# Patient Record
Sex: MALE | Race: WHITE | NOT HISPANIC OR LATINO | Employment: OTHER | URBAN - NONMETROPOLITAN AREA
[De-identification: names, ages, dates, MRNs, and addresses within clinical notes are randomized per-mention and may not be internally consistent; named-entity substitution may affect disease eponyms.]

---

## 2017-01-13 ENCOUNTER — OFFICE VISIT (OUTPATIENT)
Dept: INTERNAL MEDICINE | Facility: CLINIC | Age: 52
End: 2017-01-13

## 2017-01-13 VITALS
SYSTOLIC BLOOD PRESSURE: 134 MMHG | TEMPERATURE: 98.4 F | RESPIRATION RATE: 14 BRPM | HEART RATE: 104 BPM | HEIGHT: 69 IN | WEIGHT: 277 LBS | DIASTOLIC BLOOD PRESSURE: 86 MMHG | OXYGEN SATURATION: 98 % | BODY MASS INDEX: 41.03 KG/M2

## 2017-01-13 DIAGNOSIS — N52.9 ERECTILE DYSFUNCTION, UNSPECIFIED ERECTILE DYSFUNCTION TYPE: ICD-10-CM

## 2017-01-13 DIAGNOSIS — I10 ESSENTIAL HYPERTENSION: ICD-10-CM

## 2017-01-13 DIAGNOSIS — Z23 NEED FOR VACCINATION: ICD-10-CM

## 2017-01-13 DIAGNOSIS — E55.9 VITAMIN D DEFICIENCY DISEASE: ICD-10-CM

## 2017-01-13 DIAGNOSIS — E11.8 TYPE 2 DIABETES MELLITUS WITH COMPLICATION, WITHOUT LONG-TERM CURRENT USE OF INSULIN (HCC): ICD-10-CM

## 2017-01-13 DIAGNOSIS — R00.0 TACHYCARDIA: Primary | ICD-10-CM

## 2017-01-13 DIAGNOSIS — E78.5 HYPERLIPIDEMIA, UNSPECIFIED HYPERLIPIDEMIA TYPE: ICD-10-CM

## 2017-01-13 DIAGNOSIS — Z00.00 HEALTH CARE MAINTENANCE: ICD-10-CM

## 2017-01-13 DIAGNOSIS — E66.01 MORBID OBESITY, UNSPECIFIED OBESITY TYPE (HCC): ICD-10-CM

## 2017-01-13 PROCEDURE — 90732 PPSV23 VACC 2 YRS+ SUBQ/IM: CPT | Performed by: INTERNAL MEDICINE

## 2017-01-13 PROCEDURE — 90715 TDAP VACCINE 7 YRS/> IM: CPT | Performed by: INTERNAL MEDICINE

## 2017-01-13 PROCEDURE — 99396 PREV VISIT EST AGE 40-64: CPT | Performed by: INTERNAL MEDICINE

## 2017-01-13 PROCEDURE — 90471 IMMUNIZATION ADMIN: CPT | Performed by: INTERNAL MEDICINE

## 2017-01-13 PROCEDURE — 90472 IMMUNIZATION ADMIN EACH ADD: CPT | Performed by: INTERNAL MEDICINE

## 2017-01-13 PROCEDURE — 99213 OFFICE O/P EST LOW 20 MIN: CPT | Performed by: INTERNAL MEDICINE

## 2017-01-13 NOTE — MR AVS SNAPSHOT
Myke Marcial   1/13/2017 9:15 AM   Office Visit    Provider:  Chucky Connors MD   Department:  [facility] PRIMARY CARE   Dept Phone:  [phone]                Your Full Care Plan              Your Updated Medication List          This list is accurate as of: 1/13/17 11:38 AM.  Always use your most recent med list.                amLODIPine 5 MG tablet   Commonly known as:  NORVASC   Take 1 tablet by mouth Daily.       atorvastatin 20 MG tablet   Commonly known as:  LIPITOR   Take 1 tablet by mouth daily.       Canagliflozin 100 MG tablet   Commonly known as:  INVOKANA   1 tab daily po       glimepiride 4 MG tablet   Commonly known as:  AMARYL   1 tab bid po       insulin glargine 100 UNIT/ML injection   Commonly known as:  LANTUS   10u q am, increase 1u every 3days until am sugar below 140       Insulin Pen Needle 31G X 5 MM misc   1 each Daily.       Insulin Syringes (Disposable) U-100 1 ML misc   2 each Daily.       lisinopril 40 MG tablet   Commonly known as:  PRINIVIL,ZESTRIL   Take 1 tablet by mouth Daily.       metFORMIN 1000 MG tablet   Commonly known as:  GLUCOPHAGE   Take 1 tablet by mouth 2 (two) times a day with meals.       pramipexole 0.25 MG tablet   Commonly known as:  MIRAPEX   Take 1 tablet by mouth 3 (Three) Times a Day.       SITagliptin 100 MG tablet   Commonly known as:  JANUVIA   Take 1 tablet by mouth Daily.               We Performed the Following     Ambulatory Referral to Gastroenterology     Tdap Vaccine Greater Than or Equal To 8yo IM       You Were Diagnosed With        Codes Comments    Tachycardia    -  Primary ICD-10-CM: R00.0  ICD-9-CM: 785.0     Essential hypertension     ICD-10-CM: I10  ICD-9-CM: 401.9     Hyperlipidemia, unspecified hyperlipidemia type     ICD-10-CM: E78.5  ICD-9-CM: 272.4     Morbid obesity, unspecified obesity type     ICD-10-CM: E66.01  ICD-9-CM: 278.01     Vitamin D deficiency disease     ICD-10-CM:  "E55.9  ICD-9-CM: 268.9     Type 2 diabetes mellitus with complication, without long-term current use of insulin     ICD-10-CM: E11.8  ICD-9-CM: 250.90     Erectile dysfunction, unspecified erectile dysfunction type     ICD-10-CM: N52.9  ICD-9-CM: 607.84     Health care maintenance     ICD-10-CM: Z00.00  ICD-9-CM: V70.0       Medications to be Given to You by a Medical Professional     Due       Frequency    (none) pneumococcal polysaccharide 23-valent (PNEUMOVAX-23) vaccine 0.5 mL  During Hospitalization      Instructions     None    Patient Instructions History      MyChart Signup     Our records indicate that you have an active [employer] account.    You can view your After Visit Summary by going to [email or web address] and logging in with your [employer] username and password.  If you don't have a [employer] username and password but a parent or guardian has access to your record, the parent or guardian should login with their own [employer] username and password and access your record to view the After Visit Summary.    If you have questions, you can email [email or web address] or call [phone] to talk to our [employer] staff.  Remember, [employer] is NOT to be used for urgent needs.  For medical emergencies, dial 911.               Other Info from Your Visit           Your Appointments     Jun 21, 2017  9:00 AM EDT   Follow Up with TAE Han   [facility] SLE[facility] (--)    [address] [facility] [address] [address] [phone]           Arrive 15 minutes prior to appointment.              Allergies     Penicillins  Rash      Reason for Visit     Annual Exam Here for physical      Vital Signs     Blood Pressure Pulse Temperature Respirations Height Weight    134/86 104 98.4 °F (36.9 °C) 14 69\" (175.3 cm) 277 lb (126 kg)    Oxygen Saturation Body Mass Index Smoking Status             98% 40.91 kg/m2 Never Smoker       "   Problems and Diagnoses Noted     Erectile dysfunction    High blood pressure    High cholesterol or triglycerides    Severe obesity    Type 2 diabetes mellitus with manifestations    Vitamin D deficiency disease    Fast heart beat    -  Primary    Health maintenance examination          Treatment Goal(s) as of 1/13/2017 at 11:38 AM     1. Eat more fruits and vegetables (pt-stated)

## 2017-01-13 NOTE — PROGRESS NOTES
Subjective   Myke Marcial is a 51 y.o. male and is here for a comprehensive physical exam. Patient here for annual physical. Patient also has multiple medical problems to be followed up. Below is a level III office visit note.  Blood pressure stable on medicine. 1/11/17 BP monitor registered  while accompanying his mother in the [facility].patient was brought to ER but Telemetry showed only 90. Blood tests all normal. ?monitor error for patient did not have any sx at that time.  Diabetes is 150's based on FSBS medication, lantus 13u daily insulin  Hyperlipidemia stable medication. Patient is overweight. No chest pain no short of breath. Complaining erectile dysfunction    Do you take any herbs or supplements that were not prescribed by a doctor? no  Are you taking calcium supplements? no  Are you taking aspirin daily? no      The following portions of the patient's history were reviewed and updated as appropriate: allergies, current medications, past family history, past medical history, past social history, past surgical history and problem list.      Review of Systems   Constitutional: Negative.    HENT: Negative.    Eyes: Negative.    Respiratory: Negative.    Cardiovascular: Negative.    Gastrointestinal: Negative.    Endocrine: Negative.    Genitourinary: Negative.    Musculoskeletal: Negative.    Skin: Negative.    Allergic/Immunologic: Negative.    Neurological: Negative.    Hematological: Negative.    Psychiatric/Behavioral: Negative.    All other systems reviewed and are negative.        Physical Exam   Constitutional: He is oriented to person, place, and time. He appears well-developed and well-nourished.   HENT:   Head: Normocephalic and atraumatic.   Right Ear: External ear normal.   Left Ear: External ear normal.   Nose: Nose normal.   Mouth/Throat: Oropharynx is clear and moist.   Eyes: Conjunctivae and EOM are normal. Pupils are equal, round, and reactive to light.   Neck:  Normal range of motion. Neck supple. No thyromegaly present.   Cardiovascular: Normal rate, regular rhythm, normal heart sounds and intact distal pulses.    Pulmonary/Chest: Effort normal and breath sounds normal.   Abdominal: Soft. Bowel sounds are normal.   Genitourinary: Rectum normal, prostate normal and penis normal.   Musculoskeletal: Normal range of motion.   Neurological: He is alert and oriented to person, place, and time. He has normal reflexes.   Skin: Skin is warm and dry.   Psychiatric: He has a normal mood and affect. His behavior is normal. Judgment and thought content normal.   Nursing note and vitals reviewed.      All  tests have been reviewed.    Assessment/Plan          1. Patient Counseling:  --Nutrition: Stressed importance of moderation in sodium/caffeine intake, saturated fat and cholesterol, caloric balance, sufficient intake of fresh fruits, vegetables, fiber, calcium and iron.  --Exercise: Stressed the importance of regular exercise.   --Injury prevention: Discussed safety belts, safety helmets, smoke detector, smoking near bedding or upholstery.   --Dental health: Discussed importance of regular tooth brushing, flossing, and dental visits.  --Immunizations reviewed.  --Discussed benefits of screening colonoscopy.  --After hours service discussed with patient    2. Discussed the patient's BMI with him.             Essential hypertension continue lisinopril 40, norvasc 10  Sleep apnea continue CPAP  Type 2 diabetes mellitus, continue amaryl 4mg bid, d/c invokana due to cost, continue lantus 13u and increase by 1u q3d till am sugar below 130  Hyperlipidemia continue  Morbid obesity, diet and weight loss, counseling   Erectile dysfunction, decline med now   vit D low, do lab next  RLS, continue  mirapex  Umbilical hernia watch for now  onyco watch for now  ?tackycardia holter start, obtain rec from [facility]  tdap today and pneumovax today  3 mo after labs

## 2017-01-30 RX ORDER — LISINOPRIL 40 MG/1
TABLET ORAL
Qty: 30 TABLET | Refills: 0 | Status: SHIPPED | OUTPATIENT
Start: 2017-01-30 | End: 2017-08-21 | Stop reason: SDUPTHER

## 2017-02-06 RX ORDER — SITAGLIPTIN 100 MG/1
TABLET, FILM COATED ORAL
Qty: 30 TABLET | Refills: 0 | Status: SHIPPED | OUTPATIENT
Start: 2017-02-06 | End: 2017-07-05 | Stop reason: SDUPTHER

## 2017-02-27 RX ORDER — PRAMIPEXOLE DIHYDROCHLORIDE 0.25 MG/1
TABLET ORAL
Qty: 90 TABLET | Refills: 0 | Status: SHIPPED | OUTPATIENT
Start: 2017-02-27 | End: 2018-05-25 | Stop reason: SDUPTHER

## 2017-02-27 RX ORDER — AMLODIPINE BESYLATE 5 MG/1
TABLET ORAL
Qty: 30 TABLET | Refills: 0 | Status: SHIPPED | OUTPATIENT
Start: 2017-02-27 | End: 2017-07-05 | Stop reason: SDUPTHER

## 2017-02-27 RX ORDER — GLIMEPIRIDE 4 MG/1
TABLET ORAL
Qty: 60 TABLET | Refills: 0 | Status: SHIPPED | OUTPATIENT
Start: 2017-02-27 | End: 2017-08-21 | Stop reason: SDUPTHER

## 2017-06-09 ENCOUNTER — TELEPHONE (OUTPATIENT)
Dept: INTERNAL MEDICINE | Facility: CLINIC | Age: 52
End: 2017-06-09

## 2017-06-09 RX ORDER — INSULIN GLARGINE 100 [IU]/ML
INJECTION, SOLUTION SUBCUTANEOUS
Qty: 3 PEN | Refills: 6 | Status: SHIPPED | OUTPATIENT
Start: 2017-06-09 | End: 2018-10-31 | Stop reason: SDUPTHER

## 2017-07-05 RX ORDER — AMLODIPINE BESYLATE 5 MG/1
5 TABLET ORAL DAILY
Qty: 90 TABLET | Refills: 3 | Status: SHIPPED | OUTPATIENT
Start: 2017-07-05 | End: 2018-01-12 | Stop reason: ALTCHOICE

## 2017-07-05 RX ORDER — PRAMIPEXOLE DIHYDROCHLORIDE 0.25 MG/1
0.25 TABLET ORAL 3 TIMES DAILY
Qty: 90 TABLET | Refills: 3 | Status: SHIPPED | OUTPATIENT
Start: 2017-07-05 | End: 2017-11-15 | Stop reason: SDUPTHER

## 2017-08-21 RX ORDER — GLIMEPIRIDE 4 MG/1
TABLET ORAL
Qty: 60 TABLET | Refills: 0 | Status: SHIPPED | OUTPATIENT
Start: 2017-08-21 | End: 2017-11-29 | Stop reason: SDUPTHER

## 2017-08-21 RX ORDER — LISINOPRIL 40 MG/1
TABLET ORAL
Qty: 30 TABLET | Refills: 0 | Status: SHIPPED | OUTPATIENT
Start: 2017-08-21 | End: 2017-11-20 | Stop reason: SDUPTHER

## 2017-09-11 RX ORDER — ATORVASTATIN CALCIUM 20 MG/1
TABLET, FILM COATED ORAL
Qty: 90 TABLET | Refills: 3 | Status: SHIPPED | OUTPATIENT
Start: 2017-09-11 | End: 2019-06-17 | Stop reason: SDUPTHER

## 2017-11-15 RX ORDER — PRAMIPEXOLE DIHYDROCHLORIDE 0.25 MG/1
TABLET ORAL
Qty: 90 TABLET | Refills: 3 | Status: SHIPPED | OUTPATIENT
Start: 2017-11-15 | End: 2018-07-05 | Stop reason: SDUPTHER

## 2017-11-20 RX ORDER — LISINOPRIL 40 MG/1
40 TABLET ORAL DAILY
Qty: 30 TABLET | Refills: 11 | Status: SHIPPED | OUTPATIENT
Start: 2017-11-20 | End: 2019-02-07 | Stop reason: SINTOL

## 2017-11-29 RX ORDER — GLIMEPIRIDE 4 MG/1
4 TABLET ORAL 2 TIMES DAILY
Qty: 60 TABLET | Refills: 11 | Status: SHIPPED | OUTPATIENT
Start: 2017-11-29 | End: 2018-08-30 | Stop reason: SDUPTHER

## 2018-01-12 ENCOUNTER — OFFICE VISIT (OUTPATIENT)
Dept: INTERNAL MEDICINE | Facility: CLINIC | Age: 53
End: 2018-01-12

## 2018-01-12 VITALS
SYSTOLIC BLOOD PRESSURE: 152 MMHG | WEIGHT: 289 LBS | DIASTOLIC BLOOD PRESSURE: 102 MMHG | HEIGHT: 69 IN | BODY MASS INDEX: 42.8 KG/M2 | HEART RATE: 90 BPM | TEMPERATURE: 98.5 F | OXYGEN SATURATION: 95 %

## 2018-01-12 DIAGNOSIS — E66.01 MORBID OBESITY (HCC): ICD-10-CM

## 2018-01-12 DIAGNOSIS — E78.5 HYPERLIPIDEMIA, UNSPECIFIED HYPERLIPIDEMIA TYPE: ICD-10-CM

## 2018-01-12 DIAGNOSIS — Z79.4 TYPE 2 DIABETES MELLITUS WITH COMPLICATION, WITH LONG-TERM CURRENT USE OF INSULIN (HCC): ICD-10-CM

## 2018-01-12 DIAGNOSIS — R53.83 FATIGUE, UNSPECIFIED TYPE: ICD-10-CM

## 2018-01-12 DIAGNOSIS — Z11.59 ENCOUNTER FOR HEPATITIS C SCREENING TEST FOR LOW RISK PATIENT: ICD-10-CM

## 2018-01-12 DIAGNOSIS — E11.8 TYPE 2 DIABETES MELLITUS WITH COMPLICATION, WITH LONG-TERM CURRENT USE OF INSULIN (HCC): ICD-10-CM

## 2018-01-12 DIAGNOSIS — I10 ESSENTIAL HYPERTENSION: Primary | ICD-10-CM

## 2018-01-12 PROCEDURE — 99214 OFFICE O/P EST MOD 30 MIN: CPT | Performed by: PHYSICIAN ASSISTANT

## 2018-01-12 RX ORDER — AMLODIPINE BESYLATE 10 MG/1
10 TABLET ORAL DAILY
Qty: 30 TABLET | Refills: 5 | Status: SHIPPED | OUTPATIENT
Start: 2018-01-12 | End: 2018-06-07 | Stop reason: SDUPTHER

## 2018-01-12 NOTE — PROGRESS NOTES
"Subjective     Chief Complaint: elevated BP    History of Present Illness     Myke Marcial is a 52 y.o. male presents with elevated BP readings. Reading 150s-160s systolic when checked at work today.  Notes increased stress at work today.  States his face feels hot and appears red, but denies headache, vision changes, SOA, chest pain, leg swelling. Other that a bit of a cough and scratchy throat, he feels fine. Denies any new medications. He's been taking lisinopril 40mg and amlodipine 5mg daily, BP has been well controlled with this regimen in the past. Denies any history of MI, CVA.     He was seen by Dr. Connors in January 2017, however he did not complete his lab work at that time.  No recent labs since then.  States he is sure his A1c will be high as he has not been watching his diet or exercising.    The following portions of the patient's history were reviewed and updated as appropriate: current medications, allergies, PMH.    Review of Systems   Constitutional: Negative for chills, fatigue and fever.   HENT: Positive for congestion and sore throat. Negative for ear pain, rhinorrhea, sinus pain, sinus pressure, tinnitus, trouble swallowing and voice change.    Respiratory: Negative for cough, chest tightness, shortness of breath and wheezing.    Cardiovascular: Negative for chest pain, palpitations and leg swelling.   Gastrointestinal: Negative.    Endocrine: Negative.    Genitourinary: Negative.    Musculoskeletal: Negative.    Allergic/Immunologic: Negative.    Neurological: Negative for dizziness, weakness, light-headedness and headaches.   Psychiatric/Behavioral: Negative.        Objective     Vitals:    01/12/18 1326   BP: (!) 152/102   Pulse: 90   Temp: 98.5 °F (36.9 °C)   SpO2: 95%   Weight: 131 kg (289 lb)   Height: 175.3 cm (69.02\")       Physical Exam   Constitutional: He is oriented to person, place, and time.   Obese male.   HENT:   Head: Normocephalic and atraumatic.   Right Ear: Tympanic " membrane and external ear normal.   Left Ear: Tympanic membrane and external ear normal.   Nose: Rhinorrhea present.   Mouth/Throat: Posterior oropharyngeal erythema present.   Eyes: EOM are normal. Pupils are equal, round, and reactive to light.   Cardiovascular: Normal rate, regular rhythm and normal heart sounds.    Pulmonary/Chest: Effort normal and breath sounds normal.   Musculoskeletal: Normal range of motion. He exhibits no tenderness.   Lymphadenopathy:     He has no cervical adenopathy.   Neurological: He is alert and oriented to person, place, and time.       Assessment/Plan     Diagnoses and all orders for this visit:    Essential hypertension  -     CBC Auto Differential  -     Comprehensive Metabolic Panel  -     amLODIPine (NORVASC) 10 MG tablet; Take 1 tablet by mouth Daily.    Morbid obesity  -     Hemoglobin A1c  -     Lipid Panel    Hyperlipidemia, unspecified hyperlipidemia type  -     Hemoglobin A1c  -     Lipid Panel    Type 2 diabetes mellitus with complication, with long-term current use of insulin  -     Comprehensive Metabolic Panel  -     Hemoglobin A1c  -     Lipid Panel    Encounter for hepatitis C screening test for low risk patient  -     Hepatitis C Antibody    Fatigue, unspecified type  -     CBC Auto Differential  -     TSH      Increase amlodipine to 10 mg daily.  Patient states there is a monitor at work and his wife has a monitor at home, he will keep an eye on his blood pressure over the next few days.  We discussed the risks associated with hypertension, when and where to seek treatment if blood pressure were to increase.  We also discussed dietary changes, reducing salt, healthy options for both his hypertension and diabetes.  Patient will return in one day next week for labs as he is not fasting today.  Follow up on blood pressure and labs in 1 month.    Maribel Hall PA-C  01/12/2018         Please note that portions of this note were completed with a voice recognition  program. Efforts were made to edit dictation, but occasionally words are mistranscribed.

## 2018-02-02 ENCOUNTER — OFFICE VISIT (OUTPATIENT)
Dept: INTERNAL MEDICINE | Facility: CLINIC | Age: 53
End: 2018-02-02

## 2018-02-02 VITALS
DIASTOLIC BLOOD PRESSURE: 100 MMHG | BODY MASS INDEX: 42.36 KG/M2 | SYSTOLIC BLOOD PRESSURE: 152 MMHG | TEMPERATURE: 98.7 F | WEIGHT: 286 LBS | HEIGHT: 69 IN | OXYGEN SATURATION: 95 % | HEART RATE: 91 BPM

## 2018-02-02 DIAGNOSIS — I10 ESSENTIAL HYPERTENSION: Primary | ICD-10-CM

## 2018-02-02 PROCEDURE — 99213 OFFICE O/P EST LOW 20 MIN: CPT | Performed by: PHYSICIAN ASSISTANT

## 2018-02-02 NOTE — PROGRESS NOTES
"Subjective     Chief Complaint: follow up blood pressure    History of Present Illness     Myke Marcial is a 52 y.o. male presenting to follow up on his elevated blood pressure.  At last visit, we increased his amlodipine from 5-10 mg.  Previously his BP had been running in the 150s to 160s systolic.  Patient reports he believes it has been doing better at home with the increased dose however he forgot to take his medication last night and that is why it is elevated again today.  States he has also been trying to work on his diet at home.  Fasting today so he can get lab work completed.  He denies headache, dizziness, vision changes, chest pain, leg swelling, shortness of breath.    The following portions of the patient's history were reviewed and updated as appropriate: current medications, allergies, PMH.    Review of Systems   Respiratory: Negative for cough, chest tightness, shortness of breath and wheezing.    Cardiovascular: Negative for chest pain, palpitations and leg swelling.   Neurological: Negative for dizziness, weakness, light-headedness and headaches.     Objective     Vitals:    02/02/18 0753   BP: 152/100   Pulse: 91   Temp: 98.7 °F (37.1 °C)   SpO2: 95%   Weight: 130 kg (286 lb)   Height: 175.3 cm (69.02\")       Physical Exam   Constitutional: He is oriented to person, place, and time.   Pleasant male.  Obese.   Cardiovascular: Normal rate, regular rhythm and normal heart sounds.    Pulmonary/Chest: Effort normal and breath sounds normal.   Abdominal: Soft. Bowel sounds are normal.   Lymphadenopathy:     He has no cervical adenopathy.   Neurological: He is alert and oriented to person, place, and time.   Skin: Skin is warm and dry.   Psychiatric: He has a normal mood and affect.      Assessment/Plan     Diagnoses and all orders for this visit:    Essential hypertension    Patient will complete lab work today that was ordered at last visit.  We again had a long discussion of the importance " of blood pressure control, especially in a diabetic patient.  We discussed the impact healthy diet and adding in exercise could have on his health long-term.  He will continue to monitor his blood pressure at home and let me know what it has been running at next visit.    Maribel Hall PA-C  02/02/2018         Please note that portions of this note were completed with a voice recognition program. Efforts were made to edit dictation, but occasionally words are mistranscribed.

## 2018-02-03 LAB
ALBUMIN SERPL-MCNC: 4.1 G/DL (ref 3.5–5)
ALBUMIN/GLOB SERPL: 1.3 G/DL (ref 1–2)
ALP SERPL-CCNC: 79 U/L (ref 38–126)
ALT SERPL-CCNC: 39 U/L (ref 13–69)
AST SERPL-CCNC: 21 U/L (ref 15–46)
BASOPHILS # BLD AUTO: 0.06 10*3/MM3 (ref 0–0.2)
BASOPHILS NFR BLD AUTO: 1.1 % (ref 0–2.5)
BILIRUB SERPL-MCNC: 0.5 MG/DL (ref 0.2–1.3)
BUN SERPL-MCNC: 15 MG/DL (ref 7–20)
BUN/CREAT SERPL: 16.7 (ref 6.3–21.9)
CALCIUM SERPL-MCNC: 9.6 MG/DL (ref 8.4–10.2)
CHLORIDE SERPL-SCNC: 102 MMOL/L (ref 98–107)
CHOLEST SERPL-MCNC: 172 MG/DL (ref 0–199)
CO2 SERPL-SCNC: 28 MMOL/L (ref 26–30)
CREAT SERPL-MCNC: 0.9 MG/DL (ref 0.6–1.3)
EOSINOPHIL # BLD AUTO: 0.08 10*3/MM3 (ref 0–0.7)
EOSINOPHIL NFR BLD AUTO: 1.5 % (ref 0–7)
ERYTHROCYTE [DISTWIDTH] IN BLOOD BY AUTOMATED COUNT: 13.1 % (ref 11.5–14.5)
GFR SERPLBLD CREATININE-BSD FMLA CKD-EPI: 108 ML/MIN/1.73
GFR SERPLBLD CREATININE-BSD FMLA CKD-EPI: 89 ML/MIN/1.73
GLOBULIN SER CALC-MCNC: 3.2 GM/DL
GLUCOSE SERPL-MCNC: 230 MG/DL (ref 74–98)
HBA1C MFR BLD: 9.3 %
HCT VFR BLD AUTO: 46 % (ref 42–52)
HCV AB S/CO SERPL IA: <0.1 S/CO RATIO (ref 0–0.9)
HDLC SERPL-MCNC: 35 MG/DL (ref 40–60)
HGB BLD-MCNC: 15.2 G/DL (ref 14–18)
IMM GRANULOCYTES # BLD: 0.02 10*3/MM3 (ref 0–0.06)
IMM GRANULOCYTES NFR BLD: 0.4 % (ref 0–0.6)
LDLC SERPL CALC-MCNC: 59 MG/DL (ref 0–99)
LYMPHOCYTES # BLD AUTO: 1.39 10*3/MM3 (ref 0.6–3.4)
LYMPHOCYTES NFR BLD AUTO: 25.4 % (ref 10–50)
MCH RBC QN AUTO: 30.1 PG (ref 27–31)
MCHC RBC AUTO-ENTMCNC: 33 G/DL (ref 30–37)
MCV RBC AUTO: 91.1 FL (ref 80–94)
MONOCYTES # BLD AUTO: 0.46 10*3/MM3 (ref 0–0.9)
MONOCYTES NFR BLD AUTO: 8.4 % (ref 0–12)
NEUTROPHILS # BLD AUTO: 3.47 10*3/MM3 (ref 2–6.9)
NEUTROPHILS NFR BLD AUTO: 63.2 % (ref 37–80)
NRBC BLD AUTO-RTO: 0 /100 WBC (ref 0–0)
PLATELET # BLD AUTO: 257 10*3/MM3 (ref 130–400)
POTASSIUM SERPL-SCNC: 5.3 MMOL/L (ref 3.5–5.1)
PROT SERPL-MCNC: 7.3 G/DL (ref 6.3–8.2)
RBC # BLD AUTO: 5.05 10*6/MM3 (ref 4.7–6.1)
SODIUM SERPL-SCNC: 141 MMOL/L (ref 137–145)
TRIGL SERPL-MCNC: 388 MG/DL
TSH SERPL DL<=0.005 MIU/L-ACNC: 2.39 MIU/ML (ref 0.47–4.68)
VLDLC SERPL CALC-MCNC: 77.6 MG/DL
WBC # BLD AUTO: 5.48 10*3/MM3 (ref 4.8–10.8)

## 2018-02-23 DIAGNOSIS — I10 ESSENTIAL HYPERTENSION: Primary | ICD-10-CM

## 2018-02-23 RX ORDER — HYDROCHLOROTHIAZIDE 12.5 MG/1
12.5 CAPSULE, GELATIN COATED ORAL DAILY
Qty: 30 CAPSULE | Refills: 3 | Status: SHIPPED | OUTPATIENT
Start: 2018-02-23 | End: 2018-06-07 | Stop reason: SDUPTHER

## 2018-05-23 ENCOUNTER — OFFICE VISIT (OUTPATIENT)
Dept: FAMILY MEDICINE CLINIC | Facility: CLINIC | Age: 53
End: 2018-05-23

## 2018-05-23 ENCOUNTER — TELEPHONE (OUTPATIENT)
Dept: SURGERY | Facility: CLINIC | Age: 53
End: 2018-05-23

## 2018-05-23 DIAGNOSIS — E11.8 TYPE 2 DIABETES MELLITUS WITH COMPLICATION, WITH LONG-TERM CURRENT USE OF INSULIN (HCC): ICD-10-CM

## 2018-05-23 DIAGNOSIS — E55.9 VITAMIN D DEFICIENCY DISEASE: ICD-10-CM

## 2018-05-23 DIAGNOSIS — Z12.11 SCREENING FOR COLON CANCER: ICD-10-CM

## 2018-05-23 DIAGNOSIS — E66.01 MORBID OBESITY (HCC): ICD-10-CM

## 2018-05-23 DIAGNOSIS — G47.33 OSA ON CPAP: ICD-10-CM

## 2018-05-23 DIAGNOSIS — E78.2 MIXED HYPERLIPIDEMIA: ICD-10-CM

## 2018-05-23 DIAGNOSIS — Z79.4 TYPE 2 DIABETES MELLITUS WITH COMPLICATION, WITH LONG-TERM CURRENT USE OF INSULIN (HCC): ICD-10-CM

## 2018-05-23 DIAGNOSIS — Z99.89 OSA ON CPAP: ICD-10-CM

## 2018-05-23 DIAGNOSIS — I10 ESSENTIAL HYPERTENSION: Primary | ICD-10-CM

## 2018-05-23 LAB — GLUCOSE BLDC GLUCOMTR-MCNC: 160 MG/DL (ref 70–130)

## 2018-05-23 PROCEDURE — 82962 GLUCOSE BLOOD TEST: CPT | Performed by: FAMILY MEDICINE

## 2018-05-23 PROCEDURE — 99214 OFFICE O/P EST MOD 30 MIN: CPT | Performed by: FAMILY MEDICINE

## 2018-05-23 NOTE — PROGRESS NOTES
"Subjective   Myke Marcial is a 52 y.o. male.     History of Present Illness  Mr. Marcial presents today as a new patient with his wife to establish care.  He was previously followed at [facility] in [place].    Hypertension: Patient here for follow-up of elevated blood pressure. He is not regularly exercising and is not adherent to low salt diet.  Blood pressure is well controlled at home. Cardiac symptoms none. Patient denies chest pain, claudication, cough, dyspnea, exertional chest pressure/discomfort, irregular heart beat, orthopnea, palpitations, syncope and tachypnea.  Cardiovascular risk factors: diabetes mellitus, dyslipidemia, hypertension, male gender, obesity (BMI >= 30 kg/m2) and sedentary lifestyle. Use of agents associated with hypertension: none. History of target organ damage: none. Reports taking meds as rx'd.    Hyperlipidemia: Patient presents for f/u on hyperlipidemia.  He was tested because of comorbidities.  His last labs reviewed on chart. Cardiac symptoms as above. No focal neuro symptoms. There is a family history of hyperlipidemia. There is not a family history of early ischemia heart disease.    Diabetes Mellitus Type II, Follow-up: Patient here for follow-up of Type 2 diabetes mellitus.  Current symptoms/problems include hyperglycemia and have been unchanged. Symptoms have been present for several months.  He admits he has been having difficulty remembering to take nighttime diabetic medications.  He is currently on Januvia, Glucophage, Invokana, Amaryl as well as basal insulin.    Known diabetic complications: none  Cardiovascular risk factors: as above  Current diabetic medications include 3 oral agents as well as insulin.     Eye exam current (within one year): yes  Weight trend: stable  Prior visit with dietician: no  Current diet: in general, an \"unhealthy\" diet  Current exercise: none regularly    Current monitoring regimen: irregular BG check  Home blood sugar " records: not sure as not checking regularly  Any episodes of hypoglycemia? no    Is He on ACE inhibitor or angiotensin II receptor blocker?   Yes    lisinopril (Zestril)    Sleep Apnea: Patient presents with possible obstructive sleep apnea assoc'd with morbid obesity. Patent has a over 2 year history of symptoms of daytime fatigue and hypertension. Patient generally gets 7 or 8 hours of sleep per night, and states they generally have generally restful sleep. Snoring of moderate severity is present but better with CPAP use. Apneic episodes are not present. Nasal obstruction is not present.  He is compliant with CPAP use.     At 52 and he has not yet had screening colonoscopy.  Was previously scheduled but then he developed a complicated not reschedule.  He is willing to be referred.    Has had previous vit D def; unknown current status. Not on oral replacement.    The following portions of the patient's history were reviewed and updated as appropriate: allergies, current medications, past family history, past medical history, past social history, past surgical history and problem list.    Review of Systems   Constitutional: Negative for appetite change, fatigue, fever and unexpected weight change.   HENT: Negative for congestion, ear pain, hearing loss, nosebleeds, rhinorrhea, sneezing, sore throat, tinnitus and trouble swallowing.    Eyes: Negative for pain, discharge, redness and visual disturbance.   Respiratory: Negative for cough, chest tightness, shortness of breath and wheezing.    Cardiovascular: Negative for chest pain, palpitations and leg swelling.   Gastrointestinal: Negative for abdominal pain, blood in stool, constipation, diarrhea, nausea and vomiting.   Endocrine: Negative for cold intolerance, heat intolerance, polydipsia and polyuria.   Genitourinary: Negative for dysuria, flank pain, frequency, hematuria and urgency.        ED   Musculoskeletal: Positive for back pain and neck pain. Negative for  arthralgias, joint swelling and myalgias.   Skin: Negative for rash and wound.   Neurological: Negative for dizziness, tremors, seizures, syncope, speech difficulty, weakness, numbness and headaches.   Hematological: Negative for adenopathy. Does not bruise/bleed easily.   Psychiatric/Behavioral: Negative for confusion, dysphoric mood, sleep disturbance and suicidal ideas. The patient is not nervous/anxious.        Objective    Vitals:    05/23/18 1033   BP: 128/84   Pulse: 72   SpO2: 98%     Body mass index is 41.2 kg/m².  1    05/23/18  1033   Weight: 127 kg (279 lb)       Physical Exam   Constitutional: He is oriented to person, place, and time. He appears well-developed and well-nourished. He is cooperative. He does not appear ill. No distress.   Morbidly obese   HENT:   Head: Normocephalic and atraumatic.   Right Ear: Tympanic membrane, external ear and ear canal normal.   Left Ear: Tympanic membrane, external ear and ear canal normal.   Nose: Nose normal. No mucosal edema or rhinorrhea.   Mouth/Throat: Oropharynx is clear and moist and mucous membranes are normal. No oral lesions. No posterior oropharyngeal erythema.   Mallampati class 3   Eyes: Conjunctivae, EOM and lids are normal.   Neck: Neck supple. Normal carotid pulses present. Carotid bruit is not present. No thyromegaly present.   Hoarseness noted   Cardiovascular: Normal rate, regular rhythm and intact distal pulses.  Exam reveals distant heart sounds.    Pulmonary/Chest: Effort normal and breath sounds normal.   Abdominal: Soft. He exhibits distension (mild). He exhibits no mass (exam limited by body habitus). Bowel sounds are decreased. There is no hepatosplenomegaly (exam limited by body habitus). There is no tenderness.   Musculoskeletal: Normal range of motion. He exhibits no edema, tenderness or deformity.    Myke had a diabetic foot exam performed today.   During the foot exam he had a monofilament test performed (nromal).  Vascular Status  -  His right foot exhibits no edema. His left foot exhibits no edema.  Skin Integrity  -  His right foot skin is intact. He has callous right foot.  He has no right foot ulcer.His left foot skin is intact.He has callous left foot. He has no left foot ulcer..  Lymphadenopathy:     He has no cervical adenopathy.   Neurological: He is alert and oriented to person, place, and time. He has normal strength. He displays no tremor. Gait normal.   Skin: Skin is warm and dry. No ecchymosis and no rash noted. He is not diaphoretic. No cyanosis. No pallor. Nails show no clubbing.   Psychiatric: He has a normal mood and affect. His speech is normal and behavior is normal. Judgment and thought content normal. Cognition and memory are normal.   Vitals reviewed.    Lab Results   Component Value Date    WBC 5.48 02/02/2018    HGB 15.2 02/02/2018    HCT 46.0 02/02/2018    MCV 91.1 02/02/2018     02/02/2018     Lab Results   Component Value Date    BUN 17 05/23/2018    CREATININE 0.80 05/23/2018    EGFRIFNONA 102 05/23/2018    EGFRIFAFRI 124 05/23/2018    BCR 21.3 05/23/2018    K 4.4 05/23/2018    CO2 25.0 (L) 05/23/2018    CALCIUM 9.6 05/23/2018    PROTENTOTREF 7.6 05/23/2018    ALBUMIN 4.20 05/23/2018    LABIL2 1.2 05/23/2018    AST 30 05/23/2018    ALT 37 05/23/2018     Lab Results   Component Value Date    CHLPL 151 05/23/2018    TRIG 270 (H) 05/23/2018    HDL 37 (L) 05/23/2018    LDL 60 05/23/2018     Lab Results   Component Value Date    TSH 2.390 02/02/2018     Lab Results   Component Value Date    HGBA1C 10.00 05/23/2018     Sleep study performed 9/14/16 per Dr. Nazario confirmed severe obstructive sleep apnea and periodic limb movement disorder.    Assessment/Plan   Myke was seen today for establish care.    Diagnoses and all orders for this visit:    Essential hypertension  -     Comprehensive Metabolic Panel  -     Microalbumin / Creatinine Urine Ratio - Urine, Clean Catch    Mixed hyperlipidemia  -      Comprehensive Metabolic Panel  -     Lipid Panel    Vitamin D deficiency disease    Type 2 diabetes mellitus with complication, with long-term current use of insulin  -     Comprehensive Metabolic Panel  -     Hemoglobin A1c  -     Microalbumin / Creatinine Urine Ratio - Urine, Clean Catch  -     POC Glucose    Screening for colon cancer  -     Ambulatory Referral For Screening Colonoscopy    PRATEEK on CPAP    Morbid obesity    Hypertension generally well controlled.  He is encouraged to continue current medications, follow low-salt diet.    Insulin-dependent diabetes mellitus uncontrolled.  He is on for oral medications as well as basal insulin with fair compliance.  A1c as above with adjustment of treatment as indicated.    Hyperlipidemia with good tolerance of statin and aspirin.  Update lipid profile as above.  Adjust treatment as indicated.    Morbid obesity with associated severe sleep apnea.  He has good compliance with CPAP.  Poor compliance with dietary and exercise recommendations.  He will follow-up with Dr. Nazario as scheduled.    Assess status of vit/min deficiencies and replace as indicated.    Nutrition and activity goals reviewed including: mainly water to drink, limit white flour/processed sugar, high protein, high fiber carbs, good breakfast, working toward 150 mins cardio per week.    Routine f/u in 3 months, sooner as needed/instructed.  Pt is aware of reasons to seek emergent care.  Patient voiced understanding of all instructions and denied further questions.

## 2018-05-24 LAB
ALBUMIN SERPL-MCNC: 4.2 G/DL (ref 3.5–5)
ALBUMIN/CREAT UR: 5.7 MG/G CREAT (ref 0–30)
ALBUMIN/GLOB SERPL: 1.2 G/DL (ref 1–2)
ALP SERPL-CCNC: 80 U/L (ref 38–126)
ALT SERPL-CCNC: 37 U/L (ref 13–69)
AST SERPL-CCNC: 30 U/L (ref 15–46)
BILIRUB SERPL-MCNC: 0.5 MG/DL (ref 0.2–1.3)
BUN SERPL-MCNC: 17 MG/DL (ref 7–20)
BUN/CREAT SERPL: 21.3 (ref 6.3–21.9)
CALCIUM SERPL-MCNC: 9.6 MG/DL (ref 8.4–10.2)
CHLORIDE SERPL-SCNC: 101 MMOL/L (ref 98–107)
CHOLEST SERPL-MCNC: 151 MG/DL (ref 0–199)
CO2 SERPL-SCNC: 25 MMOL/L (ref 26–30)
CREAT SERPL-MCNC: 0.8 MG/DL (ref 0.6–1.3)
CREAT UR-MCNC: 96.7 MG/DL
GFR SERPLBLD CREATININE-BSD FMLA CKD-EPI: 102 ML/MIN/1.73
GFR SERPLBLD CREATININE-BSD FMLA CKD-EPI: 124 ML/MIN/1.73
GLOBULIN SER CALC-MCNC: 3.4 GM/DL
GLUCOSE SERPL-MCNC: 161 MG/DL (ref 74–98)
HBA1C MFR BLD: 10 %
HDLC SERPL-MCNC: 37 MG/DL (ref 40–60)
LDLC SERPL CALC-MCNC: 60 MG/DL (ref 0–99)
MICROALBUMIN UR-MCNC: 5.5 UG/ML
POTASSIUM SERPL-SCNC: 4.4 MMOL/L (ref 3.5–5.1)
PROT SERPL-MCNC: 7.6 G/DL (ref 6.3–8.2)
SODIUM SERPL-SCNC: 139 MMOL/L (ref 137–145)
TRIGL SERPL-MCNC: 270 MG/DL
VLDLC SERPL CALC-MCNC: 54 MG/DL

## 2018-05-25 VITALS
HEIGHT: 69 IN | WEIGHT: 279 LBS | OXYGEN SATURATION: 98 % | HEART RATE: 72 BPM | DIASTOLIC BLOOD PRESSURE: 84 MMHG | BODY MASS INDEX: 41.32 KG/M2 | SYSTOLIC BLOOD PRESSURE: 128 MMHG

## 2018-06-07 DIAGNOSIS — I10 ESSENTIAL HYPERTENSION: ICD-10-CM

## 2018-06-07 RX ORDER — AMLODIPINE BESYLATE 10 MG/1
10 TABLET ORAL DAILY
Qty: 30 TABLET | Refills: 5 | Status: SHIPPED | OUTPATIENT
Start: 2018-06-07 | End: 2018-10-10 | Stop reason: SDUPTHER

## 2018-06-07 RX ORDER — HYDROCHLOROTHIAZIDE 12.5 MG/1
12.5 CAPSULE, GELATIN COATED ORAL DAILY
Qty: 30 CAPSULE | Refills: 3 | Status: SHIPPED | OUTPATIENT
Start: 2018-06-07 | End: 2018-08-23

## 2018-06-14 ENCOUNTER — OFFICE VISIT (OUTPATIENT)
Dept: SURGERY | Facility: CLINIC | Age: 53
End: 2018-06-14

## 2018-06-14 VITALS
DIASTOLIC BLOOD PRESSURE: 90 MMHG | HEIGHT: 69 IN | OXYGEN SATURATION: 97 % | TEMPERATURE: 98.8 F | HEART RATE: 78 BPM | BODY MASS INDEX: 41.47 KG/M2 | WEIGHT: 280 LBS | SYSTOLIC BLOOD PRESSURE: 140 MMHG

## 2018-06-14 DIAGNOSIS — I10 HYPERTENSION, UNSPECIFIED TYPE: ICD-10-CM

## 2018-06-14 DIAGNOSIS — Z12.11 COLON CANCER SCREENING: ICD-10-CM

## 2018-06-14 DIAGNOSIS — G47.37 CENTRAL SLEEP APNEA DUE TO MEDICAL CONDITION: Primary | ICD-10-CM

## 2018-06-14 PROCEDURE — 99243 OFF/OP CNSLTJ NEW/EST LOW 30: CPT | Performed by: SURGERY

## 2018-06-14 RX ORDER — BISACODYL 5 MG/1
10 TABLET, DELAYED RELEASE ORAL 2 TIMES DAILY
Qty: 4 TABLET | Refills: 0 | Status: SHIPPED | OUTPATIENT
Start: 2018-06-14 | End: 2018-06-15

## 2018-06-14 RX ORDER — POLYETHYLENE GLYCOL 1450
1 POWDER (GRAM) MISCELLANEOUS
Qty: 238 G | Refills: 0 | Status: SHIPPED | OUTPATIENT
Start: 2018-06-14 | End: 2018-06-14

## 2018-06-14 NOTE — PROGRESS NOTES
Patient: Myke Marcial    YOB: 1965    Date: 06/14/2018    Primary Care Provider: Jaimie Nathan MD    Chief Complaint   Patient presents with   • Colon Cancer Screening       Subjective .     History of present illness:  I saw the patient in the office today as a consultation for evaluation and treatment of .  Patient has a hx of sleep apnea. He has never had a colonoscopy.  He denies a fm hx of colon cancer, changes in bowel habits or abdominal pain. Patient has never had a colonoscopy. He does have a history significant for obstructive sleep apnea, he does use a BiPAP machine at home, this seems to be fairly severe.  This is the reason for my consultation to see the patient in the office today.    The following portions of the patient's history were reviewed and updated as appropriate: allergies, current medications, past family history, past medical history, past social history, past surgical history and problem list.      Review of Systems   Constitutional: Negative for chills, diaphoresis, fatigue and fever.   HENT: Negative for dental problem, drooling, ear discharge and hearing loss.    Respiratory: Negative for cough, choking, chest tightness and shortness of breath.    Cardiovascular: Negative for chest pain, palpitations and leg swelling.   Gastrointestinal: Positive for blood in stool.   Endocrine: Negative for cold intolerance and heat intolerance.   Genitourinary: Negative for flank pain, frequency and hematuria.   Neurological: Negative for seizures, light-headedness, numbness and headaches.   Psychiatric/Behavioral: Negative for agitation, behavioral problems and confusion.       History:  Past Medical History:   Diagnosis Date   • Diabetes mellitus    • GERD (gastroesophageal reflux disease)    • Hyperlipidemia    • Hypertension    • Sleep apnea, obstructive        Past Surgical History:   Procedure Laterality Date   • MUSCLE REPAIR         Family History   Problem  Relation Age of Onset   • Hypertension Father    • Hyperlipidemia Father    • Sleep apnea Mother    • Cancer Maternal Grandmother    • Cancer Paternal Grandmother        Social History   Substance Use Topics   • Smoking status: Never Smoker   • Smokeless tobacco: Never Used   • Alcohol use No       Allergies:  Allergies   Allergen Reactions   • Penicillins Rash       Medications:    Current Outpatient Prescriptions:   •  amLODIPine (NORVASC) 10 MG tablet, Take 1 tablet by mouth Daily., Disp: 30 tablet, Rfl: 5  •  atorvastatin (LIPITOR) 20 MG tablet, TAKE ONE TABLET BY MOUTH ONCE DAILY, Disp: 90 tablet, Rfl: 3  •  Canagliflozin (INVOKANA) 100 MG tablet, 1 tab daily po, Disp: 30 tablet, Rfl: 3  •  glimepiride (AMARYL) 4 MG tablet, Take 1 tablet by mouth 2 (Two) Times a Day., Disp: 60 tablet, Rfl: 11  •  hydrochlorothiazide (MICROZIDE) 12.5 MG capsule, Take 1 capsule by mouth Daily., Disp: 30 capsule, Rfl: 3  •  Insulin Glargine (BASAGLAR KWIKPEN) 100 UNIT/ML injection pen, 25 u qhs., Disp: 3 pen, Rfl: 6  •  Insulin Pen Needle 31G X 5 MM misc, 1 each Daily., Disp: 100 each, Rfl: 3  •  Insulin Syringes, Disposable, U-100 1 ML misc, 2 each Daily., Disp: 100 each, Rfl: 3  •  lisinopril (PRINIVIL,ZESTRIL) 40 MG tablet, Take 1 tablet by mouth Daily., Disp: 30 tablet, Rfl: 11  •  metFORMIN (GLUCOPHAGE) 1000 MG tablet, TAKE ONE TABLET BY MOUTH TWICE DAILY WITH MEALS, Disp: 60 tablet, Rfl: 11  •  pramipexole (MIRAPEX) 0.25 MG tablet, TAKE ONE TABLET BY MOUTH THREE TIMES DAILY, Disp: 90 tablet, Rfl: 3  •  SITagliptin (JANUVIA) 100 MG tablet, Take 1 tablet by mouth Daily., Disp: 90 tablet, Rfl: 3  •  bisacodyl (DULCOLAX) 5 MG EC tablet, Take 2 tablets by mouth 2 (Two) Times a Day for 1 day., Disp: 4 tablet, Rfl: 0  •  Polyethylene Glycol powder, 1 bottle 1 (One) Time for 1 dose. To be used for bowel prep., Disp: 238 g, Rfl: 0    Objective     Vital Signs:   Vitals:    06/14/18 1231   BP: 140/90   Pulse: 78   Temp: 98.8 °F (37.1  "°C)   TempSrc: Temporal Artery    SpO2: 97%   Weight: 127 kg (280 lb)   Height: 175.3 cm (69\")       Physical Exam:   General Appearance:    Alert, cooperative, in no acute distress   Head:    Normocephalic, without obvious abnormality, atraumatic   Eyes:            Lids and lashes normal, conjunctivae and sclerae normal, no   icterus, no pallor, corneas clear, PERRL   Ears:    Ears appear intact with no abnormalities noted   Throat:   No oral lesions, no thrush, oral mucosa moist   Neck:   No adenopathy, supple, trachea midline, no thyromegaly,  no JVD   Lungs:     Clear to auscultation,respirations regular, even and                  unlabored    Heart:    Regular rhythm and normal rate, normal S1 and S2, no            murmur   Abdomen:     no masses, no organomegaly, soft non-tender, non-distended, no guarding, there is no evidence of tenderness   Extremities:   Moves all extremities well, no edema, no cyanosis, no             redness   Pulses:   Pulses palpable and equal bilaterally   Skin:   No bleeding, bruising or rash   Lymph nodes:   No palpable adenopathy   Neurologic:   Cranial nerves 2 - 12 grossly intact, sensation intact      Results Review:   I reviewed the patient's new clinical results.  I reviewed the patient's new imaging results and agree with the interpretation.  I reviewed the patient's other test results and agree with the interpretation    Review of Systems was reviewed and confirmed as accurate today.    Assessment/Plan :    1. Central sleep apnea due to medical condition    2. Colon cancer screening    3. Hypertension, unspecified type        I recommend a colonoscopy for further evaluation. The procedure was explained as well as the risks which include but are not limited to bleeding, infection, perforation, abdominal pain etc. The patient understands these risks and the procedure and wishes to proceed.     He needed to be seen in the office prior to this screening colonoscopy because of " his significant history of obstructive sleep apnea.    Electronically signed by Musa Berger MD  06/14/18 8:47 AM      Portoins of this note have been scribed for Musa Berger MD by Janessa Lerma. 6/14/2018  1:52 PM.

## 2018-06-18 PROBLEM — Z12.11 COLON CANCER SCREENING: Status: ACTIVE | Noted: 2018-06-18

## 2018-06-18 PROBLEM — G47.37 CENTRAL SLEEP APNEA DUE TO MEDICAL CONDITION: Status: ACTIVE | Noted: 2018-06-18

## 2018-07-05 ENCOUNTER — TELEPHONE (OUTPATIENT)
Dept: FAMILY MEDICINE CLINIC | Facility: CLINIC | Age: 53
End: 2018-07-05

## 2018-07-09 RX ORDER — PRAMIPEXOLE DIHYDROCHLORIDE 0.25 MG/1
0.25 TABLET ORAL 3 TIMES DAILY
Qty: 90 TABLET | Refills: 3 | Status: SHIPPED | OUTPATIENT
Start: 2018-07-09 | End: 2018-12-08 | Stop reason: SDUPTHER

## 2018-07-10 ENCOUNTER — ANESTHESIA (OUTPATIENT)
Dept: GASTROENTEROLOGY | Facility: HOSPITAL | Age: 53
End: 2018-07-10

## 2018-07-10 ENCOUNTER — HOSPITAL ENCOUNTER (OUTPATIENT)
Facility: HOSPITAL | Age: 53
Setting detail: HOSPITAL OUTPATIENT SURGERY
Discharge: HOME | End: 2018-07-10
Attending: SURGERY | Admitting: SURGERY

## 2018-07-10 ENCOUNTER — ANESTHESIA EVENT (OUTPATIENT)
Dept: GASTROENTEROLOGY | Facility: HOSPITAL | Age: 53
End: 2018-07-10

## 2018-07-10 VITALS
TEMPERATURE: 98.1 F | HEART RATE: 74 BPM | SYSTOLIC BLOOD PRESSURE: 145 MMHG | RESPIRATION RATE: 18 BRPM | BODY MASS INDEX: 41.47 KG/M2 | DIASTOLIC BLOOD PRESSURE: 74 MMHG | HEIGHT: 69 IN | WEIGHT: 280 LBS | OXYGEN SATURATION: 98 %

## 2018-07-10 DIAGNOSIS — Z12.11 COLON CANCER SCREENING: ICD-10-CM

## 2018-07-10 DIAGNOSIS — G47.37 CENTRAL SLEEP APNEA DUE TO MEDICAL CONDITION: ICD-10-CM

## 2018-07-10 LAB — GLUCOSE BLDC GLUCOMTR-MCNC: 159 MG/DL (ref 70–130)

## 2018-07-10 PROCEDURE — 82962 GLUCOSE BLOOD TEST: CPT

## 2018-07-10 PROCEDURE — 25010000002 PROPOFOL 200 MG/20ML EMULSION: Performed by: NURSE ANESTHETIST, CERTIFIED REGISTERED

## 2018-07-10 RX ORDER — SODIUM CHLORIDE, SODIUM LACTATE, POTASSIUM CHLORIDE, CALCIUM CHLORIDE 600; 310; 30; 20 MG/100ML; MG/100ML; MG/100ML; MG/100ML
1000 INJECTION, SOLUTION INTRAVENOUS CONTINUOUS
Status: DISCONTINUED | OUTPATIENT
Start: 2018-07-10 | End: 2018-07-10 | Stop reason: HOSPADM

## 2018-07-10 RX ORDER — PROPOFOL 10 MG/ML
INJECTION, EMULSION INTRAVENOUS AS NEEDED
Status: DISCONTINUED | OUTPATIENT
Start: 2018-07-10 | End: 2018-07-10 | Stop reason: SURG

## 2018-07-10 RX ORDER — SODIUM CHLORIDE 0.9 % (FLUSH) 0.9 %
3 SYRINGE (ML) INJECTION AS NEEDED
Status: DISCONTINUED | OUTPATIENT
Start: 2018-07-10 | End: 2018-07-10 | Stop reason: HOSPADM

## 2018-07-10 RX ADMIN — LIDOCAINE HYDROCHLORIDE 60 MG: 20 INJECTION, SOLUTION INTRAVENOUS at 07:38

## 2018-07-10 RX ADMIN — SODIUM CHLORIDE, POTASSIUM CHLORIDE, SODIUM LACTATE AND CALCIUM CHLORIDE 1000 ML: 600; 310; 30; 20 INJECTION, SOLUTION INTRAVENOUS at 06:45

## 2018-07-10 RX ADMIN — PROPOFOL 100 MG: 10 INJECTION, EMULSION INTRAVENOUS at 07:40

## 2018-07-10 RX ADMIN — PROPOFOL 100 MG: 10 INJECTION, EMULSION INTRAVENOUS at 07:43

## 2018-07-10 RX ADMIN — PROPOFOL 100 MG: 10 INJECTION, EMULSION INTRAVENOUS at 07:50

## 2018-07-10 RX ADMIN — PROPOFOL 100 MG: 10 INJECTION, EMULSION INTRAVENOUS at 07:45

## 2018-07-10 NOTE — ANESTHESIA POSTPROCEDURE EVALUATION
Patient: Myke Marcial    Procedure Summary     Date:  07/10/18 Room / Location:  [facility] ENDOSCOPY 3 / [facility] ENDOSCOPY    Anesthesia Start:  0737 Anesthesia Stop:  0758    Procedure:  COLONOSCOPY (N/A ) Diagnosis:       Colon cancer screening      Central sleep apnea due to medical condition      (Colon cancer screening [Z12.11])      (Central sleep apnea due to medical condition [G47.37])    Surgeon:  Musa Berger MD Provider:  Lobito Sal CRNA    Anesthesia Type:  MAC ASA Status:  3          Anesthesia Type: MAC  Last vitals  BP   145/74 (07/10/18 0825)   Temp   98.1 °F (36.7 °C) (07/10/18 0806)   Pulse   74 (07/10/18 0825)   Resp   18 (07/10/18 0825)     SpO2   98 % (07/10/18 0825)     Post Anesthesia Care and Evaluation    Patient location during evaluation: bedside  Patient participation: complete - patient participated  Level of consciousness: awake  Pain score: 0  Pain management: adequate  Airway patency: patent  Anesthetic complications: No anesthetic complications  PONV Status: controlled  Cardiovascular status: acceptable and stable  Respiratory status: acceptable and room air  Hydration status: acceptable

## 2018-07-10 NOTE — ANESTHESIA PREPROCEDURE EVALUATION
Anesthesia Evaluation     Patient summary reviewed and Nursing notes reviewed   no history of anesthetic complications:  NPO Solid Status: > 8 hours  NPO Liquid Status: > 8 hours           Airway   Mallampati: I  TM distance: >3 FB  Neck ROM: full  no difficulty expected  Dental - normal exam     Pulmonary - normal exam   (+) sleep apnea,   Cardiovascular - normal exam    (+) hypertension, hyperlipidemia,       Neuro/Psych- negative ROS  GI/Hepatic/Renal/Endo    (+) morbid obesity, GERD,  diabetes mellitus,     Musculoskeletal (-) negative ROS    Abdominal    Substance History - negative use     OB/GYN negative ob/gyn ROS         Other - negative ROS                       Anesthesia Plan    ASA 3     MAC     intravenous induction   Anesthetic plan and risks discussed with patient.

## 2018-07-10 NOTE — DISCHARGE INSTRUCTIONS
No pushing, pulling, tugging,  heavy lifting, or strenuous activity.  No major decision making, driving, or drinking alcoholic beverages for 24 hours. ( due to the medications you have  received)  Always use good hand hygiene/washing techniques.  .

## 2018-07-13 LAB
LAB AP CASE REPORT: NORMAL
PATH REPORT.FINAL DX SPEC: NORMAL

## 2018-07-31 RX ORDER — SITAGLIPTIN 100 MG/1
TABLET, FILM COATED ORAL
Qty: 30 TABLET | Refills: 11 | Status: SHIPPED | OUTPATIENT
Start: 2018-07-31 | End: 2018-08-01 | Stop reason: SDUPTHER

## 2018-08-23 ENCOUNTER — OFFICE VISIT (OUTPATIENT)
Dept: FAMILY MEDICINE CLINIC | Facility: CLINIC | Age: 53
End: 2018-08-23

## 2018-08-23 VITALS
DIASTOLIC BLOOD PRESSURE: 96 MMHG | OXYGEN SATURATION: 96 % | BODY MASS INDEX: 42.06 KG/M2 | HEIGHT: 69 IN | HEART RATE: 89 BPM | SYSTOLIC BLOOD PRESSURE: 142 MMHG | WEIGHT: 284 LBS

## 2018-08-23 DIAGNOSIS — Z99.89 OSA ON CPAP: ICD-10-CM

## 2018-08-23 DIAGNOSIS — G47.33 OSA ON CPAP: ICD-10-CM

## 2018-08-23 DIAGNOSIS — E78.2 MIXED HYPERLIPIDEMIA: ICD-10-CM

## 2018-08-23 DIAGNOSIS — Z23 NEED FOR VACCINATION WITH 13-POLYVALENT PNEUMOCOCCAL CONJUGATE VACCINE: ICD-10-CM

## 2018-08-23 DIAGNOSIS — E11.8 TYPE 2 DIABETES MELLITUS WITH COMPLICATION, WITH LONG-TERM CURRENT USE OF INSULIN (HCC): Primary | ICD-10-CM

## 2018-08-23 DIAGNOSIS — Z79.4 TYPE 2 DIABETES MELLITUS WITH COMPLICATION, WITH LONG-TERM CURRENT USE OF INSULIN (HCC): Primary | ICD-10-CM

## 2018-08-23 DIAGNOSIS — I10 ESSENTIAL HYPERTENSION: ICD-10-CM

## 2018-08-23 LAB
EXPIRATION DATE: ABNORMAL
GLUCOSE BLDC GLUCOMTR-MCNC: 186 MG/DL (ref 70–130)
HBA1C MFR BLD: 10.8 %
Lab: ABNORMAL

## 2018-08-23 PROCEDURE — 99214 OFFICE O/P EST MOD 30 MIN: CPT | Performed by: FAMILY MEDICINE

## 2018-08-23 PROCEDURE — 90670 PCV13 VACCINE IM: CPT | Performed by: FAMILY MEDICINE

## 2018-08-23 PROCEDURE — 90471 IMMUNIZATION ADMIN: CPT | Performed by: FAMILY MEDICINE

## 2018-08-23 PROCEDURE — 82947 ASSAY GLUCOSE BLOOD QUANT: CPT | Performed by: FAMILY MEDICINE

## 2018-08-23 PROCEDURE — 83036 HEMOGLOBIN GLYCOSYLATED A1C: CPT | Performed by: FAMILY MEDICINE

## 2018-08-23 RX ORDER — CHLORTHALIDONE 25 MG/1
25 TABLET ORAL DAILY
Qty: 30 TABLET | Refills: 5 | Status: SHIPPED | OUTPATIENT
Start: 2018-08-23 | End: 2019-04-10 | Stop reason: SDUPTHER

## 2018-08-23 NOTE — PROGRESS NOTES
"Subjective   Myke Marcial is a 52 y.o. male.     History of Present Illness  Mr. Marcial presents today for routine f/u.   Hypertension: Patient here for follow-up of elevated blood pressure. He is not regularly exercising and is not adherent to low salt diet.  Blood pressure is not checked at home. Cardiac symptoms none. Patient denies chest pain, claudication, cough, dyspnea, exertional chest pressure/discomfort, irregular heart beat, orthopnea, palpitations, syncope and tachypnea.  Cardiovascular risk factors: diabetes mellitus, dyslipidemia, hypertension, male gender, obesity (BMI >= 30 kg/m2) and sedentary lifestyle. Use of agents associated with hypertension: none. History of target organ damage: none. Reports taking meds as rx'd.  He is currently on Norvasc, HCTZ 12.5 mg, lisinopril.     Hyperlipidemia: Patient presents for f/u on hyperlipidemia.  He was tested because of comorbidities.  His last labs reviewed on chart. Cardiac symptoms as above. No focal neuro symptoms. There is a family history of hyperlipidemia. There is not a family history of early ischemia heart disease.  He is taking Lipitor and aspirin as prescribed.  Denies claudication or new focal neurological symptoms.     Diabetes Mellitus Type II, Follow-up: Patient here for follow-up of Type 2 diabetes mellitus.  Current symptoms/problems include hyperglycemia and have been unchanged. Symptoms have been present for several months.  He admits he has one dose of his twice-daily dosing Amaryl and metformin.  He does continue to take basically are as prescribed and Januvia once daily also.  He was not able to afford Invokana.       Known diabetic complications: none  Cardiovascular risk factors: as above  Current diabetic medications include 3 oral agents as well as insulin.      Eye exam current (within one year): no  Weight trend: Increased  Prior visit with dietician: no  Current diet: in general, an \"unhealthy\" diet  Current exercise: " none regularly     Current monitoring regimen: He is checking blood glucose very irregularly  Home blood sugar records: not sure as not checking regularly  Any episodes of hypoglycemia? no     Is He on ACE inhibitor or angiotensin II receptor blocker?   Yes    lisinopril (Zestril)     Sleep Apnea: Patient presents with obstructive sleep apnea assoc'd with morbid obesity. Patent has a over 2 year history of symptoms of daytime fatigue and hypertension. Patient generally gets 7 or 8 hours of sleep per night, and states they are generally restful sleep. Snoring of moderate severity is present but better with CPAP use. Apneic episodes are not present. Nasal obstruction is not present.  He is compliant with CPAP use.      Since last visit he has undergone screening colonoscopy per Dr. Berger. He had benign colon polyps removed.    The following portions of the patient's history were reviewed and updated as appropriate: allergies, current medications, past family history, past medical history, past social history, past surgical history and problem list.    Review of Systems   Constitutional: Positive for fatigue and unexpected weight change. Negative for appetite change and fever.   HENT: Negative for congestion, ear pain, hearing loss, nosebleeds, rhinorrhea, sneezing, sore throat, tinnitus and trouble swallowing.    Eyes: Negative for pain, discharge, redness and visual disturbance.   Respiratory: Negative for cough, chest tightness, shortness of breath and wheezing.    Cardiovascular: Negative for chest pain, palpitations and leg swelling.   Gastrointestinal: Negative for abdominal pain, blood in stool, constipation, diarrhea, nausea and vomiting.   Endocrine: Negative for cold intolerance, heat intolerance, polydipsia and polyuria.   Genitourinary: Negative for dysuria, flank pain, frequency, hematuria and urgency.        ED   Musculoskeletal: Positive for back pain and neck pain. Negative for arthralgias, joint  swelling and myalgias.   Skin: Negative for rash and wound.   Neurological: Negative for dizziness, tremors, seizures, syncope, speech difficulty, weakness, numbness and headaches.   Hematological: Negative for adenopathy. Does not bruise/bleed easily.   Psychiatric/Behavioral: Negative for confusion, dysphoric mood, sleep disturbance and suicidal ideas. The patient is not nervous/anxious.        Objective    Vitals:    08/23/18 1041   BP: 142/96   Pulse: 89   SpO2: 96%     Body mass index is 41.94 kg/m².  1    08/23/18  1041   Weight: 129 kg (284 lb)       Physical Exam   Constitutional: He is oriented to person, place, and time. He appears well-developed and well-nourished. He is cooperative. He does not appear ill. No distress.   Morbidly obese   HENT:   Head: Normocephalic and atraumatic.   Mouth/Throat: Mucous membranes are normal. Mucous membranes are not dry.   Mallampati class 3   Eyes: Conjunctivae and lids are normal.   Neck: Phonation normal. Neck supple. Normal carotid pulses present. Carotid bruit is not present. No thyromegaly present.   Cardiovascular: Normal rate, regular rhythm, normal heart sounds and intact distal pulses.  Exam reveals no gallop.    No murmur heard.  Pulmonary/Chest: Effort normal and breath sounds normal.   Musculoskeletal: He exhibits no edema, tenderness or deformity.     Vascular Status -  His right foot exhibits no edema. His left foot exhibits no edema.  Skin Integrity  -  His right foot skin is intact. He has callous right foot.  He has no right foot ulcer.His left foot skin is intact.He has callous left foot. He has no left foot ulcer..  Lymphadenopathy:     He has no cervical adenopathy.   Neurological: He is alert and oriented to person, place, and time. He displays no tremor. Gait normal.   Skin: Skin is warm and dry. No ecchymosis and no rash noted. No cyanosis. No pallor. Nails show no clubbing.   Psychiatric: He has a normal mood and affect. His behavior is normal.  Cognition and memory are normal.   Nursing note and vitals reviewed.    Recent Results (from the past 24 hour(s))   POC Glycated Hemoglobin, Total    Collection Time: 08/23/18 10:59 AM   Result Value Ref Range    Hemoglobin A1C 10.8 %    Lot Number [id number]     Expiration Date 5/20    POC Glucose    Collection Time: 08/23/18 10:59 AM   Result Value Ref Range    Glucose 186 (A) 70 - 130 mg/dL     Lab Results   Component Value Date    WBC 5.48 02/02/2018    HGB 15.2 02/02/2018    HCT 46.0 02/02/2018    MCV 91.1 02/02/2018     02/02/2018     Lab Results   Component Value Date    BUN 17 05/23/2018    CREATININE 0.80 05/23/2018    EGFRIFNONA 102 05/23/2018    EGFRIFAFRI 124 05/23/2018    BCR 21.3 05/23/2018    K 4.4 05/23/2018    CO2 25.0 (L) 05/23/2018    CALCIUM 9.6 05/23/2018    PROTENTOTREF 7.6 05/23/2018    ALBUMIN 4.20 05/23/2018    LABIL2 1.2 05/23/2018    AST 30 05/23/2018    ALT 37 05/23/2018     Lab Results   Component Value Date    CHLPL 151 05/23/2018    TRIG 270 (H) 05/23/2018    HDL 37 (L) 05/23/2018    LDL 60 05/23/2018     Lab Results   Component Value Date    TSH 2.390 02/02/2018     Assessment/Plan   Myke was seen today for anxiety, depression, hyperlipidemia, hypertension and diabetes.    Diagnoses and all orders for this visit:    Type 2 diabetes mellitus with complication, with long-term current use of insulin (CMS/[facility])  -     POC Glycated Hemoglobin, Total  -     POC Glucose  -     pneumococcal conj. 13-valent (PREVNAR-13) vaccine 0.5 mL; Inject 0.5 mL into the appropriate muscle as directed by prescriber 1 (One) Time.    Essential hypertension    Mixed hyperlipidemia    PRATEEK on CPAP    Need for vaccination with 13-polyvalent pneumococcal conjugate vaccine  -     pneumococcal conj. 13-valent (PREVNAR-13) vaccine 0.5 mL; Inject 0.5 mL into the appropriate muscle as directed by prescriber 1 (One) Time.    Other orders  -     chlorthalidone (HYGROTON) 25 MG tablet; Take 1 tablet by mouth  Daily.    Type 2 diabetes mellitus with basal insulin dependence, uncontrolled.  He is strongly encouraged to take his Amaryl twice daily as prescribed, metformin twice daily as prescribed, continue Januvia as well as basal insulin.  He voices understanding of the importance of medication compliance.  He is also encouraged to follow diabetic appropriate diet and exercise daily.  Is also reminded to schedule yearly diabetic eye exam.  He declines assistance with referral.    Hypertension not at goal.  Continue Norvasc 10 mg daily, lisinopril 40 mg daily.  Discontinue low-dose HCTZ and replace with chlorthalidone 25 mg daily.  Last potassium above 4.  He is encouraged to check daily record and report any blood pressure readings persistently above 140/90.    Hyperlipidemia with LDL at goal.  Continue Lipitor 20 mg daily, 1 mg aspirin.  He is encouraged to follow a heart healthy diet.    PRATEEK with good CPAP compliance.  He is encouraged to continue.    Pneumococcal vaccination updated today.    He will follow-up per routine in 3 months, sooner as needed.    Patient was encouraged to keep me informed of any acute changes, lack of improvement, or any new concerning symptoms.  Pt is aware of reasons to seek emergent care.  Patient voiced understanding of all instructions and denied further questions.        Please note that portions of this note may have been completed with a voice recognition program. Efforts were made to edit the dictations, but occasionally words are mistranscribed.

## 2018-08-24 PROBLEM — Z12.11 COLON CANCER SCREENING: Status: RESOLVED | Noted: 2018-06-18 | Resolved: 2018-08-24

## 2018-08-24 PROBLEM — Z86.010 HISTORY OF COLON POLYPS: Status: ACTIVE | Noted: 2018-08-24

## 2018-08-24 PROBLEM — Z86.0100 HISTORY OF COLON POLYPS: Status: ACTIVE | Noted: 2018-08-24

## 2018-08-30 RX ORDER — GLIMEPIRIDE 4 MG/1
4 TABLET ORAL
Qty: 60 TABLET | Refills: 5 | Status: SHIPPED | OUTPATIENT
Start: 2018-08-30 | End: 2020-06-10 | Stop reason: SDUPTHER

## 2018-09-21 ENCOUNTER — OFFICE VISIT (OUTPATIENT)
Dept: FAMILY MEDICINE CLINIC | Facility: CLINIC | Age: 53
End: 2018-09-21

## 2018-09-21 VITALS
DIASTOLIC BLOOD PRESSURE: 78 MMHG | HEART RATE: 94 BPM | WEIGHT: 279 LBS | SYSTOLIC BLOOD PRESSURE: 118 MMHG | OXYGEN SATURATION: 96 % | BODY MASS INDEX: 41.2 KG/M2

## 2018-09-21 DIAGNOSIS — Z79.4 TYPE 2 DIABETES MELLITUS WITH COMPLICATION, WITH LONG-TERM CURRENT USE OF INSULIN (HCC): ICD-10-CM

## 2018-09-21 DIAGNOSIS — L03.031 CELLULITIS OF TOE OF RIGHT FOOT: ICD-10-CM

## 2018-09-21 DIAGNOSIS — E11.621 DIABETIC ULCER OF RIGHT MIDFOOT ASSOCIATED WITH TYPE 2 DIABETES MELLITUS, LIMITED TO BREAKDOWN OF SKIN (HCC): Primary | ICD-10-CM

## 2018-09-21 DIAGNOSIS — L97.411 DIABETIC ULCER OF RIGHT MIDFOOT ASSOCIATED WITH TYPE 2 DIABETES MELLITUS, LIMITED TO BREAKDOWN OF SKIN (HCC): Primary | ICD-10-CM

## 2018-09-21 DIAGNOSIS — E11.8 TYPE 2 DIABETES MELLITUS WITH COMPLICATION, WITH LONG-TERM CURRENT USE OF INSULIN (HCC): ICD-10-CM

## 2018-09-21 LAB — GLUCOSE BLDC GLUCOMTR-MCNC: 197 MG/DL (ref 70–130)

## 2018-09-21 PROCEDURE — 99214 OFFICE O/P EST MOD 30 MIN: CPT | Performed by: FAMILY MEDICINE

## 2018-09-21 PROCEDURE — 82962 GLUCOSE BLOOD TEST: CPT | Performed by: FAMILY MEDICINE

## 2018-09-21 NOTE — PROGRESS NOTES
"Subjective   Myke Marcial is a 52 y.o. male.     History of Present Illness  Mr. Marcial presents today for ER follow-up.  He was seen at [facility] ER on 9/20/18 with complaint of a foot sore.  He is an insulin-dependent diabetic which has been recently poorly controlled.  Blood sugars generally run in the 300s.  He is noncompliant diabetic appropriate diet and routine exercise.  Reports he is taking medications as prescribed.  He denies a history of diabetic ulcer.  Lesion is on right foot just under \"joint of big toe\".  Began as a thick callus, then formed \"blood blister\".  Wife removed excess skin and performed wound care at home.  Unfortunately lesion enlarged.  He denies fever, states lesion is not acutely painful denies purulent drainage.  He has been performing wound care as instructed by the ER with Mipelex dressing since that time.  Unfortunately he has not been able to rest and elevate the extremity as he is self-employed and feels he \"cannot afford to take time off\".    The following portions of the patient's history were reviewed and updated as appropriate: allergies, current medications, past family history, past medical history, past social history, past surgical history and problem list.    Review of Systems   Constitutional: Positive for fatigue. Negative for appetite change and fever.   HENT: Negative for congestion, ear pain, hearing loss, nosebleeds, rhinorrhea, sneezing, sore throat, tinnitus and trouble swallowing.    Eyes: Negative for pain, discharge, redness and visual disturbance.   Respiratory: Negative for cough, chest tightness, shortness of breath and wheezing.    Cardiovascular: Negative for chest pain, palpitations and leg swelling.   Gastrointestinal: Negative for abdominal pain, blood in stool, constipation, diarrhea, nausea and vomiting.   Endocrine: Negative for cold intolerance, heat intolerance, polydipsia and polyuria.   Genitourinary: Negative for dysuria, flank " pain, frequency, hematuria and urgency.        ED   Musculoskeletal: Positive for back pain and neck pain. Negative for arthralgias, joint swelling and myalgias.   Skin: Positive for wound. Negative for rash.   Neurological: Negative for dizziness, tremors, seizures, syncope, speech difficulty, weakness, numbness and headaches.   Hematological: Negative for adenopathy. Does not bruise/bleed easily.   Psychiatric/Behavioral: Negative for confusion, dysphoric mood, sleep disturbance and suicidal ideas. The patient is not nervous/anxious.        Objective    Vitals:    09/21/18 1112   BP: 118/78   Pulse: 94   SpO2: 96%     Body mass index is 41.2 kg/m².  1    09/21/18  1112   Weight: 127 kg (279 lb)       Physical Exam   Constitutional: He is oriented to person, place, and time. He appears well-developed and well-nourished. He is cooperative. He does not appear ill. No distress.   morbidly obese   HENT:   Mouth/Throat: Mucous membranes are normal. Mucous membranes are not dry.   Cardiovascular: Normal rate, regular rhythm and intact distal pulses.  Exam reveals distant heart sounds.    Pulmonary/Chest: Effort normal and breath sounds normal.     Vascular Status -  His right foot exhibits no edema. His left foot exhibits no edema.  Skin Integrity  -  He has right foot ulcer (as demarcated; Grade 1 Stage A)..     Neurological: He is alert and oriented to person, place, and time. He has normal strength. A sensory deficit (soles of feet) is present. Gait (avoiding walking on ball right foot) abnormal.   Skin: Skin is warm and dry. Capillary refill takes less than 2 seconds.   See above foot exam   Psychiatric: He has a normal mood and affect. His behavior is normal. Cognition and memory are normal.   Nursing note and vitals reviewed.    Recent Results (from the past 72 hour(s))   POC Glucose    Collection Time: 09/21/18 11:46 AM   Result Value Ref Range    Glucose 197 (A) 70 - 130 mg/dL     Assessment/Plan   Myke jose daniel  seen today for foot injury.    Diagnoses and all orders for this visit:    Diabetic ulcer of right midfoot associated with type 2 diabetes mellitus, limited to breakdown of skin (CMS/[facility])    Type 2 diabetes mellitus with complication, with long-term current use of insulin (CMS/[facility])  -     POC Glucose    Cellulitis of toe of right foot  -     sulfamethoxazole-trimethoprim (BACTRIM DS) 800-160 MG per tablet; Take 1 tablet by mouth 2 (Two) Times a Day for 10 days.    Wound care reviewed including appropriate cleansing and dressing of diabetic ulcer.  He is encouraged to elevate the extremity as much as possible and avoid ill fitting shoes, prolonged walking.    He is to return Monday for recheck.  Will refer to wound care clinic if necessary.    He is encouraged to up titrate his basal insulin as necessary to keep fasting morning blood sugar less than 120.    Patient was encouraged to keep me informed of any acute changes, lack of improvement, or any new concerning symptoms.  Pt is aware of reasons to seek emergent care.  Patient voiced understanding of all instructions and denied further questions.

## 2018-09-23 RX ORDER — SULFAMETHOXAZOLE AND TRIMETHOPRIM 800; 160 MG/1; MG/1
1 TABLET ORAL 2 TIMES DAILY
Qty: 20 TABLET | Refills: 0 | Status: SHIPPED | OUTPATIENT
Start: 2018-09-23 | End: 2018-10-03

## 2018-09-24 ENCOUNTER — OFFICE VISIT (OUTPATIENT)
Dept: FAMILY MEDICINE CLINIC | Facility: CLINIC | Age: 53
End: 2018-09-24

## 2018-09-24 VITALS
DIASTOLIC BLOOD PRESSURE: 72 MMHG | WEIGHT: 286 LBS | OXYGEN SATURATION: 99 % | SYSTOLIC BLOOD PRESSURE: 112 MMHG | HEIGHT: 69 IN | HEART RATE: 100 BPM | BODY MASS INDEX: 42.36 KG/M2

## 2018-09-24 DIAGNOSIS — L97.511 DIABETIC ULCER OF OTHER PART OF RIGHT FOOT ASSOCIATED WITH TYPE 2 DIABETES MELLITUS, LIMITED TO BREAKDOWN OF SKIN (HCC): Primary | ICD-10-CM

## 2018-09-24 DIAGNOSIS — E11.621 DIABETIC ULCER OF OTHER PART OF RIGHT FOOT ASSOCIATED WITH TYPE 2 DIABETES MELLITUS, LIMITED TO BREAKDOWN OF SKIN (HCC): Primary | ICD-10-CM

## 2018-09-24 PROCEDURE — 99213 OFFICE O/P EST LOW 20 MIN: CPT | Performed by: FAMILY MEDICINE

## 2018-09-24 NOTE — PROGRESS NOTES
"Subjective   Myke Marcial is a 52 y.o. male.     History of Present Illness  Mr. Marcial presents today for f/u on diabetic foot ulcer. Was seen [facility] ER on 9/20/18 with complaint of a foot sore. Seen here in clinic 9/21/18.  He is an insulin-dependent diabetic which had been recently poorly controlled.  Blood sugars generally run in the 300s. He is noncompliant with diabetic diet and routine exercise. Reports he is taking medications as prescribed.  He denies a previous history of diabetic ulcer.  Lesion is on right foot just under \"joint of big toe\".  Began as a thick callus, then formed \"blood blister\".  Wife removed excess skin and performed wound care at home.  Unfortunately lesion enlarged.  He denies fever, states lesion is not acutely painful denies purulent drainage.  He had been performing wound care as instructed by the ER with Mipelex dressing.  Unfortunately he had not been able to rest and elevate the extremity as he is self-employed and feels he \"cannot afford to take time off\".    At time of visit of Friday, he was instructed in appropriate wound care, advised to keep foot elevated, without prolonged friction/pressure. Has done so over the weekend. Has noticed significant improvement. No further \"red, warm, swollen\". No purulent drainage.    The following portions of the patient's history were reviewed and updated as appropriate: allergies, current medications, past family history, past medical history, past social history, past surgical history and problem list.    Review of Systems   Constitutional: Positive for fatigue. Negative for appetite change and fever.   HENT: Negative for congestion, ear pain, hearing loss, nosebleeds, rhinorrhea, sneezing, sore throat, tinnitus and trouble swallowing.    Eyes: Negative for pain, discharge, redness and visual disturbance.   Respiratory: Negative for cough, chest tightness, shortness of breath and wheezing.    Cardiovascular: Negative for chest " pain, palpitations and leg swelling.   Gastrointestinal: Negative for abdominal pain, blood in stool, constipation, diarrhea, nausea and vomiting.   Endocrine: Negative for cold intolerance, heat intolerance, polydipsia and polyuria.   Genitourinary: Negative for dysuria, flank pain, frequency, hematuria and urgency.        ED   Musculoskeletal: Positive for back pain and neck pain. Negative for arthralgias, joint swelling and myalgias.   Skin: Positive for wound. Negative for rash.   Neurological: Negative for dizziness, tremors, seizures, syncope, speech difficulty, weakness, numbness and headaches.   Hematological: Negative for adenopathy. Does not bruise/bleed easily.   Psychiatric/Behavioral: Negative for confusion, dysphoric mood, sleep disturbance and suicidal ideas. The patient is not nervous/anxious.        Objective    Vitals:    09/24/18 0831   BP: 112/72   Pulse: 100   SpO2: 99%     Body mass index is 42.23 kg/m².  1    09/24/18  0831   Weight: 130 kg (286 lb)       Physical Exam   Constitutional: He is oriented to person, place, and time. He appears well-developed and well-nourished. He is cooperative. He does not appear ill. No distress.   morbidly obese   HENT:   Mouth/Throat: Mucous membranes are normal. Mucous membranes are not dry.   Cardiovascular: Normal rate, regular rhythm and intact distal pulses.  Exam reveals distant heart sounds.    Pulmonary/Chest: Effort normal and breath sounds normal.     Vascular Status -  His right foot exhibits no edema. His left foot exhibits no edema.  Skin Integrity  -  He has right foot ulcer (as demarcated; Grade 1 Stage A)..     Neurological: He is alert and oriented to person, place, and time. He has normal strength. A sensory deficit (soles of feet) is present. Gait (avoiding walking on ball right foot) abnormal.   Skin: Skin is warm and dry. Capillary refill takes less than 2 seconds.   See above foot exam   Psychiatric: He has a normal mood and affect. His  behavior is normal. Cognition and memory are normal.   Nursing note and vitals reviewed.      Assessment/Plan   Myke was seen today for wound check.    Diagnoses and all orders for this visit:    Diabetic ulcer of other part of right foot associated with type 2 diabetes mellitus, limited to breakdown of skin (CMS/HCC)    Other orders  -     Insulin Pen Needle 33G X 5 MM misc; 1 each Daily.    Healing Stage I diabetic foot ulcer. Encouraged to continue appropriate wound care, elevate when able, use appropriate foot wear, etc. Recheck in 2 weeks unless it is not continuing to improve or signs of cellulitis developed. Do not feel he needs abx at this time.  Patient was encouraged to keep me informed of any acute changes, lack of improvement, or any new concerning symptoms.  Pt is aware of reasons to seek emergent care.  Patient voiced understanding of all instructions and denied further questions.

## 2018-10-03 RX ORDER — INSULIN GLARGINE 100 [IU]/ML
INJECTION, SOLUTION SUBCUTANEOUS
Qty: 1 EACH | Refills: 12 | COMMUNITY
Start: 2018-10-03 | End: 2018-10-10

## 2018-10-10 ENCOUNTER — OFFICE VISIT (OUTPATIENT)
Dept: FAMILY MEDICINE CLINIC | Facility: CLINIC | Age: 53
End: 2018-10-10

## 2018-10-10 VITALS
SYSTOLIC BLOOD PRESSURE: 118 MMHG | DIASTOLIC BLOOD PRESSURE: 74 MMHG | HEIGHT: 69 IN | HEART RATE: 76 BPM | OXYGEN SATURATION: 98 % | BODY MASS INDEX: 42.36 KG/M2 | WEIGHT: 286 LBS

## 2018-10-10 DIAGNOSIS — E11.621 DIABETIC ULCER OF RIGHT MIDFOOT ASSOCIATED WITH TYPE 2 DIABETES MELLITUS, LIMITED TO BREAKDOWN OF SKIN (HCC): ICD-10-CM

## 2018-10-10 DIAGNOSIS — E11.8 TYPE 2 DIABETES MELLITUS WITH COMPLICATION, WITH LONG-TERM CURRENT USE OF INSULIN (HCC): Primary | ICD-10-CM

## 2018-10-10 DIAGNOSIS — Z79.4 TYPE 2 DIABETES MELLITUS WITH COMPLICATION, WITH LONG-TERM CURRENT USE OF INSULIN (HCC): Primary | ICD-10-CM

## 2018-10-10 DIAGNOSIS — I10 ESSENTIAL HYPERTENSION: ICD-10-CM

## 2018-10-10 DIAGNOSIS — L97.411 DIABETIC ULCER OF RIGHT MIDFOOT ASSOCIATED WITH TYPE 2 DIABETES MELLITUS, LIMITED TO BREAKDOWN OF SKIN (HCC): ICD-10-CM

## 2018-10-10 PROCEDURE — 99214 OFFICE O/P EST MOD 30 MIN: CPT | Performed by: FAMILY MEDICINE

## 2018-10-10 RX ORDER — AMLODIPINE BESYLATE 10 MG/1
10 TABLET ORAL DAILY
Qty: 30 TABLET | Refills: 5 | Status: SHIPPED | OUTPATIENT
Start: 2018-10-10 | End: 2019-06-17 | Stop reason: SDUPTHER

## 2018-10-10 RX ORDER — FLURBIPROFEN SODIUM 0.3 MG/ML
SOLUTION/ DROPS OPHTHALMIC
COMMUNITY
Start: 2018-09-25 | End: 2021-08-11

## 2018-10-10 RX ORDER — SYRING-NEEDL,DISP,INSUL,0.3 ML 31GX15/64"
SYRINGE, EMPTY DISPOSABLE MISCELLANEOUS
COMMUNITY
Start: 2018-10-03 | End: 2021-08-11

## 2018-10-10 NOTE — PROGRESS NOTES
"Subjective   Myke Marcial is a 52 y.o. male.     History of Present Illness  Mr. Marcial presents today for f/u on diabetic foot ulcer. Was seen [facility] ER on 9/20/18 with complaint of a foot sore. Seen here in clinic 9/21/18 and subsequently 9/24.  He is an insulin-dependent diabetic which had been poorly controlled.  Blood sugars had been running in the 300s. He is noncompliant with diabetic diet and routine exercise. At last visit was instructed to uptitrate basal insulin. He has done so and fasting blood glucose now <150. No blood sugars above 200. Reports he is taking medications as prescribed.  He denies a previous history of diabetic ulcer.  Lesion is on right foot just under \"joint of big toe\".  He denies fever, states lesion is not acutely painful and denies purulent drainage.  He had been performing wound care and using standard sterile adhesive dressing. Unfortunately he has not been able to rest and elevate the extremity as frequently as rec'd as he is self-employed and feels he \"cannot afford to take time off\".    Has HTN. BP has been well controlled. Taking meds as rx'd. Needs refill.     The following portions of the patient's history were reviewed and updated as appropriate: allergies, current medications, past family history, past medical history, past social history, past surgical history and problem list.    Review of Systems   Constitutional: Positive for fatigue. Negative for appetite change and fever.   HENT: Negative for congestion, ear pain, hearing loss, nosebleeds, rhinorrhea, sneezing, sore throat, tinnitus and trouble swallowing.    Eyes: Negative for pain, discharge, redness and visual disturbance.   Respiratory: Negative for cough, chest tightness, shortness of breath and wheezing.    Cardiovascular: Negative for chest pain, palpitations and leg swelling.   Gastrointestinal: Negative for abdominal pain, blood in stool, constipation, diarrhea, nausea and vomiting.   Endocrine: " Negative for cold intolerance, heat intolerance, polydipsia and polyuria.   Genitourinary: Negative for dysuria, flank pain, frequency, hematuria and urgency.        ED   Musculoskeletal: Positive for back pain and neck pain. Negative for arthralgias, joint swelling and myalgias.   Skin: Positive for wound. Negative for rash.   Neurological: Negative for dizziness, tremors, seizures, syncope, speech difficulty, weakness, numbness and headaches.   Hematological: Negative for adenopathy. Does not bruise/bleed easily.   Psychiatric/Behavioral: Negative for confusion, dysphoric mood, sleep disturbance and suicidal ideas. The patient is not nervous/anxious.        Objective    Vitals:    10/10/18 1354   BP: 118/74   Pulse: 76   SpO2: 98%     Body mass index is 42.23 kg/m².  1    10/10/18  1354   Weight: 130 kg (286 lb)       Physical Exam   Constitutional: He is oriented to person, place, and time. He appears well-developed and well-nourished. He is cooperative. He does not appear ill. No distress.   morbidly obese   HENT:   Mouth/Throat: Mucous membranes are normal. Mucous membranes are not dry.   Cardiovascular: Intact distal pulses.      Vascular Status -  His right foot exhibits no edema. His left foot exhibits no edema.  Skin Integrity  -  He has right foot ulcer (as demarcated; Grade 1 Stage A) and callous right foot..     Neurological: He is alert and oriented to person, place, and time. A sensory deficit (soles of feet) is present. Gait (avoiding walking on ball right foot) abnormal.   Skin: Skin is warm and dry. Capillary refill takes less than 2 seconds.   See above foot exam   Psychiatric: He has a normal mood and affect. His behavior is normal. Cognition and memory are normal.   Nursing note and vitals reviewed.    Lab Results   Component Value Date    WBC 5.48 02/02/2018    HGB 15.2 02/02/2018    HCT 46.0 02/02/2018    MCV 91.1 02/02/2018     02/02/2018     Lab Results   Component Value Date    BUN 17  05/23/2018    CREATININE 0.80 05/23/2018    EGFRIFNONA 102 05/23/2018    EGFRIFAFRI 124 05/23/2018    BCR 21.3 05/23/2018    K 4.4 05/23/2018    CO2 25.0 (L) 05/23/2018    CALCIUM 9.6 05/23/2018    PROTENTOTREF 7.6 05/23/2018    ALBUMIN 4.20 05/23/2018    LABIL2 1.2 05/23/2018    AST 30 05/23/2018    ALT 37 05/23/2018     Lab Results   Component Value Date    HGBA1C 10.8 08/23/2018     Lab Results   Component Value Date    CHLPL 151 05/23/2018    TRIG 270 (H) 05/23/2018    HDL 37 (L) 05/23/2018    LDL 60 05/23/2018     Assessment/Plan   Myke was seen today for foot ulcer.    Diagnoses and all orders for this visit:    Type 2 diabetes mellitus with complication, with long-term current use of insulin (CMS/[facility])    Essential hypertension  -     amLODIPine (NORVASC) 10 MG tablet; Take 1 tablet by mouth Daily.    Diabetic ulcer of right midfoot associated with type 2 diabetes mellitus, limited to breakdown of skin (CMS/[facility])    Other orders  -     SITagliptin (JANUVIA) 100 MG tablet; Take 1 tablet by mouth Daily.    Type 2 insulin-dependent diabetes mellitus previously uncontrolled but with significant improvement after up titration of basal insulin.  He is encouraged to continue Januvia, Glucophage, Amaryl and basal insulin.  He is encouraged to follow diabetic appropriate diet.    Hypertension controlled continue current medications.    Diabetic foot ulcer with slow improvement.  Continue appropriate wound care.  He is once again encouraged elevated extremity as much as possible.  If minimal continued improvement over the next 2 weeks, refer to podiatry for further evaluation/treatment.    Patient was encouraged to keep me informed of any acute changes, lack of improvement, or any new concerning symptoms.  Pt is aware of reasons to seek emergent care.  Patient voiced understanding of all instructions and denied further questions.

## 2018-10-31 RX ORDER — INSULIN GLARGINE 100 [IU]/ML
INJECTION, SOLUTION SUBCUTANEOUS
Qty: 3 PEN | Refills: 6 | Status: SHIPPED | OUTPATIENT
Start: 2018-10-31 | End: 2018-11-28 | Stop reason: SDUPTHER

## 2018-11-01 ENCOUNTER — TELEPHONE (OUTPATIENT)
Dept: FAMILY MEDICINE CLINIC | Facility: CLINIC | Age: 53
End: 2018-11-01

## 2018-11-01 NOTE — TELEPHONE ENCOUNTER
----- Message from Roxanne Polanco MA sent at 11/1/2018  7:43 AM EDT -----  Regarding: FW: Non-Urgent Medical Question  Contact: [phone]      ----- Message -----  From: Myke Marcial  Sent: 10/31/2018   1:41 PM  To: [facility]  Subject: Non-Urgent Medical Question                      I have had an eye exam on October 24 and everything is good. No diabatic complications. thank you

## 2018-11-21 ENCOUNTER — OFFICE VISIT (OUTPATIENT)
Dept: ORTHOPEDIC SURGERY | Facility: CLINIC | Age: 53
End: 2018-11-21

## 2018-11-21 VITALS — WEIGHT: 291 LBS | BODY MASS INDEX: 43.1 KG/M2 | HEIGHT: 69 IN | RESPIRATION RATE: 18 BRPM

## 2018-11-21 DIAGNOSIS — E11.42 DIABETIC POLYNEUROPATHY ASSOCIATED WITH TYPE 2 DIABETES MELLITUS (HCC): ICD-10-CM

## 2018-11-21 DIAGNOSIS — E11.621 TYPE 2 DIABETES MELLITUS WITH FOOT ULCER, WITH LONG-TERM CURRENT USE OF INSULIN (HCC): Primary | ICD-10-CM

## 2018-11-21 DIAGNOSIS — L97.521 ULCER OF FOOT, LEFT, LIMITED TO BREAKDOWN OF SKIN (HCC): ICD-10-CM

## 2018-11-21 DIAGNOSIS — Z79.4 TYPE 2 DIABETES MELLITUS WITH FOOT ULCER, WITH LONG-TERM CURRENT USE OF INSULIN (HCC): Primary | ICD-10-CM

## 2018-11-21 DIAGNOSIS — L97.509 TYPE 2 DIABETES MELLITUS WITH FOOT ULCER, WITH LONG-TERM CURRENT USE OF INSULIN (HCC): Primary | ICD-10-CM

## 2018-11-21 PROCEDURE — 11042 DBRDMT SUBQ TIS 1ST 20SQCM/<: CPT | Performed by: PODIATRIST

## 2018-11-21 PROCEDURE — 99203 OFFICE O/P NEW LOW 30 MIN: CPT | Performed by: PODIATRIST

## 2018-11-21 NOTE — PROGRESS NOTES
"Subjective   Patient ID: Myke Marcial is a 53 y.o. male   Foot Ulcer of the Right Foot  53-year-old diabetic male presents today with right foot wound that he thinks has been there since approximately September.  He's been applying Neosporin.  Says he was seen in the hospital and has been seen by his primary care physician for this and it has healed and improved but she recommended he see someone else.  He denies constitutional symptoms.  States his blood sugars fairly well controlled.  Complains of dry skin as well.  Complains of his toenails on both feet.    History of Present Illness                                                   Review of Systems   Constitutional: Negative.    Musculoskeletal: Positive for arthralgias.   Skin: Positive for wound (right foot).   Allergic/Immunologic: Negative.        Past Medical History:   Diagnosis Date   • Anxiety and depression    • Arthritis     LOWER BACK   • Colon cancer screening 6/18/2018    Added automatically from request for surgery [id number]   • Diabetes mellitus (CMS/[facility])    • Ear drum perforation, right    • GERD (gastroesophageal reflux disease)    • Hyperlipidemia    • Hypertension    • Sleep apnea, obstructive     CPAP   • Wears glasses         Past Surgical History:   Procedure Laterality Date   • MUSCLE REPAIR Right     biceps tendon       Allergies   Allergen Reactions   • Penicillins Swelling         Current Outpatient Medications:   •  amLODIPine (NORVASC) 10 MG tablet, Take 1 tablet by mouth Daily., Disp: 30 tablet, Rfl: 5  •  atorvastatin (LIPITOR) 20 MG tablet, TAKE ONE TABLET BY MOUTH ONCE DAILY, Disp: 90 tablet, Rfl: 3  •  B-D UF III MINI PEN NEEDLES 31G X 5 MM misc, , Disp: , Rfl:   •  BD VEO INSULIN SYR ULTRAFINE 31G X 15/64\" 1 ML misc, , Disp: , Rfl:   •  chlorthalidone (HYGROTON) 25 MG tablet, Take 1 tablet by mouth Daily., Disp: 30 tablet, Rfl: 5  •  glimepiride (AMARYL) 4 MG tablet, Take 1 tablet by mouth Every Morning Before " Breakfast., Disp: 60 tablet, Rfl: 5  •  Insulin Glargine (BASAGLAR KWIKPEN) 100 UNIT/ML injection pen, 25 u qhs., Disp: 3 pen, Rfl: 6  •  Insulin Pen Needle 33G X 5 MM misc, 1 each Daily., Disp: 100 each, Rfl: 5  •  Insulin Syringes, Disposable, U-100 1 ML misc, 1 each Daily., Disp: 100 each, Rfl: 3  •  lisinopril (PRINIVIL,ZESTRIL) 40 MG tablet, Take 1 tablet by mouth Daily., Disp: 30 tablet, Rfl: 11  •  metFORMIN (GLUCOPHAGE) 1000 MG tablet, TAKE ONE TABLET BY MOUTH TWICE DAILY WITH MEALS, Disp: 60 tablet, Rfl: 11  •  pramipexole (MIRAPEX) 0.25 MG tablet, Take 1 tablet by mouth 3 (Three) Times a Day., Disp: 90 tablet, Rfl: 3  •  SITagliptin (JANUVIA) 100 MG tablet, Take 1 tablet by mouth Daily., Disp: 30 tablet, Rfl: 3    Family History   Problem Relation Age of Onset   • Hypertension Father    • Hyperlipidemia Father    • Sleep apnea Mother    • Cancer Maternal Grandmother    • Cancer Paternal Grandmother        Social History     Socioeconomic History   • Marital status:      Spouse name: Not on file   • Number of children: Not on file   • Years of education: Not on file   • Highest education level: Not on file   Social Needs   • Financial resource strain: Not on file   • Food insecurity - worry: Not on file   • Food insecurity - inability: Not on file   • Transportation needs - medical: Not on file   • Transportation needs - non-medical: Not on file   Occupational History   • Not on file   Tobacco Use   • Smoking status: Never Smoker   • Smokeless tobacco: Never Used   Substance and Sexual Activity   • Alcohol use: Yes     Comment: OCCASIONAL   • Drug use: No   • Sexual activity: Defer   Other Topics Concern   • Not on file   Social History Narrative   • Not on file       Counseling given: No       I have reviewed all of the above social hx, family hx, surgical hx, medications, allergies & ROS and confirm that it is accurate.  Objective   Physical Exam   Constitutional: He is oriented to person, place,  and time. He appears well-developed and well-nourished.   HENT:   Head: Normocephalic and atraumatic.   Nose: Nose normal.   Eyes: Conjunctivae and EOM are normal. Pupils are equal, round, and reactive to light.   Neck: Normal range of motion.   Pulmonary/Chest: Effort normal.   Neurological: He is alert and oriented to person, place, and time.   Skin: Skin is warm.   Psychiatric: He has a normal mood and affect. His behavior is normal. Judgment and thought content normal.   Nursing note and vitals reviewed.    Ortho Exam  [unfilled]  Lower extremity exam:  Vascular: Pulses faintly palpable, minimal pedal hair noted, no edema noted  Neuro: Gross sensation intact.  Protective sensation decreased.  Derm: Normal turgor and temperature.  Skin is very dry and scaly and flaky globally about the plantar foot.  Toenails are thickened and discolored with subungual debris.  He has a full-thickness ulceration submetatarsal phalangeal joint one on the right foot.  Wound has a granular base and hyperkeratotic periwound.  Wound measures 1 x 0.6.  It is full-thickness into the subcutaneous tissue layer.  It does not probe or tunnel or track or undermine.  There is no drainage or odor.  No sign of infection.  MSK: There is flexible digital contracture digits 2 through 5 on the right foot.    Assessment/Plan diabetes, neuropathy, right plantar foot wound, onychomycosis, dry skin  Independent Review of Radiographic Studies:      Laboratory and Other Studies:     Medical Decision Making:        Procedures   Full-thickness excisional wound debridement of the right plantar foot wound was performed with curette into the subcutaneous tissue layer removing dead devitalized hyperkeratotic tissue creating a healthy granular wound bed that measures 1 x 0.6.  Collagen dressing was then applied.    Myke was seen today for foot ulcer.    Diagnoses and all orders for this visit:    Type 2 diabetes mellitus with foot ulcer, with long-term  current use of insulin (CMS/[facility])    Diabetic polyneuropathy associated with type 2 diabetes mellitus (CMS/[facility])    Ulcer of foot, left, limited to breakdown of skin (CMS/[facility])          Recommendations/Plan:  He was instructed on collagen dressing changes and this will be ordered and sent home for him.  He should apply this daily.  He was fashioned a Plastizote cut out to help offload along with his normal diabetic shoe and insert.  I explained we would try this first but if this was an adequate with offloading we may have to convert him to a shoe or boot.  I discussed his toenails with him and recommended vix vapor rub or tea tree oill.  I explained given his diabetes oral antifungals are not recommended due to the potential drug interaction with his diabetic medications.  I recommend he use Gold Bond diabetic foot lotion to his foot however keep this away from the wound.  The wound should be cleansed daily but not soaked or allowed to get wet.  Follow up 2 weeks.    Return in about 2 weeks (around 12/5/2018).  Patient agreeable to call or return sooner for any concerns.

## 2018-11-28 ENCOUNTER — OFFICE VISIT (OUTPATIENT)
Dept: FAMILY MEDICINE CLINIC | Facility: CLINIC | Age: 53
End: 2018-11-28

## 2018-11-28 VITALS
BODY MASS INDEX: 43.25 KG/M2 | OXYGEN SATURATION: 97 % | HEART RATE: 92 BPM | SYSTOLIC BLOOD PRESSURE: 118 MMHG | WEIGHT: 292 LBS | HEIGHT: 69 IN | DIASTOLIC BLOOD PRESSURE: 78 MMHG

## 2018-11-28 DIAGNOSIS — Z79.4 TYPE 2 DIABETES MELLITUS WITH COMPLICATION, WITH LONG-TERM CURRENT USE OF INSULIN (HCC): Primary | ICD-10-CM

## 2018-11-28 DIAGNOSIS — I10 ESSENTIAL HYPERTENSION: ICD-10-CM

## 2018-11-28 DIAGNOSIS — E11.621 DIABETIC ULCER OF OTHER PART OF RIGHT FOOT ASSOCIATED WITH TYPE 2 DIABETES MELLITUS, LIMITED TO BREAKDOWN OF SKIN (HCC): ICD-10-CM

## 2018-11-28 DIAGNOSIS — L97.511 DIABETIC ULCER OF OTHER PART OF RIGHT FOOT ASSOCIATED WITH TYPE 2 DIABETES MELLITUS, LIMITED TO BREAKDOWN OF SKIN (HCC): ICD-10-CM

## 2018-11-28 DIAGNOSIS — E78.2 MIXED HYPERLIPIDEMIA: ICD-10-CM

## 2018-11-28 DIAGNOSIS — E11.8 TYPE 2 DIABETES MELLITUS WITH COMPLICATION, WITH LONG-TERM CURRENT USE OF INSULIN (HCC): Primary | ICD-10-CM

## 2018-11-28 LAB
EXPIRATION DATE: ABNORMAL
HBA1C MFR BLD: 9.5 %
Lab: ABNORMAL

## 2018-11-28 PROCEDURE — 99214 OFFICE O/P EST MOD 30 MIN: CPT | Performed by: FAMILY MEDICINE

## 2018-11-28 PROCEDURE — 83036 HEMOGLOBIN GLYCOSYLATED A1C: CPT | Performed by: FAMILY MEDICINE

## 2018-11-28 RX ORDER — INSULIN GLARGINE 100 [IU]/ML
INJECTION, SOLUTION SUBCUTANEOUS
Qty: 5 PEN | Refills: 3 | Status: SHIPPED | OUTPATIENT
Start: 2018-11-28 | End: 2019-03-01 | Stop reason: SDUPTHER

## 2018-11-29 PROBLEM — E11.621 DIABETIC FOOT ULCER (HCC): Status: ACTIVE | Noted: 2018-11-29

## 2018-11-29 PROBLEM — L97.509 DIABETIC FOOT ULCER (HCC): Status: ACTIVE | Noted: 2018-11-29

## 2018-11-29 NOTE — PROGRESS NOTES
Subjective   Myke Marcial is a 53 y.o. male.     History of Present Illness  Mr. Marcial presents today for f/u on several chronic medical problems.    He has IDDM which has not been well controlled. Taking basal insulin and morning meds but skipping evening doses. Not check BG, not exercising, no following diabetic appropriate diet. Has had consulation with dr. Coronel for diabetic foot ulcer which is healing well s/p debridement.    Has HTN. Has been well controlled. Taking meds. Mild cough but he does not find it distressing, unsure if related to Ace-inh.    Has HLP for which he is taking statin. Tolerating well.    The following portions of the patient's history were reviewed and updated as appropriate: allergies, current medications, past family history, past medical history, past social history, past surgical history and problem list.    Review of Systems   Constitutional: Positive for fatigue and unexpected weight change. Negative for appetite change and fever.   HENT: Negative for congestion, ear pain, hearing loss, nosebleeds, rhinorrhea, sneezing, sore throat, tinnitus and trouble swallowing.    Eyes: Negative for pain, discharge, redness and visual disturbance.   Respiratory: Negative for cough, chest tightness, shortness of breath and wheezing.    Cardiovascular: Negative for chest pain, palpitations and leg swelling.   Gastrointestinal: Negative for abdominal pain, blood in stool, constipation, diarrhea, nausea and vomiting.   Endocrine: Negative for cold intolerance, heat intolerance, polydipsia and polyuria.   Genitourinary: Negative for dysuria, flank pain, frequency, hematuria and urgency.        ED   Musculoskeletal: Positive for back pain and neck pain. Negative for arthralgias, joint swelling and myalgias.   Skin: Positive for wound. Negative for rash.   Neurological: Negative for dizziness, tremors, seizures, syncope, speech difficulty, weakness, numbness and headaches.   Hematological:  Negative for adenopathy. Does not bruise/bleed easily.   Psychiatric/Behavioral: Negative for confusion, dysphoric mood, sleep disturbance and suicidal ideas. The patient is not nervous/anxious.        Objective    Vitals:    11/28/18 1051   BP: 118/78   Pulse: 92   SpO2: 97%     Body mass index is 43.12 kg/m².      11/28/18  1051   Weight: 132 kg (292 lb)       Physical Exam   Constitutional: He is oriented to person, place, and time. He appears well-developed and well-nourished. He is cooperative. He does not appear ill. No distress.   morbidly obese   HENT:   Head: Normocephalic and atraumatic.   Mouth/Throat: Mucous membranes are normal. Mucous membranes are not dry.   Cardiovascular: Normal rate, regular rhythm, normal heart sounds and intact distal pulses.   Pulmonary/Chest: Effort normal and breath sounds normal.     Vascular Status -  His right foot exhibits no edema. His left foot exhibits no edema.  Skin Integrity  -  He has right foot ulcer (as demarcated; Grade 1 Stage A) and callous right foot..  Neurological: He is alert and oriented to person, place, and time. A sensory deficit (soles of feet) is present. Gait (avoiding walking on ball right foot) abnormal.   Skin: Skin is warm and dry. Capillary refill takes less than 2 seconds.   Much improved diabetic foot ulcer right plantar foot, no drainage or cellulitic change   Psychiatric: He has a normal mood and affect. His behavior is normal. Cognition and memory are normal.   Nursing note and vitals reviewed.      Recent Results (from the past 4200 hour(s))   POC Glucose Once    Collection Time: 07/10/18  6:30 AM   Result Value Ref Range    Glucose 159 (H) 70 - 130 mg/dL   Tissue Pathology Exam    Collection Time: 07/10/18  7:51 AM   Result Value Ref Range    Case Report       Surgical Pathology Report                         Case: [id number]                                  Authorizing Provider:  Musa Berger MD         Collected:           07/10/2018  07:51 AM          Ordering Location:     [facility]    Received:            07/10/2018 08:44 AM                                 [facility]                                                                    Pathologist:           Jean Phoenix MD                                                      Specimens:   1) - Large Intestine, Transverse Colon, transverse colon polyp                                      2) - Large Intestine, Right / Ascending Colon, right colon polyp                           Final Diagnosis       See Scanned Report       POC Glycated Hemoglobin, Total    Collection Time: 08/23/18 10:59 AM   Result Value Ref Range    Hemoglobin A1C 10.8 %    Lot Number [id number]     Expiration Date 5/20    POC Glucose    Collection Time: 08/23/18 10:59 AM   Result Value Ref Range    Glucose 186 (A) 70 - 130 mg/dL   POC Glucose    Collection Time: 09/21/18 11:46 AM   Result Value Ref Range    Glucose 197 (A) 70 - 130 mg/dL   POC Glycated Hemoglobin, Total    Collection Time: 11/28/18 11:14 AM   Result Value Ref Range    Hemoglobin A1C 9.5 %    Lot Number [id number]     Expiration Date 7/2,020      Assessment/Plan   Myke was seen today for diabetes, hyperlipidemia, hypertension, anxiety and depression.    Diagnoses and all orders for this visit:    Type 2 diabetes mellitus with complication, with long-term current use of insulin (CMS/[facility])  -     POC Glycated Hemoglobin, Total    Essential hypertension    Mixed hyperlipidemia    Diabetic ulcer of other part of right foot associated with type 2 diabetes mellitus, limited to breakdown of skin (CMS/[facility])    Other orders  -     Insulin Glargine (BASAGLAR KWIKPEN) 100 UNIT/ML injection pen; 25 u qhs.       Uncontrolled IDDM with some improvement in A1c. Encouraged to take evening meds as rx'd as well. Encouraged to follow diabetic appropriate diet, exercise daily and have yearly eye exam.    HTN controlled. HLP with good tolerance of statin.  Continue current meds. Pt advised to eat a heart healthy diet and get regular aerobic exercise.    Good improvement in diabetic foot ulcer. F/u with Dr. Coronel as scheduled and continue wound care as directed.    Routine f/u in 3 months, sooner as needed.  Patient was encouraged to keep me informed of any acute changes, lack of improvement, or any new concerning symptoms.  Pt is aware of reasons to seek emergent care.  Patient voiced understanding of all instructions and denied further questions.

## 2018-12-05 ENCOUNTER — OFFICE VISIT (OUTPATIENT)
Dept: ORTHOPEDIC SURGERY | Facility: CLINIC | Age: 53
End: 2018-12-05

## 2018-12-05 VITALS — WEIGHT: 292 LBS | BODY MASS INDEX: 43.25 KG/M2 | RESPIRATION RATE: 18 BRPM | HEIGHT: 69 IN

## 2018-12-05 DIAGNOSIS — L97.511 ULCERATED, FOOT, RIGHT, LIMITED TO BREAKDOWN OF SKIN (HCC): ICD-10-CM

## 2018-12-05 DIAGNOSIS — Z79.4 TYPE 2 DIABETES MELLITUS WITH FOOT ULCER, WITH LONG-TERM CURRENT USE OF INSULIN (HCC): Primary | ICD-10-CM

## 2018-12-05 DIAGNOSIS — E11.621 TYPE 2 DIABETES MELLITUS WITH FOOT ULCER, WITH LONG-TERM CURRENT USE OF INSULIN (HCC): Primary | ICD-10-CM

## 2018-12-05 DIAGNOSIS — E11.42 DIABETIC POLYNEUROPATHY ASSOCIATED WITH TYPE 2 DIABETES MELLITUS (HCC): ICD-10-CM

## 2018-12-05 DIAGNOSIS — L97.509 TYPE 2 DIABETES MELLITUS WITH FOOT ULCER, WITH LONG-TERM CURRENT USE OF INSULIN (HCC): Primary | ICD-10-CM

## 2018-12-05 PROCEDURE — 11042 DBRDMT SUBQ TIS 1ST 20SQCM/<: CPT | Performed by: PODIATRIST

## 2018-12-05 NOTE — PROGRESS NOTES
Subjective   Patient ID: Myke Marcial is a 53 y.o. male patient returns today for her plantar right foot wound.  He's been applying his dressing and is wearing regular shoes but states he is trying to stay off of it as best he can.  He states his work load has decreased some so he's been doing better with standing off of it.  Denies complaints or constitutional symptoms.      History of Present Illness                                                   Review of Systems   Constitutional: Negative.    Cardiovascular: Negative.    Musculoskeletal: Positive for arthralgias.   Skin: Positive for wound (right foot).       Past Medical History:   Diagnosis Date   • Anxiety and depression    • Arthritis     LOWER BACK   • Colon cancer screening 6/18/2018    Added automatically from request for surgery [id number]   • Diabetes mellitus (CMS/[facility])    • Ear drum perforation, right    • GERD (gastroesophageal reflux disease)    • Hyperlipidemia    • Hypertension    • Sleep apnea, obstructive     CPAP   • Wears glasses         Past Surgical History:   Procedure Laterality Date   • MUSCLE REPAIR Right     biceps tendon       Social History     Socioeconomic History   • Marital status:      Spouse name: Not on file   • Number of children: Not on file   • Years of education: Not on file   • Highest education level: Not on file   Social Needs   • Financial resource strain: Not on file   • Food insecurity - worry: Not on file   • Food insecurity - inability: Not on file   • Transportation needs - medical: Not on file   • Transportation needs - non-medical: Not on file   Occupational History   • Not on file   Tobacco Use   • Smoking status: Never Smoker   • Smokeless tobacco: Never Used   Substance and Sexual Activity   • Alcohol use: Yes     Comment: OCCASIONAL   • Drug use: No   • Sexual activity: Defer   Other Topics Concern   • Not on file   Social History Narrative   • Not on file       Counseling given: No      I  have reviewed all of the above social hx, family hx, surgical hx, medications, allergies & ROS and confirm that it is accurate.    Allergies   Allergen Reactions   • Penicillins Swelling       Objective   Physical Exam   Constitutional: He is oriented to person, place, and time. He appears well-developed and well-nourished.   HENT:   Head: Normocephalic and atraumatic.   Nose: Nose normal.   Eyes: Conjunctivae and EOM are normal. Pupils are equal, round, and reactive to light.   Neck: Normal range of motion.   Pulmonary/Chest: Effort normal.   Neurological: He is alert and oriented to person, place, and time.   Skin: Skin is warm.   Psychiatric: He has a normal mood and affect. His behavior is normal. Judgment and thought content normal.   Nursing note and vitals reviewed.    Ortho Exam  [unfilled]    His right foot wound under the first metatarsal phalangeal joint seems to be decreased in size.  Also seems to be decreased in depth.  Has a hyperkeratotic periwound.  Base is granular.  It does not probe or tunnel or track or undermine.  No drainage or odor.  Measures 0.7 x 0.5.  No changed neurovascular exam.    Assessment/Plan  diabetes, neuropathy, plantar right foot wound  Independent Review of Radiographic Studies:      Laboratory and Other Studies:     Medical Decision Making:        Procedures  Debridement Note    Location of debridement: Plantar right foot  Description of procedure: excisional  Type of instrument used: lianne  Type of tissue removed: devitalized and non-viable  Appearance and size of the wound: down to fresh bleeding tissue  Depth of debridement: subcutaneous tissue    0.7 x 0.5  I sharply and excisionally debrided the wounds down to healthy bleeding tissue with a good healthy granular base we will continue with the local wound care.    There are no diagnoses linked to this encounter.      Recommendations/Plan:  Continue collagen.  As his wound continues to heal I will allow him to continue  the offloading I have made him for regular shoe year.  If it deteriorates we may have to consider boot.  May also still consider grafting if needed.  Follow-up 2 weeks.    No Follow-up on file.  Patient agreeable to call or return sooner for any concerns.

## 2018-12-10 RX ORDER — PRAMIPEXOLE DIHYDROCHLORIDE 0.25 MG/1
TABLET ORAL
Qty: 90 TABLET | Refills: 3 | Status: SHIPPED | OUTPATIENT
Start: 2018-12-10 | End: 2019-06-03 | Stop reason: SDUPTHER

## 2018-12-19 ENCOUNTER — OFFICE VISIT (OUTPATIENT)
Dept: ORTHOPEDIC SURGERY | Facility: CLINIC | Age: 53
End: 2018-12-19

## 2018-12-19 VITALS — BODY MASS INDEX: 43.25 KG/M2 | WEIGHT: 292 LBS | HEIGHT: 69 IN | RESPIRATION RATE: 18 BRPM

## 2018-12-19 DIAGNOSIS — Z79.4 TYPE 2 DIABETES MELLITUS WITH FOOT ULCER, WITH LONG-TERM CURRENT USE OF INSULIN (HCC): Primary | ICD-10-CM

## 2018-12-19 DIAGNOSIS — E11.42 DIABETIC POLYNEUROPATHY ASSOCIATED WITH TYPE 2 DIABETES MELLITUS (HCC): ICD-10-CM

## 2018-12-19 DIAGNOSIS — L97.511 ULCERATED, FOOT, RIGHT, LIMITED TO BREAKDOWN OF SKIN (HCC): ICD-10-CM

## 2018-12-19 DIAGNOSIS — E11.621 TYPE 2 DIABETES MELLITUS WITH FOOT ULCER, WITH LONG-TERM CURRENT USE OF INSULIN (HCC): Primary | ICD-10-CM

## 2018-12-19 DIAGNOSIS — L97.509 TYPE 2 DIABETES MELLITUS WITH FOOT ULCER, WITH LONG-TERM CURRENT USE OF INSULIN (HCC): Primary | ICD-10-CM

## 2018-12-19 PROCEDURE — 11042 DBRDMT SUBQ TIS 1ST 20SQCM/<: CPT | Performed by: PODIATRIST

## 2018-12-19 NOTE — PROGRESS NOTES
Subjective   Patient ID: Myke Marcial is a 53 y.o. male patient returns today for his right foot wound.  Denies complaints.  He is wearing normal shoes.  States he's been applying his collagen.      History of Present Illness                                                   Review of Systems   Constitutional: Negative.    Respiratory: Negative.    Musculoskeletal: Negative.    Skin: Positive for wound (right fool).       Past Medical History:   Diagnosis Date   • Anxiety and depression    • Arthritis     LOWER BACK   • Colon cancer screening 6/18/2018    Added automatically from request for surgery [id number]   • Diabetes mellitus (CMS/[facility])    • Ear drum perforation, right    • GERD (gastroesophageal reflux disease)    • Hyperlipidemia    • Hypertension    • Sleep apnea, obstructive     CPAP   • Wears glasses         Past Surgical History:   Procedure Laterality Date   • COLONOSCOPY N/A 7/10/2018    Procedure: COLONOSCOPY WITH POLYPECTOMIES;  Surgeon: Musa Berger MD;  Location: [facility] ENDOSCOPY;  Service: Gastroenterology   • MUSCLE REPAIR Right     biceps tendon       Social History     Socioeconomic History   • Marital status:      Spouse name: Not on file   • Number of children: Not on file   • Years of education: Not on file   • Highest education level: Not on file   Social Needs   • Financial resource strain: Not on file   • Food insecurity - worry: Not on file   • Food insecurity - inability: Not on file   • Transportation needs - medical: Not on file   • Transportation needs - non-medical: Not on file   Occupational History   • Not on file   Tobacco Use   • Smoking status: Never Smoker   • Smokeless tobacco: Never Used   Substance and Sexual Activity   • Alcohol use: Yes     Comment: OCCASIONAL   • Drug use: No   • Sexual activity: Defer   Other Topics Concern   • Not on file   Social History Narrative   • Not on file       Counseling given: No      I have reviewed all of the above social  hx, family hx, surgical hx, medications, allergies & ROS and confirm that it is accurate.    Allergies   Allergen Reactions   • Penicillins Swelling       Objective   Physical Exam   Constitutional: He is oriented to person, place, and time. He appears well-developed and well-nourished.   HENT:   Head: Normocephalic and atraumatic.   Nose: Nose normal.   Eyes: Conjunctivae and EOM are normal. Pupils are equal, round, and reactive to light.   Neck: Normal range of motion.   Cardiovascular: Normal rate.   Pulmonary/Chest: Effort normal.   Neurological: He is alert and oriented to person, place, and time.   Skin: Skin is warm.   Psychiatric: He has a normal mood and affect. His behavior is normal. Judgment and thought content normal.   Nursing note and vitals reviewed.    Ortho Exam  [unfilled]  His wound continues to slowly improve.  It's developed callus and hyperkeratotic tissue around the perimeter of the wound.  The base is granular.  It seems to be healthy.  No change to neurovascular exam or musculoskeletal exam.  Wound measures 1 x 1 cm and is full-thickness into the subcutaneous tissue layer.  It does not probe or tunnel or track or undermine.      Assessment/Plan  diabetes, neuropathy, plantar right foot wound  Independent Review of Radiographic Studies:      Laboratory and Other Studies:     Medical Decision Making:        Procedures  Debridement Note    Location of debridement: Right foot sub-met one  Description of procedure: excisional  Type of instrument used: lianne  Type of tissue removed: devitalized and non-viable  Appearance and size of the wound: down to fresh bleeding tissue  Depth of debridement: subcutaneous tissue    Wound measures 1 x 1 cm  I sharply and excisionally debrided the wounds down to healthy bleeding tissue with a good healthy granular base we will continue with the local wound care.    There are no diagnoses linked to this encounter.      Recommendations/Plan:  Collagen was  applied.  Continue collagen daily.  I also recommended he purchase a dancer's pad and place in his shoe to help further offload the area.  We will submit for wound graft.  Follow-up in 2 weeks.    No Follow-up on file.  Patient agreeable to call or return sooner for any concerns.

## 2019-01-09 ENCOUNTER — OFFICE VISIT (OUTPATIENT)
Dept: ORTHOPEDIC SURGERY | Facility: CLINIC | Age: 54
End: 2019-01-09

## 2019-01-09 VITALS — HEIGHT: 69 IN | BODY MASS INDEX: 43.25 KG/M2 | WEIGHT: 292 LBS | RESPIRATION RATE: 18 BRPM

## 2019-01-09 DIAGNOSIS — Z79.4 TYPE 2 DIABETES MELLITUS WITH FOOT ULCER, WITH LONG-TERM CURRENT USE OF INSULIN (HCC): Primary | ICD-10-CM

## 2019-01-09 DIAGNOSIS — M20.11 VALGUS DEFORMITY OF BOTH GREAT TOES: ICD-10-CM

## 2019-01-09 DIAGNOSIS — L97.509 TYPE 2 DIABETES MELLITUS WITH FOOT ULCER, WITH LONG-TERM CURRENT USE OF INSULIN (HCC): Primary | ICD-10-CM

## 2019-01-09 DIAGNOSIS — L97.511 ULCERATED, FOOT, RIGHT, LIMITED TO BREAKDOWN OF SKIN (HCC): ICD-10-CM

## 2019-01-09 DIAGNOSIS — L97.521 ULCER OF FOOT, LEFT, LIMITED TO BREAKDOWN OF SKIN (HCC): ICD-10-CM

## 2019-01-09 DIAGNOSIS — M20.12 VALGUS DEFORMITY OF BOTH GREAT TOES: ICD-10-CM

## 2019-01-09 DIAGNOSIS — E11.621 TYPE 2 DIABETES MELLITUS WITH FOOT ULCER, WITH LONG-TERM CURRENT USE OF INSULIN (HCC): Primary | ICD-10-CM

## 2019-01-09 DIAGNOSIS — E11.42 DIABETIC POLYNEUROPATHY ASSOCIATED WITH TYPE 2 DIABETES MELLITUS (HCC): ICD-10-CM

## 2019-01-09 PROCEDURE — 99213 OFFICE O/P EST LOW 20 MIN: CPT | Performed by: PODIATRIST

## 2019-01-09 NOTE — PROGRESS NOTES
Subjective   Patient ID: Myke Marcial is a 53 y.o. male returns today for his right foot wound.  He is wearing normal shoes.  States he did get the dancer's pad that I recommended and he's been using that.  States he thinks his wound is doing pretty good.  States there is no hole there anymore.    History of Present Illness                                                   Review of Systems   Constitutional: Negative.    Respiratory: Negative.    Musculoskeletal: Negative.    Skin: Positive for wound (right foot).       Past Medical History:   Diagnosis Date   • Anxiety and depression    • Arthritis     LOWER BACK   • Colon cancer screening 6/18/2018    Added automatically from request for surgery [id number]   • Diabetes mellitus (CMS/[facility])    • Ear drum perforation, right    • GERD (gastroesophageal reflux disease)    • Hyperlipidemia    • Hypertension    • Sleep apnea, obstructive     CPAP   • Wears glasses         Past Surgical History:   Procedure Laterality Date   • COLONOSCOPY N/A 7/10/2018    Procedure: COLONOSCOPY WITH POLYPECTOMIES;  Surgeon: Musa Berger MD;  Location: [facility] ENDOSCOPY;  Service: Gastroenterology   • MUSCLE REPAIR Right     biceps tendon       Social History     Socioeconomic History   • Marital status:      Spouse name: Not on file   • Number of children: Not on file   • Years of education: Not on file   • Highest education level: Not on file   Social Needs   • Financial resource strain: Not on file   • Food insecurity - worry: Not on file   • Food insecurity - inability: Not on file   • Transportation needs - medical: Not on file   • Transportation needs - non-medical: Not on file   Occupational History   • Not on file   Tobacco Use   • Smoking status: Never Smoker   • Smokeless tobacco: Never Used   Substance and Sexual Activity   • Alcohol use: Yes     Comment: OCCASIONAL   • Drug use: No   • Sexual activity: Defer   Other Topics Concern   • Not on file   Social History  Narrative   • Not on file       Counseling given: No      I have reviewed all of the above social hx, family hx, surgical hx, medications, allergies & ROS and confirm that it is accurate.    Allergies   Allergen Reactions   • Penicillins Swelling       Objective   Physical Exam  Ortho Exam  [unfilled]  Exam of the right foot shows no change in neurovascular exam her musculoskeletal exam.  The wound under the plantar aspect of his first MTPJ is callused and when I removed a small amount of callus with a 15 blade there is a very small crack in the skin that only may be 2-3 mm in length and this is not full thickness.  Seems to be responding well and healing quite well.  No sign of infection.      Assessment/Plan  diabetes, neuropathy, hallux valgus, cavus foot, hammertoes 2 through 5  Independent Review of Radiographic Studies:      Laboratory and Other Studies:     Medical Decision Making:        Procedures  Debridement Note        There are no diagnoses linked to this encounter.      Recommendations/Plan:  I recommend he continue to soften his skin and use lotions and moisturizers.  I recommend he may be use a thick Band-Aid over the area to add additional padding and also continue with his dancer's pad.  I was able to stand down the portion of his diabetic insert to help further offload this area to help with the more definitive treatment.  I also recommend he probably needs to bring the remainder of his diabetic inserts and so I can stand them down as well.  When it comes time for new diabetic shoes and inserts he should let me know so we can ensure that he gets a custom molded insert rather than just something off the shelf.  Follow-up 1 month.    No Follow-up on file.  Patient agreeable to call or return sooner for any concerns.

## 2019-02-06 ENCOUNTER — OFFICE VISIT (OUTPATIENT)
Dept: ORTHOPEDIC SURGERY | Facility: CLINIC | Age: 54
End: 2019-02-06

## 2019-02-06 VITALS — RESPIRATION RATE: 18 BRPM | HEIGHT: 69 IN | BODY MASS INDEX: 43.25 KG/M2 | WEIGHT: 292 LBS

## 2019-02-06 DIAGNOSIS — L97.509 TYPE 2 DIABETES MELLITUS WITH FOOT ULCER, WITH LONG-TERM CURRENT USE OF INSULIN (HCC): ICD-10-CM

## 2019-02-06 DIAGNOSIS — L85.3 DRY SKIN: ICD-10-CM

## 2019-02-06 DIAGNOSIS — E11.42 DIABETIC POLYNEUROPATHY ASSOCIATED WITH TYPE 2 DIABETES MELLITUS (HCC): ICD-10-CM

## 2019-02-06 DIAGNOSIS — L97.511 ULCERATED, FOOT, RIGHT, LIMITED TO BREAKDOWN OF SKIN (HCC): Primary | ICD-10-CM

## 2019-02-06 DIAGNOSIS — E11.621 TYPE 2 DIABETES MELLITUS WITH FOOT ULCER, WITH LONG-TERM CURRENT USE OF INSULIN (HCC): ICD-10-CM

## 2019-02-06 DIAGNOSIS — Z79.4 TYPE 2 DIABETES MELLITUS WITH FOOT ULCER, WITH LONG-TERM CURRENT USE OF INSULIN (HCC): ICD-10-CM

## 2019-02-06 PROCEDURE — 99213 OFFICE O/P EST LOW 20 MIN: CPT | Performed by: PODIATRIST

## 2019-02-06 NOTE — PROGRESS NOTES
Subjective   Patient ID: Myke Marcial is a 53 y.o. male turns today for his plantar right foot.  Says he's been under the weather and sick.  Says he's using cream on his feet but still having issues with dry skin.      History of Present Illness                                                   Review of Systems   Constitutional: Negative.    Respiratory: Negative.    Gastrointestinal: Negative.    Musculoskeletal: Negative.    Skin: Negative.        Past Medical History:   Diagnosis Date   • Anxiety and depression    • Arthritis     LOWER BACK   • Colon cancer screening 6/18/2018    Added automatically from request for surgery [id number]   • Diabetes mellitus (CMS/[facility])    • Ear drum perforation, right    • GERD (gastroesophageal reflux disease)    • Hyperlipidemia    • Hypertension    • Sleep apnea, obstructive     CPAP   • Wears glasses         Past Surgical History:   Procedure Laterality Date   • COLONOSCOPY N/A 7/10/2018    Procedure: COLONOSCOPY WITH POLYPECTOMIES;  Surgeon: Musa Berger MD;  Location: [facility] ENDOSCOPY;  Service: Gastroenterology   • MUSCLE REPAIR Right     biceps tendon       Social History     Socioeconomic History   • Marital status:      Spouse name: Not on file   • Number of children: Not on file   • Years of education: Not on file   • Highest education level: Not on file   Social Needs   • Financial resource strain: Not on file   • Food insecurity - worry: Not on file   • Food insecurity - inability: Not on file   • Transportation needs - medical: Not on file   • Transportation needs - non-medical: Not on file   Occupational History   • Not on file   Tobacco Use   • Smoking status: Never Smoker   • Smokeless tobacco: Never Used   Substance and Sexual Activity   • Alcohol use: Yes     Comment: OCCASIONAL   • Drug use: No   • Sexual activity: Defer   Other Topics Concern   • Not on file   Social History Narrative   • Not on file       Counseling given: No      I have  reviewed all of the above social hx, family hx, surgical hx, medications, allergies & ROS and confirm that it is accurate.    Allergies   Allergen Reactions   • Penicillins Swelling       Objective   Physical Exam  Ortho Exam  [unfilled]right  Lower extremity exam:  Vascular: Pulses palpable, pedal hair noted, CFT brisk, no edema noted  Neuro: Gross sensation intact  Derm: Normal turgor and temperature, no wounds.  The wound area sub-met one on the right foot has a thick layer of dry skin once this is removed shows it is healed.  His skin is still diffusely dry and cracked about the entire plantar foot.  MSK: Joint range of motion normal, manual muscle testing normal, no pain to palpation, no pain with range of motion          Assessment/Plan  diabetes, neuropathy, resolved right foot wound, dry skin  Independent Review of Radiographic Studies:      Laboratory and Other Studies:     Medical Decision Making:        Procedures  Debridement Note        There are no diagnoses linked to this encounter.      Recommendations/Plan:  I stressed the importance of moisturizing his feet daily and applying lotion to prevent further issues.  Continue to use his diabetic shoes and inserts.  I can check him ever so often for routine evaluation.  Notify me any problems or concerns.  When he is ready for diabetic shoes he can let me know.    No Follow-up on file.  Patient agreeable to call or return sooner for any concerns.

## 2019-02-07 RX ORDER — LOSARTAN POTASSIUM 100 MG/1
100 TABLET ORAL DAILY
Qty: 90 TABLET | Refills: 3 | Status: SHIPPED | OUTPATIENT
Start: 2019-02-07 | End: 2020-02-11

## 2019-03-01 ENCOUNTER — OFFICE VISIT (OUTPATIENT)
Dept: FAMILY MEDICINE CLINIC | Facility: CLINIC | Age: 54
End: 2019-03-01

## 2019-03-01 VITALS
HEIGHT: 69 IN | DIASTOLIC BLOOD PRESSURE: 72 MMHG | SYSTOLIC BLOOD PRESSURE: 112 MMHG | HEART RATE: 90 BPM | BODY MASS INDEX: 41.92 KG/M2 | WEIGHT: 283 LBS | OXYGEN SATURATION: 98 %

## 2019-03-01 DIAGNOSIS — Z79.4 TYPE 2 DIABETES MELLITUS WITH COMPLICATION, WITH LONG-TERM CURRENT USE OF INSULIN (HCC): Primary | ICD-10-CM

## 2019-03-01 DIAGNOSIS — E78.2 MIXED HYPERLIPIDEMIA: ICD-10-CM

## 2019-03-01 DIAGNOSIS — E11.8 TYPE 2 DIABETES MELLITUS WITH COMPLICATION, WITH LONG-TERM CURRENT USE OF INSULIN (HCC): Primary | ICD-10-CM

## 2019-03-01 DIAGNOSIS — Z99.89 OSA ON CPAP: ICD-10-CM

## 2019-03-01 DIAGNOSIS — I10 ESSENTIAL HYPERTENSION: ICD-10-CM

## 2019-03-01 DIAGNOSIS — G47.33 OSA ON CPAP: ICD-10-CM

## 2019-03-01 PROBLEM — L97.509 DIABETIC FOOT ULCER: Status: RESOLVED | Noted: 2018-11-29 | Resolved: 2019-03-01

## 2019-03-01 PROBLEM — E11.621 DIABETIC FOOT ULCER: Status: RESOLVED | Noted: 2018-11-29 | Resolved: 2019-03-01

## 2019-03-01 LAB
EXPIRATION DATE: ABNORMAL
GLUCOSE BLDC GLUCOMTR-MCNC: 277 MG/DL (ref 70–130)
HBA1C MFR BLD: 11 %
Lab: ABNORMAL

## 2019-03-01 PROCEDURE — 82962 GLUCOSE BLOOD TEST: CPT | Performed by: FAMILY MEDICINE

## 2019-03-01 PROCEDURE — 99214 OFFICE O/P EST MOD 30 MIN: CPT | Performed by: FAMILY MEDICINE

## 2019-03-01 PROCEDURE — 83036 HEMOGLOBIN GLYCOSYLATED A1C: CPT | Performed by: FAMILY MEDICINE

## 2019-03-01 RX ORDER — INSULIN GLARGINE 100 [IU]/ML
80 INJECTION, SOLUTION SUBCUTANEOUS NIGHTLY
Qty: 8 PEN | Refills: 5 | Status: SHIPPED | OUTPATIENT
Start: 2019-03-01 | End: 2020-09-10

## 2019-03-01 NOTE — PATIENT INSTRUCTIONS
IDDM uncontrolled. A1c = 11. You should increase basal insulin by 5 units every 5 days for fasting morning blood glucose persitently over 120.

## 2019-03-04 LAB
ALBUMIN SERPL-MCNC: 4.4 G/DL (ref 3.5–5)
ALBUMIN/GLOB SERPL: 1.3 G/DL (ref 1–2)
ALP SERPL-CCNC: 90 U/L (ref 38–126)
ALT SERPL-CCNC: 43 U/L (ref 13–69)
AST SERPL-CCNC: 36 U/L (ref 15–46)
BILIRUB SERPL-MCNC: 0.5 MG/DL (ref 0.2–1.3)
BUN SERPL-MCNC: 18 MG/DL (ref 7–20)
BUN/CREAT SERPL: 16.4 (ref 6.3–21.9)
CALCIUM SERPL-MCNC: 10.1 MG/DL (ref 8.4–10.2)
CHLORIDE SERPL-SCNC: 96 MMOL/L (ref 98–107)
CHOLEST SERPL-MCNC: 184 MG/DL (ref 0–199)
CO2 SERPL-SCNC: 27 MMOL/L (ref 26–30)
CREAT SERPL-MCNC: 1.1 MG/DL (ref 0.6–1.3)
ERYTHROCYTE [DISTWIDTH] IN BLOOD BY AUTOMATED COUNT: 13.8 % (ref 11.5–14.5)
GLOBULIN SER CALC-MCNC: 3.4 GM/DL
GLUCOSE SERPL-MCNC: 283 MG/DL (ref 74–98)
HCT VFR BLD AUTO: 45.6 % (ref 42–52)
HDLC SERPL-MCNC: 33 MG/DL (ref 40–60)
HGB BLD-MCNC: 14.9 G/DL (ref 14–18)
LDLC SERPL CALC-MCNC: ABNORMAL MG/DL
MCH RBC QN AUTO: 29.6 PG (ref 27–31)
MCHC RBC AUTO-ENTMCNC: 32.7 G/DL (ref 30–37)
MCV RBC AUTO: 90.5 FL (ref 80–94)
PLATELET # BLD AUTO: 270 10*3/MM3 (ref 130–400)
POTASSIUM SERPL-SCNC: 4.1 MMOL/L (ref 3.5–5.1)
PROT SERPL-MCNC: 7.8 G/DL (ref 6.3–8.2)
RBC # BLD AUTO: 5.04 10*6/MM3 (ref 4.7–6.1)
SODIUM SERPL-SCNC: 137 MMOL/L (ref 137–145)
TRIGL SERPL-MCNC: 524 MG/DL
TSH SERPL DL<=0.005 MIU/L-ACNC: 2.52 MIU/ML (ref 0.47–4.68)
VLDLC SERPL CALC-MCNC: ABNORMAL MG/DL
WBC # BLD AUTO: 6.01 10*3/MM3 (ref 4.8–10.8)

## 2019-04-11 RX ORDER — CHLORTHALIDONE 25 MG/1
TABLET ORAL
Qty: 30 TABLET | Refills: 5 | Status: SHIPPED | OUTPATIENT
Start: 2019-04-11 | End: 2020-06-10

## 2019-06-03 ENCOUNTER — OFFICE VISIT (OUTPATIENT)
Dept: FAMILY MEDICINE CLINIC | Facility: CLINIC | Age: 54
End: 2019-06-03

## 2019-06-03 VITALS
OXYGEN SATURATION: 94 % | SYSTOLIC BLOOD PRESSURE: 118 MMHG | HEART RATE: 110 BPM | WEIGHT: 279 LBS | HEIGHT: 69 IN | BODY MASS INDEX: 41.32 KG/M2 | DIASTOLIC BLOOD PRESSURE: 78 MMHG

## 2019-06-03 DIAGNOSIS — E78.2 MIXED HYPERLIPIDEMIA: ICD-10-CM

## 2019-06-03 DIAGNOSIS — E11.8 TYPE 2 DIABETES MELLITUS WITH COMPLICATION, WITH LONG-TERM CURRENT USE OF INSULIN (HCC): Primary | ICD-10-CM

## 2019-06-03 DIAGNOSIS — I10 ESSENTIAL HYPERTENSION: ICD-10-CM

## 2019-06-03 DIAGNOSIS — L02.415 ABSCESS OF RIGHT THIGH: ICD-10-CM

## 2019-06-03 DIAGNOSIS — Z79.4 TYPE 2 DIABETES MELLITUS WITH COMPLICATION, WITH LONG-TERM CURRENT USE OF INSULIN (HCC): Primary | ICD-10-CM

## 2019-06-03 LAB
EXPIRATION DATE: ABNORMAL
GLUCOSE BLDC GLUCOMTR-MCNC: 400 MG/DL (ref 70–130)
HBA1C MFR BLD: 13 %
Lab: ABNORMAL

## 2019-06-03 PROCEDURE — 82962 GLUCOSE BLOOD TEST: CPT | Performed by: FAMILY MEDICINE

## 2019-06-03 PROCEDURE — 99214 OFFICE O/P EST MOD 30 MIN: CPT | Performed by: FAMILY MEDICINE

## 2019-06-03 PROCEDURE — 10061 I&D ABSCESS COMP/MULTIPLE: CPT | Performed by: FAMILY MEDICINE

## 2019-06-03 PROCEDURE — 83036 HEMOGLOBIN GLYCOSYLATED A1C: CPT | Performed by: FAMILY MEDICINE

## 2019-06-03 RX ORDER — SULFAMETHOXAZOLE AND TRIMETHOPRIM 800; 160 MG/1; MG/1
1 TABLET ORAL 2 TIMES DAILY
Qty: 20 TABLET | Refills: 0 | Status: SHIPPED | OUTPATIENT
Start: 2019-06-03 | End: 2019-06-13

## 2019-06-03 RX ORDER — PRAMIPEXOLE DIHYDROCHLORIDE 0.25 MG/1
TABLET ORAL
Qty: 90 TABLET | Refills: 3 | Status: SHIPPED | OUTPATIENT
Start: 2019-06-03 | End: 2019-06-03 | Stop reason: SDUPTHER

## 2019-06-03 RX ORDER — PRAMIPEXOLE DIHYDROCHLORIDE 0.25 MG/1
0.25 TABLET ORAL 3 TIMES DAILY
Qty: 90 TABLET | Refills: 3 | Status: SHIPPED | OUTPATIENT
Start: 2019-06-03 | End: 2020-02-11

## 2019-06-03 NOTE — PROGRESS NOTES
"Subjective   Myke Marcial is a 53 y.o. male.     Presents for routine f/u and c/o boil right leg.    Friday, now 3 days ago, noted painful right \"knot\" upper inner right thigh. Enlarging since that time. Some purulent drainage. Very painful. No injury to area. No treatments. Denies fever. Becoming \"Too painful to walk\".     Also with uncontrolled IDDM. Has not been taking his basaglar x 1 week at last . Not checking blood glucose regularly. Not following diabetic appropriate diet. Minimal exercise.    Comorbid HTN, HLP. Taking norvasc, lipitor, ARB as rx'd. BP has been well controlled. Does not follow heart healthy diet.     The following portions of the patient's history were reviewed and updated as appropriate: allergies, current medications, past family history, past medical history, past social history, past surgical history and problem list.    Review of Systems   Constitutional: Positive for unexpected weight change. Negative for appetite change.   HENT: Negative for ear pain, nosebleeds, rhinorrhea, tinnitus and trouble swallowing.    Eyes: Negative for visual disturbance.   Respiratory: Negative for wheezing.    Cardiovascular: Positive for leg swelling.   Gastrointestinal: Negative for blood in stool, constipation and diarrhea.   Genitourinary: Negative for dysuria, frequency and hematuria.        ED   Musculoskeletal: Positive for back pain (chronic, stable).   Skin: Positive for color change and wound.   Neurological: Negative for syncope.   Hematological: Negative for adenopathy. Does not bruise/bleed easily.   Psychiatric/Behavioral: Positive for sleep disturbance (due to pain). Negative for dysphoric mood.       Objective    Vitals:    06/03/19 0811   BP: 118/78   Pulse: 110   SpO2: 94%     Body mass index is 41.2 kg/m².      06/03/19  0811   Weight: 127 kg (279 lb)       Physical Exam   Constitutional: He is oriented to person, place, and time. He appears well-developed and well-nourished. " He is cooperative. He appears ill (mildly). No distress.   morbidly obese   HENT:   Head: Normocephalic and atraumatic.   Mouth/Throat: Mucous membranes are normal. Mucous membranes are not dry.   Cardiovascular: Normal rate, regular rhythm, normal heart sounds and intact distal pulses.   Pulmonary/Chest: Effort normal and breath sounds normal.   Abdominal: Soft. There is no tenderness.     Vascular Status -  His right foot exhibits no edema. His left foot exhibits no edema.  Neurological: He is alert and oriented to person, place, and time. He displays no tremor. Gait (antalgic on right) abnormal.   Skin: Skin is warm and dry. Lesion (large abscess inner right thigh, markedlyl TTP, underlying fluctuance, no active drainage at this time, moderate surrounding induratin/cellulitis) noted.        Much improved diabetic foot ulcer right plantar foot, no drainage or cellulitic change   Psychiatric: He has a normal mood and affect. His behavior is normal. Cognition and memory are normal.   Good eye contact. Answers questions appropriately. Good personal hygiene and grooming.   Nursing note and vitals reviewed.      Assessment/Plan   Myke was seen today for leg injury, diabetes, hyperlipidemia and hypertension.    Diagnoses and all orders for this visit:    Type 2 diabetes mellitus with complication, with long-term current use of insulin (CMS/[facility])  -     insulin aspart protamine & aspart (NOVOLOG) 70/30 100 unit/mL; 25 units SQ bid, increase as directed to max dose of 50 units bid  -     POC Glucose  -     POC Glycated Hemoglobin, Total    Essential hypertension    Mixed hyperlipidemia    Abscess of right thigh  -     Incision & Drainage  -     sulfamethoxazole-trimethoprim (BACTRIM DS) 800-160 MG per tablet; Take 1 tablet by mouth 2 (Two) Times a Day for 10 days.  -     Culture, Routine - Swab, Leg, Right  -     Anaerobic & Aerobic Culture ([facility] Only) - Swab, Thigh, Right    Other orders  -     pramipexole (MIRAPEX)  0.25 MG tablet; Take 1 tablet by mouth 3 (Three) Times a Day.       IDDM uncontrolled with jump in A1c from 11 to 13. I have reviewed risks/benefits and potential side effects of various treatment options. Pt voices understanding and wishes to proceed with 70/30 insulin bid. Hold basaglar for now. Continue oral meds. Patient encouraged to take meds as rx'd, follow diabetic appropriate diet, exercise daily, perform feet check daily, and have yearly diabetic eye exams.    HTN at goal. Continue norvasc, ARB.  HLP with good tolerance of statin.    Abscess right thigh in need of I & D. See procedure note below. abx as above. Recheck 2 days.     Patient was encouraged to keep me informed of any acute changes, lack of improvement, or any new concerning symptoms.  Pt is aware of reasons to seek emergent care.  Patient voiced understanding of all instructions and denied further questions.      PROCEDURE NOTE  Procedure: Complex I & D.  Informed consent obtained.  Time out protocol performed prior to procedure.    Incision & Drainage  Date/Time: 6/3/2019 8:50 AM  Performed by: Jaimie Nathan MD  Authorized by: Jaimie Nathan MD   Type: abscess  Body area: lower extremity  Location details: right leg  Anesthesia: local infiltration    Anesthesia:  Local Anesthetic: lidocaine 2% with epinephrine  Anesthetic total: 4 mL    Sedation:  Patient sedated: no    Risk factors: none.  Scalpel size: 11  Incision type: single straight (2 intersecting perpendicular straight incisions)  Complexity: complex (loculations disrupted with hemostat)  Drainage: purulent and  serosanguinous  Drainage amount: moderate  Wound treatment: wound left open  Packing material: 1/4 in iodoform gauze  Patient tolerance: Patient tolerated the procedure well with no immediate complications  Comments: Sterile compression dressing applied. Wound care instructions reviewed. Pt/spouse denied further questions. Recheck 2 days. Patient was encouraged to keep me  informed of any acute changes, lack of improvement, or any new concerning symptoms.  Pt is aware of reasons to seek emergent care.  Patient voiced understanding of all instructions and denied further questions.

## 2019-06-03 NOTE — PATIENT INSTRUCTIONS
Incision and Drainage  Incision and drainage is a surgical procedure to open and drain a fluid-filled sac. The sac may be filled with pus, mucus, or blood. Examples of fluid-filled sacs that may need surgical drainage include cysts, skin infections (abscesses), and red lumps that develop from a ruptured cyst or a small abscess (boils).  You may need this procedure if the affected area is large, painful, infected, or not healing well.  Tell a health care provider about:  · Any allergies you have.  · All medicines you are taking, including vitamins, herbs, eye drops, creams, and over-the-counter medicines.  · Any problems you or family members have had with anesthetic medicines.  · Any blood disorders you have.  · Any surgeries you have had.  · Any medical conditions you have.  · Whether you are pregnant or may be pregnant.  What are the risks?  Generally, this is a safe procedure. However, problems may occur, including:  · Infection.  · Bleeding.  · Allergic reactions to medicines.  · Scarring.    What happens before the procedure?  · You may need an ultrasound or other imaging tests to see how large or deep the fluid-filled sac is.  · You may have blood tests to check for infection.  · You may get a tetanus shot.  · You may be given antibiotic medicine to help prevent infection.  · Follow instructions from your health care provider about eating or drinking restrictions.  · Ask your health care provider about:  ? Changing or stopping your regular medicines. This is especially important if you are taking diabetes medicines or blood thinners.  ? Taking medicines such as aspirin and ibuprofen. These medicines can thin your blood. Do not take these medicines before your procedure if your health care provider instructs you not to.  · Plan to have someone take you home after the procedure.  · If you will be going home right after the procedure, plan to have someone stay with you for 24 hours.  What happens during the  procedure?  · To reduce your risk of infection:  ? Your health care team will wash or sanitize their hands.  ? Your skin will be washed with soap.  · You will be given one or more of the following:  ? A medicine to help you relax (sedative).  ? A medicine to numb the area (local anesthetic).  ? A medicine to make you fall asleep (general anesthetic).  · An incision will be made in the top of the fluid-filled sac.  · The contents of the sac may be squeezed out, or a syringe or tube (catheter) may be used to empty the sac.  · The catheter may be left in place for several weeks to drain any fluid. Or, your health care provider may stitch open the edges of the incision to make a long-term opening for drainage (marsupialization).  · The inside of the sac may be washed out (irrigated) with a sterile solution and packed with gauze before it is covered with a bandage (dressing).  The procedure may vary among health care providers and hospitals.  What happens after the procedure?  · Your blood pressure, heart rate, breathing rate, and blood oxygen level will be monitored often until the medicines you were given have worn off.  · Do not drive for 24 hours if you received a sedative.  This information is not intended to replace advice given to you by your health care provider. Make sure you discuss any questions you have with your health care provider.  Document Released: 06/13/2002 Document Revised: 05/25/2017 Document Reviewed: 10/07/2016  [employer] Interactive Patient Education © 2019 [employer]

## 2019-06-05 ENCOUNTER — OFFICE VISIT (OUTPATIENT)
Dept: FAMILY MEDICINE CLINIC | Facility: CLINIC | Age: 54
End: 2019-06-05

## 2019-06-05 VITALS
HEIGHT: 69 IN | SYSTOLIC BLOOD PRESSURE: 112 MMHG | DIASTOLIC BLOOD PRESSURE: 78 MMHG | OXYGEN SATURATION: 97 % | BODY MASS INDEX: 41.32 KG/M2 | WEIGHT: 279 LBS | HEART RATE: 96 BPM

## 2019-06-05 DIAGNOSIS — E11.8 TYPE 2 DIABETES MELLITUS WITH COMPLICATION, WITH LONG-TERM CURRENT USE OF INSULIN (HCC): ICD-10-CM

## 2019-06-05 DIAGNOSIS — L02.415 ABSCESS OF RIGHT THIGH: Primary | ICD-10-CM

## 2019-06-05 DIAGNOSIS — Z79.4 TYPE 2 DIABETES MELLITUS WITH COMPLICATION, WITH LONG-TERM CURRENT USE OF INSULIN (HCC): ICD-10-CM

## 2019-06-05 PROCEDURE — 99213 OFFICE O/P EST LOW 20 MIN: CPT | Performed by: FAMILY MEDICINE

## 2019-06-05 NOTE — PROGRESS NOTES
Subjective   Myke Marcial is a 53 y.o. male.     History of Present Illness  Mr. Marical is a 52 yo male with uncontrolled diabetes mellitus who was seen 2 days ago for his diabetes as well as abscess right upper thigh. He required complex I and D with packing. Here for wound recheck and f/u on DM. He has obtained the mixed insulin as rx'd at last visit. Began tx yesterday. No hypoglycemia. Has just started checking BG at home.    He reports improved in pain, swelling location of abscess. No fever. Minimal serosanguinous drainage.    The following portions of the patient's history were reviewed and updated as appropriate: allergies, current medications, past family history, past medical history, past social history, past surgical history and problem list.    Review of Systems   Constitutional: Positive for fatigue. Negative for fever.   HENT: Negative for trouble swallowing.    Eyes: Negative for visual disturbance.   Respiratory: Negative for cough, shortness of breath and wheezing.    Cardiovascular: Positive for leg swelling. Negative for chest pain and palpitations.   Gastrointestinal: Negative for abdominal pain, blood in stool, diarrhea, nausea and vomiting.   Genitourinary: Negative for decreased urine volume, difficulty urinating and hematuria.   Musculoskeletal: Positive for arthralgias, back pain (chronic, stable) and myalgias.   Skin: Positive for wound.   Neurological: Negative for dizziness, syncope, weakness and headaches.   Hematological: Negative for adenopathy. Does not bruise/bleed easily.   Psychiatric/Behavioral: Positive for sleep disturbance (due to pain). Negative for confusion and dysphoric mood.       Objective    Vitals:    06/05/19 1345   BP: 112/78   Pulse: 96   SpO2: 97%     Body mass index is 41.2 kg/m².      06/05/19  1345   Weight: 127 kg (279 lb)       Physical Exam   Constitutional: He is oriented to person, place, and time. He appears well-developed and well-nourished. He  is cooperative. He does not appear ill. No distress.   morbidly obese   HENT:   Head: Normocephalic and atraumatic.   Mouth/Throat: Mucous membranes are normal. Mucous membranes are not dry.     Vascular Status -  His right foot exhibits no edema. His left foot exhibits no edema.  Neurological: He is alert and oriented to person, place, and time. He displays no tremor. Gait (antalgic on right) abnormal.   Skin: Skin is warm and dry. Lesion (resolving abscess upper inner right thigh, moderately TTP, no underlying fluctuance, no active drainage at this time, packing in place, decreased surrounding induration/decreased cellulitis) noted.        Much improved diabetic foot ulcer right plantar foot, no drainage or cellulitic change   Psychiatric: He has a normal mood and affect. His behavior is normal. Cognition and memory are normal.   Good eye contact. Answers questions appropriately. Good personal hygiene and grooming.   Nursing note and vitals reviewed.    Lab Results   Component Value Date    HGBA1C 13.0 06/03/2019    HGBA1C 11.0 03/01/2019    HGBA1C 9.5 11/28/2018     Lab Results   Component Value Date    MICROALBUR 5.5 05/23/2018    CREATININE 1.10 03/01/2019     Assessment/Plan   Myke was seen today for wound check and diabetes.    Diagnoses and all orders for this visit:    Abscess of right thigh    Type 2 diabetes mellitus with complication, with long-term current use of insulin (CMS/[facility])      Wound culture pending. Continue bactrim for now. Wound packing removed without incident. Using sterile technique wound packed once again with 1/4 in iodoform gauze and sterile pressure dressing with Tegaderm placed. Wound care once again reviewed. Recheck wound 2 days.    Uncontrolled DM now insulin dependent; have reviewed mixed insulin regimen/instructions. He voiced understanding and will uptitrate dosing as instructed.    I will contact patient regarding test results and provide instructions regarding any necessary  changes in plan of care.  Patient was encouraged to keep me informed of any acute changes, lack of improvement, or any new concerning symptoms.  Pt is aware of reasons to seek emergent care.  Patient voiced understanding of all instructions and denied further questions.

## 2019-06-06 LAB
BACTERIA SPEC AEROBE CULT: ABNORMAL
BACTERIA SPEC CULT: ABNORMAL

## 2019-06-07 ENCOUNTER — OFFICE VISIT (OUTPATIENT)
Dept: FAMILY MEDICINE CLINIC | Facility: CLINIC | Age: 54
End: 2019-06-07

## 2019-06-07 VITALS
OXYGEN SATURATION: 96 % | WEIGHT: 279 LBS | BODY MASS INDEX: 41.32 KG/M2 | HEART RATE: 98 BPM | SYSTOLIC BLOOD PRESSURE: 122 MMHG | HEIGHT: 69 IN | DIASTOLIC BLOOD PRESSURE: 76 MMHG

## 2019-06-07 DIAGNOSIS — L02.415 ABSCESS OF RIGHT THIGH: Primary | ICD-10-CM

## 2019-06-07 DIAGNOSIS — E11.8 TYPE 2 DIABETES MELLITUS WITH COMPLICATION, WITH LONG-TERM CURRENT USE OF INSULIN (HCC): ICD-10-CM

## 2019-06-07 DIAGNOSIS — Z79.4 TYPE 2 DIABETES MELLITUS WITH COMPLICATION, WITH LONG-TERM CURRENT USE OF INSULIN (HCC): ICD-10-CM

## 2019-06-07 PROCEDURE — 99213 OFFICE O/P EST LOW 20 MIN: CPT | Performed by: FAMILY MEDICINE

## 2019-06-07 RX ORDER — CEPHALEXIN 500 MG/1
500 CAPSULE ORAL 3 TIMES DAILY
Qty: 30 CAPSULE | Refills: 0 | Status: SHIPPED | OUTPATIENT
Start: 2019-06-07 | End: 2019-06-17

## 2019-06-07 NOTE — PROGRESS NOTES
"Subjective   Myke Marcial is a 53 y.o. male.     History of Present Illness  Mr. Marcial is a 52 yo male with uncontrolled diabetes mellitus who was seen 4 days ago for his diabetes as well as abscess right upper thigh. He required complex I and D with packing. Had wound check and repacking 2 days ago. Here for wound recheck as directed. Having trouble getting dressing to \"stay stuck\" due to location. He has obtained the mixed insulin as rx'd earlier this week. No hypoglycemia. He reports improvement in pain, decreased swelling at location of abscess. No fever. Moderate serosanguinous drainage.    Culture report showed group B beta-hemolytic strep. He is currently only on Bactrim. Lists PCN as allergy. States he gets rash/hives. No h/o anaphylaxis.     The following portions of the patient's history were reviewed and updated as appropriate: allergies, current medications, past family history, past medical history, past social history, past surgical history and problem list.    Review of Systems   Constitutional: Positive for fatigue. Negative for fever.   HENT: Negative for trouble swallowing.    Eyes: Negative for visual disturbance.   Respiratory: Negative for cough, shortness of breath and wheezing.    Cardiovascular: Positive for leg swelling. Negative for chest pain and palpitations.   Gastrointestinal: Positive for nausea. Negative for abdominal pain, blood in stool, diarrhea and vomiting.   Genitourinary: Negative for decreased urine volume, difficulty urinating and hematuria.   Musculoskeletal: Positive for arthralgias, back pain (chronic, stable) and myalgias.   Skin: Positive for wound.        As per HPI   Neurological: Negative for dizziness, weakness and headaches.   Hematological: Negative for adenopathy. Does not bruise/bleed easily.   Psychiatric/Behavioral: Positive for sleep disturbance (due to pain). Negative for confusion and dysphoric mood.       Objective    Vitals:    06/07/19 1121 "   BP: 122/76   Pulse: 98   SpO2: 96%     Body mass index is 41.2 kg/m².      06/07/19  1121   Weight: 127 kg (279 lb)       Physical Exam   Constitutional: He is oriented to person, place, and time. He appears well-developed and well-nourished. He is cooperative. He does not appear ill. No distress.   morbidly obese   HENT:   Head: Normocephalic and atraumatic.   Mouth/Throat: Mucous membranes are normal. Mucous membranes are not dry.   Cardiovascular: Intact distal pulses.     Vascular Status -  His right foot exhibits no edema. His left foot exhibits no edema.  Neurological: He is alert and oriented to person, place, and time. Gait (antalgic on right) abnormal.   Skin: Skin is warm and dry. Lesion (resolving abscess upper inner right thigh, moderately TTP, no underlying fluctuance, no active drainage at this time, packing in place, stable surrounding induration/stable cellulitis from 2 days ago) noted.        Psychiatric: He has a normal mood and affect. His behavior is normal. Cognition and memory are normal.   Nursing note and vitals reviewed.    Recent Results (from the past 168 hour(s))   POC Glycated Hemoglobin, Total    Collection Time: 06/03/19  2:05 PM   Result Value Ref Range    Hemoglobin A1C 13.0 %    Lot Number [id number]     Expiration Date 1/27/21    POC Glucose    Collection Time: 06/03/19  2:06 PM   Result Value Ref Range    Glucose 400 (A) 70 - 130 mg/dL   Culture, Routine - ,    Collection Time: 06/03/19  4:50 PM   Result Value Ref Range    Culture Final report (A)     Result 1 Comment (A)             Culture Final report Abnormal     Result 1 Comment Abnormal      Comment: Beta hemolytic Streptococcus, group B   Heavy growth   Penicillin and ampicillin are drugs of choice for treatment of   beta-hemolytic streptococcal infections. Susceptibility testing of   penicillins and other beta-lactam agents approved by the FDA for   treatment of beta-hemolytic streptococcal infections need not be    performed routinely because nonsusceptible isolates are extremely   rare in any beta-hemolytic streptococcus and have not been reported   for Streptococcus pyogenes (group A). (CLSI)            Assessment/Plan   Myke was seen today for wound check.    Diagnoses and all orders for this visit:    Abscess of right thigh    Type 2 diabetes mellitus with complication, with long-term current use of insulin (CMS/[facility])    Other orders  -     cephalexin (KEFLEX) 500 MG capsule; Take 1 capsule by mouth 3 (Three) Times a Day for 10 days.       Finish Bactrim, add keflex as above with allergy precaution reviewed.  Packing removed. No active drainage. No re-packing necessary sterile dressing applied, with use of wide ACE-bandage for securing. Pt and spouse instructed in appropriate wound care.  Uncontrolled now IDDM. Patient encouraged to take meds as rx'd, follow diabetic appropriate diet, exercise daily, perform feet check daily, and have yearly diabetic eye exams.  Keep routine f/u, f/u sooner as needed.  Patient was encouraged to keep me informed of any acute changes, lack of improvement, or any new concerning symptoms.  Pt is aware of reasons to seek emergent care.  Patient voiced understanding of all instructions and denied further questions.

## 2019-06-17 DIAGNOSIS — I10 ESSENTIAL HYPERTENSION: ICD-10-CM

## 2019-06-17 RX ORDER — AMLODIPINE BESYLATE 10 MG/1
10 TABLET ORAL DAILY
Qty: 30 TABLET | Refills: 5 | Status: SHIPPED | OUTPATIENT
Start: 2019-06-17 | End: 2019-06-17 | Stop reason: SDUPTHER

## 2019-06-17 RX ORDER — ATORVASTATIN CALCIUM 20 MG/1
20 TABLET, FILM COATED ORAL DAILY
Qty: 90 TABLET | Refills: 3 | Status: SHIPPED | OUTPATIENT
Start: 2019-06-17 | End: 2020-09-21

## 2019-06-17 RX ORDER — AMLODIPINE BESYLATE 10 MG/1
10 TABLET ORAL DAILY
Qty: 90 TABLET | Refills: 1 | Status: SHIPPED | OUTPATIENT
Start: 2019-06-17 | End: 2020-09-10

## 2019-09-09 ENCOUNTER — OFFICE VISIT (OUTPATIENT)
Dept: FAMILY MEDICINE CLINIC | Facility: CLINIC | Age: 54
End: 2019-09-09

## 2019-09-09 VITALS
BODY MASS INDEX: 41.79 KG/M2 | DIASTOLIC BLOOD PRESSURE: 76 MMHG | HEART RATE: 94 BPM | WEIGHT: 283 LBS | SYSTOLIC BLOOD PRESSURE: 138 MMHG | OXYGEN SATURATION: 97 %

## 2019-09-09 DIAGNOSIS — E66.01 MORBID OBESITY (HCC): ICD-10-CM

## 2019-09-09 DIAGNOSIS — I10 ESSENTIAL HYPERTENSION: ICD-10-CM

## 2019-09-09 DIAGNOSIS — Z79.4 TYPE 2 DIABETES MELLITUS WITH COMPLICATION, WITH LONG-TERM CURRENT USE OF INSULIN (HCC): Primary | ICD-10-CM

## 2019-09-09 DIAGNOSIS — E78.2 MIXED HYPERLIPIDEMIA: ICD-10-CM

## 2019-09-09 DIAGNOSIS — G47.33 OSA ON CPAP: ICD-10-CM

## 2019-09-09 DIAGNOSIS — Z28.21 INFLUENZA VACCINATION DECLINED BY PATIENT: ICD-10-CM

## 2019-09-09 DIAGNOSIS — E11.8 TYPE 2 DIABETES MELLITUS WITH COMPLICATION, WITH LONG-TERM CURRENT USE OF INSULIN (HCC): Primary | ICD-10-CM

## 2019-09-09 DIAGNOSIS — K21.9 GASTROESOPHAGEAL REFLUX DISEASE, ESOPHAGITIS PRESENCE NOT SPECIFIED: ICD-10-CM

## 2019-09-09 DIAGNOSIS — Z99.89 OSA ON CPAP: ICD-10-CM

## 2019-09-09 DIAGNOSIS — E11.65 UNCONTROLLED TYPE 2 DIABETES MELLITUS WITH HYPERGLYCEMIA (HCC): ICD-10-CM

## 2019-09-09 LAB — HBA1C MFR BLD: 12 %

## 2019-09-09 PROCEDURE — 83036 HEMOGLOBIN GLYCOSYLATED A1C: CPT | Performed by: FAMILY MEDICINE

## 2019-09-09 PROCEDURE — 99214 OFFICE O/P EST MOD 30 MIN: CPT | Performed by: FAMILY MEDICINE

## 2019-09-09 RX ORDER — LANSOPRAZOLE 30 MG/1
30 CAPSULE, DELAYED RELEASE ORAL DAILY
Qty: 30 CAPSULE | Refills: 1 | Status: SHIPPED | OUTPATIENT
Start: 2019-09-09 | End: 2019-10-01

## 2019-09-09 NOTE — PROGRESS NOTES
Subjective   Myke Marcial is a 53 y.o. male.     History of Present Illness  Mr. Marcial presents today for f/u on several chronic medical problems.     He has IDDM which has not been well controlled. Taking mixed insulin as rx'd. Now up to 60 units bid. Reports taking his oral meds as well including amaryl and metformin. Not checking BG on regular basis. When he does check it, still running over 200 in AM. He is not exercising regularly, not following diabetic appropriate diet. He has h/o diabetic foot ulcer now healed.      Has HTN. Has been farily well controlled on norvasc, HCTZ and ARB. Taking meds as rx'd. Denies side effects. Denies CP, palpitations, orthopnea. Stable mild swelling in feet.     Has HLP for which he is taking statin. Tolerating well. Denies symptoms of claudication.     Has PRATEEK and his using his CPAP regularly. Denies apneic spells. Daytime sleepiness has improved. recently having increased insomnia which he relates to stress.    He complains of worsening heartburn, worst after eating, lying down etc.  No dysphagia, no blood in stool, no unintentional weight loss.  He has not used any over-the-counter medications to treat symptoms.  Worsening over the last few weeks.  Denies history of peptic ulcer disease.  Denies use of chronic NSAIDs.  Denies alcohol use.    Pt's previous HPI reviewed and updated as indicated.      The following portions of the patient's history were reviewed and updated as appropriate: allergies, current medications, past family history, past medical history, past social history, past surgical history and problem list.    Review of Systems   Constitutional: Positive for fatigue and unexpected weight change. Negative for appetite change.   HENT: Positive for congestion and postnasal drip. Negative for nosebleeds, rhinorrhea, sore throat, tinnitus and trouble swallowing.    Eyes: Negative for visual disturbance.   Respiratory: Positive for shortness of breath (with  exertion). Negative for cough and wheezing.    Cardiovascular: Positive for leg swelling. Negative for chest pain and palpitations.   Gastrointestinal: Positive for nausea and vomiting (occasional with severe reflux). Negative for abdominal pain, blood in stool, constipation and diarrhea.        Heartburn, bilious reflux   Endocrine: Positive for heat intolerance.   Genitourinary: Negative for decreased urine volume, difficulty urinating, dysuria and hematuria.        ED   Musculoskeletal: Positive for arthralgias and back pain (chronic, stable).   Skin: Negative for rash and wound.   Neurological: Negative for dizziness, syncope, weakness and headaches.   Hematological: Negative for adenopathy. Does not bruise/bleed easily.   Psychiatric/Behavioral: Positive for dysphoric mood (mildly) and sleep disturbance. Negative for confusion. The patient is not nervous/anxious.    Pt's previous ROS reviewed and updated as indicated.     Objective    Vitals:    09/09/19 0954   BP: 138/76   Pulse: 94   SpO2: 97%     Body mass index is 41.79 kg/m².      09/09/19  0954   Weight: 128 kg (283 lb)         Physical Exam   Constitutional: He is oriented to person, place, and time. He appears well-developed and well-nourished. He is cooperative. He does not appear ill. No distress.   morbidly obese   HENT:   Head: Normocephalic and atraumatic.   Mouth/Throat: Mucous membranes are normal. Mucous membranes are not dry.   Neck: Phonation normal. Neck supple. Normal carotid pulses present. Carotid bruit is not present. No thyroid mass present.   Thyroid exam limited by body habitus   Cardiovascular: Normal rate, regular rhythm and intact distal pulses. Exam reveals distant heart sounds.   Pulmonary/Chest: Effort normal and breath sounds normal.   Abdominal: Soft. He exhibits no distension and no mass (exam limited by body habitus). Bowel sounds are decreased. There is no hepatosplenomegaly (exam limited by body habitus). There is no  tenderness.     Vascular Status -  His right foot exhibits no edema. His left foot exhibits no edema.  Neurological: He is alert and oriented to person, place, and time. He displays no tremor. Gait normal.   Skin: Skin is warm and dry. No bruising and no rash noted.   Much improved diabetic foot ulcer right plantar foot, no drainage or cellulitic change   Psychiatric: He has a normal mood and affect. His behavior is normal. Cognition and memory are normal.   Good eye contact. Answers questions appropriately. Good personal hygiene and grooming.   Nursing note and vitals reviewed.  Pt's previous physical exam reviewed and updated as indicated.    Lab Results   Component Value Date    HGBA1C 12 09/09/2019    HGBA1C 13.0 06/03/2019    HGBA1C 11.0 03/01/2019     Lab Results   Component Value Date    MICROALBUR 4.2 09/09/2019    CREATININE 1.20 09/09/2019       Assessment/Plan   Myke was seen today for diabetes, heartburn, nausea and hypertension.    Diagnoses and all orders for this visit:    Type 2 diabetes mellitus with complication, with long-term current use of insulin (CMS/[facility])  -     POC Glycosylated Hemoglobin (Hb A1C)  -     Comprehensive Metabolic Panel  -     Microalbumin / Creatinine Urine Ratio - Urine, Clean Catch  -     Ambulatory Referral to Endocrinology    PRATEEK on CPAP    Mixed hyperlipidemia  -     Comprehensive Metabolic Panel  -     Lipid Panel    Essential hypertension  -     CBC & Differential  -     Comprehensive Metabolic Panel  -     Microalbumin / Creatinine Urine Ratio - Urine, Clean Catch    Gastroesophageal reflux disease, esophagitis presence not specified  -     CBC & Differential  -     Comprehensive Metabolic Panel  -     lansoprazole (PREVACID) 30 MG capsule; Take 1 capsule by mouth Daily.    Uncontrolled type 2 diabetes mellitus with hyperglycemia (CMS/[facility])  -     Ambulatory Referral to Endocrinology    Morbid obesity (CMS/[facility])    Influenza vaccination declined by patient        Insulin-dependent diabetes mellitus poorly controlled.  A1c down to 12.0 from a previous of 13.  On high dose of mixed insulin as well as to oral medications.  Poor compliance with dietary and exercise recommendations.  I recommended he have consultation with endocrinologist which she is amenable. Patient encouraged to take meds as rx'd, follow diabetic appropriate diet, exercise daily, perform feet check daily, and have yearly diabetic eye exams.    PRATEEK on CPAP with fair compliance.  Encouraged follow-up with sleep medicine as scheduled.    Mixed hyperlipidemia good tolerance of statin.    Essential hypertension fairly well controlled.  Continue amlodipine, losartan.  He is reminded of importance of heart healthy diet and regular exercise.    Recent worsening of heartburn/reflux.  Reviewed with/benefits and potential side effects of various treatment options.  He is amenable to a trial of PPI.  If minimal response consider referral for EGD, H. pylori testing etc.  Lifestyle modifications for management of heartburn/reflux again reviewed.    Chronic morbid obesity with poor lifestyle management. Nutrition and activity goals reviewed including: mainly water to drink, limit white flour/processed sugar, higher lean protein, high fiber carbs, working toward 150 mins cardio per week, resistance training 2x/week.    He declined influenza vaccination.    Routine follow-up in 3 months, sooner as needed/instructed.  I will contact patient regarding test results and provide instructions regarding any necessary changes in plan of care.  Patient was encouraged to keep me informed of any acute changes, lack of improvement, or any new concerning symptoms.  Pt is aware of reasons to seek emergent care.  Patient voiced understanding of all instructions and denied further questions.            Please note that portions of this note may have been completed with a voice recognition program. Efforts were made to edit the dictations,  but occasionally words are mistranscribed.

## 2019-09-10 LAB
ALBUMIN SERPL-MCNC: 4.3 G/DL (ref 3.5–5.2)
ALBUMIN/CREAT UR: 3.9 MG/G CREAT (ref 0–30)
ALBUMIN/GLOB SERPL: 1.3 G/DL
ALP SERPL-CCNC: 77 U/L (ref 39–117)
ALT SERPL-CCNC: 31 U/L (ref 1–41)
AST SERPL-CCNC: 34 U/L (ref 1–40)
BASOPHILS # BLD AUTO: 0.04 10*3/MM3 (ref 0–0.2)
BASOPHILS NFR BLD AUTO: 0.6 % (ref 0–1.5)
BILIRUB SERPL-MCNC: 0.5 MG/DL (ref 0.2–1.2)
BUN SERPL-MCNC: 23 MG/DL (ref 6–20)
BUN/CREAT SERPL: 19.2 (ref 7–25)
CALCIUM SERPL-MCNC: 9.4 MG/DL (ref 8.6–10.5)
CHLORIDE SERPL-SCNC: 93 MMOL/L (ref 98–107)
CHOLEST SERPL-MCNC: 186 MG/DL (ref 0–200)
CO2 SERPL-SCNC: 26.1 MMOL/L (ref 22–29)
CREAT SERPL-MCNC: 1.2 MG/DL (ref 0.76–1.27)
CREAT UR-MCNC: 107.4 MG/DL
EOSINOPHIL # BLD AUTO: 0.04 10*3/MM3 (ref 0–0.4)
EOSINOPHIL NFR BLD AUTO: 0.6 % (ref 0.3–6.2)
ERYTHROCYTE [DISTWIDTH] IN BLOOD BY AUTOMATED COUNT: 14.6 % (ref 12.3–15.4)
GLOBULIN SER CALC-MCNC: 3.4 GM/DL
GLUCOSE SERPL-MCNC: 311 MG/DL (ref 65–99)
HCT VFR BLD AUTO: 52.4 % (ref 37.5–51)
HDLC SERPL-MCNC: 28 MG/DL (ref 40–60)
HGB BLD-MCNC: 16.1 G/DL (ref 13–17.7)
IMM GRANULOCYTES # BLD AUTO: 0.03 10*3/MM3 (ref 0–0.05)
IMM GRANULOCYTES NFR BLD AUTO: 0.4 % (ref 0–0.5)
LDLC SERPL CALC-MCNC: ABNORMAL MG/DL
LYMPHOCYTES # BLD AUTO: 1.43 10*3/MM3 (ref 0.7–3.1)
LYMPHOCYTES NFR BLD AUTO: 20.1 % (ref 19.6–45.3)
MCH RBC QN AUTO: 29.4 PG (ref 26.6–33)
MCHC RBC AUTO-ENTMCNC: 30.7 G/DL (ref 31.5–35.7)
MCV RBC AUTO: 95.8 FL (ref 79–97)
MICROALBUMIN UR-MCNC: 4.2 UG/ML
MONOCYTES # BLD AUTO: 0.44 10*3/MM3 (ref 0.1–0.9)
MONOCYTES NFR BLD AUTO: 6.2 % (ref 5–12)
NEUTROPHILS # BLD AUTO: 5.13 10*3/MM3 (ref 1.7–7)
NEUTROPHILS NFR BLD AUTO: 72.1 % (ref 42.7–76)
NRBC BLD AUTO-RTO: 0 /100 WBC (ref 0–0.2)
PLATELET # BLD AUTO: 305 10*3/MM3 (ref 140–450)
POTASSIUM SERPL-SCNC: 4.9 MMOL/L (ref 3.5–5.2)
PROT SERPL-MCNC: 7.7 G/DL (ref 6–8.5)
RBC # BLD AUTO: 5.47 10*6/MM3 (ref 4.14–5.8)
SODIUM SERPL-SCNC: 134 MMOL/L (ref 136–145)
TRIGL SERPL-MCNC: 679 MG/DL (ref 0–150)
VLDLC SERPL CALC-MCNC: ABNORMAL MG/DL
WBC # BLD AUTO: 7.11 10*3/MM3 (ref 3.4–10.8)

## 2019-09-16 ENCOUNTER — PRIOR AUTHORIZATION (OUTPATIENT)
Dept: FAMILY MEDICINE CLINIC | Facility: CLINIC | Age: 54
End: 2019-09-16

## 2019-09-16 NOTE — TELEPHONE ENCOUNTER
A prior auth has been started through cover my meds for lansoprazole.  Currently waiting on a response from the insurance.

## 2019-10-01 ENCOUNTER — TELEPHONE (OUTPATIENT)
Dept: FAMILY MEDICINE CLINIC | Facility: CLINIC | Age: 54
End: 2019-10-01

## 2019-10-01 RX ORDER — OMEPRAZOLE 20 MG/1
20 TABLET, DELAYED RELEASE ORAL 2 TIMES DAILY
Qty: 60 TABLET | Refills: 2 | Status: SHIPPED | OUTPATIENT
Start: 2019-10-01 | End: 2020-06-10 | Stop reason: SDUPTHER

## 2019-10-01 NOTE — TELEPHONE ENCOUNTER
Please inform pt, insurance would not cover prevacid for reflux until you have failed other tx options. New rx for prilosec OTC sent in.

## 2019-10-16 ENCOUNTER — OFFICE VISIT (OUTPATIENT)
Dept: FAMILY MEDICINE CLINIC | Facility: CLINIC | Age: 54
End: 2019-10-16

## 2019-10-16 VITALS
DIASTOLIC BLOOD PRESSURE: 80 MMHG | HEIGHT: 69 IN | WEIGHT: 273 LBS | TEMPERATURE: 97.9 F | OXYGEN SATURATION: 96 % | SYSTOLIC BLOOD PRESSURE: 134 MMHG | RESPIRATION RATE: 14 BRPM | HEART RATE: 102 BPM | BODY MASS INDEX: 40.43 KG/M2

## 2019-10-16 DIAGNOSIS — L02.416 ABSCESS OF LEFT THIGH: Primary | ICD-10-CM

## 2019-10-16 PROCEDURE — 99213 OFFICE O/P EST LOW 20 MIN: CPT | Performed by: FAMILY MEDICINE

## 2019-10-16 PROCEDURE — 10061 I&D ABSCESS COMP/MULTIPLE: CPT | Performed by: FAMILY MEDICINE

## 2019-10-16 RX ORDER — CEPHALEXIN 500 MG/1
500 CAPSULE ORAL 3 TIMES DAILY
Qty: 30 CAPSULE | Refills: 0 | Status: SHIPPED | OUTPATIENT
Start: 2019-10-16 | End: 2019-10-26

## 2019-10-16 NOTE — PROGRESS NOTES
Subjective   Myke Marcial is a 53 y.o. male.     History of Present Illness  Mr. Marcial presents today with c/o painful sore upper inner left thigh beginning several days ago. assoc'd with swelling, redness, burning severe pain. Worse with walking, friction. Used topical tx without improvement. No fever, no drainage from site. He has comorbid uncontrolled DM. He is UTD on tetanus. He has had these type of lesions in the past.    The following portions of the patient's history were reviewed and updated as appropriate: allergies, current medications, past family history, past medical history, past social history, past surgical history and problem list.    Review of Systems   Constitutional: Positive for fatigue and unexpected weight change. Negative for appetite change.   HENT: Positive for congestion and postnasal drip. Negative for nosebleeds, rhinorrhea, sore throat, tinnitus and trouble swallowing.    Eyes: Negative for visual disturbance.   Respiratory: Positive for shortness of breath (with exertion). Negative for cough and wheezing.    Cardiovascular: Positive for leg swelling. Negative for chest pain and palpitations.   Gastrointestinal: Positive for nausea and vomiting (occasional with severe reflux). Negative for abdominal pain, blood in stool, constipation and diarrhea.        Heartburn, bilious reflux   Endocrine: Positive for heat intolerance.   Genitourinary: Negative for decreased urine volume, difficulty urinating, dysuria and hematuria.        ED   Musculoskeletal: Positive for arthralgias and back pain (chronic, stable).   Skin: Negative for rash and wound.        As per HPI   Neurological: Negative for dizziness, syncope, weakness and headaches.   Hematological: Negative for adenopathy. Does not bruise/bleed easily.   Psychiatric/Behavioral: Positive for dysphoric mood (mildly) and sleep disturbance. Negative for confusion. The patient is not nervous/anxious.    Pt's previous ROS reviewed  and updated as indicated.       Objective    Vitals:    10/16/19 0928   BP: 134/80   Pulse: 102   Resp: 14   Temp: 97.9 °F (36.6 °C)   SpO2: 96%     Body mass index is 40.32 kg/m².      10/16/19  0928   Weight: 124 kg (273 lb)       Physical Exam   Constitutional: He appears well-developed and well-nourished. He is cooperative. He does not appear ill. He appears distressed (due to pain).   Genitourinary:         Neurological: He is alert.   Skin: Lesion (abscess upper left inner thigh/groin as detailed above) noted.   Psychiatric: He has a normal mood and affect. His behavior is normal.   Nursing note and vitals reviewed.    Immunization History   Administered Date(s) Administered   • Pneumococcal Conjugate 13-Valent (PCV13) 08/23/2018   • Pneumococcal Polysaccharide (PPSV23) 01/13/2017   • Tdap 01/13/2017     Assessment/Plan   Myke was seen today for cyst on leg.    Diagnoses and all orders for this visit:    Abscess of left thigh  -     Incision & Drainage  -     Anaerobic & Aerobic Culture ([facility] Only) - Swab, Groin, left  -     cephalexin (KEFLEX) 500 MG capsule; Take 1 capsule by mouth 3 (Three) Times a Day for 10 days.       I have reviewed risks/benefits and potential side effects of various treatment options. Pt voices understanding and wishes to proceed with I and D. See procedure note below.    Keep routine apt as scheduled next week. F/u sooner as needed.  I will contact patient regarding test results and provide instructions regarding any necessary changes in plan of care.  Patient was encouraged to keep me informed of any acute changes, lack of improvement, or any new concerning symptoms.  Pt is aware of reasons to seek emergent care.  Patient voiced understanding of all instructions and denied further questions.            PROCEDURE NOTE  Procedure: complex I and D of abscess   Informed consent obtained.  Time out protocol performed prior to procedure.    Incision & Drainage  Date/Time: 10/16/2019  10:40 AM  Performed by: Jaimie Nathan MD  Authorized by: Jaimie Nathan MD   Type: abscess  Body area: lower extremity (upper inner left thigh/groin)  Anesthesia: local infiltration    Anesthesia:  Local Anesthetic: lidocaine 1% without epinephrine  Anesthetic total: 3 mL    Sedation:  Patient sedated: no    Risk factor: underlying major vessel  Scalpel size: 10  Incision type: single straight (x 2 in cross anderson pattern)  Drainage: purulent and  serosanguinous  Drainage amount: copious  Wound treatment: wound left open  Packing material: 1/4 in iodoform gauze  Patient tolerance: Patient tolerated the procedure well with no immediate complications  Comments: Sterile dressing applied. postprocedure wound care reviewed. Pt voiced understanding and denied further questions.

## 2019-10-21 ENCOUNTER — OFFICE VISIT (OUTPATIENT)
Dept: FAMILY MEDICINE CLINIC | Facility: CLINIC | Age: 54
End: 2019-10-21

## 2019-10-21 VITALS
HEART RATE: 94 BPM | DIASTOLIC BLOOD PRESSURE: 74 MMHG | WEIGHT: 273 LBS | TEMPERATURE: 97.7 F | BODY MASS INDEX: 40.43 KG/M2 | HEIGHT: 69 IN | SYSTOLIC BLOOD PRESSURE: 112 MMHG | OXYGEN SATURATION: 98 %

## 2019-10-21 DIAGNOSIS — Z51.89 WOUND CHECK, ABSCESS: Primary | ICD-10-CM

## 2019-10-21 DIAGNOSIS — L02.214 ABSCESS OF LEFT GROIN: ICD-10-CM

## 2019-10-21 PROCEDURE — 99024 POSTOP FOLLOW-UP VISIT: CPT | Performed by: FAMILY MEDICINE

## 2019-10-21 NOTE — PROGRESS NOTES
Subjective   Myke Marcial is a 53 y.o. male.     History of Present Illness  Mr. Marcial presents today for wound recheck. Seen 3 days ago for abscess left groin, upper inner left thigh. Had I and D last Thursday with packing. Subsequent packing removed. He has been continuing wound care as instructed. Marked improvement in pain, swelling. Minimal purulent drainage.    The following portions of the patient's history were reviewed and updated as appropriate: allergies, current medications, past family history, past medical history, past social history, past surgical history and problem list.    Review of Systems  No fever. No new lesions.     Objective    Vitals:    10/21/19 0822   BP: 112/74   Pulse: 94   Temp: 97.7 °F (36.5 °C)   SpO2: 98%     Body mass index is 40.32 kg/m².      10/21/19  0822   Weight: 124 kg (273 lb)       Physical Exam  Left upper inner thigh, left groin with markedly decreased swelling. No underlying fluctuance. Center of abscess sight with very minimal purulent drainage. Minimally TTP.    Assessment/Plan   Myke was seen today for abscess.    Diagnoses and all orders for this visit:    Wound check, abscess    Abscess of left groin       Resolving abscess. Continue warm soaks. Complete abx. Culture pending.  Keep routine f/u as scheduled, f/u sooner as needed.  Patient was encouraged to keep me informed of any acute changes, lack of improvement, or any new concerning symptoms.  Pt is aware of reasons to seek emergent care.  Patient voiced understanding of all instructions and denied further questions.

## 2019-10-22 LAB
BACTERIA SPEC AEROBE CULT: ABNORMAL
BACTERIA SPEC ANAEROBE CULT: ABNORMAL
BACTERIA SPEC CULT: ABNORMAL
BACTERIA SPEC CULT: ABNORMAL

## 2019-11-18 RX ORDER — FLUCONAZOLE 150 MG/1
TABLET ORAL
Qty: 2 TABLET | Refills: 0 | Status: SHIPPED | OUTPATIENT
Start: 2019-11-18 | End: 2019-12-09

## 2019-12-03 ENCOUNTER — OFFICE VISIT (OUTPATIENT)
Dept: PULMONOLOGY | Facility: CLINIC | Age: 54
End: 2019-12-03

## 2019-12-03 VITALS
HEIGHT: 69 IN | BODY MASS INDEX: 39.55 KG/M2 | HEART RATE: 110 BPM | RESPIRATION RATE: 18 BRPM | OXYGEN SATURATION: 97 % | DIASTOLIC BLOOD PRESSURE: 70 MMHG | WEIGHT: 267 LBS | SYSTOLIC BLOOD PRESSURE: 110 MMHG

## 2019-12-03 DIAGNOSIS — R06.83 SNORING: ICD-10-CM

## 2019-12-03 DIAGNOSIS — E66.9 OBESITY (BMI 30-39.9): ICD-10-CM

## 2019-12-03 DIAGNOSIS — G47.19 EXCESSIVE DAYTIME SLEEPINESS: ICD-10-CM

## 2019-12-03 DIAGNOSIS — G47.33 OBSTRUCTIVE SLEEP APNEA: Primary | ICD-10-CM

## 2019-12-03 PROCEDURE — 99214 OFFICE O/P EST MOD 30 MIN: CPT | Performed by: INTERNAL MEDICINE

## 2019-12-03 RX ORDER — HYDROXYZINE HYDROCHLORIDE 25 MG/1
TABLET, FILM COATED ORAL SEE ADMIN INSTRUCTIONS
Refills: 0 | COMMUNITY
Start: 2019-11-09 | End: 2020-09-10

## 2019-12-03 NOTE — PROGRESS NOTES
"Chief Complaint   Patient presents with   • Follow-up   • Sleeping Problem       Subjective   Myke Marcial is a 54 y.o. male.     History of Present Illness   Patient comes back today for follow up of Sleep apnea. Patient does report some initial improvement but now feels that the PAP device, at the current pressure of 12, is not relieving very few symptoms    Patient reports minimal worsening in daytime sleepiness when compared to before. Patient is not aware of snoring through the device.     Patient is also having occasional leaks with the mask which makes it hard for him to use the mask on a consistent basis, especially since his mask is 2 years old.     Patient says that the compliance with the use of the equipment is good.     He still drinks 1-2 cups of coffee per day.     The following portions of the patient's history were reviewed and updated as appropriate: allergies, current medications, past family history, past medical history, past social history and past surgical history.    Review of Systems   Constitutional: Negative for chills and fever.   HENT: Negative for rhinorrhea, sinus pressure and sore throat.    Respiratory: Negative for cough, chest tightness, shortness of breath and wheezing.    Psychiatric/Behavioral: Positive for sleep disturbance.       Objective   Visit Vitals  /70   Pulse 110   Resp 18   Ht 175.3 cm (69\")   Wt 121 kg (267 lb)   SpO2 97%   BMI 39.43 kg/m²       Physical Exam   Constitutional: He is oriented to person, place, and time. He appears well-developed and well-nourished.   HENT:   Head: Atraumatic.   Crowded Oropharynx.    Eyes: EOM are normal. Pupils are equal, round, and reactive to light.   Neck: No JVD present. No tracheal deviation present. No thyromegaly present.   Increased adipose tissue.    Cardiovascular: Normal rate and regular rhythm.   Pulmonary/Chest: Effort normal and breath sounds normal. No respiratory distress. He has no wheezes. "   Musculoskeletal: Normal range of motion. He exhibits no edema.   Gait was normal.   Neurological: He is alert and oriented to person, place, and time.   Skin: Skin is warm and dry.   Psychiatric: He has a normal mood and affect. His behavior is normal.   Vitals reviewed.      Assessment/Plan   Myke was seen today for follow-up and sleeping problem.    Diagnoses and all orders for this visit:    Obstructive sleep apnea  -     BIPAP / CPAP Adjustment    Snoring  -     BIPAP / CPAP Adjustment    Excessive daytime sleepiness    Obesity (BMI 30-39.9)           Return in about 3 months (around 3/3/2020) for Yas/Nataliia, ....Also 10 mths w/ Dr. Nazario.    DISCUSSION (if any):  Laboratory workup was also reviewed which showed   Lab Results   Component Value Date    CO2 26.1 09/09/2019     ===========================  ===========================    I was able to review his last sleep study, from Sep 2016.  It showed that the patient has significant sleep apnea and his AHI was 93/hour.     I told the patient that his symptoms are consistnt with fairly well controlled sleep apnea.    It appears that some of his issues are secondary to the mask.  The patient was provided with a prescription for new mask and supplies.    If the symptoms do not improve, even after above-mentioned measures, then he may have to undergo a full night titration study.    Patient was advised to continue using PAP for at least 4 hours every night    Patient was advised to call this office with any issues    Weight loss was also discussed and advised.      Dictated utilizing Dragon dictation.    This document was electronically signed by Anai Nazario MD on 12/03/19 at 11:56 AM

## 2019-12-09 ENCOUNTER — OFFICE VISIT (OUTPATIENT)
Dept: FAMILY MEDICINE CLINIC | Facility: CLINIC | Age: 54
End: 2019-12-09

## 2019-12-09 VITALS
BODY MASS INDEX: 39.55 KG/M2 | SYSTOLIC BLOOD PRESSURE: 112 MMHG | OXYGEN SATURATION: 98 % | HEART RATE: 91 BPM | DIASTOLIC BLOOD PRESSURE: 78 MMHG | TEMPERATURE: 98.6 F | HEIGHT: 69 IN | WEIGHT: 267 LBS

## 2019-12-09 DIAGNOSIS — E78.2 MIXED HYPERLIPIDEMIA: ICD-10-CM

## 2019-12-09 DIAGNOSIS — Z99.89 OSA ON CPAP: ICD-10-CM

## 2019-12-09 DIAGNOSIS — Z79.4 TYPE 2 DIABETES MELLITUS WITH COMPLICATION, WITH LONG-TERM CURRENT USE OF INSULIN (HCC): Primary | ICD-10-CM

## 2019-12-09 DIAGNOSIS — E11.8 TYPE 2 DIABETES MELLITUS WITH COMPLICATION, WITH LONG-TERM CURRENT USE OF INSULIN (HCC): Primary | ICD-10-CM

## 2019-12-09 DIAGNOSIS — G47.33 OSA ON CPAP: ICD-10-CM

## 2019-12-09 DIAGNOSIS — I10 ESSENTIAL HYPERTENSION: ICD-10-CM

## 2019-12-09 LAB — HBA1C MFR BLD: 12.9 %

## 2019-12-09 PROCEDURE — 99214 OFFICE O/P EST MOD 30 MIN: CPT | Performed by: FAMILY MEDICINE

## 2019-12-09 PROCEDURE — 83036 HEMOGLOBIN GLYCOSYLATED A1C: CPT | Performed by: FAMILY MEDICINE

## 2020-02-11 RX ORDER — PRAMIPEXOLE DIHYDROCHLORIDE 0.25 MG/1
TABLET ORAL
Qty: 90 TABLET | Refills: 0 | Status: SHIPPED | OUTPATIENT
Start: 2020-02-11 | End: 2020-04-06

## 2020-02-11 RX ORDER — LOSARTAN POTASSIUM 100 MG/1
TABLET ORAL
Qty: 30 TABLET | Refills: 0 | Status: SHIPPED | OUTPATIENT
Start: 2020-02-11 | End: 2020-04-06

## 2020-03-09 ENCOUNTER — OFFICE VISIT (OUTPATIENT)
Dept: FAMILY MEDICINE CLINIC | Facility: CLINIC | Age: 55
End: 2020-03-09

## 2020-03-09 VITALS
SYSTOLIC BLOOD PRESSURE: 138 MMHG | BODY MASS INDEX: 41.03 KG/M2 | HEIGHT: 69 IN | DIASTOLIC BLOOD PRESSURE: 88 MMHG | TEMPERATURE: 98.2 F | OXYGEN SATURATION: 98 % | HEART RATE: 99 BPM | WEIGHT: 277 LBS

## 2020-03-09 DIAGNOSIS — G47.33 OSA ON CPAP: ICD-10-CM

## 2020-03-09 DIAGNOSIS — E11.8 TYPE 2 DIABETES MELLITUS WITH COMPLICATION (HCC): Primary | ICD-10-CM

## 2020-03-09 DIAGNOSIS — I10 ESSENTIAL HYPERTENSION: ICD-10-CM

## 2020-03-09 DIAGNOSIS — Z99.89 OSA ON CPAP: ICD-10-CM

## 2020-03-09 DIAGNOSIS — E78.2 MIXED HYPERLIPIDEMIA: ICD-10-CM

## 2020-03-09 LAB — HBA1C MFR BLD: 11.2 %

## 2020-03-09 PROCEDURE — 83036 HEMOGLOBIN GLYCOSYLATED A1C: CPT | Performed by: FAMILY MEDICINE

## 2020-03-09 PROCEDURE — 99214 OFFICE O/P EST MOD 30 MIN: CPT | Performed by: FAMILY MEDICINE

## 2020-03-09 RX ORDER — CYCLOPENTOLATE HYDROCHLORIDE 10 MG/ML
SOLUTION/ DROPS OPHTHALMIC
COMMUNITY
Start: 2020-02-13 | End: 2020-06-10

## 2020-03-09 NOTE — PROGRESS NOTES
"Subjective   Myke Marcial is a 54 y.o. male.     History of Present Illness   Mr. Marcial presents today for f/u on several chronic medical problems.     He has IDDM which has not been well controlled. He is on 3 oral meds. Not taking mixed insulin as rx'd. Often only getting one shot per day but he reports he \"takes the other if he feels he needs it\". Reports taking his oral meds as well including amaryl, januvia, and metformin. Not checking BG on regular basis. When he does check it, still running over 160-180 in AM. He is not exercising regularly, not following diabetic appropriate diet. He has h/o diabetic foot ulcer now healed.      Has HTN. Has been farily well controlled on norvasc, HCTZ and ARB. Taking meds as rx'd. Denies side effects. Denies CP, palpitations, orthopnea. Stable mild swelling in feet.     Has HLP for which he is taking statin. Tolerating well. Denies symptoms of claudication.     Has PRATEEK and his using his CPAP irregularly since having cataract surgery. Denies apneic spells. Daytime sleepiness improves with use.    Now UTD on diabetic eye exam with Dr. Perry and subsequently Dr. Aguilera who performed cataract surgery each eye over past month.    No recent ilness. He is up approx 10 lbs from last visit.    Pt's previous HPI reviewed and updated as indicated.     The following portions of the patient's history were reviewed and updated as appropriate: allergies, current medications, past family history, past medical history, past social history, past surgical history and problem list.    Review of Systems   Constitutional: Positive for fatigue. Negative for appetite change and unexpected weight change.   HENT: Positive for congestion and postnasal drip. Negative for nosebleeds, rhinorrhea, sore throat, tinnitus and trouble swallowing.    Eyes: Negative for visual disturbance.   Respiratory: Positive for shortness of breath (with exertion). Negative for cough and wheezing.  "   Cardiovascular: Positive for leg swelling (mild, stable). Negative for chest pain and palpitations.   Gastrointestinal: Positive for nausea and vomiting (occasional with severe reflux). Negative for abdominal pain, blood in stool, constipation and diarrhea.        Heartburn   Endocrine: Positive for heat intolerance.   Genitourinary: Negative for decreased urine volume, difficulty urinating, dysuria and hematuria.        ED   Musculoskeletal: Positive for arthralgias and back pain (chronic, stable).   Skin: Negative for rash and wound.   Neurological: Negative for dizziness, syncope, weakness and headaches.   Hematological: Negative for adenopathy. Does not bruise/bleed easily.   Psychiatric/Behavioral: Positive for dysphoric mood (mildly) and sleep disturbance. Negative for confusion and suicidal ideas. The patient is not nervous/anxious.    Pt's previous ROS reviewed and updated as indicated.       Objective    Vitals:    03/09/20 0800   BP: 138/88   Pulse: 99   Temp: 98.2 °F (36.8 °C)   SpO2: 98%     Body mass index is 40.91 kg/m².      03/09/20  0800   Weight: 126 kg (277 lb)       Physical Exam   Constitutional: He is oriented to person, place, and time. He appears well-developed and well-nourished. He is cooperative. He does not appear ill. No distress.   obese   HENT:   Head: Normocephalic and atraumatic.   Mouth/Throat: Mucous membranes are normal. Mucous membranes are not dry.   Eyes: Conjunctivae and lids are normal. No scleral icterus.   Neck: Neck supple. Normal carotid pulses present. Carotid bruit is not present. No thyromegaly present.   Cardiovascular: Normal rate, regular rhythm, normal heart sounds and intact distal pulses. Exam reveals no gallop.   No murmur heard.  Pulmonary/Chest: Effort normal and breath sounds normal.     Vascular Status -  His right foot exhibits no edema. His left foot exhibits no edema.  Lymphadenopathy:     He has no cervical adenopathy.   Neurological: He is alert and  oriented to person, place, and time. He displays no tremor. Gait normal.   Skin: Skin is warm and dry. No bruising and no rash noted.   Psychiatric: He has a normal mood and affect. His behavior is normal. Cognition and memory are normal.   Good eye contact. Answers questions appropriately. Good personal hygiene and grooming.   Nursing note and vitals reviewed.  Pt's previous physical exam reviewed and updated as indicated.    Lab Results   Component Value Date    HGBA1C 11.2 03/09/2020    HGBA1C 12.9 12/09/2019    HGBA1C 12 09/09/2019     Lab Results   Component Value Date    MICROALBUR 4.2 09/09/2019    CREATININE 1.20 09/09/2019         Assessment/Plan   Myke was seen today for diabetes, hyperlipidemia and hypertension.    Diagnoses and all orders for this visit:    Type 2 diabetes mellitus with complication (CMS/[facility])  -     POC Glycosylated Hemoglobin (Hb A1C)  -     Comprehensive Metabolic Panel    Essential hypertension  -     Comprehensive Metabolic Panel    Mixed hyperlipidemia  -     Comprehensive Metabolic Panel  -     Lipid Panel    PRATEEK on CPAP       Type 2 insulin-dependent diabetes mellitus uncontrolled but with slowly improving A1c.  He is once again reminded importance of taking his mixed insulin twice daily for adequate blood glucose control.  Continue glimepiride, Januvia and metformin. Patient encouraged to take meds as rx'd, follow diabetic appropriate diet, exercise daily, perform feet check daily, and have yearly diabetic eye exams. He declines endocrinology referral at this time.    Hypertension fairly well controlled, continue losartan, chlorthalidone, and amlodipine.  Hyperlipidemia with good tolerance of statin.  Continue Lipitor. Pt advised to eat a heart healthy diet and get regular aerobic exercise.    Fluctuating CPAP compliance for management of PRATEEK.  More consistent compliance encouraged.  He admits he does have improvement in chronic fatigue, swelling, cognitive impairment with use  of CPAP.    Routine follow-up in 3 months, sooner as needed/instructed.  I will contact patient regarding test results and provide instructions regarding any necessary changes in plan of care.  Patient was encouraged to keep me informed of any acute changes, lack of improvement, or any new concerning symptoms.  Pt is aware of reasons to seek emergent care.  Patient voiced understanding of all instructions and denied further questions.              Please note that portions of this note may have been completed with a voice recognition program. Efforts were made to edit the dictations, but occasionally words are mistranscribed.

## 2020-03-10 LAB
ALBUMIN SERPL-MCNC: 4 G/DL (ref 3.5–5.2)
ALBUMIN/GLOB SERPL: 1.2 G/DL
ALP SERPL-CCNC: 85 U/L (ref 39–117)
ALT SERPL-CCNC: 21 U/L (ref 1–41)
AST SERPL-CCNC: 20 U/L (ref 1–40)
BILIRUB SERPL-MCNC: 0.3 MG/DL (ref 0.2–1.2)
BUN SERPL-MCNC: 19 MG/DL (ref 6–20)
BUN/CREAT SERPL: 17.9 (ref 7–25)
CALCIUM SERPL-MCNC: 9.5 MG/DL (ref 8.6–10.5)
CHLORIDE SERPL-SCNC: 94 MMOL/L (ref 98–107)
CHOLEST SERPL-MCNC: 187 MG/DL (ref 0–200)
CO2 SERPL-SCNC: 27.8 MMOL/L (ref 22–29)
CREAT SERPL-MCNC: 1.06 MG/DL (ref 0.76–1.27)
GLOBULIN SER CALC-MCNC: 3.3 GM/DL
GLUCOSE SERPL-MCNC: 199 MG/DL (ref 65–99)
HDLC SERPL-MCNC: 32 MG/DL (ref 40–60)
LDLC SERPL CALC-MCNC: ABNORMAL MG/DL
POTASSIUM SERPL-SCNC: 4.3 MMOL/L (ref 3.5–5.2)
PROT SERPL-MCNC: 7.3 G/DL (ref 6–8.5)
SODIUM SERPL-SCNC: 137 MMOL/L (ref 136–145)
TRIGL SERPL-MCNC: 468 MG/DL (ref 0–150)
VLDLC SERPL CALC-MCNC: ABNORMAL MG/DL

## 2020-04-06 RX ORDER — PRAMIPEXOLE DIHYDROCHLORIDE 0.25 MG/1
TABLET ORAL
Qty: 90 TABLET | Refills: 0 | Status: SHIPPED | OUTPATIENT
Start: 2020-04-06 | End: 2020-06-30

## 2020-04-06 RX ORDER — LOSARTAN POTASSIUM 100 MG/1
TABLET ORAL
Qty: 30 TABLET | Refills: 0 | Status: SHIPPED | OUTPATIENT
Start: 2020-04-06 | End: 2020-06-24

## 2020-05-05 ENCOUNTER — TELEMEDICINE (OUTPATIENT)
Dept: PULMONOLOGY | Facility: CLINIC | Age: 55
End: 2020-05-05

## 2020-05-05 DIAGNOSIS — G47.19 EXCESSIVE DAYTIME SLEEPINESS: ICD-10-CM

## 2020-05-05 DIAGNOSIS — G47.9 SLEEP DISTURBANCE: ICD-10-CM

## 2020-05-05 DIAGNOSIS — Z72.821 POOR SLEEP HYGIENE: ICD-10-CM

## 2020-05-05 DIAGNOSIS — G47.33 OBSTRUCTIVE SLEEP APNEA: Primary | ICD-10-CM

## 2020-05-05 PROCEDURE — 99214 OFFICE O/P EST MOD 30 MIN: CPT | Performed by: INTERNAL MEDICINE

## 2020-05-05 NOTE — PROGRESS NOTES
You have chosen to receive care through a telehealth visit.  Do you consent to use a video/audio connection for your medical care today? Yes       Subjective   Myke Marcial is a 54 y.o. male.     History of Present Illness   Patient was evaluated today for follow up of Sleep apnea.     Patient doesn't report any issues with the device. The patient describes no significant issues with his mask either.  He is having trouble falling asleep and staying asleep because of stress and actually says that he ends up staying up till 4 AM in the morning and then sleeps for a little while without the CPAP and wakes up.  He actually feels that his ability to rest has been significantly diminished.    Patient says that the compliance with the use of the equipment is good, only limited by poor sleeping habits over the past 1 month or more..     He is having issues with daytime sleepiness and tiredness, on the nights where he does not sleep well and does not end up using the CPAP.    He does mention however, that even on the nights where he is Yupelri to use the CPAP, he does not feel that the pressure is resolving all his symptoms.  Although he denies any knowledge of snoring with a full facemask, he is having occasional dryness again.    Review of Systems -positive for sleep disturbance, anxiety and stress.  Negative for shortness of breath, cough or chest pain.    Objective     Physical Exam    This was a remote visit. Physical exam not performed.     Assessment/Plan   Diagnoses and all orders for this visit:    Obstructive sleep apnea  -     BIPAP / CPAP Adjustment    Excessive daytime sleepiness  -     BIPAP / CPAP Adjustment    Sleep disturbance    Poor sleep hygiene           Return in about 8 months (around 1/5/2021) for Yas/Nataliia.    DISCUSSION (if any):  I reviewed the results of last sleep study in detail. I informed him that the apnea hypopnea index was 93 / hr. This was an in lab study. REM AHI, on the  sleep study, was 120/hr. This was done in 2016.    I told the patient that his symptoms are consistent with partially controlled sleep apnea.    I had a long discussion with the patient with regards to sleep hygiene and the need to sleep.  I told the patient that I completely understand the stress given the current CoVid 19 situation.      The patient brought up melatonin and I suggested that he can use 3-5 mg every night around 7:30 or 8:00 on a regular basis.    I explained to him that melatonin works better if it is taken with DLMO, as it does potentiate the effect of endogenous melatonin.     Sleep hygiene measures were discussed in great detail.    I have decided to empirically increase the pressure by 2 cm, to a total of 14.    Patient was advised to continue using PAP for at least 4 hours every night.    Patient was advised to call this office with any issues.      Dictated utilizing Dragon dictation.    This document was electronically signed by Anai Nazario MD on 05/05/20 at 13:30

## 2020-06-10 ENCOUNTER — OFFICE VISIT (OUTPATIENT)
Dept: FAMILY MEDICINE CLINIC | Facility: CLINIC | Age: 55
End: 2020-06-10

## 2020-06-10 VITALS
HEIGHT: 69 IN | WEIGHT: 269 LBS | TEMPERATURE: 98 F | OXYGEN SATURATION: 98 % | BODY MASS INDEX: 39.84 KG/M2 | DIASTOLIC BLOOD PRESSURE: 80 MMHG | SYSTOLIC BLOOD PRESSURE: 110 MMHG | HEART RATE: 104 BPM

## 2020-06-10 DIAGNOSIS — Z99.89 OSA ON CPAP: ICD-10-CM

## 2020-06-10 DIAGNOSIS — E66.01 MORBID OBESITY (HCC): ICD-10-CM

## 2020-06-10 DIAGNOSIS — E78.2 MIXED HYPERLIPIDEMIA: ICD-10-CM

## 2020-06-10 DIAGNOSIS — E11.8 TYPE 2 DIABETES MELLITUS WITH COMPLICATION (HCC): Primary | ICD-10-CM

## 2020-06-10 DIAGNOSIS — I10 ESSENTIAL HYPERTENSION: ICD-10-CM

## 2020-06-10 DIAGNOSIS — G47.33 OSA ON CPAP: ICD-10-CM

## 2020-06-10 DIAGNOSIS — K21.9 GASTROESOPHAGEAL REFLUX DISEASE, ESOPHAGITIS PRESENCE NOT SPECIFIED: ICD-10-CM

## 2020-06-10 LAB
GLUCOSE BLDC GLUCOMTR-MCNC: 263 MG/DL (ref 70–130)
HBA1C MFR BLD: 14.9 %

## 2020-06-10 PROCEDURE — 99214 OFFICE O/P EST MOD 30 MIN: CPT | Performed by: FAMILY MEDICINE

## 2020-06-10 PROCEDURE — 83036 HEMOGLOBIN GLYCOSYLATED A1C: CPT | Performed by: FAMILY MEDICINE

## 2020-06-10 PROCEDURE — 82962 GLUCOSE BLOOD TEST: CPT | Performed by: FAMILY MEDICINE

## 2020-06-10 RX ORDER — OMEPRAZOLE 20 MG/1
20 TABLET, DELAYED RELEASE ORAL 2 TIMES DAILY
Qty: 60 TABLET | Refills: 2 | Status: SHIPPED | OUTPATIENT
Start: 2020-06-10 | End: 2022-05-02

## 2020-06-10 RX ORDER — CHLORTHALIDONE 25 MG/1
TABLET ORAL
Qty: 30 TABLET | Refills: 0 | Status: SHIPPED | OUTPATIENT
Start: 2020-06-10 | End: 2020-08-14

## 2020-06-10 RX ORDER — GLIMEPIRIDE 4 MG/1
4 TABLET ORAL
Qty: 60 TABLET | Refills: 5 | Status: SHIPPED | OUTPATIENT
Start: 2020-06-10 | End: 2021-01-07 | Stop reason: SDUPTHER

## 2020-06-10 NOTE — PROGRESS NOTES
"Subjective   Myke Marcial is a 54 y.o. male.     History of Present Illness   Mr. Marcial presents today for f/u on several chronic medical problems.     He has IDDM which has not been well controlled. He is on 3 oral meds and mixed insulin. He admits that he has not been taking either as rx'd over the past 3 months. Not checking BG on regular basis. He is not exercising regularly, not following diabetic appropriate diet over past 3 months, less active as he has had less work. He has h/o diabetic foot ulcer now healed. he has noticed he feels much more fatigued than usual.     Has HTN. Has been farily well controlled on norvasc, HCTZ and ARB. Taking meds as rx'd. Denies side effects. Denies CP, palpitations, orthopnea. Stable mild swelling in feet.     Has HLP for which he is taking statin. Tolerating well. Denies symptoms of claudication.     Has PRATEEK and his using his CPAP when he does sleep but admits his \"sleep schedule is all off\". Denies apneic spells. Daytime sleepiness improves with use.     No recent ilness. Unintentional weight loss of 11 lbs since last visit.    He has also noticed Increased heartburn. No abd pain, dysphagia or blood in stool. He has not been using his prilosec regularly.    Pt's previous HPI reviewed and updated as indicated.     The following portions of the patient's history were reviewed and updated as appropriate: allergies, current medications, past family history, past medical history, past social history, past surgical history and problem list.    Review of Systems   Constitutional: Positive for fatigue. Negative for appetite change and unexpected weight change.   HENT: Positive for congestion and postnasal drip. Negative for nosebleeds, rhinorrhea, sore throat, tinnitus and trouble swallowing.    Eyes: Negative for visual disturbance.   Respiratory: Positive for shortness of breath (with exertion). Negative for cough and wheezing.    Cardiovascular: Positive for leg " swelling (mild, stable). Negative for chest pain and palpitations.   Gastrointestinal: Positive for nausea. Negative for abdominal pain, blood in stool, constipation, diarrhea and vomiting.        Heartburn   Endocrine: Positive for heat intolerance.   Genitourinary: Negative for decreased urine volume, difficulty urinating, dysuria and hematuria.        ED   Musculoskeletal: Positive for arthralgias and back pain (chronic, stable).   Skin: Negative for rash and wound.   Neurological: Negative for dizziness, syncope, weakness and headaches.   Hematological: Negative for adenopathy. Does not bruise/bleed easily.   Psychiatric/Behavioral: Positive for dysphoric mood (mildly) and sleep disturbance. Negative for confusion and suicidal ideas. The patient is not nervous/anxious.    Pt's previous ROS reviewed and updated as indicated.       Objective    Vitals:    06/10/20 0901   BP: 110/80   Pulse: 104   Temp: 98 °F (36.7 °C)   SpO2: 98%     Body mass index is 39.72 kg/m².      06/10/20  0901   Weight: 122 kg (269 lb)       Physical Exam   Constitutional: He is oriented to person, place, and time. He appears well-developed and well-nourished. He is cooperative. He does not appear ill. No distress.   obese   HENT:   Head: Normocephalic and atraumatic.   Mouth/Throat: Mucous membranes are normal. Mucous membranes are not dry.   Eyes: Conjunctivae and lids are normal. No scleral icterus.   Neck: Neck supple. Normal carotid pulses present. Carotid bruit is not present.   Cardiovascular: Normal rate, regular rhythm, normal heart sounds and intact distal pulses. Exam reveals no gallop.   No murmur heard.  Pulmonary/Chest: Effort normal and breath sounds normal.     Vascular Status -  His right foot exhibits abnormal foot edema (mild). His left foot exhibits abnormal foot edema (mild).  Neurological: He is alert and oriented to person, place, and time. He has normal strength. He displays no tremor. Gait normal.   Skin: Skin is  warm and dry. No bruising and no rash noted.   Psychiatric: He has a normal mood and affect. His behavior is normal. Cognition and memory are normal.   Nursing note and vitals reviewed.  Pt's previous physical exam reviewed and updated as indicated.    Recent Results (from the past 168 hour(s))   POC Glycosylated Hemoglobin (Hb A1C)    Collection Time: 06/10/20  9:25 AM   Result Value Ref Range    Hemoglobin A1C 14.9 %   POC Glucose Fingerstick    Collection Time: 06/10/20  9:25 AM   Result Value Ref Range    Glucose 263 (A) 70 - 130 mg/dL     Lab Results   Component Value Date    HGBA1C 14.9 06/10/2020    HGBA1C 11.2 03/09/2020    HGBA1C 12.9 12/09/2019     Lab Results   Component Value Date    MICROALBUR 4.2 09/09/2019    CREATININE 1.06 03/09/2020     Lab Results   Component Value Date    CHLPL 187 03/09/2020    TRIG 468 (H) 03/09/2020    HDL 32 (L) 03/09/2020    LDL CANCELED 03/09/2020     Lab Results   Component Value Date    WBC 7.11 09/09/2019    HGB 16.1 09/09/2019    HCT 52.4 (H) 09/09/2019    MCV 95.8 09/09/2019     09/09/2019     Lab Results   Component Value Date    BUN 19 03/09/2020    CREATININE 1.06 03/09/2020    EGFRIFNONA 73 03/09/2020    EGFRIFAFRI 89 03/09/2020    BCR 17.9 03/09/2020    K 4.3 03/09/2020    CO2 27.8 03/09/2020    CALCIUM 9.5 03/09/2020    PROTENTOTREF 7.3 03/09/2020    ALBUMIN 4.00 03/09/2020    LABIL2 1.2 03/09/2020    AST 20 03/09/2020    ALT 21 03/09/2020         Assessment/Plan   Myke was seen today for follow-up and diabetes.    Diagnoses and all orders for this visit:    Type 2 diabetes mellitus with complication (CMS/[facility])  -     POC Glycosylated Hemoglobin (Hb A1C)  -     POC Glucose Fingerstick    Essential hypertension    Mixed hyperlipidemia    PRATEEK on CPAP    Morbid obesity (CMS/[facility])    Gastroesophageal reflux disease, esophagitis presence not specified    Other orders  -     glimepiride (AMARYL) 4 MG tablet; Take 1 tablet by mouth Every Morning Before  Breakfast.  -     metFORMIN (GLUCOPHAGE) 1000 MG tablet; Take 1 tablet by mouth 2 (Two) Times a Day With Meals.  -     omeprazole OTC (PrilOSEC OTC) 20 MG EC tablet; Take 1 tablet by mouth 2 (Two) Times a Day.       Markedly uncontrolled IDDM despite being rx'd 3 oral meds as well as mixed insulin- this is due to noncompliance with meds, diet, and exercise recommendations. We have had kayleen discussion today regarding absolute need for him to take meds as rx'd, follow dietary rec's and get daily exercise in order to get his blood sugar under control and prevent serious complications. He voiced understanding. Continue novolog 70/30, basaglar, amaryl, metformin and januvia.    HTN controlled, continue losartan and norvasc.    HLP continue lipitor.    Encouraged regular sleep schedule and use of CPAP.    Nutrition and activity goals reviewed including: mainly water to drink, limit white flour/processed sugar, higher lean protein, high fiber carbs, regular meals, working toward 150 mins cardio per week, limit daily kcal to 1800.    Restart prilosec. GERD prevention methods again reviewed.    Routine f/u in 3 months, sooner as needed.  Check BG at least twice daily, record for my review. Report BG persistently over 300.  Patient was encouraged to keep me informed of any acute changes, lack of improvement, or any new concerning symptoms.  Pt is aware of reasons to seek emergent care.  Patient voiced understanding of all instructions and denied further questions.

## 2020-06-12 PROBLEM — K21.9 GASTROESOPHAGEAL REFLUX DISEASE: Status: ACTIVE | Noted: 2020-06-12

## 2020-06-24 RX ORDER — LOSARTAN POTASSIUM 100 MG/1
TABLET ORAL
Qty: 30 TABLET | Refills: 2 | Status: SHIPPED | OUTPATIENT
Start: 2020-06-24 | End: 2020-11-23 | Stop reason: SDUPTHER

## 2020-06-30 RX ORDER — PRAMIPEXOLE DIHYDROCHLORIDE 0.25 MG/1
TABLET ORAL
Qty: 90 TABLET | Refills: 0 | Status: SHIPPED | OUTPATIENT
Start: 2020-06-30 | End: 2020-08-21

## 2020-08-14 DIAGNOSIS — E11.8 TYPE 2 DIABETES MELLITUS WITH COMPLICATION, WITH LONG-TERM CURRENT USE OF INSULIN (HCC): ICD-10-CM

## 2020-08-14 DIAGNOSIS — Z79.4 TYPE 2 DIABETES MELLITUS WITH COMPLICATION, WITH LONG-TERM CURRENT USE OF INSULIN (HCC): ICD-10-CM

## 2020-08-14 RX ORDER — CHLORTHALIDONE 25 MG/1
TABLET ORAL
Qty: 30 TABLET | Refills: 0 | Status: SHIPPED | OUTPATIENT
Start: 2020-08-14 | End: 2020-09-21

## 2020-08-21 RX ORDER — PRAMIPEXOLE DIHYDROCHLORIDE 0.25 MG/1
TABLET ORAL
Qty: 90 TABLET | Refills: 0 | Status: SHIPPED | OUTPATIENT
Start: 2020-08-21 | End: 2020-09-29

## 2020-09-10 ENCOUNTER — OFFICE VISIT (OUTPATIENT)
Dept: FAMILY MEDICINE CLINIC | Facility: CLINIC | Age: 55
End: 2020-09-10

## 2020-09-10 VITALS
SYSTOLIC BLOOD PRESSURE: 122 MMHG | TEMPERATURE: 98.4 F | HEIGHT: 69 IN | DIASTOLIC BLOOD PRESSURE: 80 MMHG | HEART RATE: 97 BPM | WEIGHT: 279 LBS | OXYGEN SATURATION: 98 % | BODY MASS INDEX: 41.32 KG/M2 | RESPIRATION RATE: 14 BRPM

## 2020-09-10 DIAGNOSIS — Z79.4 TYPE 2 DIABETES MELLITUS WITH COMPLICATION, WITH LONG-TERM CURRENT USE OF INSULIN (HCC): ICD-10-CM

## 2020-09-10 DIAGNOSIS — I10 ESSENTIAL HYPERTENSION: ICD-10-CM

## 2020-09-10 DIAGNOSIS — E11.8 TYPE 2 DIABETES MELLITUS WITH COMPLICATION, WITH LONG-TERM CURRENT USE OF INSULIN (HCC): ICD-10-CM

## 2020-09-10 DIAGNOSIS — E78.2 MIXED HYPERLIPIDEMIA: ICD-10-CM

## 2020-09-10 DIAGNOSIS — F32.A DEPRESSIVE DISORDER: ICD-10-CM

## 2020-09-10 DIAGNOSIS — E11.8 TYPE 2 DIABETES MELLITUS WITH COMPLICATION (HCC): Primary | ICD-10-CM

## 2020-09-10 DIAGNOSIS — Z28.21 INFLUENZA VACCINATION DECLINED BY PATIENT: ICD-10-CM

## 2020-09-10 PROCEDURE — 99214 OFFICE O/P EST MOD 30 MIN: CPT | Performed by: FAMILY MEDICINE

## 2020-09-10 RX ORDER — BUPROPION HYDROCHLORIDE 150 MG/1
150 TABLET ORAL DAILY
Qty: 30 TABLET | Refills: 2 | Status: SHIPPED | OUTPATIENT
Start: 2020-09-10 | End: 2020-12-28

## 2020-09-10 NOTE — PROGRESS NOTES
"Subjective   Myke Marcial is a 54 y.o. male.     History of Present Illness   Mr. Marcial presents today for f/u on several chronic medical problems.     He has IDDM which has not been well controlled. He is on 3 oral meds and mixed insulin. over past few weeks he feels he has been taking meds more regularly. BG this morning 103. Prior to that BG in 300s, 400s. He admits that when he is \"working on big job\" he does not take his medicaiton as regularly. He is not exercising regularly, not following diabetic appropriate. He has h/o diabetic foot ulcer now healed.      Has HTN. Has been farily well controlled on norvasc, HCTZ and ARB. Taking meds as rx'd. Denies side effects. Denies CP, palpitations, orthopnea. Stable mild swelling in feet.     Has HLP for which he is taking statin. Tolerating well. Denies symptoms of claudication.     Has PRATEEK and his using his CPAP when he does sleep but admits his \"sleep schedule is all off\". Denies apneic spells. Daytime sleepiness improves with use.     No recent ilness. weight gain of 10 lbs over past month.     He reports his wife wants him to be evaluated for possible depression.  He admits he has had longstanding dysphoric mood, irritability, sleep impairment, fluctuating appetite, anhedonia, poor motivation.  Is never been on anti-depressants previously.  Denies suicidal ideation.  No manic or psychotic features.  He is quite willing to try medication if it helps\".      The following portions of the patient's history were reviewed and updated as appropriate: allergies, current medications, past family history, past medical history, past social history, past surgical history and problem list.    Review of Systems   Constitutional: Positive for fatigue. Negative for appetite change and unexpected weight change.   HENT: Positive for congestion and postnasal drip. Negative for nosebleeds, rhinorrhea, sore throat, tinnitus and trouble swallowing.    Eyes: Negative for " visual disturbance.   Respiratory: Positive for shortness of breath (with exertion). Negative for cough and wheezing.    Cardiovascular: Positive for leg swelling (mild, stable). Negative for chest pain and palpitations.   Gastrointestinal: Positive for nausea. Negative for abdominal pain, blood in stool, constipation, diarrhea and vomiting.        Heartburn   Endocrine: Positive for heat intolerance.   Genitourinary: Negative for decreased urine volume, difficulty urinating, dysuria and hematuria.        ED   Musculoskeletal: Positive for arthralgias and back pain (chronic, stable).   Skin: Negative for rash and wound.   Neurological: Negative for dizziness, syncope, weakness and headaches.   Hematological: Negative for adenopathy. Does not bruise/bleed easily.   Psychiatric/Behavioral: Positive for dysphoric mood and sleep disturbance. Negative for confusion and suicidal ideas. The patient is not nervous/anxious.    Pt's previous ROS reviewed and updated as indicated.       Objective    Vitals:    09/10/20 0826   BP: 122/80   Pulse: 97   Resp: 14   Temp: 98.4 °F (36.9 °C)   SpO2: 98%     Body mass index is 41.2 kg/m².      09/10/20  0826   Weight: 127 kg (279 lb)       Physical Exam   Constitutional: He is oriented to person, place, and time. He appears well-developed and well-nourished. He is cooperative. He does not appear ill. No distress.   Morbidly obese   HENT:   Head: Normocephalic and atraumatic.   Eyes: Conjunctivae and EOM are normal. No scleral icterus.   Neck: Neck supple. Normal carotid pulses present. Carotid bruit is not present. No thyroid mass present.   Cardiovascular: Normal rate, regular rhythm, normal heart sounds and intact distal pulses. Exam reveals no gallop.   No murmur heard.  Pulmonary/Chest: Effort normal and breath sounds normal.     Vascular Status -  His right foot exhibits abnormal foot edema (mild). His left foot exhibits abnormal foot edema (mild).  Lymphadenopathy:     He has no  cervical adenopathy.   Neurological: He is alert and oriented to person, place, and time. He has normal strength. He displays no tremor. No cranial nerve deficit or sensory deficit. Gait normal.   Skin: Skin is warm and dry. No bruising and no rash noted.   Psychiatric: He has a normal mood and affect. His speech is normal and behavior is normal. Judgment and thought content normal. Cognition and memory are normal.   Good eye contact. Answers questions appropriately. Good personal hygiene and grooming.   Nursing note and vitals reviewed.  Pt's previous physical exam reviewed and updated as indicated.        Assessment/Plan   Myke was seen today for diabetes, hyperlipidemia and hypertension.    Diagnoses and all orders for this visit:    Type 2 diabetes mellitus with complication (CMS/[facility])  -     CBC (No Diff)  -     Comprehensive Metabolic Panel  -     Hemoglobin A1c  -     Microalbumin / Creatinine Urine Ratio - Urine, Clean Catch    Type 2 diabetes mellitus with complication, with long-term current use of insulin (CMS/[facility])  -     insulin aspart protamine & aspart (NOVOLOG) 70/30 100 unit/mL; INJECT 25 UNITS SUBCUTANEOUSLY TWICE DAILY. INCREASE AS DIRECTED TO MAX DOSE OF 50 UNITS TWICE DAILY  -     Cancel: CBC (No Diff)  -     Cancel: Comprehensive Metabolic Panel  -     Cancel: Lipid Panel  -     Cancel: Hemoglobin A1c  -     Cancel: Microalbumin / Creatinine Urine Ratio - Urine, Clean Catch  -     Cancel: TSH Rfx On Abnormal To Free T4    Mixed hyperlipidemia  -     Comprehensive Metabolic Panel  -     Lipid Panel    Essential hypertension  -     CBC (No Diff)  -     Comprehensive Metabolic Panel  -     Microalbumin / Creatinine Urine Ratio - Urine, Clean Catch  -     TSH Rfx On Abnormal To Free T4    Depressive disorder    Influenza vaccination declined by patient    Other orders  -     buPROPion XL (Wellbutrin XL) 150 MG 24 hr tablet; Take 1 tablet by mouth Daily.       Type 2 insulin-dependent diabetes  mellitus poorly controlled but with recently improving blood glucose per his report.  A1c as above.  Adjust treatment as indicated.  In the interim continue mixed insulin twice daily, metformin, Januvia, Amaryl. Patient encouraged to take meds as rx'd, follow diabetic appropriate diet, exercise daily, perform feet check daily, and have yearly diabetic eye exams.    Mixed hyperlipidemia with good tolerance of statin.  Lipid profile as above.    Hypertension fairly well controlled on losartan, chlorthalidone. Pt advised to eat a heart healthy diet and get regular aerobic exercise.    Risk/benefits and potential side effects various treatment options for depression reviewed.  He was understanding is amenable to a trial of Wellbutrin  mg once daily.  Lifestyle modification efforts for management of depression reviewed as well including regular exercise, appropriate nutrition, regulating sleep schedule etc.    Routine follow-up in 3 months, sooner as needed/instructed.  I will contact patient regarding test results and provide instructions regarding any necessary changes in plan of care.  Patient was encouraged to keep me informed of any acute changes, lack of improvement, or any new concerning symptoms.  Pt is aware of reasons to seek emergent care.  Patient voiced understanding of all instructions and denied further questions.              Please note that portions of this note may have been completed with a voice recognition program. Efforts were made to edit the dictations, but occasionally words are mistranscribed.

## 2020-09-11 LAB
ALBUMIN SERPL-MCNC: 4.6 G/DL (ref 3.5–5.2)
ALBUMIN/CREAT UR: 9 MG/G CREAT (ref 0–29)
ALBUMIN/GLOB SERPL: 1.6 G/DL
ALP SERPL-CCNC: 77 U/L (ref 39–117)
ALT SERPL-CCNC: 30 U/L (ref 1–41)
AST SERPL-CCNC: 28 U/L (ref 1–40)
BILIRUB SERPL-MCNC: 0.4 MG/DL (ref 0–1.2)
BUN SERPL-MCNC: 18 MG/DL (ref 6–20)
BUN/CREAT SERPL: 14.5 (ref 7–25)
CALCIUM SERPL-MCNC: 10 MG/DL (ref 8.6–10.5)
CHLORIDE SERPL-SCNC: 99 MMOL/L (ref 98–107)
CHOLEST SERPL-MCNC: 176 MG/DL (ref 0–200)
CO2 SERPL-SCNC: 28.6 MMOL/L (ref 22–29)
CREAT SERPL-MCNC: 1.24 MG/DL (ref 0.76–1.27)
CREAT UR-MCNC: 191.3 MG/DL
ERYTHROCYTE [DISTWIDTH] IN BLOOD BY AUTOMATED COUNT: 13.6 % (ref 12.3–15.4)
GLOBULIN SER CALC-MCNC: 2.8 GM/DL
GLUCOSE SERPL-MCNC: 108 MG/DL (ref 65–99)
HBA1C MFR BLD: 12.6 % (ref 4.8–5.6)
HCT VFR BLD AUTO: 47.4 % (ref 37.5–51)
HDLC SERPL-MCNC: 38 MG/DL (ref 40–60)
HGB BLD-MCNC: 16 G/DL (ref 13–17.7)
LDLC SERPL CALC-MCNC: ABNORMAL MG/DL
MCH RBC QN AUTO: 30 PG (ref 26.6–33)
MCHC RBC AUTO-ENTMCNC: 33.8 G/DL (ref 31.5–35.7)
MCV RBC AUTO: 88.8 FL (ref 79–97)
MICROALBUMIN UR-MCNC: 16.4 UG/ML
PLATELET # BLD AUTO: 309 10*3/MM3 (ref 140–450)
POTASSIUM SERPL-SCNC: 4.5 MMOL/L (ref 3.5–5.2)
PROT SERPL-MCNC: 7.4 G/DL (ref 6–8.5)
RBC # BLD AUTO: 5.34 10*6/MM3 (ref 4.14–5.8)
SODIUM SERPL-SCNC: 138 MMOL/L (ref 136–145)
TRIGL SERPL-MCNC: 419 MG/DL (ref 0–150)
TSH SERPL DL<=0.005 MIU/L-ACNC: 2.57 UIU/ML (ref 0.27–4.2)
VLDLC SERPL CALC-MCNC: ABNORMAL MG/DL
WBC # BLD AUTO: 7.74 10*3/MM3 (ref 3.4–10.8)

## 2020-09-21 RX ORDER — CHLORTHALIDONE 25 MG/1
TABLET ORAL
Qty: 90 TABLET | Refills: 3 | Status: SHIPPED | OUTPATIENT
Start: 2020-09-21 | End: 2020-09-29

## 2020-09-21 RX ORDER — ATORVASTATIN CALCIUM 20 MG/1
TABLET, FILM COATED ORAL
Qty: 90 TABLET | Refills: 3 | Status: SHIPPED | OUTPATIENT
Start: 2020-09-21 | End: 2021-01-07 | Stop reason: SDUPTHER

## 2020-09-29 RX ORDER — PRAMIPEXOLE DIHYDROCHLORIDE 0.25 MG/1
TABLET ORAL
Qty: 90 TABLET | Refills: 0 | Status: SHIPPED | OUTPATIENT
Start: 2020-09-29 | End: 2020-12-22

## 2020-09-29 RX ORDER — CHLORTHALIDONE 25 MG/1
TABLET ORAL
Qty: 30 TABLET | Refills: 0 | Status: SHIPPED | OUTPATIENT
Start: 2020-09-29 | End: 2020-12-22

## 2020-10-27 ENCOUNTER — TELEPHONE (OUTPATIENT)
Dept: FAMILY MEDICINE CLINIC | Facility: CLINIC | Age: 55
End: 2020-10-27

## 2020-10-27 NOTE — TELEPHONE ENCOUNTER
PT IS CALLING CHECKING ON THE STATUS OF HIS FORM FOR DIABETIC SHOES.    PT IS REQUESTING A CALL BACK TO DISCUSS.    PLEASE ADVISE  [phone]

## 2020-11-08 DIAGNOSIS — E11.8 TYPE 2 DIABETES MELLITUS WITH COMPLICATION, WITH LONG-TERM CURRENT USE OF INSULIN (HCC): ICD-10-CM

## 2020-11-08 DIAGNOSIS — Z79.4 TYPE 2 DIABETES MELLITUS WITH COMPLICATION, WITH LONG-TERM CURRENT USE OF INSULIN (HCC): ICD-10-CM

## 2020-11-09 ENCOUNTER — PRIOR AUTHORIZATION (OUTPATIENT)
Dept: FAMILY MEDICINE CLINIC | Facility: CLINIC | Age: 55
End: 2020-11-09

## 2020-11-09 RX ORDER — INSULIN ASPART 100 [IU]/ML
INJECTION, SUSPENSION SUBCUTANEOUS
Qty: 15 ML | Refills: 5 | Status: SHIPPED | OUTPATIENT
Start: 2020-11-09 | End: 2021-05-01 | Stop reason: SDUPTHER

## 2020-11-23 RX ORDER — LOSARTAN POTASSIUM 100 MG/1
100 TABLET ORAL DAILY
Qty: 30 TABLET | Refills: 5 | Status: SHIPPED | OUTPATIENT
Start: 2020-11-23 | End: 2021-01-07 | Stop reason: SDUPTHER

## 2020-12-22 RX ORDER — CHLORTHALIDONE 25 MG/1
TABLET ORAL
Qty: 30 TABLET | Refills: 0 | Status: SHIPPED | OUTPATIENT
Start: 2020-12-22 | End: 2020-12-28

## 2020-12-22 RX ORDER — PRAMIPEXOLE DIHYDROCHLORIDE 0.25 MG/1
TABLET ORAL
Qty: 90 TABLET | Refills: 0 | Status: SHIPPED | OUTPATIENT
Start: 2020-12-22 | End: 2020-12-28

## 2020-12-28 RX ORDER — PRAMIPEXOLE DIHYDROCHLORIDE 0.25 MG/1
TABLET ORAL
Qty: 90 TABLET | Refills: 0 | Status: SHIPPED | OUTPATIENT
Start: 2020-12-28 | End: 2021-01-07 | Stop reason: SDUPTHER

## 2020-12-28 RX ORDER — BUPROPION HYDROCHLORIDE 150 MG/1
TABLET ORAL
Qty: 90 TABLET | Refills: 0 | Status: SHIPPED | OUTPATIENT
Start: 2020-12-28 | End: 2021-01-07 | Stop reason: SDUPTHER

## 2020-12-28 RX ORDER — CHLORTHALIDONE 25 MG/1
TABLET ORAL
Qty: 30 TABLET | Refills: 0 | Status: SHIPPED | OUTPATIENT
Start: 2020-12-28 | End: 2021-01-07 | Stop reason: SDUPTHER

## 2021-01-06 ENCOUNTER — OFFICE VISIT (OUTPATIENT)
Dept: PULMONOLOGY | Facility: CLINIC | Age: 56
End: 2021-01-06

## 2021-01-06 VITALS
WEIGHT: 270 LBS | DIASTOLIC BLOOD PRESSURE: 86 MMHG | HEIGHT: 69 IN | OXYGEN SATURATION: 97 % | SYSTOLIC BLOOD PRESSURE: 130 MMHG | BODY MASS INDEX: 39.99 KG/M2 | HEART RATE: 96 BPM | TEMPERATURE: 97.3 F | RESPIRATION RATE: 16 BRPM

## 2021-01-06 DIAGNOSIS — Z72.821 POOR SLEEP HYGIENE: ICD-10-CM

## 2021-01-06 DIAGNOSIS — G47.33 OBSTRUCTIVE SLEEP APNEA: Primary | ICD-10-CM

## 2021-01-06 DIAGNOSIS — E66.9 OBESITY (BMI 30-39.9): ICD-10-CM

## 2021-01-06 DIAGNOSIS — G47.19 EXCESSIVE DAYTIME SLEEPINESS: ICD-10-CM

## 2021-01-06 PROCEDURE — 99213 OFFICE O/P EST LOW 20 MIN: CPT | Performed by: NURSE PRACTITIONER

## 2021-01-07 ENCOUNTER — OFFICE VISIT (OUTPATIENT)
Dept: FAMILY MEDICINE CLINIC | Facility: CLINIC | Age: 56
End: 2021-01-07

## 2021-01-07 VITALS
BODY MASS INDEX: 39.84 KG/M2 | OXYGEN SATURATION: 96 % | HEART RATE: 90 BPM | RESPIRATION RATE: 14 BRPM | WEIGHT: 269 LBS | HEIGHT: 69 IN | TEMPERATURE: 96.2 F | DIASTOLIC BLOOD PRESSURE: 80 MMHG | SYSTOLIC BLOOD PRESSURE: 124 MMHG

## 2021-01-07 DIAGNOSIS — Z28.21 INFLUENZA VACCINATION DECLINED BY PATIENT: ICD-10-CM

## 2021-01-07 DIAGNOSIS — G47.33 OSA ON CPAP: ICD-10-CM

## 2021-01-07 DIAGNOSIS — Z99.89 OSA ON CPAP: ICD-10-CM

## 2021-01-07 DIAGNOSIS — F32.A DEPRESSIVE DISORDER: ICD-10-CM

## 2021-01-07 DIAGNOSIS — I10 ESSENTIAL HYPERTENSION: ICD-10-CM

## 2021-01-07 DIAGNOSIS — E78.2 MIXED HYPERLIPIDEMIA: ICD-10-CM

## 2021-01-07 DIAGNOSIS — E11.8 TYPE 2 DIABETES MELLITUS WITH COMPLICATION (HCC): Primary | ICD-10-CM

## 2021-01-07 LAB — HBA1C MFR BLD: NORMAL %

## 2021-01-07 PROCEDURE — 99214 OFFICE O/P EST MOD 30 MIN: CPT | Performed by: FAMILY MEDICINE

## 2021-01-07 PROCEDURE — 83036 HEMOGLOBIN GLYCOSYLATED A1C: CPT | Performed by: FAMILY MEDICINE

## 2021-01-07 RX ORDER — LOSARTAN POTASSIUM 100 MG/1
100 TABLET ORAL DAILY
Qty: 90 TABLET | Refills: 3 | Status: SHIPPED | OUTPATIENT
Start: 2021-01-07 | End: 2022-01-06

## 2021-01-07 RX ORDER — CHLORTHALIDONE 25 MG/1
25 TABLET ORAL DAILY
Qty: 90 TABLET | Refills: 3 | Status: SHIPPED | OUTPATIENT
Start: 2021-01-07 | End: 2022-01-24

## 2021-01-07 RX ORDER — BUPROPION HYDROCHLORIDE 150 MG/1
150 TABLET ORAL DAILY
Qty: 90 TABLET | Refills: 3 | Status: SHIPPED | OUTPATIENT
Start: 2021-01-07 | End: 2021-04-28

## 2021-01-07 RX ORDER — ATORVASTATIN CALCIUM 20 MG/1
20 TABLET, FILM COATED ORAL DAILY
Qty: 90 TABLET | Refills: 3 | Status: SHIPPED | OUTPATIENT
Start: 2021-01-07 | End: 2021-11-15 | Stop reason: SDUPTHER

## 2021-01-07 RX ORDER — PRAMIPEXOLE DIHYDROCHLORIDE 0.25 MG/1
0.25 TABLET ORAL 3 TIMES DAILY
Qty: 90 TABLET | Refills: 3 | Status: SHIPPED | OUTPATIENT
Start: 2021-01-07 | End: 2021-04-28 | Stop reason: SDUPTHER

## 2021-01-07 RX ORDER — GLIMEPIRIDE 4 MG/1
4 TABLET ORAL
Qty: 90 TABLET | Refills: 3 | Status: SHIPPED | OUTPATIENT
Start: 2021-01-07 | End: 2021-08-11

## 2021-01-07 NOTE — PROGRESS NOTES
"Subjective   Myke Marcial is a 55 y.o. male.     History of Present Illness   Mr. Marcial presents today for f/u on several chronic medical problems.     He has IDDM which has not been poorly controlled. He is on 3 oral meds and mixed insulin. he admits that since his last visit he has not been taking his meds regularly, taking insulin \"only if he needs it\" although he admits he does not check his BG regularly. He is not exercising much, not followign appropriate diet.    Has HTN. Has been farily well controlled on norvasc, HCTZ and ARB. Taking meds as rx'd. Denies side effects. Denies CP, palpitations, orthopnea. Stable mild swelling in feet.     Has HLP for which he is taking statin. Tolerating well. Denies symptoms of claudication.     Has PRATEEK and his using his CPAP when he does sleep but admits his \"sleep schedule is all off\". often going to bed at 3 AM in morning, sleeping late. Makes for irregular meal times. Denies apneic spells. Daytime sleepiness improves with use.     No recent ilness. weight stable since last visit, but BMI 39+.     He continues to struggle with depressed mood, anhedonia, poor motivation. Taking wellbutrin, helps some. Denies SI. Does not wish to increase dose or take additional meds. Similar to last visit he endorses dysphoric mood, irritability, sleep impairment, fluctuating appetite, anhedonia, poor motivation.  No manic or psychotic features.      Pt's previous HPI reviewed and updated as indicated.     The following portions of the patient's history were reviewed and updated as appropriate: allergies, current medications, past family history, past medical history, past social history, past surgical history and problem list.    Review of Systems   Constitutional: Positive for fatigue. Negative for appetite change and unexpected weight change.   HENT: Positive for congestion and postnasal drip. Negative for nosebleeds, rhinorrhea, sore throat, tinnitus and trouble swallowing. "    Eyes: Negative for visual disturbance.   Respiratory: Positive for shortness of breath (with exertion). Negative for cough and wheezing.    Cardiovascular: Positive for leg swelling (mild, stable). Negative for chest pain and palpitations.   Gastrointestinal: Positive for nausea. Negative for abdominal pain, blood in stool, constipation, diarrhea and vomiting.        Heartburn   Endocrine: Positive for heat intolerance.   Genitourinary: Negative for decreased urine volume, difficulty urinating, dysuria and hematuria.        ED   Musculoskeletal: Positive for arthralgias and back pain (chronic, stable).   Skin: Negative for rash and wound.   Neurological: Negative for dizziness, syncope, weakness and headaches.   Hematological: Negative for adenopathy. Does not bruise/bleed easily.   Psychiatric/Behavioral: Positive for dysphoric mood and sleep disturbance. Negative for confusion and suicidal ideas. The patient is not nervous/anxious.    Pt's previous ROS reviewed and updated as indicated.       Objective    Vitals:    01/07/21 0811   BP: 124/80   Pulse: 90   Resp: 14   Temp: 96.2 °F (35.7 °C)   SpO2: 96%     Body mass index is 39.72 kg/m².      01/07/21 0811   Weight: 122 kg (269 lb)       Physical Exam  Vitals signs and nursing note reviewed.   Constitutional:       General: He is not in acute distress.     Appearance: He is well-developed and well-groomed. He is obese. He is not ill-appearing.   HENT:      Head: Normocephalic and atraumatic.   Eyes:      General: No scleral icterus.     Extraocular Movements: Extraocular movements intact.      Conjunctiva/sclera: Conjunctivae normal.   Neck:      Thyroid: No thyroid mass.      Vascular: Normal carotid pulses. No carotid bruit.   Cardiovascular:      Rate and Rhythm: Normal rate and regular rhythm.      Pulses: Normal pulses.      Heart sounds: Heart sounds are distant.   Pulmonary:      Effort: Pulmonary effort is normal.      Breath sounds: Normal breath  sounds.   Musculoskeletal:      Right lower leg: Edema (mild) present.      Left lower leg: Edema (mild) present.   Lymphadenopathy:      Cervical: No cervical adenopathy.   Skin:     General: Skin is warm and dry.      Coloration: Skin is not jaundiced or pale.      Findings: No bruising.   Neurological:      Mental Status: He is alert and oriented to person, place, and time.      Motor: No tremor.      Gait: Gait is intact.   Psychiatric:         Attention and Perception: Attention normal.         Mood and Affect: Affect is flat.         Behavior: Behavior normal. Behavior is cooperative.         Thought Content: Thought content is not paranoid or delusional. Thought content does not include suicidal ideation.         Cognition and Memory: Cognition normal.         Judgment: Judgment normal.       Lab Results   Component Value Date    HGBA1C  01/07/2021      Comment:      Too high to read     Lab Results   Component Value Date    HGBA1C  01/07/2021      Comment:      Too high to read    HGBA1C 12.60 (H) 09/10/2020    HGBA1C 14.9 06/10/2020     Lab Results   Component Value Date    MICROALBUR 16.4 09/10/2020    CREATININE 1.24 09/10/2020     Immunization History   Administered Date(s) Administered   • Pneumococcal Conjugate 13-Valent (PCV13) 08/23/2018   • Pneumococcal Polysaccharide (PPSV23) 01/13/2017   • Tdap 01/13/2017       Assessment/Plan   Diagnoses and all orders for this visit:    1. Type 2 diabetes mellitus with complication (CMS/HCC) (Primary)  -     POC Glycosylated Hemoglobin (Hb A1C)  -     Hemoglobin A1c  -     Comprehensive Metabolic Panel    2. Essential hypertension  -     Comprehensive Metabolic Panel    3. Mixed hyperlipidemia  -     Lipid Panel  -     Comprehensive Metabolic Panel    4. PRATEEK on CPAP    5. Depressive disorder    6. Influenza vaccination declined by patient    Other orders  -     glimepiride (AMARYL) 4 MG tablet; Take 1 tablet by mouth Every Morning Before Breakfast.  Dispense: 90  tablet; Refill: 3  -     SITagliptin (Januvia) 100 MG tablet; Take 1 tablet by mouth Daily.  Dispense: 90 tablet; Refill: 3  -     losartan (COZAAR) 100 MG tablet; Take 1 tablet by mouth Daily.  Dispense: 90 tablet; Refill: 3  -     pramipexole (MIRAPEX) 0.25 MG tablet; Take 1 tablet by mouth 3 (Three) Times a Day.  Dispense: 90 tablet; Refill: 3  -     metFORMIN (GLUCOPHAGE) 1000 MG tablet; Take 1 tablet by mouth 2 (Two) Times a Day With Meals.  Dispense: 180 tablet; Refill: 3  -     chlorthalidone (HYGROTON) 25 MG tablet; Take 1 tablet by mouth Daily.  Dispense: 90 tablet; Refill: 3  -     buPROPion XL (WELLBUTRIN XL) 150 MG 24 hr tablet; Take 1 tablet by mouth Daily.  Dispense: 90 tablet; Refill: 3  -     atorvastatin (LIPITOR) 20 MG tablet; Take 1 tablet by mouth Daily.  Dispense: 90 tablet; Refill: 3       IDDM very poorly controlled. venupuncture A1c as above. He is once again encouraged to take oral meds and insulin as rx'd and that his average BG is likely running over 400 placing him at risk for severe illness requiring hospitalization as well as increasing his risk of death. Clinically stable at this time. He is also advised to limit sugar intake and follow more diabetic appropriate eating plan as well as at least walk daily.    Depression affecting medical complaince. Continue wellbutrin. He declines change in tx at this time. Denies SI.    Encouraged CPAP compliance, regular sleep schedule, sleep hygiene, etc.    HLP, continue statin.    HTN controlled, continue losartan, hctz. Pt advised to eat a heart healthy diet and get regular aerobic exercise.    He once again declines influenza vaccination.    Routine f/u in 3 months, sooner as needed/instructed.  I will contact patient regarding test results and provide instructions regarding any necessary changes in plan of care.  Patient was encouraged to keep me informed of any acute changes, lack of improvement, or any new concerning symptoms.  Pt is aware of  reasons to seek emergent care.  Patient voiced understanding of all instructions and denied further questions.

## 2021-01-08 ENCOUNTER — DOCUMENTATION (OUTPATIENT)
Dept: INTERNAL MEDICINE | Facility: CLINIC | Age: 56
End: 2021-01-08

## 2021-01-08 LAB
ALBUMIN SERPL-MCNC: 4.2 G/DL (ref 3.5–5.2)
ALBUMIN/GLOB SERPL: 1.2 G/DL
ALP SERPL-CCNC: 91 U/L (ref 39–117)
ALT SERPL-CCNC: 34 U/L (ref 1–41)
AST SERPL-CCNC: 26 U/L (ref 1–40)
BILIRUB SERPL-MCNC: 0.5 MG/DL (ref 0–1.2)
BUN SERPL-MCNC: 16 MG/DL (ref 6–20)
BUN/CREAT SERPL: 14.2 (ref 7–25)
CALCIUM SERPL-MCNC: 9.9 MG/DL (ref 8.6–10.5)
CHLORIDE SERPL-SCNC: 93 MMOL/L (ref 98–107)
CHOLEST SERPL-MCNC: 293 MG/DL (ref 0–200)
CO2 SERPL-SCNC: 27.9 MMOL/L (ref 22–29)
CREAT SERPL-MCNC: 1.13 MG/DL (ref 0.76–1.27)
GLOBULIN SER CALC-MCNC: 3.5 GM/DL
GLUCOSE SERPL-MCNC: 447 MG/DL (ref 65–99)
HBA1C MFR BLD: 15.2 % (ref 4.8–5.6)
HDLC SERPL-MCNC: 27 MG/DL (ref 40–60)
LDLC SERPL CALC-MCNC: ABNORMAL MG/DL
POTASSIUM SERPL-SCNC: 4.7 MMOL/L (ref 3.5–5.2)
PROT SERPL-MCNC: 7.7 G/DL (ref 6–8.5)
SODIUM SERPL-SCNC: 131 MMOL/L (ref 136–145)
TRIGL SERPL-MCNC: 1202 MG/DL (ref 0–150)
VLDLC SERPL CALC-MCNC: ABNORMAL MG/DL

## 2021-01-08 NOTE — PROGRESS NOTES
Was notified by lab regarding critical glucose of 457 and patient’s hemoglobin A-1 C was greater than 15.2.  Patient has history of uncontrolled diabetes as last hemoglobin A1c was 12.6.

## 2021-01-11 ENCOUNTER — OFFICE VISIT (OUTPATIENT)
Dept: FAMILY MEDICINE CLINIC | Facility: CLINIC | Age: 56
End: 2021-01-11

## 2021-01-11 VITALS
RESPIRATION RATE: 14 BRPM | SYSTOLIC BLOOD PRESSURE: 122 MMHG | HEART RATE: 99 BPM | WEIGHT: 273 LBS | BODY MASS INDEX: 40.43 KG/M2 | TEMPERATURE: 98.6 F | OXYGEN SATURATION: 97 % | HEIGHT: 69 IN | DIASTOLIC BLOOD PRESSURE: 78 MMHG

## 2021-01-11 DIAGNOSIS — R59.0 LYMPHADENOPATHY, POSTERIOR CERVICAL: ICD-10-CM

## 2021-01-11 DIAGNOSIS — E11.65 UNCONTROLLED TYPE 2 DIABETES MELLITUS WITH HYPERGLYCEMIA (HCC): ICD-10-CM

## 2021-01-11 DIAGNOSIS — L03.221 CELLULITIS AND ABSCESS OF NECK: Primary | ICD-10-CM

## 2021-01-11 DIAGNOSIS — L02.11 CELLULITIS AND ABSCESS OF NECK: Primary | ICD-10-CM

## 2021-01-11 PROCEDURE — 96372 THER/PROPH/DIAG INJ SC/IM: CPT | Performed by: FAMILY MEDICINE

## 2021-01-11 PROCEDURE — 99213 OFFICE O/P EST LOW 20 MIN: CPT | Performed by: FAMILY MEDICINE

## 2021-01-11 RX ORDER — CLINDAMYCIN HYDROCHLORIDE 300 MG/1
300 CAPSULE ORAL 3 TIMES DAILY
Qty: 21 CAPSULE | Refills: 0 | Status: SHIPPED | OUTPATIENT
Start: 2021-01-11 | End: 2021-01-18

## 2021-01-11 RX ORDER — CEFTRIAXONE 1 G/1
1 INJECTION, POWDER, FOR SOLUTION INTRAMUSCULAR; INTRAVENOUS ONCE
Status: COMPLETED | OUTPATIENT
Start: 2021-01-11 | End: 2021-01-11

## 2021-01-11 RX ORDER — CIPROFLOXACIN 500 MG/1
500 TABLET, FILM COATED ORAL 2 TIMES DAILY
Qty: 14 TABLET | Refills: 0 | Status: SHIPPED | OUTPATIENT
Start: 2021-01-11 | End: 2021-01-18

## 2021-01-11 RX ADMIN — CEFTRIAXONE 1 G: 1 INJECTION, POWDER, FOR SOLUTION INTRAMUSCULAR; INTRAVENOUS at 11:04

## 2021-01-11 NOTE — PROGRESS NOTES
Subjective   Myke Marcial is a 55 y.o. male.     History of Present Illness  Mr. Morales is a 55-year  male with poorly controlled insulin-dependent diabetes who presents today with complaint of painful knot on the back of his head/neck.  Noted within the last couple of days.  Tender to palpation.  No drainage.  Feels warm to touch.  No fever.  He is up-to-date on his tetanus vaccination. Worsening from time of onset. Pain making it difficult to wear his CPAP properly due to strap location.    The following portions of the patient's history were reviewed and updated as appropriate: allergies, current medications, past family history, past medical history, past social history, past surgical history and problem list.    Review of Systems   Constitutional: Positive for fatigue. Negative for appetite change and unexpected weight change.   HENT: Positive for congestion and postnasal drip. Negative for nosebleeds, rhinorrhea, sore throat, tinnitus and trouble swallowing.    Eyes: Negative for visual disturbance.   Respiratory: Positive for shortness of breath (with exertion). Negative for cough and wheezing.    Cardiovascular: Positive for leg swelling (mild, stable). Negative for chest pain and palpitations.   Gastrointestinal: Positive for nausea. Negative for abdominal pain, blood in stool, constipation, diarrhea and vomiting.        Heartburn   Endocrine: Positive for heat intolerance.   Genitourinary: Negative for decreased urine volume, difficulty urinating, dysuria and hematuria.        ED   Musculoskeletal: Positive for arthralgias and back pain (chronic, stable).   Skin: Positive for color change and rash. Negative for wound.   Neurological: Negative for dizziness, syncope, weakness and headaches.   Hematological: Negative for adenopathy. Does not bruise/bleed easily.   Psychiatric/Behavioral: Positive for dysphoric mood and sleep disturbance. Negative for confusion and suicidal ideas. The  patient is not nervous/anxious.    Pt's previous ROS reviewed and updated as indicated.       Objective    Vitals:    01/11/21 1038   BP: 122/78   Pulse: 99   Resp: 14   Temp: 98.6 °F (37 °C)   SpO2: 97%     Body mass index is 40.32 kg/m².      01/11/21  1038   Weight: 124 kg (273 lb)       Physical Exam  Vitals signs and nursing note reviewed.   Constitutional:       General: He is not in acute distress.     Appearance: He is well-developed and well-groomed. He is morbidly obese. He is not ill-appearing.   HENT:      Head: Normocephalic and atraumatic.     Eyes:      General: No scleral icterus.     Conjunctiva/sclera:      Right eye: Right conjunctiva is injected (mildly).      Left eye: Left conjunctiva is injected (mildly).   Neck:      Musculoskeletal: Neck supple. Erythema (as above, right posterior) present.   Lymphadenopathy:      Cervical: Cervical adenopathy present.      Right cervical: Posterior cervical adenopathy present.   Skin:     General: Skin is warm and dry.      Findings: Erythema (as above) present.   Neurological:      Mental Status: He is alert and oriented to person, place, and time.   Psychiatric:         Mood and Affect: Mood and affect normal.         Behavior: Behavior normal. Behavior is cooperative.         Cognition and Memory: Cognition normal.       Lab Results   Component Value Date    HGBA1C 15.20 (H) 01/07/2021    HGBA1C  01/07/2021      Comment:      Too high to read    HGBA1C 12.60 (H) 09/10/2020     Lab Results   Component Value Date    MICROALBUR 16.4 09/10/2020    CREATININE 1.13 01/07/2021     Immunization History   Administered Date(s) Administered   • Pneumococcal Conjugate 13-Valent (PCV13) 08/23/2018   • Pneumococcal Polysaccharide (PPSV23) 01/13/2017   • Tdap 01/13/2017     Assessment/Plan   Diagnoses and all orders for this visit:    1. Cellulitis and abscess of neck (Primary)  -     cefTRIAXone (ROCEPHIN) injection 1 g    2. Uncontrolled type 2 diabetes mellitus with  hyperglycemia (CMS/HCC)    3. Lymphadenopathy, posterior cervical    Other orders  -     clindamycin (Cleocin) 300 MG capsule; Take 1 capsule by mouth 3 (Three) Times a Day for 7 days.  Dispense: 21 capsule; Refill: 0  -     ciprofloxacin (Cipro) 500 MG tablet; Take 1 tablet by mouth 2 (Two) Times a Day for 7 days.  Dispense: 14 tablet; Refill: 0       Somewhat rapid onset suspected infectious process right posterior neck consistent with cellulitis, possible abscess formation.  Do not feel incision and drainage necessary at this time.  POC IM Rocephin in office today.  Cipro and clindamycin to cover broad-spectrum.  He is encouraged to use warm compress and monitor closely, avoid attempting to drain lesion on his own.  Tetanus up-to-date.  He is to follow-up for recheck if minimal improvement in the next 24 to 48 hours or any acute worsening.    He reports he did receive and review result note instructions regarding recent lab results, marked hyperglycemia and A1c over 15.  As instructed he has restarted his insulin and oral medications.    He has been made aware that use of Cipro may increase his risk of hypoglycemia as he is currently glipizide as well.  Not likely a risk considering his recent marked hyperglycemia.  He is encouraged to check his glucose often and report concerns.    Routine follow-up as scheduled.  Follow-up sooner as needed.  Patient was encouraged to keep me informed of any acute changes, lack of improvement, or any new concerning symptoms.  Pt is aware of reasons to seek emergent care.  Patient voiced understanding of all instructions and denied further questions.            Please note that portions of this note may have been completed with a voice recognition program. Efforts were made to edit the dictations, but occasionally words are mistranscribed.

## 2021-02-01 PROBLEM — E11.319 DIABETIC RETINOPATHY ASSOCIATED WITH TYPE 2 DIABETES MELLITUS (HCC): Status: ACTIVE | Noted: 2021-02-01

## 2021-03-23 ENCOUNTER — PRIOR AUTHORIZATION (OUTPATIENT)
Dept: FAMILY MEDICINE CLINIC | Facility: CLINIC | Age: 56
End: 2021-03-23

## 2021-03-23 NOTE — TELEPHONE ENCOUNTER
Patients medication has been approved.      Start date: 03/23/2021  End date: 03/23/2022      Patients pharmacy has been notified.    Patient notified via [employer]

## 2021-03-23 NOTE — TELEPHONE ENCOUNTER
A prior auth has been started through cover my meds for januvia.    Currently waiting on a response from the insurance.

## 2021-04-28 ENCOUNTER — OFFICE VISIT (OUTPATIENT)
Dept: FAMILY MEDICINE CLINIC | Facility: CLINIC | Age: 56
End: 2021-04-28

## 2021-04-28 VITALS
HEIGHT: 69 IN | BODY MASS INDEX: 38.51 KG/M2 | SYSTOLIC BLOOD PRESSURE: 130 MMHG | RESPIRATION RATE: 20 BRPM | WEIGHT: 260 LBS | DIASTOLIC BLOOD PRESSURE: 70 MMHG | HEART RATE: 91 BPM | TEMPERATURE: 97.3 F | OXYGEN SATURATION: 97 %

## 2021-04-28 DIAGNOSIS — E78.2 MIXED HYPERLIPIDEMIA: ICD-10-CM

## 2021-04-28 DIAGNOSIS — E11.65 UNCONTROLLED TYPE 2 DIABETES MELLITUS WITH HYPERGLYCEMIA (HCC): ICD-10-CM

## 2021-04-28 DIAGNOSIS — Z99.89 OSA ON CPAP: ICD-10-CM

## 2021-04-28 DIAGNOSIS — G47.33 OSA ON CPAP: ICD-10-CM

## 2021-04-28 DIAGNOSIS — E11.8 TYPE 2 DIABETES MELLITUS WITH COMPLICATION, WITH LONG-TERM CURRENT USE OF INSULIN (HCC): ICD-10-CM

## 2021-04-28 DIAGNOSIS — Z79.4 TYPE 2 DIABETES MELLITUS WITH COMPLICATION, WITH LONG-TERM CURRENT USE OF INSULIN (HCC): ICD-10-CM

## 2021-04-28 DIAGNOSIS — I10 ESSENTIAL HYPERTENSION: Primary | ICD-10-CM

## 2021-04-28 PROCEDURE — 99214 OFFICE O/P EST MOD 30 MIN: CPT | Performed by: FAMILY MEDICINE

## 2021-04-28 RX ORDER — PRAMIPEXOLE DIHYDROCHLORIDE 0.25 MG/1
0.25 TABLET ORAL 3 TIMES DAILY
Qty: 90 TABLET | Refills: 5 | Status: SHIPPED | OUTPATIENT
Start: 2021-04-28 | End: 2022-07-01

## 2021-04-29 LAB
ALBUMIN SERPL-MCNC: 4.5 G/DL (ref 3.5–5.2)
ALBUMIN/GLOB SERPL: 1.5 G/DL
ALP SERPL-CCNC: 79 U/L (ref 39–117)
ALT SERPL-CCNC: 26 U/L (ref 1–41)
AST SERPL-CCNC: 23 U/L (ref 1–40)
BASOPHILS # BLD AUTO: 0.04 10*3/MM3 (ref 0–0.2)
BASOPHILS NFR BLD AUTO: 0.7 % (ref 0–1.5)
BILIRUB SERPL-MCNC: 0.7 MG/DL (ref 0–1.2)
BUN SERPL-MCNC: 18 MG/DL (ref 6–20)
BUN/CREAT SERPL: 13.7 (ref 7–25)
CALCIUM SERPL-MCNC: 10.1 MG/DL (ref 8.6–10.5)
CHLORIDE SERPL-SCNC: 97 MMOL/L (ref 98–107)
CHOLEST SERPL-MCNC: 196 MG/DL (ref 0–200)
CO2 SERPL-SCNC: 27.4 MMOL/L (ref 22–29)
CREAT SERPL-MCNC: 1.31 MG/DL (ref 0.76–1.27)
EOSINOPHIL # BLD AUTO: 0.04 10*3/MM3 (ref 0–0.4)
EOSINOPHIL NFR BLD AUTO: 0.7 % (ref 0.3–6.2)
ERYTHROCYTE [DISTWIDTH] IN BLOOD BY AUTOMATED COUNT: 13.3 % (ref 12.3–15.4)
GLOBULIN SER CALC-MCNC: 3.1 GM/DL
GLUCOSE SERPL-MCNC: 282 MG/DL (ref 65–99)
HBA1C MFR BLD: 13.8 % (ref 4.8–5.6)
HCT VFR BLD AUTO: 49.9 % (ref 37.5–51)
HDLC SERPL-MCNC: 40 MG/DL (ref 40–60)
HGB BLD-MCNC: 16.7 G/DL (ref 13–17.7)
IMM GRANULOCYTES # BLD AUTO: 0.02 10*3/MM3 (ref 0–0.05)
IMM GRANULOCYTES NFR BLD AUTO: 0.3 % (ref 0–0.5)
LDLC SERPL CALC-MCNC: 74 MG/DL (ref 0–100)
LYMPHOCYTES # BLD AUTO: 1.46 10*3/MM3 (ref 0.7–3.1)
LYMPHOCYTES NFR BLD AUTO: 24 % (ref 19.6–45.3)
MCH RBC QN AUTO: 29.2 PG (ref 26.6–33)
MCHC RBC AUTO-ENTMCNC: 33.5 G/DL (ref 31.5–35.7)
MCV RBC AUTO: 87.2 FL (ref 79–97)
MONOCYTES # BLD AUTO: 0.38 10*3/MM3 (ref 0.1–0.9)
MONOCYTES NFR BLD AUTO: 6.3 % (ref 5–12)
NEUTROPHILS # BLD AUTO: 4.14 10*3/MM3 (ref 1.7–7)
NEUTROPHILS NFR BLD AUTO: 68 % (ref 42.7–76)
NRBC BLD AUTO-RTO: 0 /100 WBC (ref 0–0.2)
PLATELET # BLD AUTO: 303 10*3/MM3 (ref 140–450)
POTASSIUM SERPL-SCNC: 4.1 MMOL/L (ref 3.5–5.2)
PROT SERPL-MCNC: 7.6 G/DL (ref 6–8.5)
RBC # BLD AUTO: 5.72 10*6/MM3 (ref 4.14–5.8)
SODIUM SERPL-SCNC: 136 MMOL/L (ref 136–145)
TRIGL SERPL-MCNC: 525 MG/DL (ref 0–150)
VLDLC SERPL CALC-MCNC: 82 MG/DL (ref 5–40)
WBC # BLD AUTO: 6.08 10*3/MM3 (ref 3.4–10.8)

## 2021-05-01 RX ORDER — INSULIN ASPART 100 [IU]/ML
INJECTION, SUSPENSION SUBCUTANEOUS
Qty: 15 ML | Refills: 5 | Status: SHIPPED | OUTPATIENT
Start: 2021-05-01 | End: 2021-08-11 | Stop reason: SDUPTHER

## 2021-07-28 ENCOUNTER — OFFICE VISIT (OUTPATIENT)
Dept: FAMILY MEDICINE CLINIC | Facility: CLINIC | Age: 56
End: 2021-07-28

## 2021-07-28 VITALS
SYSTOLIC BLOOD PRESSURE: 120 MMHG | WEIGHT: 257.6 LBS | OXYGEN SATURATION: 95 % | DIASTOLIC BLOOD PRESSURE: 84 MMHG | HEART RATE: 105 BPM | HEIGHT: 69 IN | BODY MASS INDEX: 38.16 KG/M2

## 2021-07-28 DIAGNOSIS — E66.01 MORBID OBESITY (HCC): ICD-10-CM

## 2021-07-28 DIAGNOSIS — E11.65 UNCONTROLLED TYPE 2 DIABETES MELLITUS WITH HYPERGLYCEMIA (HCC): ICD-10-CM

## 2021-07-28 DIAGNOSIS — Z99.89 OSA ON CPAP: ICD-10-CM

## 2021-07-28 DIAGNOSIS — Z91.199 MEDICALLY NONCOMPLIANT: ICD-10-CM

## 2021-07-28 DIAGNOSIS — I10 ESSENTIAL HYPERTENSION: Primary | ICD-10-CM

## 2021-07-28 DIAGNOSIS — E78.2 MIXED HYPERLIPIDEMIA: ICD-10-CM

## 2021-07-28 DIAGNOSIS — G47.33 OSA ON CPAP: ICD-10-CM

## 2021-07-28 PROCEDURE — 99214 OFFICE O/P EST MOD 30 MIN: CPT | Performed by: FAMILY MEDICINE

## 2021-07-28 RX ORDER — BUPROPION HYDROCHLORIDE 150 MG/1
150 TABLET ORAL DAILY
COMMUNITY
Start: 2021-06-24 | End: 2022-03-08

## 2021-07-29 LAB
ALBUMIN SERPL-MCNC: 4.3 G/DL (ref 3.5–5.2)
ALBUMIN/GLOB SERPL: 1.3 G/DL
ALP SERPL-CCNC: 84 U/L (ref 39–117)
ALT SERPL-CCNC: 23 U/L (ref 1–41)
AST SERPL-CCNC: 22 U/L (ref 1–40)
BILIRUB SERPL-MCNC: 0.4 MG/DL (ref 0–1.2)
BUN SERPL-MCNC: 15 MG/DL (ref 6–20)
BUN/CREAT SERPL: 14.7 (ref 7–25)
CALCIUM SERPL-MCNC: 9.8 MG/DL (ref 8.6–10.5)
CHLORIDE SERPL-SCNC: 99 MMOL/L (ref 98–107)
CHOLEST SERPL-MCNC: 244 MG/DL (ref 0–200)
CO2 SERPL-SCNC: 23.5 MMOL/L (ref 22–29)
CREAT SERPL-MCNC: 1.02 MG/DL (ref 0.76–1.27)
GLOBULIN SER CALC-MCNC: 3.4 GM/DL
GLUCOSE SERPL-MCNC: 228 MG/DL (ref 65–99)
HBA1C MFR BLD: 13.6 % (ref 4.8–5.6)
HDLC SERPL-MCNC: 37 MG/DL (ref 40–60)
LDLC SERPL CALC-MCNC: 105 MG/DL (ref 0–100)
POTASSIUM SERPL-SCNC: 4.1 MMOL/L (ref 3.5–5.2)
PROT SERPL-MCNC: 7.7 G/DL (ref 6–8.5)
SODIUM SERPL-SCNC: 137 MMOL/L (ref 136–145)
TRIGL SERPL-MCNC: 591 MG/DL (ref 0–150)
TSH SERPL DL<=0.005 MIU/L-ACNC: 2.32 UIU/ML (ref 0.27–4.2)
VLDLC SERPL CALC-MCNC: 102 MG/DL (ref 5–40)

## 2021-08-01 PROBLEM — Z91.199 MEDICALLY NONCOMPLIANT: Status: ACTIVE | Noted: 2021-08-01

## 2021-08-11 ENCOUNTER — OFFICE VISIT (OUTPATIENT)
Dept: ENDOCRINOLOGY | Facility: CLINIC | Age: 56
End: 2021-08-11

## 2021-08-11 VITALS
OXYGEN SATURATION: 99 % | DIASTOLIC BLOOD PRESSURE: 78 MMHG | HEART RATE: 103 BPM | BODY MASS INDEX: 37.77 KG/M2 | HEIGHT: 69 IN | SYSTOLIC BLOOD PRESSURE: 142 MMHG | WEIGHT: 255 LBS

## 2021-08-11 DIAGNOSIS — E66.01 CLASS 2 SEVERE OBESITY DUE TO EXCESS CALORIES WITH SERIOUS COMORBIDITY AND BODY MASS INDEX (BMI) OF 37.0 TO 37.9 IN ADULT (HCC): ICD-10-CM

## 2021-08-11 DIAGNOSIS — E11.65 UNCONTROLLED TYPE 2 DIABETES MELLITUS WITH HYPERGLYCEMIA (HCC): Primary | ICD-10-CM

## 2021-08-11 DIAGNOSIS — E78.2 MIXED HYPERLIPIDEMIA: ICD-10-CM

## 2021-08-11 DIAGNOSIS — E11.319 DIABETIC RETINOPATHY ASSOCIATED WITH TYPE 2 DIABETES MELLITUS, MACULAR EDEMA PRESENCE UNSPECIFIED, UNSPECIFIED LATERALITY, UNSPECIFIED RETINOPATHY SEVERITY (HCC): ICD-10-CM

## 2021-08-11 PROCEDURE — 99204 OFFICE O/P NEW MOD 45 MIN: CPT | Performed by: INTERNAL MEDICINE

## 2021-08-11 RX ORDER — INSULIN ASPART 100 [IU]/ML
INJECTION, SUSPENSION SUBCUTANEOUS
Qty: 75 ML | Refills: 1 | Status: SHIPPED | OUTPATIENT
Start: 2021-08-11 | End: 2022-03-08 | Stop reason: SDUPTHER

## 2021-08-11 RX ORDER — DULAGLUTIDE 0.75 MG/.5ML
0.75 INJECTION, SOLUTION SUBCUTANEOUS
Qty: 2 ML | Refills: 5 | Status: SHIPPED | OUTPATIENT
Start: 2021-08-11 | End: 2021-11-15

## 2021-08-11 NOTE — PROGRESS NOTES
Chief Complaint   Patient presents with   • Diabetes        Referring Provider  Jaimie Nathan MD     HPI   Myke Marcial is a 55 y.o. male had concerns including Diabetes.    Diabetes was diagnosed 6-7 years ago. On insulin for about 5 years.   Complications include neuropathy and retinopathy.  Last ophtho exam was within the last month - getting treatment for retinopathy.  Current prescribed medications for diabetes include metformin 1000 mg BID, glimepiride 4 mg AM, januvia 100 mg, mixed insulin 25 units twice daily. He misses doses of all of his medications frequently. If he takes any of these, will take only at night.   Has taken up to 100 units of insulin daily on rare occasion with no hypoglycemia.   Doesn't take his insulin even once daily recently.   He checks his blood sugar 0 times per day.     Has unintentional weight loss. About 20 lbs since January.     No personal history of pancreatitis and no family history of MEN or MTC.     Diet: meat and potatoes mostly, doesn't eat a lot of fruits and vegetables. Some apples, bananas, green beans, corn. Wheat bread.   Drinks: rare alcohol, regular soda - 2 per day, water   Snacks: chips, candy, chocolate     Is also missing doses of atorvastatin.    Past Medical History:   Diagnosis Date   • Anxiety and depression    • Arthritis     LOWER BACK   • Colon cancer screening 6/18/2018    Added automatically from request for surgery [id number]   • Diabetes mellitus (CMS/HCC)    • Diabetic foot ulcer (CMS/HCC) 11/29/2018   • Ear drum perforation, right    • GERD (gastroesophageal reflux disease)    • Hyperlipidemia    • Hypertension    • Sleep apnea, obstructive     CPAP   • Type 2 diabetes mellitus (CMS/HCC)    • Wears glasses      Past Surgical History:   Procedure Laterality Date   • COLONOSCOPY N/A 7/10/2018    Procedure: COLONOSCOPY WITH POLYPECTOMIES;  Surgeon: Musa Berger MD;  Location: [facility] ENDOSCOPY;  Service: Gastroenterology   • MUSCLE REPAIR  Right     biceps tendon      Family History   Problem Relation Age of Onset   • Hypertension Father    • Hyperlipidemia Father    • Sleep apnea Mother    • Cancer Maternal Grandmother    • Cancer Paternal Grandmother       Social History     Socioeconomic History   • Marital status:      Spouse name: Not on file   • Number of children: Not on file   • Years of education: Not on file   • Highest education level: Not on file   Tobacco Use   • Smoking status: Never Smoker   • Smokeless tobacco: Never Used   Vaping Use   • Vaping Use: Never used   Substance and Sexual Activity   • Alcohol use: Yes     Comment: OCCASIONAL   • Drug use: No   • Sexual activity: Defer      Allergies   Allergen Reactions   • Ace Inhibitors Cough   • Penicillins Swelling      Current Outpatient Medications on File Prior to Visit   Medication Sig Dispense Refill   • atorvastatin (LIPITOR) 20 MG tablet Take 1 tablet by mouth Daily. 90 tablet 3   • buPROPion XL (WELLBUTRIN XL) 150 MG 24 hr tablet Take 150 mg by mouth Daily.     • chlorthalidone (HYGROTON) 25 MG tablet Take 1 tablet by mouth Daily. 90 tablet 3   • losartan (COZAAR) 100 MG tablet Take 1 tablet by mouth Daily. 90 tablet 3   • metFORMIN (GLUCOPHAGE) 1000 MG tablet Take 1 tablet by mouth 2 (Two) Times a Day With Meals. 180 tablet 3   • omeprazole OTC (PrilOSEC OTC) 20 MG EC tablet Take 1 tablet by mouth 2 (Two) Times a Day. 60 tablet 2   • pramipexole (MIRAPEX) 0.25 MG tablet Take 1 tablet by mouth 3 (Three) Times a Day. 90 tablet 5   • [DISCONTINUED] glimepiride (AMARYL) 4 MG tablet Take 1 tablet by mouth Every Morning Before Breakfast. 90 tablet 3   • [DISCONTINUED] insulin aspart prot & aspart (NovoLOG Mix 70/30 FlexPen) (70-30) 100 UNIT/ML suspension pen-injector injection INJECT 25 UNITS SUBCUTANEOUSLY TWICE DAILY. INCREASE AS DIRECTED TO MAX DOSE OF 50 UNITS TWICE DAILY 15 mL 5   • [DISCONTINUED] Insulin Syringes, Disposable, U-100 1 ML misc 1 each Daily. 100 each 3   •  "[DISCONTINUED] SITagliptin (Januvia) 100 MG tablet Take 1 tablet by mouth Daily. 90 tablet 3   • Insulin Pen Needle 33G X 5 MM misc 1 each Daily. 100 each 5   • [DISCONTINUED] B-D UF III MINI PEN NEEDLES 31G X 5 MM misc      • [DISCONTINUED] BD VEO INSULIN SYR ULTRAFINE 31G X 15/64\" 1 ML misc        No current facility-administered medications on file prior to visit.        Review of Systems   Constitutional: Negative.    HENT: Negative.    Eyes: Negative.    Respiratory: Negative.    Cardiovascular: Negative.    Gastrointestinal: Negative.    Endocrine: Negative.    Genitourinary: Negative.    Musculoskeletal: Negative.    Skin: Negative.    Allergic/Immunologic: Negative.    Neurological: Negative.    Hematological: Negative.    Psychiatric/Behavioral: Negative.         /78 (BP Location: Left arm, Patient Position: Sitting, Cuff Size: Adult)   Pulse 103   Ht 175.3 cm (69\")   Wt 116 kg (255 lb)   SpO2 99%   BMI 37.66 kg/m²      Physical Exam      Constitutional:  well developed; well nourished  no acute distress  obese - Body mass index is 37.66 kg/m².   ENT/Thyroid: no thyromegaly  no palpable nodules   Eyes: EOM intact  Conjunctiva: clear   Respiratory:  breathing is unlabored  clear to auscultation bilaterally   Cardiovascular:  regular rate and rhythm, S1, S2 normal, no murmur, click, rub or gallop   Chest:  Not performed.   Abdomen: Not performed.   : Not performed.   Musculoskeletal: negative findings:  ROM of all joints is normal, no deformities present   Skin: dry and warm   Neuro: normal without focal findings and mental status, speech normal, alert and oriented x3   Psych: oriented to time, place and person, mood and affect are within normal limits     CMP:  Lab Results   Component Value Date     (H) 07/28/2021    BUN 15 07/28/2021    CREATININE 1.02 07/28/2021    EGFRIFNONA 76 07/28/2021    EGFRIFAFRI 92 07/28/2021    BCR 14.7 07/28/2021     07/28/2021    K 4.1 07/28/2021    CO2 " 23.5 07/28/2021    CALCIUM 9.8 07/28/2021    PROTENTOTREF 7.7 07/28/2021    ALBUMIN 4.30 07/28/2021    LABGLOBREF 3.4 07/28/2021    LABIL2 1.3 07/28/2021    BILITOT 0.4 07/28/2021    ALKPHOS 84 07/28/2021    AST 22 07/28/2021    ALT 23 07/28/2021     Lipid Panel:  Lab Results   Component Value Date    TRIG 591 (H) 07/28/2021    HDL 37 (L) 07/28/2021    VLDL 102 (H) 07/28/2021     (H) 07/28/2021     HbA1c:  Lab Results   Component Value Date    HGBA1C 13.60 (H) 07/28/2021    HGBA1C 13.80 (H) 04/28/2021     Glucose:  Lab Results   Component Value Date    POCGLU 263 (A) 06/10/2020     Microalbumin:  Lab Results   Component Value Date    MALBCRERATIO 9 09/10/2020     TSH:  Lab Results   Component Value Date    TSH 2.320 07/28/2021       Assessment and Plan    Diagnoses and all orders for this visit:    1. Uncontrolled type 2 diabetes mellitus with hyperglycemia (CMS/[facility]) (Primary)  Uncontrolled with A1c 13. Complicated by neuropathy and retinopathy. Hyperglycemia due to noncompliance.  Resume Metformin 1000 mg twice daily.  Discontinue glimepiride and Januvia. Glimepiride does not add much glucose control when prescribed insulin. Will transition to GLP-1 agonist, therefore DC Januvia.  Add Trulicity 0.75 mg weekly. Cautioned regarding possible GI side effects. No personal history of pancreatitis, no family history of MEN or MTC. Call the office monthly for dose increases.  Labs up-to-date with urine microalbumin from September 2020, lipid and CMP from July. Ophtho exam up-to-date monthly. Check monofilament at follow-up visit.  -     Dulaglutide (Trulicity) 0.75 MG/0.5ML solution pen-injector; Inject 0.75 mg under the skin into the appropriate area as directed Every 7 (Seven) Days.  Dispense: 2 mL; Refill: 5  -     insulin aspart prot & aspart (NovoLOG Mix 70/30 FlexPen) (70-30) 100 UNIT/ML suspension pen-injector injection; INJECT 20 UNITS SUBCUTANEOUSLY TWICE DAILY. INCREASE AS DIRECTED TO MAX DOSE OF 70 UNITS  TWICE DAILY  Dispense: 75 mL; Refill: 1    2. Diabetic retinopathy associated with type 2 diabetes mellitus, macular edema presence unspecified, unspecified laterality, unspecified retinopathy severity (CMS/[facility])  Due to uncontrolled diabetes. Cautioned regarding possible risk for progression of complications with persistent significant hyperglycemia which could even result in vision loss. Continue routine follow-up and treatments through ophthalmology.    3. Mixed hyperlipidemia  Lipids elevated from labs in July. Noncompliant with atorvastatin. Resume atorvastatin daily and recheck CMP and lipid after 3 months.    4. Class 2 severe obesity due to excess calories with serious comorbidity and body mass index (BMI) of 37.0 to 37.9 in adult (CMS/[facility])  BMI elevated at 37.6. Weight loss necessary for overall health and diabetes management. Patient has had unintentional weight loss over the last several months, possibly due to uncontrolled diabetes. Cautioned regarding the risk for weight gain if he does not make adjustments in his diet as his diabetes control improves. Discontinue regular soda. Decrease carb intake. Caution on high calorie foods.       Return in about 3 months (around 11/11/2021) for next scheduled follow up. The patient was instructed to contact the clinic with any interval questions or concerns.    Ondina Garcia, DO   Endocrinologist    Please note that portions of this document were completed with a voice recognition program. Efforts were made to edit the dictations, but occasionally words are mis-transcribed.

## 2021-08-11 NOTE — PATIENT INSTRUCTIONS
Check glucose levels twice daily. Discontinue Januvia and glimepiride. Start Trulicity 0.75 mg weekly. Call the office monthly for dose increases if tolerated.  Resume it mixed insulin 20 units twice daily before meals. Call the office if BG's are persistently greater than 200.

## 2021-08-26 ENCOUNTER — OFFICE VISIT (OUTPATIENT)
Dept: FAMILY MEDICINE CLINIC | Facility: CLINIC | Age: 56
End: 2021-08-26

## 2021-08-26 VITALS
HEIGHT: 69 IN | SYSTOLIC BLOOD PRESSURE: 130 MMHG | WEIGHT: 251 LBS | BODY MASS INDEX: 37.18 KG/M2 | DIASTOLIC BLOOD PRESSURE: 90 MMHG | HEART RATE: 106 BPM | OXYGEN SATURATION: 97 %

## 2021-08-26 DIAGNOSIS — R11.2 INTRACTABLE VOMITING WITH NAUSEA, UNSPECIFIED VOMITING TYPE: Primary | ICD-10-CM

## 2021-08-26 PROCEDURE — 99213 OFFICE O/P EST LOW 20 MIN: CPT | Performed by: FAMILY MEDICINE

## 2021-08-26 RX ORDER — ONDANSETRON 4 MG/1
TABLET, ORALLY DISINTEGRATING ORAL
COMMUNITY
Start: 2021-08-24 | End: 2022-05-12

## 2021-08-26 NOTE — PROGRESS NOTES
Subjective   Myke Marcial is a 55 y.o. male.     History of Present Illness  Mr. Morales is a 55-year-old  male with uncontrolled insulin-dependent diabetes who presents today with concern regarding nausea and vomiting.  Began after initiation of Trulicity.  He has had 2 injections thus far.  Symptoms actually improved over the last few days.  No hematemesis, no melena hematochezia, no abdominal pain.  He continues to have some nausea.  Has had follow-up with endocrinology earlier this month.  Has not been checking his blood glucose regularly.  Denies fever or malaise.    Apparently he was seen at [facility] ER earlier this week.  Labs reportedly normal.  Given IV fluids.  Concerned about his use of Trulicity.  ER records not available at this time.    The following portions of the patient's history were reviewed and updated as appropriate: allergies, current medications, past family history, past medical history, past social history, past surgical history and problem list.    Review of Systems   Constitutional: Positive for fatigue. Negative for appetite change and unexpected weight change.   HENT: Positive for congestion and postnasal drip. Negative for nosebleeds, rhinorrhea, sore throat, tinnitus and trouble swallowing.    Eyes: Negative for visual disturbance.   Respiratory: Positive for shortness of breath (with exertion). Negative for cough and wheezing.    Cardiovascular: Positive for leg swelling (mild, stable). Negative for chest pain and palpitations.   Gastrointestinal: Positive for diarrhea, nausea and vomiting. Negative for abdominal pain, blood in stool and constipation.        Heartburn   Endocrine: Positive for heat intolerance.   Genitourinary: Negative for decreased urine volume, difficulty urinating, dysuria and hematuria.        ED   Musculoskeletal: Positive for arthralgias and back pain (chronic, stable).   Skin: Negative for rash and wound.   Neurological: Negative  for dizziness, syncope, weakness and headaches.   Hematological: Negative for adenopathy. Does not bruise/bleed easily.   Psychiatric/Behavioral: Positive for dysphoric mood and sleep disturbance. Negative for confusion and suicidal ideas. The patient is not nervous/anxious.    Pt's previous ROS reviewed and updated as indicated.       Objective    Vitals:    08/26/21 1058   BP: 130/90   Pulse: 106   SpO2: 97%     Body mass index is 37.05 kg/m².      08/26/21  1058   Weight: 114 kg (251 lb)       Physical Exam  Vitals and nursing note reviewed.   Constitutional:       General: He is not in acute distress.     Appearance: He is well-developed and well-groomed. He is not ill-appearing.   HENT:      Head: Normocephalic and atraumatic.   Eyes:      General: No scleral icterus.  Cardiovascular:      Rate and Rhythm: Normal rate and regular rhythm.      Heart sounds: Normal heart sounds.   Pulmonary:      Effort: Pulmonary effort is normal.      Breath sounds: Normal breath sounds.   Abdominal:      General: Abdomen is protuberant. Bowel sounds are normal.      Palpations: Abdomen is soft.      Tenderness: There is no abdominal tenderness.      Comments: Exam limited by body habitus   Musculoskeletal:      Right lower leg: No edema.      Left lower leg: No edema.   Skin:     General: Skin is warm and dry.      Coloration: Skin is not jaundiced or pale.   Neurological:      Mental Status: He is alert and oriented to person, place, and time.      Gait: Gait is intact.   Psychiatric:         Mood and Affect: Mood and affect normal.         Behavior: Behavior normal. Behavior is cooperative.         Cognition and Memory: Cognition normal.         Assessment/Plan   Diagnoses and all orders for this visit:    1. Intractable vomiting with nausea, unspecified vomiting type (Primary)       Nausea and vomiting with benign abdominal exam today and generally slowly improving course.  Suspect this is most likely due to his Trulicity  in commendation with possible diabetic associated gastroparesis.  I recommended he eat bland diet, stay well-hydrated, and contact his endocrinologist regarding possible replacements for Trulicity.  He is amenable to this plan.    ER records requested.    Keep routine follow-up in October, sooner as needed.  I will contact patient regarding test results and provide instructions regarding any necessary changes in plan of care.  Patient was encouraged to keep me informed of any acute changes, lack of improvement, or any new concerning symptoms.  Pt is aware of reasons to seek emergent care.  Patient voiced understanding of all instructions and denied further questions.    Please note that portions of this note may have been completed with a voice recognition program. Efforts were made to edit the dictations, but occasionally words are mistranscribed.

## 2021-10-28 ENCOUNTER — OFFICE VISIT (OUTPATIENT)
Dept: FAMILY MEDICINE CLINIC | Facility: CLINIC | Age: 56
End: 2021-10-28

## 2021-10-28 VITALS
OXYGEN SATURATION: 100 % | WEIGHT: 251 LBS | HEART RATE: 89 BPM | SYSTOLIC BLOOD PRESSURE: 130 MMHG | TEMPERATURE: 97 F | DIASTOLIC BLOOD PRESSURE: 80 MMHG | BODY MASS INDEX: 37.18 KG/M2 | HEIGHT: 69 IN

## 2021-10-28 DIAGNOSIS — I10 ESSENTIAL HYPERTENSION: Primary | ICD-10-CM

## 2021-10-28 DIAGNOSIS — Z28.21 INFLUENZA VACCINATION DECLINED: ICD-10-CM

## 2021-10-28 DIAGNOSIS — E78.2 MIXED HYPERLIPIDEMIA: ICD-10-CM

## 2021-10-28 DIAGNOSIS — E11.65 UNCONTROLLED TYPE 2 DIABETES MELLITUS WITH HYPERGLYCEMIA (HCC): ICD-10-CM

## 2021-10-28 PROBLEM — Z91.199 MEDICALLY NONCOMPLIANT: Status: RESOLVED | Noted: 2021-08-01 | Resolved: 2021-10-28

## 2021-10-28 PROCEDURE — 99214 OFFICE O/P EST MOD 30 MIN: CPT | Performed by: FAMILY MEDICINE

## 2021-10-29 LAB
ALBUMIN/CREAT UR: 16 MG/G CREAT (ref 0–29)
CHOLEST SERPL-MCNC: 201 MG/DL (ref 0–200)
CREAT UR-MCNC: 140.4 MG/DL
HBA1C MFR BLD: 11.3 % (ref 4.8–5.6)
HDLC SERPL-MCNC: 38 MG/DL (ref 40–60)
LDLC SERPL CALC-MCNC: 104 MG/DL (ref 0–100)
MICROALBUMIN UR-MCNC: 21.9 UG/ML
TRIGL SERPL-MCNC: 350 MG/DL (ref 0–150)
VLDLC SERPL CALC-MCNC: 59 MG/DL (ref 5–40)

## 2021-11-09 ENCOUNTER — OFFICE VISIT (OUTPATIENT)
Dept: PULMONOLOGY | Facility: CLINIC | Age: 56
End: 2021-11-09

## 2021-11-09 VITALS
RESPIRATION RATE: 16 BRPM | SYSTOLIC BLOOD PRESSURE: 122 MMHG | WEIGHT: 254 LBS | BODY MASS INDEX: 37.62 KG/M2 | HEART RATE: 92 BPM | HEIGHT: 69 IN | DIASTOLIC BLOOD PRESSURE: 84 MMHG | OXYGEN SATURATION: 98 %

## 2021-11-09 DIAGNOSIS — G47.33 OBSTRUCTIVE SLEEP APNEA: Primary | ICD-10-CM

## 2021-11-09 PROCEDURE — 99213 OFFICE O/P EST LOW 20 MIN: CPT | Performed by: INTERNAL MEDICINE

## 2021-11-09 NOTE — PROGRESS NOTES
"  Chief Complaint   Patient presents with   • Follow-up   • Sleeping Problem         Subjective   Myke Marcial is a 56 y.o. male.     History of Present Illness   Patient comes in today to follow-up on obstructive sleep apnea.    The patient says that he tries to use his CPAP on a nightly basis but sometimes he doesn't sleep in the room and doesn't use CPAP.       The following portions of the patient's history were reviewed and updated as appropriate: allergies, current medications, past family history, past medical history, past social history and past surgical history.    Review of Systems   Constitutional: Negative for chills and fever.   HENT: Negative for rhinorrhea, sinus pressure, sneezing and sore throat.    Respiratory: Negative for cough, shortness of breath and wheezing.    Psychiatric/Behavioral: Positive for sleep disturbance.       Objective   Visit Vitals  /84   Pulse 92   Resp 16   Ht 175.3 cm (69\")   Wt 115 kg (254 lb)   SpO2 98%   BMI 37.51 kg/m²       Physical Exam  Vitals reviewed.   Constitutional:       Appearance: He is well-developed.   HENT:      Head: Atraumatic.      Mouth/Throat:      Comments: Oropharynx was crowded.  Musculoskeletal:      Comments: Gait was normal.   Neurological:      Mental Status: He is alert and oriented to person, place, and time.         Assessment/Plan   Diagnoses and all orders for this visit:    1. Obstructive sleep apnea (Primary)           Return in about 4 months (around 3/9/2022) for Yas/Nataliia.    DISCUSSION (if any):  I reviewed the results of last sleep study in detail. I informed him that the apnea hypopnea index was 93 / hr. This was in-lab study, performed in Sept 2016.    Needs to be more compliant with sleeping in his bed and thereby using CPAP at a pressure of 14.    Patient seems to be compliant with PAP device, based on the available data and his account of improved symptoms.     Compliance data was obtained from the his " "device/DME company.    Sleep hygiene measures were discussed in great detail including need to follow a strict bedtime and to avoid electronic devices in bed and close to bedtime.    he was also asked to avoid caffeinated or alcoholic beverages within 4 to 6 hours of bedtime.    Weight loss advised.    The patient was once again reminded to continue using the PAP device regularly, every night for atleast 4 hours.    Patient is aware of the recall and he has registered the device with [employer].    Patient can look into adding inline filter and to consider changing it on a regular basis.      It is not possible to make any predictions with regards to increase in the possibility of health related complications, in the event obstructive sleep apnea is not treated even for short time.    Patients were urged to contact their DME company, regarding their concern for their current PAP devices. We informed them, that we can only order \"PAP devices\" but have no control over which brand of device they receive/received from their DME company. A change in the device will have to be determined by their DME company and insurance company.     We will keep following American Academy of sleep medicine and American thoracic Society guidelines and update recommendations as they are released.    I spent a total of 22 minutes face to face with this patient. Part of this time was spent in counseling patient about the pathophysiology of underlying disease process, reviewing any available sleep studies, need to use devices (as applicable) on a regular basis and lifestyle modifications that may positively impact patient's health.  Time also includes documentation in electronic health records.        Dictated utilizing Dragon dictation.    This document was electronically signed by Anai Nazario MD on 11/09/21 at 13:49 EST  "

## 2021-11-15 ENCOUNTER — OFFICE VISIT (OUTPATIENT)
Dept: ENDOCRINOLOGY | Facility: CLINIC | Age: 56
End: 2021-11-15

## 2021-11-15 VITALS
WEIGHT: 249.2 LBS | HEIGHT: 69 IN | HEART RATE: 71 BPM | BODY MASS INDEX: 36.91 KG/M2 | OXYGEN SATURATION: 98 % | DIASTOLIC BLOOD PRESSURE: 78 MMHG | SYSTOLIC BLOOD PRESSURE: 118 MMHG

## 2021-11-15 DIAGNOSIS — E78.2 MIXED HYPERLIPIDEMIA: ICD-10-CM

## 2021-11-15 DIAGNOSIS — E66.01 CLASS 2 SEVERE OBESITY DUE TO EXCESS CALORIES WITH SERIOUS COMORBIDITY AND BODY MASS INDEX (BMI) OF 36.0 TO 36.9 IN ADULT (HCC): ICD-10-CM

## 2021-11-15 DIAGNOSIS — E11.65 UNCONTROLLED TYPE 2 DIABETES MELLITUS WITH HYPERGLYCEMIA (HCC): Primary | ICD-10-CM

## 2021-11-15 PROCEDURE — 99214 OFFICE O/P EST MOD 30 MIN: CPT | Performed by: INTERNAL MEDICINE

## 2021-11-15 RX ORDER — ATORVASTATIN CALCIUM 40 MG/1
40 TABLET, FILM COATED ORAL DAILY
Qty: 90 TABLET | Refills: 3 | Status: SHIPPED | OUTPATIENT
Start: 2021-11-15 | End: 2022-10-26 | Stop reason: SDUPTHER

## 2021-11-15 RX ORDER — DULAGLUTIDE 1.5 MG/.5ML
1.5 INJECTION, SOLUTION SUBCUTANEOUS
Qty: 2 PEN | Refills: 0
Start: 2021-11-15 | End: 2021-12-07

## 2021-11-15 NOTE — PATIENT INSTRUCTIONS
Change the insulin to 25 units twice daily before breakfast and supper. Increase by 5 units once a week until most glucose levels are < 200.

## 2021-11-15 NOTE — PROGRESS NOTES
Chief Complaint   Patient presents with   • Diabetes     A!C 10/28/21          HPI   Myke Marcial is a 56 y.o. male had concerns including Diabetes (A!C 10/28/21).    He is checking blood sugars 0-2 times per day. BG this morning is 105. Yesterday afternoon/evening was 300s.   Last night took 100 units of insulin.   Current medications for diabetes include metformin 1000 mg BID, trulicity 0.75 mg weekly, and mixed insulin  units taking only once a day before supper. Only takes it if his glucose is high.    Had some issues with tolerating the trulicity initially. Has a few episodes of vomiting but this seems to be better when he takes separate from his other meds. Is willing to try a dose increase.     Is taking the atorvastatin consistently now. Had labs updated recently with persistently elevated lipids.    The following portions of the patient's history were reviewed and updated as appropriate: allergies, current medications, past family history, past medical history, past social history, past surgical history and problem list.      Review of Systems   Constitutional: Negative.    Endocrine:        See HPI        Physical Exam  Vitals reviewed.   Constitutional:       Appearance: Normal appearance. He is obese.      Comments: Body mass index is 36.8 kg/m².   Cardiovascular:      Rate and Rhythm: Normal rate.      Pulses:           Dorsalis pedis pulses are 1+ on the right side and 1+ on the left side.   Pulmonary:      Effort: Pulmonary effort is normal.   Feet:      Right foot:      Skin integrity: Callus and dry skin present.      Toenail Condition: Right toenails are abnormally thick. Fungal disease present.     Left foot:      Skin integrity: Callus and dry skin present.      Toenail Condition: Left toenails are abnormally thick. Fungal disease present.     Comments: Monofilament 1/5 bilaterally, great toe only    Diabetic Foot Exam Performed and Monofilament Test Performed      Neurological:  "     General: No focal deficit present.      Mental Status: He is alert. Mental status is at baseline.   Psychiatric:         Mood and Affect: Mood normal.         Behavior: Behavior normal.        /78 (BP Location: Right arm, Patient Position: Sitting, Cuff Size: Adult)   Pulse 71   Ht 175.3 cm (69\")   Wt 113 kg (249 lb 3.2 oz)   SpO2 98%   BMI 36.80 kg/m²      Labs and imaging    CMP:  Lab Results   Component Value Date    BUN 15 07/28/2021    CREATININE 1.02 07/28/2021    EGFRIFNONA 76 07/28/2021    EGFRIFAFRI 92 07/28/2021    BCR 14.7 07/28/2021     07/28/2021    K 4.1 07/28/2021    CO2 23.5 07/28/2021    CALCIUM 9.8 07/28/2021    PROTENTOTREF 7.7 07/28/2021    ALBUMIN 4.30 07/28/2021    LABGLOBREF 3.4 07/28/2021    LABIL2 1.3 07/28/2021    BILITOT 0.4 07/28/2021    ALKPHOS 84 07/28/2021    AST 22 07/28/2021    ALT 23 07/28/2021     Lipid Panel:  Lab Results   Component Value Date    TRIG 350 (H) 10/28/2021    TRIG 591 (H) 07/28/2021    HDL 38 (L) 10/28/2021    HDL 37 (L) 07/28/2021    VLDL 59 (H) 10/28/2021    VLDL 102 (H) 07/28/2021     (H) 10/28/2021     (H) 07/28/2021     HbA1c:  Lab Results   Component Value Date    HGBA1C 11.30 (H) 10/28/2021    HGBA1C 13.60 (H) 07/28/2021     Glucose:    Lab Results   Component Value Date    POCGLU 263 (A) 06/10/2020     Microalbumin:  Lab Results   Component Value Date    MALBCRERATIO 16 10/28/2021     TSH:  Lab Results   Component Value Date    TSH 2.320 07/28/2021       Assessment and plan  Diagnoses and all orders for this visit:    1. Uncontrolled type 2 diabetes mellitus with hyperglycemia (HCC) (Primary)  Uncontrolled with significant hyperglycemia and an A1c of 11.3, though this is improved from his last visit. Complicated by neuropathy and retinopathy.  Continue metformin 1000 mg twice daily.  Increase Trulicity to 1.5 mg weekly. Titrate up monthly if tolerated. Sample was given today.  Reminded the patient that mixed insulin is a " twice daily insulin. Taking only once daily will result in uncontrolled glucose levels.  Recommended a dose of 25 units twice daily before meals. Titrate up weekly as needed.  Labs are up-to-date from 10/28/2021. Ophtho exam is up-to-date monthly. Monofilament updated in the office today.  He needs to check his glucose levels twice daily. Call the office if BG's are persistently less than 100 or greater than 200.  -     Dulaglutide (Trulicity) 1.5 MG/0.5ML solution pen-injector; Inject 1.5 mg under the skin into the appropriate area as directed Every 7 (Seven) Days. SAMPLE  Dispense: 2 pen; Refill: 0    2. Mixed hyperlipidemia  Lipids remain above goal despite atorvastatin 20 mg daily. Increase dose to 40 mg daily. Monitor CMP and lipids yearly.  -     atorvastatin (LIPITOR) 40 MG tablet; Take 1 tablet by mouth Daily.  Dispense: 90 tablet; Refill: 3    3. Class 2 severe obesity due to excess calories with serious comorbidity and body mass index (BMI) of 36.0 to 36.9 in adult (HCC)  Weight is down 5 pounds with a BMI now at 36.8. Congratulated on weight loss and encouraged to continue efforts.       Return in about 3 months (around 2/15/2022) for next scheduled follow up. The patient was instructed to contact the clinic with any interval questions or concerns.    Ondina Garcia, DO   Endocrinologist    Please note that portions of this note were completed with a voice recognition program.

## 2021-12-07 DIAGNOSIS — E11.65 UNCONTROLLED TYPE 2 DIABETES MELLITUS WITH HYPERGLYCEMIA (HCC): ICD-10-CM

## 2021-12-07 RX ORDER — DULAGLUTIDE 3 MG/.5ML
3 INJECTION, SOLUTION SUBCUTANEOUS
Qty: 2 ML | Refills: 5 | Status: SHIPPED | OUTPATIENT
Start: 2021-12-07 | End: 2022-02-21 | Stop reason: SDUPTHER

## 2022-01-06 RX ORDER — LOSARTAN POTASSIUM 100 MG/1
TABLET ORAL
Qty: 90 TABLET | Refills: 0 | Status: SHIPPED | OUTPATIENT
Start: 2022-01-06 | End: 2022-10-14

## 2022-01-13 ENCOUNTER — CLINICAL SUPPORT (OUTPATIENT)
Dept: FAMILY MEDICINE CLINIC | Facility: CLINIC | Age: 57
End: 2022-01-13

## 2022-01-13 VITALS
DIASTOLIC BLOOD PRESSURE: 68 MMHG | HEIGHT: 69 IN | WEIGHT: 249 LBS | HEART RATE: 113 BPM | OXYGEN SATURATION: 100 % | SYSTOLIC BLOOD PRESSURE: 116 MMHG | BODY MASS INDEX: 36.88 KG/M2

## 2022-01-13 DIAGNOSIS — Z99.89 OSA ON CPAP: ICD-10-CM

## 2022-01-13 DIAGNOSIS — E66.01 CLASS 2 SEVERE OBESITY DUE TO EXCESS CALORIES WITH SERIOUS COMORBIDITY AND BODY MASS INDEX (BMI) OF 36.0 TO 36.9 IN ADULT: ICD-10-CM

## 2022-01-13 DIAGNOSIS — E78.2 MIXED HYPERLIPIDEMIA: ICD-10-CM

## 2022-01-13 DIAGNOSIS — G47.33 OSA ON CPAP: ICD-10-CM

## 2022-01-13 DIAGNOSIS — E11.65 UNCONTROLLED TYPE 2 DIABETES MELLITUS WITH HYPERGLYCEMIA: ICD-10-CM

## 2022-01-13 DIAGNOSIS — Z02.89 ENCOUNTER FOR EXAMINATION REQUIRED BY DEPARTMENT OF TRANSPORTATION (DOT): Primary | ICD-10-CM

## 2022-01-13 DIAGNOSIS — I10 ESSENTIAL HYPERTENSION: ICD-10-CM

## 2022-01-13 LAB
EXPIRATION DATE: NORMAL
HBA1C MFR BLD: 8.6 %
Lab: NORMAL

## 2022-01-13 PROCEDURE — 83036 HEMOGLOBIN GLYCOSYLATED A1C: CPT | Performed by: NURSE PRACTITIONER

## 2022-01-13 PROCEDURE — 99214 OFFICE O/P EST MOD 30 MIN: CPT | Performed by: NURSE PRACTITIONER

## 2022-01-13 PROCEDURE — 3051F HG A1C>EQUAL 7.0%<8.0%: CPT | Performed by: NURSE PRACTITIONER

## 2022-01-13 NOTE — PROGRESS NOTES
Subjective   Myke Marcial is a 56 y.o. male.     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  He does have past medical history of hypertension, diabetes, hyperlipidemia, obesity and PRATEEK.  Blood pressure is well controlled on current medication.  He is following endocrinology for his diabetes and at his previous visit was started on Trulicity.  He is also on metformin and NovoLog.  His A1c is 8.6 today which is down from 11.1, 2 months ago.  He has a history of retinopathy but has been receiving MATTY injections from his ophthalmologist and condition is much improved.  He just had a visit earlier this week with more injections and condition much improved.  He has 20/20 vision.  He did in clinic today as well.  He is doing much better with his diet and exercise and has lost 24 pounds in the past 12 months, 5 pounds since previous visit.  His mixed hyperlipidemia was much improved with labs in October and he takes his Lipitor 40 mg every night.  He has PRATEEK and uses a CPAP.  He just had a visit with pulmonology in October and was noted to have compliance with CPAP machine.  He denies any daytime sleepiness.       The following portions of the patient's history were reviewed and updated as appropriate: allergies, current medications, past family history, past medical history, past social history, past surgical history and problem list.    Review of Systems   Constitutional: Negative.    HENT: Negative.    Eyes: Negative.    Respiratory: Negative.    Cardiovascular: Negative.    Gastrointestinal: Negative.    Genitourinary: Negative.    Musculoskeletal: Negative.    Skin: Negative.    Neurological: Negative for dizziness, syncope, weakness and numbness.   Hematological: Negative for adenopathy.   Psychiatric/Behavioral: Negative for confusion and  "suicidal ideas. The patient is not nervous/anxious.      Vitals:    01/13/22 1538   BP: 116/68   Pulse: 113   SpO2: 100%   Weight: 113 kg (249 lb)   Height: 175.3 cm (69\")         01/13/22  1538   Weight: 113 kg (249 lb)     Objective   Physical Exam  Vitals and nursing note reviewed.   Constitutional:       General: He is not in acute distress.     Appearance: He is well-developed.   HENT:      Head: Normocephalic.      Right Ear: External ear normal.      Left Ear: External ear normal.      Nose: Nose normal.   Eyes:      Conjunctiva/sclera: Conjunctivae normal.      Pupils: Pupils are equal, round, and reactive to light.   Cardiovascular:      Rate and Rhythm: Normal rate and regular rhythm.      Heart sounds: Normal heart sounds. No murmur heard.      Pulmonary:      Effort: Pulmonary effort is normal. No respiratory distress.      Breath sounds: Normal breath sounds. No wheezing or rales.   Abdominal:      General: Bowel sounds are normal. There is no distension.      Palpations: Abdomen is soft. There is no splenomegaly or mass.      Tenderness: There is no abdominal tenderness. There is no guarding or rebound.      Hernia: No hernia is present.   Musculoskeletal:         General: No tenderness. Normal range of motion.      Cervical back: Normal range of motion and neck supple.   Feet:      Right foot:      Protective Sensation: 5 sites tested. 5 sites sensed.      Skin integrity: Callus present.      Toenail Condition: Right toenails are abnormally thick.      Left foot:      Protective Sensation: 5 sites tested. 5 sites sensed.      Skin integrity: Callus present.      Toenail Condition: Left toenails are abnormally thick.      Comments: Reports feeling has improved and feet since better control of diabetes  Skin:     General: Skin is warm and dry.      Findings: No rash.   Neurological:      Mental Status: He is alert and oriented to person, place, and time.      Cranial Nerves: Cranial nerves are intact. "      Sensory: Sensation is intact.      Motor: Motor function is intact.      Coordination: Romberg sign negative. Coordination normal. Finger-Nose-Finger Test and Heel to Shin Test normal. Rapid alternating movements normal.      Gait: Gait normal.   Psychiatric:         Behavior: Behavior normal.         Thought Content: Thought content normal.         Judgment: Judgment normal.         Assessment/Plan   Diagnoses and all orders for this visit:    1. Encounter for examination required by Department of Transportation (DOT) (Primary)    2. Uncontrolled type 2 diabetes mellitus with hyperglycemia (HCC)  -     POC Glycosylated Hemoglobin (Hb A1C)    3. Class 2 severe obesity due to excess calories with serious comorbidity and body mass index (BMI) of 36.0 to 36.9 in adult ([facility])    4. PRATEEK on CPAP    5. Essential hypertension    6. Mixed hyperlipidemia      DOT exam performed today and license renewed for 1 year.  Did not renew for 2 years due to chronic conditions.  Specific form for diabetics filled out as well.    Uncontrolled DM  -A1c 8.6 today which is definitely acceptable range per requirements for DOT licensure renewal.  He is on insulin but monitors blood sugars 2-3 times daily and follows endocrinology closely.  He has no history of hypoglycemic episodes.  No neuropathy.  He has history of retinopathy but much improved after injections and with better control of diabetes.  He follows ophthalmology closely and will continue injections.  His vision is 20/20 today without corrective lenses.     Obesity  -He has had a 24 pound weight loss in the past 12 months with 5 of those pounds being in the last 2 months since starting Trulicity.  His BMI is down to 36.7 from greater than 40.Nutrition and activity goals reviewed including: mainly water to drink, limit white flour/processed sugar, higher lean protein, high fiber carbs, regular meals, working toward 150 mins cardio per week, resistance training  2x/week.    PRATEEK  -He follows with pulmonology closely and compliance with CPAP is documented in visit from November 2021.  He reports compliance with CPAP every night and denies any daytime sleepiness.    Hypertension  -Controlled with current medication.  He is on an ARB which is renal protectant related to his diabetes.  He is also on chlorthalidone.  He reports that he does monitor his sodium intake and does not add salt.    HLD  -Dyslipidemia was not controlled with labs in October but much improved.  He is taking Lipitor nightly as directed.    Patient is treated for chronic conditions here at this practice.  We will continue to follow-up on his chronic conditions and notify Department of Transportation with any concerns.    Patient to return to clinic for regular follow-up with PCP and as needed.

## 2022-01-24 RX ORDER — CHLORTHALIDONE 25 MG/1
TABLET ORAL
Qty: 90 TABLET | Refills: 0 | Status: SHIPPED | OUTPATIENT
Start: 2022-01-24 | End: 2022-11-28

## 2022-01-28 DIAGNOSIS — G47.33 OSA (OBSTRUCTIVE SLEEP APNEA): Primary | ICD-10-CM

## 2022-01-31 ENCOUNTER — OFFICE VISIT (OUTPATIENT)
Dept: FAMILY MEDICINE CLINIC | Facility: CLINIC | Age: 57
End: 2022-01-31

## 2022-01-31 VITALS
HEART RATE: 105 BPM | DIASTOLIC BLOOD PRESSURE: 80 MMHG | OXYGEN SATURATION: 97 % | BODY MASS INDEX: 37.47 KG/M2 | WEIGHT: 253 LBS | TEMPERATURE: 98.8 F | SYSTOLIC BLOOD PRESSURE: 130 MMHG | HEIGHT: 69 IN

## 2022-01-31 DIAGNOSIS — G47.33 OSA ON CPAP: ICD-10-CM

## 2022-01-31 DIAGNOSIS — E11.319 DIABETIC RETINOPATHY ASSOCIATED WITH TYPE 2 DIABETES MELLITUS, MACULAR EDEMA PRESENCE UNSPECIFIED, UNSPECIFIED LATERALITY, UNSPECIFIED RETINOPATHY SEVERITY: ICD-10-CM

## 2022-01-31 DIAGNOSIS — R11.0 POSTPRANDIAL NAUSEA: ICD-10-CM

## 2022-01-31 DIAGNOSIS — E11.65 UNCONTROLLED TYPE 2 DIABETES MELLITUS WITH HYPERGLYCEMIA: Primary | ICD-10-CM

## 2022-01-31 DIAGNOSIS — Z99.89 OSA ON CPAP: ICD-10-CM

## 2022-01-31 DIAGNOSIS — I10 ESSENTIAL HYPERTENSION: ICD-10-CM

## 2022-01-31 DIAGNOSIS — E78.2 MIXED HYPERLIPIDEMIA: ICD-10-CM

## 2022-01-31 DIAGNOSIS — K21.9 GASTROESOPHAGEAL REFLUX DISEASE, UNSPECIFIED WHETHER ESOPHAGITIS PRESENT: ICD-10-CM

## 2022-01-31 DIAGNOSIS — R11.2 NON-INTRACTABLE VOMITING WITH NAUSEA, UNSPECIFIED VOMITING TYPE: ICD-10-CM

## 2022-01-31 PROCEDURE — 99214 OFFICE O/P EST MOD 30 MIN: CPT | Performed by: FAMILY MEDICINE

## 2022-01-31 RX ORDER — DULAGLUTIDE 3 MG/.5ML
3 INJECTION, SOLUTION SUBCUTANEOUS
Qty: 2 ML | Refills: 5 | Status: CANCELLED | OUTPATIENT
Start: 2022-01-31

## 2022-02-01 LAB
ALBUMIN SERPL-MCNC: 4 G/DL (ref 3.5–5.2)
ALBUMIN/GLOB SERPL: 1.3 G/DL
ALP SERPL-CCNC: 100 U/L (ref 39–117)
ALT SERPL-CCNC: 13 U/L (ref 1–41)
AST SERPL-CCNC: 10 U/L (ref 1–40)
BILIRUB SERPL-MCNC: 0.4 MG/DL (ref 0–1.2)
BUN SERPL-MCNC: 17 MG/DL (ref 6–20)
BUN/CREAT SERPL: 11.7 (ref 7–25)
CALCIUM SERPL-MCNC: 9.3 MG/DL (ref 8.6–10.5)
CHLORIDE SERPL-SCNC: 98 MMOL/L (ref 98–107)
CO2 SERPL-SCNC: 28.3 MMOL/L (ref 22–29)
CREAT SERPL-MCNC: 1.45 MG/DL (ref 0.76–1.27)
GLOBULIN SER CALC-MCNC: 3 GM/DL
GLUCOSE SERPL-MCNC: 249 MG/DL (ref 65–99)
MAGNESIUM SERPL-MCNC: 1.6 MG/DL (ref 1.6–2.6)
POTASSIUM SERPL-SCNC: 4.3 MMOL/L (ref 3.5–5.2)
PROT SERPL-MCNC: 7 G/DL (ref 6–8.5)
SODIUM SERPL-SCNC: 140 MMOL/L (ref 136–145)

## 2022-02-04 DIAGNOSIS — N28.9 ACUTE RENAL INSUFFICIENCY: ICD-10-CM

## 2022-02-04 DIAGNOSIS — I10 ESSENTIAL HYPERTENSION: ICD-10-CM

## 2022-02-04 DIAGNOSIS — E11.65 UNCONTROLLED TYPE 2 DIABETES MELLITUS WITH HYPERGLYCEMIA: Primary | ICD-10-CM

## 2022-02-07 DIAGNOSIS — Z11.52 ENCOUNTER FOR SCREENING FOR COVID-19: Primary | ICD-10-CM

## 2022-02-11 ENCOUNTER — HOSPITAL ENCOUNTER (OUTPATIENT)
Dept: ULTRASOUND IMAGING | Facility: HOSPITAL | Age: 57
Discharge: HOME | End: 2022-02-11
Admitting: FAMILY MEDICINE

## 2022-02-11 DIAGNOSIS — E11.65 UNCONTROLLED TYPE 2 DIABETES MELLITUS WITH HYPERGLYCEMIA: ICD-10-CM

## 2022-02-11 DIAGNOSIS — N28.9 ACUTE RENAL INSUFFICIENCY: ICD-10-CM

## 2022-02-11 DIAGNOSIS — I10 ESSENTIAL HYPERTENSION: ICD-10-CM

## 2022-02-11 PROCEDURE — 76775 US EXAM ABDO BACK WALL LIM: CPT

## 2022-02-14 ENCOUNTER — LAB (OUTPATIENT)
Dept: LAB | Facility: HOSPITAL | Age: 57
End: 2022-02-14

## 2022-02-14 PROCEDURE — U0004 COV-19 TEST NON-CDC HGH THRU: HCPCS | Performed by: FAMILY MEDICINE

## 2022-02-14 PROCEDURE — C9803 HOPD COVID-19 SPEC COLLECT: HCPCS | Performed by: FAMILY MEDICINE

## 2022-02-16 ENCOUNTER — HOSPITAL ENCOUNTER (OUTPATIENT)
Dept: GENERAL RADIOLOGY | Facility: HOSPITAL | Age: 57
Discharge: HOME | End: 2022-02-16
Admitting: FAMILY MEDICINE

## 2022-02-16 DIAGNOSIS — R11.2 NON-INTRACTABLE VOMITING WITH NAUSEA, UNSPECIFIED VOMITING TYPE: ICD-10-CM

## 2022-02-16 DIAGNOSIS — R11.0 POSTPRANDIAL NAUSEA: ICD-10-CM

## 2022-02-16 DIAGNOSIS — K21.9 GASTROESOPHAGEAL REFLUX DISEASE, UNSPECIFIED WHETHER ESOPHAGITIS PRESENT: ICD-10-CM

## 2022-02-16 DIAGNOSIS — E11.65 UNCONTROLLED TYPE 2 DIABETES MELLITUS WITH HYPERGLYCEMIA: ICD-10-CM

## 2022-02-16 PROCEDURE — 74220 X-RAY XM ESOPHAGUS 1CNTRST: CPT

## 2022-02-17 DIAGNOSIS — N28.9 RENAL INSUFFICIENCY: Primary | ICD-10-CM

## 2022-02-17 PROBLEM — K76.0 FATTY LIVER: Status: ACTIVE | Noted: 2022-02-17

## 2022-02-21 DIAGNOSIS — E11.65 UNCONTROLLED TYPE 2 DIABETES MELLITUS WITH HYPERGLYCEMIA: ICD-10-CM

## 2022-02-22 DIAGNOSIS — K22.4 ESOPHAGEAL DYSMOTILITY: Primary | ICD-10-CM

## 2022-02-22 DIAGNOSIS — R11.2 NON-INTRACTABLE VOMITING WITH NAUSEA, UNSPECIFIED VOMITING TYPE: ICD-10-CM

## 2022-02-22 RX ORDER — DULAGLUTIDE 3 MG/.5ML
3 INJECTION, SOLUTION SUBCUTANEOUS
Qty: 2 ML | Refills: 5 | Status: SHIPPED | OUTPATIENT
Start: 2022-02-22 | End: 2022-03-01 | Stop reason: ALTCHOICE

## 2022-02-28 ENCOUNTER — OFFICE VISIT (OUTPATIENT)
Dept: GASTROENTEROLOGY | Facility: CLINIC | Age: 57
End: 2022-02-28

## 2022-02-28 VITALS
BODY MASS INDEX: 37.33 KG/M2 | DIASTOLIC BLOOD PRESSURE: 84 MMHG | SYSTOLIC BLOOD PRESSURE: 132 MMHG | WEIGHT: 252 LBS | TEMPERATURE: 98.6 F | HEIGHT: 69 IN

## 2022-02-28 DIAGNOSIS — Z86.010 PERSONAL HISTORY OF COLONIC POLYPS: ICD-10-CM

## 2022-02-28 DIAGNOSIS — R19.4 CHANGE IN BOWEL HABITS: ICD-10-CM

## 2022-02-28 DIAGNOSIS — R11.2 NAUSEA AND VOMITING, INTRACTABILITY OF VOMITING NOT SPECIFIED, UNSPECIFIED VOMITING TYPE: Primary | ICD-10-CM

## 2022-02-28 DIAGNOSIS — Z01.818 PREOP TESTING: Primary | ICD-10-CM

## 2022-02-28 DIAGNOSIS — K76.0 NONALCOHOLIC FATTY LIVER DISEASE: ICD-10-CM

## 2022-02-28 DIAGNOSIS — E66.09 CLASS 2 OBESITY DUE TO EXCESS CALORIES WITHOUT SERIOUS COMORBIDITY WITH BODY MASS INDEX (BMI) OF 37.0 TO 37.9 IN ADULT: ICD-10-CM

## 2022-02-28 DIAGNOSIS — K21.9 GASTROESOPHAGEAL REFLUX DISEASE WITHOUT ESOPHAGITIS: ICD-10-CM

## 2022-02-28 PROCEDURE — 99204 OFFICE O/P NEW MOD 45 MIN: CPT | Performed by: INTERNAL MEDICINE

## 2022-02-28 RX ORDER — PROMETHAZINE HYDROCHLORIDE 12.5 MG/1
12.5 TABLET ORAL EVERY 12 HOURS PRN
Qty: 30 TABLET | Refills: 1 | Status: SHIPPED | OUTPATIENT
Start: 2022-02-28 | End: 2022-05-12 | Stop reason: SDUPTHER

## 2022-02-28 RX ORDER — SODIUM CHLORIDE 9 MG/ML
70 INJECTION, SOLUTION INTRAVENOUS CONTINUOUS PRN
Status: CANCELLED | OUTPATIENT
Start: 2022-02-28

## 2022-02-28 NOTE — PROGRESS NOTES
New Patient Consult      Date: 2022   Patient Name: Myke Marcial  MRN: [id number]  : 1965     Referring Physician: Jaimie Nathan MD    Chief Complaint   Patient presents with   • Esophageal dysmotility       History of Present Illness: Myke Marcial is a 56 y.o. male who is here today to establish care with Gastroenterology for evaluation of persistent nausea vomiting early satiety.    Patient has been having issues with nausea and intermittent vomiting for the past over several months. He feels that food is sitting in the stomach prolonged period of time and has to through up with relief intermittently. No abdominal pain as such but feels burning sensation in the epigastrium. He burps excessively as well. He will have occasional heartburn depend on the diet. On prilosec as needed now.  Pt denies odynophagia or dysphagia.  He had a esophagram done on 2022 which revealed small hiatal hernia and esophageal dysmotility.  He has a poorly controlled diabetes mellitus on insulin.  His A1c improved from 11-8 recently. GB still there.     This patient deny any hematochezia or melena. He will have occasional lose stool and constipation. Normally will have 1-2 soft BM daily. Weight is stable. There is no history of anemia. No prior history of EGD.  He had a colonoscopy done in 2018 by Dr. Ahuja and was reported as having a 7 mm colon polyp in the transverse colon which was removed with hot biopsy forceps.  Biopsy revealed only benign colonic mucosa.  Reported as having a minimal mid ascending colon mucosal erythema and biopsies showed only lymphoid aggregate. No family history of colon cancer. Grandmother had Breast cancer. No history of any abdominal surgery. Denies alcohol abuse or cigarette smoking.         Subjective      Past Medical History:   Past Medical History:   Diagnosis Date   • Anxiety and depression    • Arthritis     LOWER BACK   • Colon cancer screening  6/18/2018    Added automatically from request for surgery [id number]   • Diabetes mellitus (HCC)    • Diabetic foot ulcer (HCC) 11/29/2018   • Ear drum perforation, right    • GERD (gastroesophageal reflux disease)    • Hyperlipidemia    • Hypertension    • Sleep apnea, obstructive     CPAP   • Type 2 diabetes mellitus (HCC)    • Wears glasses        Past Surgical History:   Past Surgical History:   Procedure Laterality Date   • COLONOSCOPY N/A 7/10/2018    Procedure: COLONOSCOPY WITH POLYPECTOMIES;  Surgeon: Musa Berger MD;  Location: [facility] ENDOSCOPY;  Service: Gastroenterology   • MUSCLE REPAIR Right     biceps tendon       Family History:   Family History   Problem Relation Age of Onset   • Hypertension Father    • Hyperlipidemia Father    • Sleep apnea Mother    • Cancer Maternal Grandmother    • Cancer Paternal Grandmother        Social History:   Social History     Socioeconomic History   • Marital status:    Tobacco Use   • Smoking status: Never Smoker   • Smokeless tobacco: Never Used   Vaping Use   • Vaping Use: Never used   Substance and Sexual Activity   • Alcohol use: Yes     Comment: OCCASIONAL   • Drug use: No   • Sexual activity: Defer         Current Outpatient Medications:   •  atorvastatin (LIPITOR) 40 MG tablet, Take 1 tablet by mouth Daily., Disp: 90 tablet, Rfl: 3  •  buPROPion XL (WELLBUTRIN XL) 150 MG 24 hr tablet, Take 150 mg by mouth Daily., Disp: , Rfl:   •  chlorthalidone (HYGROTON) 25 MG tablet, Take 1 tablet by mouth once daily, Disp: 90 tablet, Rfl: 0  •  Dulaglutide (Trulicity) 3 MG/0.5ML solution pen-injector, Inject 0.5 mL under the skin into the appropriate area as directed Every 7 (Seven) Days., Disp: 2 mL, Rfl: 5  •  insulin aspart prot & aspart (NovoLOG Mix 70/30 FlexPen) (70-30) 100 UNIT/ML suspension pen-injector injection, INJECT 20 UNITS SUBCUTANEOUSLY TWICE DAILY. INCREASE AS DIRECTED TO MAX DOSE OF 70 UNITS TWICE DAILY, Disp: 75 mL, Rfl: 1  •  Insulin Pen Needle  "33G X 5 MM misc, 1 each Daily., Disp: 100 each, Rfl: 5  •  losartan (COZAAR) 100 MG tablet, Take 1 tablet by mouth once daily, Disp: 90 tablet, Rfl: 0  •  metFORMIN (GLUCOPHAGE) 1000 MG tablet, Take 1 tablet by mouth 2 (Two) Times a Day With Meals., Disp: 180 tablet, Rfl: 3  •  omeprazole OTC (PrilOSEC OTC) 20 MG EC tablet, Take 1 tablet by mouth 2 (Two) Times a Day., Disp: 60 tablet, Rfl: 2  •  ondansetron ODT (ZOFRAN-ODT) 4 MG disintegrating tablet, DISSOLVE 1 TABLET IN MOUTH EVERY 6 HOURS AS NEEDED FOR NAUSEA, Disp: , Rfl:   •  pramipexole (MIRAPEX) 0.25 MG tablet, Take 1 tablet by mouth 3 (Three) Times a Day., Disp: 90 tablet, Rfl: 5    Allergies   Allergen Reactions   • Ace Inhibitors Cough   • Penicillins Swelling       Review of Systems:   Review of Systems   Constitutional: Negative for appetite change, fatigue, fever and unexpected weight loss.   HENT: Negative for trouble swallowing.    Gastrointestinal: Positive for nausea and vomiting. Negative for abdominal distention, abdominal pain, anal bleeding, blood in stool, constipation, diarrhea, rectal pain, GERD and indigestion.       The following portions of the patient's history were reviewed and updated as appropriate: allergies, current medications, past family history, past medical history, past social history, past surgical history and problem list.    Objective     Physical Exam:  Vital Signs:   Vitals:    02/28/22 1614   BP: 132/84   Temp: 98.6 °F (37 °C)   TempSrc: Infrared   Weight: 114 kg (252 lb)   Height: 175.3 cm (69\")       Physical Exam  Constitutional:       Appearance: He is obese.   HENT:      Head: Normocephalic and atraumatic.   Eyes:      Conjunctiva/sclera: Conjunctivae normal.   Abdominal:      General: Abdomen is flat. There is no distension.      Palpations: There is no mass.      Tenderness: There is no abdominal tenderness. There is no guarding or rebound.      Hernia: No hernia is present.   Musculoskeletal:      Cervical back: " Normal range of motion and neck supple.   Neurological:      Mental Status: He is alert.           Results Review:   I have reviewed the patient's new clinical and imaging results and agree with the interpretation.     Results Encounter on 02/07/2022   Component Date Value Ref Range Status   • WBC 02/16/2022 6.95  3.40 - 10.80 10*3/mm3 Final   • RBC 02/16/2022 5.40  4.14 - 5.80 10*6/mm3 Final   • Hemoglobin 02/16/2022 16.1  13.0 - 17.7 g/dL Final   • Hematocrit 02/16/2022 48.2  37.5 - 51.0 % Final   • MCV 02/16/2022 89.3  79.0 - 97.0 fL Final   • MCH 02/16/2022 29.8  26.6 - 33.0 pg Final   • MCHC 02/16/2022 33.4  31.5 - 35.7 g/dL Final   • RDW 02/16/2022 12.5  12.3 - 15.4 % Final   • Platelets 02/16/2022 337  140 - 450 10*3/mm3 Final   • Neutrophil Rel % 02/16/2022 72.6  42.7 - 76.0 % Final   • Lymphocyte Rel % 02/16/2022 19.4* 19.6 - 45.3 % Final   • Monocyte Rel % 02/16/2022 6.0  5.0 - 12.0 % Final   • Eosinophil Rel % 02/16/2022 1.0  0.3 - 6.2 % Final   • Basophil Rel % 02/16/2022 0.6  0.0 - 1.5 % Final   • Neutrophils Absolute 02/16/2022 5.04  1.70 - 7.00 10*3/mm3 Final   • Lymphocytes Absolute 02/16/2022 1.35  0.70 - 3.10 10*3/mm3 Final   • Monocytes Absolute 02/16/2022 0.42  0.10 - 0.90 10*3/mm3 Final   • Eosinophils Absolute 02/16/2022 0.07  0.00 - 0.40 10*3/mm3 Final   • Basophils Absolute 02/16/2022 0.04  0.00 - 0.20 10*3/mm3 Final   • Immature Granulocyte Rel % 02/16/2022 0.4  0.0 - 0.5 % Final   • Immature Grans Absolute 02/16/2022 0.03  0.00 - 0.05 10*3/mm3 Final   • nRBC 02/16/2022 0.0  0.0 - 0.2 /100 WBC Final   • Specific Gravity, UA 02/16/2022 1.029  1.005 - 1.030 Final   • pH, UA 02/16/2022 5.5  5.0 - 8.0 Final   • Color, UA 02/16/2022 Yellow   Final    REFERENCE RANGE: Yellow, Straw   • Appearance, UA 02/16/2022 Clear  Clear Final   • Leukocytes, UA 02/16/2022 Negative  Negative Final   • Protein 02/16/2022 Negative  Negative Final   • Glucose, UA 02/16/2022 See below:* Negative Final    >=1000  mg/dL (3+)   • Ketones 02/16/2022 Negative  Negative Final   • Blood, UA 02/16/2022 Negative  Negative Final   • Bilirubin, UA 02/16/2022 Negative  Negative Final   • Urobilinogen, UA 02/16/2022 Comment   Final    Comment: 0.2 E.U./dL  REFERENCE RANGE: 0.2 - 1.0 E.U./dL     • Nitrite, UA 02/16/2022 Negative  Negative Final   • Creatinine, Urine 02/16/2022 135.3  Not Estab. mg/dL Final   • Microalbumin, Urine 02/16/2022 28.1  Not Estab. ug/mL Final   • Microalbumin/Creatinine Ratio 02/16/2022 21  0 - 29 mg/g creat Final    Comment:                        Normal:                0 -  29                         Moderately increased: 30 - 300                         Severely increased:       >300     • SARS-CoV-2 ANAM 02/14/2022 Not Detected  Not Detected Final   • WBC, UA 02/16/2022 0-2  /HPF Final    REFERENCE RANGE: None Seen, 0-2   • RBC, UA 02/16/2022 Comment  /HPF Final    Comment: None Seen  REFERENCE RANGE: None Seen, 0-2     • Epithelial Cells (non renal) 02/16/2022 0-2  /HPF Final    REFERENCE RANGE: None Seen, 0-2   • Bacteria, UA 02/16/2022 Trace* None Seen /HPF Final   Office Visit on 01/31/2022   Component Date Value Ref Range Status   • Glucose 01/31/2022 249* 65 - 99 mg/dL Final   • BUN 01/31/2022 17  6 - 20 mg/dL Final   • Creatinine 01/31/2022 1.45* 0.76 - 1.27 mg/dL Final   • eGFR Non  Am 01/31/2022 51* >60 mL/min/1.73 Final    Comment: GFR Normal >60  Chronic Kidney Disease <60  Kidney Failure <15     • eGFR  Am 01/31/2022 61  >60 mL/min/1.73 Final   • BUN/Creatinine Ratio 01/31/2022 11.7  7.0 - 25.0 Final   • Sodium 01/31/2022 140  136 - 145 mmol/L Final   • Potassium 01/31/2022 4.3  3.5 - 5.2 mmol/L Final   • Chloride 01/31/2022 98  98 - 107 mmol/L Final   • Total CO2 01/31/2022 28.3  22.0 - 29.0 mmol/L Final   • Calcium 01/31/2022 9.3  8.6 - 10.5 mg/dL Final   • Total Protein 01/31/2022 7.0  6.0 - 8.5 g/dL Final   • Albumin 01/31/2022 4.00  3.50 - 5.20 g/dL Final   • Globulin  01/31/2022 3.0  gm/dL Final   • A/G Ratio 01/31/2022 1.3  g/dL Final   • Total Bilirubin 01/31/2022 0.4  0.0 - 1.2 mg/dL Final   • Alkaline Phosphatase 01/31/2022 100  39 - 117 U/L Final   • AST (SGOT) 01/31/2022 10  1 - 40 U/L Final   • ALT (SGPT) 01/31/2022 13  1 - 41 U/L Final   • Magnesium 01/31/2022 1.6  1.6 - 2.6 mg/dL Final   Clinical Support on 01/13/2022   Component Date Value Ref Range Status   • Hemoglobin A1C 01/13/2022 8.6  % Final   • Lot Number 01/13/2022 [id number]   Final   • Expiration Date 01/13/2022 08/30/2023   Final      FL Esophagram Complete Single Contrast    Result Date: 2/16/2022  Small type I hiatal hernia and esophageal dysmotility.    This report was signed and finalized on 2/16/2022 11:17 AM by Juan Jade M.D..    US Renal Bilateral    Result Date: 2/11/2022  1. Unremarkable appearance of the liver. 2. Fatty infiltration of the liver.  This report was signed and finalized on 2/11/2022 3:02 PM by Kyler Morris MD.      Assessment / Plan      Assessment & Plan:  1. Nausea and vomiting, intractability of vomiting not specified, unspecified vomiting type  2. Gastroesophageal reflux disease without esophagitis  Suspected gastroparesis versus diabetes associated nausea    Patient's history is more suggestive of diabetes associated nausea and possible diabetic gastroparesis.  His Wellbutrin use possibly contributing to some extent for drug-induced gastroparesis.  Gallbladder pathology need to be ruled out however history is not very convincing of gallbladder pathology.  Recent esophagogram done on 2/16/2021 revealed only a small hiatal hernia and esophageal dysmotility.  He also had renal ultrasound done which revealed a fatty liver disease.    We had a discussion regarding dietary changes including low fiber low-fat small meals  We will start him on 12.5 mg p.o. 3 times daily as needed  We will get ultrasound gallbladder  EGD for further evaluation  Consider reducing the dose of  Wellbutrin or discontinuing the Wellbutrin if that is possible    The indications, technique, alternatives and potential risk and complications were discussed with the patient including but not limited to bleeding, bowel perforations, missing lesions and anesthetic complications. The patient understands and wishes to proceed with the procedure and has given their verbal consent. Written patient education information was given to the patient.   The patient will call if they have further questions before procedure.     3. Nonalcoholic fatty liver disease  4. Class 2 obesity due to excess calories without serious comorbidity with body mass index (BMI) of 37.0 to 37.9 in adult  Recent ultrasound abdomen done revealed fatty liver disease without any liver lesions.  Recent CMP reviewed which revealed a normal liver enzymes.  Patient is high risk for progression of the liver disease.  As per patient he used to weigh over 300 pounds and has come down to 250 pounds now.  Advised the lifestyle changes, dietary changes and losing weight down to less than 200 pounds.    5. Personal history of colonic polyps  6. Change in bowel habits  Patient does have a occasional loose stools, this is mostly associated with her diabetes, possible Metformin related.  Patient does have a CKD and may not be a candidate for Metformin.  He is going to see the endocrinologist soon and his Metformin may be discontinued that may change his bowel pattern.  Advised to avoid excessive cheese carbonated beverages and daily foods.  As per record he had a colonoscopy done in 2018 by Dr. Musa Berger and had a 1 polyp removed and biopsy was unremarkable.    May need colonoscopy if symptom doesnt improve        Follow Up:   No follow-ups on file.    Brittany Goins MD  [facility] [place]  2/28/2022  16:28 EST    Please note that portions of this note may have been completed with a voice recognition program.

## 2022-02-28 NOTE — PATIENT INSTRUCTIONS
Low-Fiber Eating Plan  Fiber is found in fruits, vegetables, whole grains, and beans. Eating a diet low in fiber helps to reduce how often you have bowel movements and the amount of stool you produce. A low-fiber eating plan may help your digestive system heal if you:  · Have certain conditions, such as Crohn's disease, diverticulitis, or irritable bowel syndrome (IBS), and are having a flare-up.  · Recently had radiation therapy on your pelvis or bowel.  · Recently had intestinal surgery.  · Have a new surgical opening in your abdomen (colostomy or ileostomy).  · Have an intestine that has narrowed (stricture).  Your health care provider will tell you how long to stay on this diet and may recommend that you work with a dietitian.  What are tips for following this plan?  Reading food labels    · Check the nutrition facts label on food products for the amount of dietary fiber.  · Choose foods that have less than 2 grams (g) of fiber per serving.    Cooking  · Use white flour for baking and cooking.  · Cook meat using methods that keep it tender, such as braising or poaching.  · Cook eggs until the yolk is completely solid.  · Cook with healthy oils, such as olive oil or canola oil.  Meal planning  · Eat 5-6 small meals throughout the day instead of 3 large meals.  · If you are lactose intolerant:  ? Choose low-lactose dairy foods.  ? Do not eat dairy foods if told by your health care provider or dietitian.  · Limit fats and oils to less than 8 teaspoons (39 mL) a day.  · Eat small portions of desserts.  · Limit acidic, spicy, or fried foods to reduce gas, bloating, and discomfort.  General information  · Follow instructions from your health care provider or dietitian about how much fiber you should have each day.  · Most people on a low-fiber eating plan should eat less than 10 g of fiber a day. Your daily fiber goal is _________________ g.  · Take vitamin and mineral supplements as told by your health care provider  or dietitian. Chewable or liquid forms are best when on this eating plan. A gummy vitamin is not recommended.  What foods should I eat?  Fruits  Soft-cooked or canned fruits without skin and seeds. Ripe banana. Applesauce. Fruit juice without pulp.  Vegetables  Well-cooked or canned vegetables without skin, seeds, or stems. Cooked potatoes without skins. Vegetable juice.  Grains  All bread and crackers made with white flour. Waffles, pancakes, and [place] toast. Bagels. Pretzels. [place]isela toast, zwieback, and matzoh. Cooked and dried cereals that do not have whole grains, added fiber, seeds, or dried fruit. [place]. Hot and cold cereals made with refined corn, rice, or oats. Plain pasta and noodles. White rice.  Meats and other proteins  Ground meat. Tender cuts of meat or poultry. Eggs. Fish, seafood, and shellfish. Smooth nut butters. Tofu.  Dairy  All milk products and drinks. Lactose-free milk, including rice, soy, and almond milk. Yogurt without fruit, nuts, chocolate, or granola mixed in. Sour cream. Cottage cheese. Cheese.  Fats and oils  Olive oil, canola oil, sunflower oil, flaxseed oil, avocado oil, and grapeseed oil. Mayonnaise. Cream cheese. Margarine. Butter.  Beverages  Decaf coffee. Fruit and vegetable juices. Smoothies (in small amounts, with no pulp or skins, and with fruits from the recommended list). Sports drinks. Herbal tea. Water.  Sweets and desserts  Plain cakes. Cookies. Cream pies and pies made with recommended fruits. Pudding. Custard. Fruit gelatin. Sherbet. Ice pops. Ice cream without nuts. Hard candy. Honey. Jelly. Molasses. Syrups. Chocolate. Marshmallows. Gumdrops.  Seasonings and condiments  Ketchup. Mild mustard. Mild salad dressings. Plain gravies. Vinegar. Spices in moderation. Salt. Sugar.  Other foods  Bouillon. Broth. Cream and strained soups made from recommended foods. Casseroles made with recommended foods.  The items listed above may not be a complete list of foods and beverages  you can eat. Contact a dietitian for more information.  What foods should I avoid?  Fruits  Raw or dried fruit. Berries. Fruit juice with pulp. Prune juice.  Vegetables  Potato skins. Raw or undercooked vegetables. All beans and bean sprouts. Cooked greens. Corn. Peas. Cabbage. Beets. Broccoli. [place] sprouts. Cauliflower. Mushrooms. Onions. Peppers. Parsnips. Okra. Sauerkraut.  Grains  Whole-wheat, whole-grain, or multigrain breads, cereals, or crackers. Rye bread. Cereals with nuts, raisins, or coconut. Bran. Granola. High-fiber cereals. Cornmeal or corn bread. Whole-grain pasta. Wild or brown rice. Quinoa. Popcorn. Buckwheat. Wheat germ.  Meats and other proteins  Tough, fibrous meats with gristle. Fatty meat. Poultry with skin. Fried meat, poultry, or fish. Precooked or cured meat, such as sausages or meat loaves. [place]. Hot dogs. Nuts and chunky nut butter. Dried peas, beans, and lentils. Hummus.  Dairy  Yogurt with fruit, nuts, chocolate, or granola mixed in. Full-fat dairy such as whole milk, ice cream, or sour cream.  Beverages  Caffeinated coffee and teas.  Fats and oils  Avocado. Coconut. Butter.  Sweets and desserts  Desserts, cookies, or candies that contain nuts or coconut. Dried fruit. Jams and preserves with seeds. Marmalade. Any dessert made with fruits or grains that are not recommended.  Seasonings and condiments  Relish. Horseradish. Pickles. Olives.  Other foods  Corn tortilla chips. Soups made with vegetables or grains that are not recommended.  The items listed above may not be a complete list of foods and beverages you should avoid. Contact a dietitian for more information.  Summary  · Most people on a low-fiber eating plan should eat less than 10 grams of fiber a day. Follow recommendations from your health care provider or dietitian about how much fiber you should have each day.  · Always check nutrition facts labels to see the dietary fiber amount in packaged foods. A low-fiber food will  have less than 2 grams of fiber per serving.  · Try to avoid whole grains, raw fruits and vegetables, dried fruit, tough cuts of meat, nuts, and seeds.  · Take a vitamin and mineral supplement as told by your health care provider or dietitian.  This information is not intended to replace advice given to you by your health care provider. Make sure you discuss any questions you have with your health care provider.  Document Revised: 04/22/2021 Document Reviewed: 04/22/2021  Elsevier Patient Education © 2021 [employer]vier Inc[employer]

## 2022-03-01 ENCOUNTER — TELEPHONE (OUTPATIENT)
Dept: ENDOCRINOLOGY | Facility: CLINIC | Age: 57
End: 2022-03-01

## 2022-03-01 RX ORDER — SEMAGLUTIDE 1.34 MG/ML
1 INJECTION, SOLUTION SUBCUTANEOUS
Qty: 9 ML | Refills: 1 | Status: SHIPPED | OUTPATIENT
Start: 2022-03-01 | End: 2022-07-12

## 2022-03-04 ENCOUNTER — PATIENT ROUNDING (BHMG ONLY) (OUTPATIENT)
Dept: GASTROENTEROLOGY | Facility: CLINIC | Age: 57
End: 2022-03-04

## 2022-03-04 NOTE — PROGRESS NOTES
March 4, 2022    Hello, may I speak with Myke Marcial?    My name is Melisa    I am  with [facility]  [address]  [place] [id number] [phone].    Before we get started may I verify your date of birth? 1965    I am calling to officially welcome you to our practice and ask about your recent visit. Is this a good time to talk? yes    Tell me about your visit with us. What things went well?  everything went fine       We're always looking for ways to make our patients' experiences even better. Do you have recommendations on ways we may improve?  no    Overall were you satisfied with your first visit to our practice? yes       I appreciate you taking the time to speak with me today. Is there anything else I can do for you? no      Thank you, and have a great day.

## 2022-03-08 ENCOUNTER — OFFICE VISIT (OUTPATIENT)
Dept: ENDOCRINOLOGY | Facility: CLINIC | Age: 57
End: 2022-03-08

## 2022-03-08 VITALS
BODY MASS INDEX: 37.33 KG/M2 | HEIGHT: 69 IN | HEART RATE: 94 BPM | DIASTOLIC BLOOD PRESSURE: 78 MMHG | OXYGEN SATURATION: 99 % | WEIGHT: 252 LBS | SYSTOLIC BLOOD PRESSURE: 124 MMHG

## 2022-03-08 DIAGNOSIS — E11.65 UNCONTROLLED TYPE 2 DIABETES MELLITUS WITH HYPERGLYCEMIA: Primary | ICD-10-CM

## 2022-03-08 DIAGNOSIS — R11.2 NON-INTRACTABLE VOMITING WITH NAUSEA, UNSPECIFIED VOMITING TYPE: ICD-10-CM

## 2022-03-08 DIAGNOSIS — E78.2 MIXED HYPERLIPIDEMIA: ICD-10-CM

## 2022-03-08 LAB
EXPIRATION DATE: ABNORMAL
GLUCOSE BLDC GLUCOMTR-MCNC: 262 MG/DL (ref 70–130)
Lab: ABNORMAL

## 2022-03-08 PROCEDURE — 99214 OFFICE O/P EST MOD 30 MIN: CPT | Performed by: INTERNAL MEDICINE

## 2022-03-08 PROCEDURE — 82947 ASSAY GLUCOSE BLOOD QUANT: CPT | Performed by: INTERNAL MEDICINE

## 2022-03-08 RX ORDER — METFORMIN HYDROCHLORIDE 500 MG/1
500 TABLET, EXTENDED RELEASE ORAL 2 TIMES DAILY
Qty: 180 TABLET | Refills: 1 | Status: SHIPPED | OUTPATIENT
Start: 2022-03-08 | End: 2022-10-18 | Stop reason: SDUPTHER

## 2022-03-08 RX ORDER — INSULIN ASPART 100 [IU]/ML
INJECTION, SUSPENSION SUBCUTANEOUS
Qty: 75 ML | Refills: 1 | Status: SHIPPED | OUTPATIENT
Start: 2022-03-08 | End: 2022-10-18 | Stop reason: SDUPTHER

## 2022-03-08 NOTE — PROGRESS NOTES
"Chief Complaint   Patient presents with   • Diabetes     Type II          HPI   Myke Marcial is a 56 y.o. male had concerns including Diabetes (Type II).    He is checking blood sugars rarely. A1c is improved to 8.6 from January.  Glucose elevated at 262 today.  He ate out last night, fried chicken, biscuit, high in carbs.  Current medications for diabetes include ozempic 1 mg weekly and mixed insulin 25 units twice daily.     He stopped metformin after labs from January as his kidney function was declined.     Is working on getting approval to become certified .     Has had some GI issues. Had an US and MBS and will have additional testing next week - EGD.  There is concern for gastroparesis med and/or DM related.    Had an issue in the past with vomiting after meals. Happens only on occasion.Symptoms have improved recently.    The following portions of the patient's history were reviewed and updated as appropriate: allergies, current medications, past family history, past medical history, past social history, past surgical history and problem list.      Review of Systems   Constitutional: Negative.    Endocrine:        See HPI        Physical Exam  Vitals reviewed.   Constitutional:       Appearance: Normal appearance. He is obese.      Comments: Body mass index is 37.21 kg/m².   Cardiovascular:      Rate and Rhythm: Normal rate.   Pulmonary:      Effort: Pulmonary effort is normal.   Neurological:      General: No focal deficit present.      Mental Status: He is alert. Mental status is at baseline.   Psychiatric:         Mood and Affect: Mood normal.         Behavior: Behavior normal.        /78 (BP Location: Left arm, Patient Position: Sitting, Cuff Size: Adult)   Pulse 94   Ht 175.3 cm (69\")   Wt 114 kg (252 lb)   SpO2 99%   BMI 37.21 kg/m²      Labs and imaging    CMP:  Lab Results   Component Value Date    BUN 17 01/31/2022    CREATININE 1.45 (H) 01/31/2022    EGFRIFNONA 51 (L) " 01/31/2022    EGFRIFAFRI 61 01/31/2022    BCR 11.7 01/31/2022     01/31/2022    K 4.3 01/31/2022    CO2 28.3 01/31/2022    CALCIUM 9.3 01/31/2022    PROTENTOTREF 7.0 01/31/2022    ALBUMIN 4.00 01/31/2022    LABGLOBREF 3.0 01/31/2022    LABIL2 1.3 01/31/2022    BILITOT 0.4 01/31/2022    ALKPHOS 100 01/31/2022    AST 10 01/31/2022    ALT 13 01/31/2022     Lipid Panel:  Lab Results   Component Value Date    TRIG 350 (H) 10/28/2021    HDL 38 (L) 10/28/2021    VLDL 59 (H) 10/28/2021     (H) 10/28/2021     HbA1c:  Lab Results   Component Value Date    HGBA1C 8.6 01/13/2022    HGBA1C 11.30 (H) 10/28/2021     Glucose:    Lab Results   Component Value Date    POCGLU 262 (A) 03/08/2022     Microalbumin:  Lab Results   Component Value Date    MALBCRERATIO 21 02/16/2022     TSH:  Lab Results   Component Value Date    TSH 2.320 07/28/2021       Assessment and plan  Diagnoses and all orders for this visit:    1. Uncontrolled type 2 diabetes mellitus with hyperglycemia (HCC) (Primary)  Uncontrolled with persistent hyperglycemia.  A1c has improved to 8.6 from January from prior of 11.3.  Complicated by neuropathy and retinopathy.  Patient is not monitoring his blood sugars as directed.  He must monitor glucose levels at least twice daily.  He declined CGM.  Continue Ozempic 1 mg weekly.  IF he has gastric emptying study he needs to discontinue the Ozempic for several weeks prior to the study.  GI symptoms have recently improved therefore continue Ozempic.    Resume Metformin 500 mg twice daily.  Dose reduction due to declined GFR.  Continue mixed insulin 25 units twice daily.  Titrate as needed once he start checking glucose levels.  If BG is persistently greater than 200, increase dose of insulin.  Labs are up-to-date.  Lipid from October, CMP from January.  Urine microalbumin from February.  Ophtho exam is up-to-date.  Monofilament up-to-date from November.  -     POC Glucose, Blood  -     metFORMIN ER  (GLUCOPHAGE-XR) 500 MG 24 hr tablet; Take 1 tablet by mouth 2 (Two) Times a Day.  Dispense: 180 tablet; Refill: 1  -     insulin aspart prot & aspart (NovoLOG Mix 70/30 FlexPen) (70-30) 100 UNIT/ML suspension pen-injector injection; INJECT 25 UNITS SUBCUTANEOUSLY TWICE DAILY. INCREASE AS DIRECTED TO MAX DOSE OF 70 UNITS TWICE DAILY  Dispense: 75 mL; Refill: 1    2. Mixed hyperlipidemia  Dose of atorvastatin was increased after his last lipid panel October.  Fasting lab orders have been entered.  Continue atorvastatin 40 mg daily.  -     Comprehensive Metabolic Panel; Future  -     Lipid Panel; Future    3. Non-intractable vomiting with nausea, unspecified vomiting type  Possibly due to gastroparesis.  Symptoms have improved with improved glucose levels.  Discussed the need to come off of Ozempic if he undergoes a gastric emptying study.  Continue for now.  Follow-up with GI.       Return in about 4 months (around 7/8/2022) for next scheduled follow up. The patient was instructed to contact the clinic with any interval questions or concerns.    Ondina Garcia, DO   Endocrinologist    Please note that portions of this note were completed with a voice recognition program.

## 2022-03-09 PROBLEM — R11.2 NAUSEA AND VOMITING: Status: ACTIVE | Noted: 2022-03-09

## 2022-03-09 PROBLEM — R19.4 CHANGE IN BOWEL HABITS: Status: ACTIVE | Noted: 2022-03-09

## 2022-03-10 ENCOUNTER — OFFICE VISIT (OUTPATIENT)
Dept: PULMONOLOGY | Facility: CLINIC | Age: 57
End: 2022-03-10

## 2022-03-10 VITALS
OXYGEN SATURATION: 100 % | HEIGHT: 69 IN | DIASTOLIC BLOOD PRESSURE: 78 MMHG | HEART RATE: 106 BPM | BODY MASS INDEX: 38.26 KG/M2 | SYSTOLIC BLOOD PRESSURE: 130 MMHG | WEIGHT: 258.3 LBS

## 2022-03-10 DIAGNOSIS — E66.9 OBESITY (BMI 30-39.9): ICD-10-CM

## 2022-03-10 DIAGNOSIS — G47.33 OSA (OBSTRUCTIVE SLEEP APNEA): Primary | ICD-10-CM

## 2022-03-10 DIAGNOSIS — G47.19 EXCESSIVE DAYTIME SLEEPINESS: ICD-10-CM

## 2022-03-10 PROCEDURE — 99212 OFFICE O/P EST SF 10 MIN: CPT | Performed by: NURSE PRACTITIONER

## 2022-03-10 NOTE — PROGRESS NOTES
"Chief Complaint   Patient presents with   • Follow-up   • Sleep Apnea         Subjective   Myke Marcial is a 56 y.o. male.     History of Present Illness   Patient comes back today for follow up of Obstructive Sleep apnea.     Patient says that he is compliant with his device and using it regularly.    Patient's symptoms of sleep disturbance and daytime sleepiness have been helped greatly with the use of PAP device, as prescribed. He feels rested most days upon awakening.    He states his machine has been recalled and he has registered it and is waiting on another machine.       The following portions of the patient's history were reviewed and updated as appropriate: allergies, current medications, past family history, past medical history, past social history and past surgical history.    Review of Systems   HENT: Negative for congestion, sinus pressure, sinus pain and sore throat.    Respiratory: Positive for apnea. Negative for cough, chest tightness, shortness of breath and wheezing.        Objective   Visit Vitals  /78 (BP Location: Left arm, Patient Position: Sitting, Cuff Size: Adult)   Pulse 106   Ht 175.3 cm (69\")   Wt 117 kg (258 lb 4.8 oz)   SpO2 100%   BMI 38.14 kg/m²         Physical Exam  Vitals reviewed.   Constitutional:       Appearance: He is well-developed.   HENT:      Head: Atraumatic.      Mouth/Throat:      Mouth: Mucous membranes are moist.      Comments: Crowded oropharynx.   Eyes:      Extraocular Movements: Extraocular movements intact.   Musculoskeletal:      Comments: Gait normal.   Skin:     General: Skin is warm.   Neurological:      Mental Status: He is alert and oriented to person, place, and time.           Assessment/Plan   Diagnoses and all orders for this visit:    1. PRATEEK (obstructive sleep apnea) (Primary)  -     BIPAP / CPAP Adjustment    2. Obesity (BMI 30-39.9)    3. Excessive daytime sleepiness           Return in about 1 year (around 3/10/2023) for Recheck, " For Dr. Nazario, Sleep ONLY.    DISCUSSION (if any):  I reviewed the results of last sleep study in detail. I informed him that the apnea hypopnea index was 93 / hr. This was in-lab study, performed in Sept 2016.    I have ordered an inline filter since his cpap machine has been recalled.     Continue treatment with CPAP at a pressure of 14, with a full-face mask.    Patient's compliance data was reviewed and the compliance is greater than 70%.    Humidification setup, hose and mask care discussed.    Weight loss advised.    Use every night for at least 4 hours stressed.      Dictated utilizing Dragon dictation.    This document was electronically signed by TAE Roger March 10, 2022  14:18 EST

## 2022-03-16 ENCOUNTER — LAB (OUTPATIENT)
Dept: LAB | Facility: HOSPITAL | Age: 57
End: 2022-03-16

## 2022-03-16 LAB — SARS-COV-2 RNA PNL SPEC NAA+PROBE: NOT DETECTED

## 2022-03-16 PROCEDURE — C9803 HOPD COVID-19 SPEC COLLECT: HCPCS

## 2022-03-16 PROCEDURE — 87635 SARS-COV-2 COVID-19 AMP PRB: CPT

## 2022-03-16 RX ORDER — MELATONIN
1000 DAILY
COMMUNITY
End: 2022-08-16 | Stop reason: HOSPADM

## 2022-03-16 NOTE — PRE-PROCEDURE INSTRUCTIONS
PAT phone history completed with pt for upcoming procedure on  3/17/22 with Dr. Goins.    PAT PASS GIVEN/REVIEWED WITH PT.  VERBALIZED UNDERSTANDING OF THE FOLLOWING:  DO NOT EAT, DRINK, SMOKE, USE SMOKELESS TOBACCO OR CHEW GUM AFTER MIDNIGHT THE NIGHT BEFORE SURGERY.  THIS ALSO INCLUDES HARD CANDIES AND MINTS.    DO NOT SHAVE THE AREA TO BE OPERATED ON AT LEAST 48 HOURS PRIOR TO THE PROCEDURE.  DO NOT WEAR MAKE UP OR NAIL POLISH.  DO NOT LEAVE IN ANY PIERCING OR WEAR JEWELRY THE DAY OF SURGERY.      DO NOT USE ADHESIVES IF YOU WEAR DENTURES.    DO NOT WEAR EYE CONTACTS; BRING IN YOUR GLASSES.    ONLY TAKE MEDICATION THE MORNING OF YOUR PROCEDURE IF INSTRUCTED BY YOUR SURGEON WITH ENOUGH WATER TO SWALLOW THE MEDICATION.  IF YOUR SURGEON DID NOT SPECIFY WHICH MEDICATIONS TO TAKE, YOU WILL NEED TO CALL THEIR OFFICE FOR FURTHER INSTRUCTIONS AND DO AS THEY INSTRUCT.    LEAVE ANYTHING YOU CONSIDER VALUABLE AT HOME.    YOU WILL NEED TO ARRANGE FOR SOMEONE TO DRIVE YOU HOME AFTER SURGERY.  IT IS RECOMMENDED THAT YOU DO NOT DRIVE, WORK, DRINK ALCOHOL OR MAKE MAJOR DECISIONS FOR AT LEAST 24 HOURS AFTER YOUR PROCEDURE IS COMPLETE.      THE DAY OF YOUR PROCEDURE, BRING IN THE FOLLOWING IF APPLICABLE:   PICTURE ID AND INSURANCE/MEDICARE OR MEDICAID CARDS/ANY CO-PAY THAT MAY BE DUE   COPY OF ADVANCED DIRECTIVE/LIVING WILL/POWER OR    CPAP/BIPAP/INHALERS   SKIN PREP SHEET   YOUR PREADMISSION TESTING PASS (IF NOT A PHONE HISTORY)           COVID self-quarantine instructions reviewed with the pt.  Verbalized understanding.

## 2022-03-17 ENCOUNTER — ANESTHESIA (OUTPATIENT)
Dept: GASTROENTEROLOGY | Facility: HOSPITAL | Age: 57
End: 2022-03-17

## 2022-03-17 ENCOUNTER — ANESTHESIA EVENT (OUTPATIENT)
Dept: GASTROENTEROLOGY | Facility: HOSPITAL | Age: 57
End: 2022-03-17

## 2022-03-17 ENCOUNTER — HOSPITAL ENCOUNTER (OUTPATIENT)
Facility: HOSPITAL | Age: 57
Setting detail: HOSPITAL OUTPATIENT SURGERY
Discharge: HOME | End: 2022-03-17
Attending: INTERNAL MEDICINE | Admitting: INTERNAL MEDICINE

## 2022-03-17 VITALS
SYSTOLIC BLOOD PRESSURE: 127 MMHG | OXYGEN SATURATION: 96 % | HEART RATE: 85 BPM | RESPIRATION RATE: 20 BRPM | WEIGHT: 243 LBS | BODY MASS INDEX: 35.99 KG/M2 | DIASTOLIC BLOOD PRESSURE: 82 MMHG | TEMPERATURE: 97.9 F | HEIGHT: 69 IN

## 2022-03-17 DIAGNOSIS — R19.4 CHANGE IN BOWEL HABITS: ICD-10-CM

## 2022-03-17 DIAGNOSIS — R11.2 NAUSEA AND VOMITING, INTRACTABILITY OF VOMITING NOT SPECIFIED, UNSPECIFIED VOMITING TYPE: ICD-10-CM

## 2022-03-17 DIAGNOSIS — K21.9 GASTROESOPHAGEAL REFLUX DISEASE WITHOUT ESOPHAGITIS: ICD-10-CM

## 2022-03-17 LAB — GLUCOSE BLDC GLUCOMTR-MCNC: 204 MG/DL (ref 70–130)

## 2022-03-17 PROCEDURE — 25010000002 PROPOFOL 10 MG/ML EMULSION: Performed by: NURSE ANESTHETIST, CERTIFIED REGISTERED

## 2022-03-17 PROCEDURE — 43239 EGD BIOPSY SINGLE/MULTIPLE: CPT | Performed by: INTERNAL MEDICINE

## 2022-03-17 PROCEDURE — 25010000002 ONDANSETRON PER 1 MG: Performed by: NURSE ANESTHETIST, CERTIFIED REGISTERED

## 2022-03-17 PROCEDURE — 82962 GLUCOSE BLOOD TEST: CPT

## 2022-03-17 PROCEDURE — 25010000002 METOCLOPRAMIDE PER 10 MG: Performed by: NURSE ANESTHETIST, CERTIFIED REGISTERED

## 2022-03-17 RX ORDER — ONDANSETRON 2 MG/ML
INJECTION INTRAMUSCULAR; INTRAVENOUS AS NEEDED
Status: DISCONTINUED | OUTPATIENT
Start: 2022-03-17 | End: 2022-03-17 | Stop reason: SURG

## 2022-03-17 RX ORDER — PROPOFOL 10 MG/ML
VIAL (ML) INTRAVENOUS AS NEEDED
Status: DISCONTINUED | OUTPATIENT
Start: 2022-03-17 | End: 2022-03-17 | Stop reason: SURG

## 2022-03-17 RX ORDER — KETAMINE HYDROCHLORIDE 50 MG/ML
INJECTION, SOLUTION, CONCENTRATE INTRAMUSCULAR; INTRAVENOUS AS NEEDED
Status: DISCONTINUED | OUTPATIENT
Start: 2022-03-17 | End: 2022-03-17 | Stop reason: SURG

## 2022-03-17 RX ORDER — LIDOCAINE HYDROCHLORIDE 20 MG/ML
INJECTION, SOLUTION INTRAVENOUS AS NEEDED
Status: DISCONTINUED | OUTPATIENT
Start: 2022-03-17 | End: 2022-03-17 | Stop reason: SURG

## 2022-03-17 RX ORDER — METOCLOPRAMIDE HYDROCHLORIDE 5 MG/ML
INJECTION INTRAMUSCULAR; INTRAVENOUS AS NEEDED
Status: DISCONTINUED | OUTPATIENT
Start: 2022-03-17 | End: 2022-03-17 | Stop reason: SURG

## 2022-03-17 RX ORDER — SODIUM CHLORIDE 9 MG/ML
70 INJECTION, SOLUTION INTRAVENOUS CONTINUOUS PRN
Status: DISCONTINUED | OUTPATIENT
Start: 2022-03-17 | End: 2022-03-17 | Stop reason: HOSPADM

## 2022-03-17 RX ADMIN — METOCLOPRAMIDE 5 MG: 5 INJECTION, SOLUTION INTRAMUSCULAR; INTRAVENOUS at 08:02

## 2022-03-17 RX ADMIN — LIDOCAINE HYDROCHLORIDE 40 MG: 20 INJECTION, SOLUTION INTRAVENOUS at 08:02

## 2022-03-17 RX ADMIN — KETAMINE HYDROCHLORIDE 25 MG: 50 INJECTION, SOLUTION INTRAMUSCULAR; INTRAVENOUS at 08:02

## 2022-03-17 RX ADMIN — SODIUM CHLORIDE 70 ML/HR: 9 INJECTION, SOLUTION INTRAVENOUS at 06:37

## 2022-03-17 RX ADMIN — PROPOFOL 100 MG: 10 INJECTION, EMULSION INTRAVENOUS at 08:02

## 2022-03-17 RX ADMIN — ONDANSETRON 4 MG: 2 INJECTION INTRAMUSCULAR; INTRAVENOUS at 08:02

## 2022-03-17 NOTE — ANESTHESIA POSTPROCEDURE EVALUATION
Patient: Myke Marcial    Procedure Summary     Date: 03/17/22 Room / Location: [facility] END[facility] 2 / [facility]    Anesthesia Start: 0756 Anesthesia Stop:     Procedure: ESOPHAGOGASTRODUODENOSCOPY with biopsies (N/A Esophagus) Diagnosis:       Nausea and vomiting, intractability of vomiting not specified, unspecified vomiting type      Gastroesophageal reflux disease without esophagitis      Change in bowel habits      (Nausea and vomiting, intractability of vomiting not specified, unspecified vomiting type [R11.2])      (Gastroesophageal reflux disease without esophagitis [K21.9])      (Change in bowel habits [R19.4])    Surgeons: Brittany Goins MD Provider: Bashir Suggs CRNA    Anesthesia Type: MAC ASA Status: 3          Anesthesia Type: MAC    Vitals  HR 84  Sat 95  Resp 12  /77  Temp 97.6        Post Anesthesia Care and Evaluation    Patient location during evaluation: bedside  Patient participation: complete - patient participated  Level of consciousness: awake and alert and sleepy but conscious  Pain score: 0  Pain management: adequate  Airway patency: patent  Anesthetic complications: No anesthetic complications  PONV Status: none  Cardiovascular status: acceptable  Respiratory status: acceptable and nasal cannula  Hydration status: acceptable

## 2022-03-17 NOTE — DISCHARGE INSTRUCTIONS
- Discharge patient to home (ambulatory).   - Low fiber small meals  - PPI daily  - Phenergon BID prn   - Avoid NSAIDS  - Will consider Reglan in 3-6 months if above measures doese not improve his symptoms   - Continue present medications.   - Await pathology results.   - Return to my office in 8 weeks.     To assist you in voiding:  Drink plenty of fluids  Listen to running water while attempting to void.    If you are unable to urinate and you have an uncomfortable urge to void or it has been   6 hours since you were discharged, return to the Emergency Room Please follow all post op instructions and follow up appointment time from your physician's office included in your discharge packet.    Rest today  No pushing,pulling,tugging,heavy lifting, or strenuous activity   No major decision making,driving,or drinking alcoholic beverages for 24 hours due to the sedation you received  Always use good hand hygiene/washing technique  No driving on pain medication.

## 2022-03-17 NOTE — ANESTHESIA PREPROCEDURE EVALUATION
Anesthesia Evaluation     Patient summary reviewed and Nursing notes reviewed   no history of anesthetic complications:  NPO Solid Status: > 8 hours  NPO Liquid Status: > 8 hours           Airway   Mallampati: II  TM distance: >3 FB  Neck ROM: full  Possible difficult intubation and Large neck circumference  Dental - normal exam     Pulmonary    (+) sleep apnea on CPAP, decreased breath sounds,   Cardiovascular - normal exam    Rhythm: regular  Rate: normal    (+) hypertension, hyperlipidemia,       Neuro/Psych  (+) psychiatric history Anxiety and Depression,    GI/Hepatic/Renal/Endo    (+) obesity,  GERD,  liver disease fatty liver disease, diabetes mellitus type 2 poorly controlled using insulin,     Musculoskeletal     Abdominal   (+) obese,     Abdomen: soft.  Bowel sounds: normal.   Substance History      OB/GYN          Other   arthritis,                      Anesthesia Plan    ASA 3     MAC   (Risks and benefits discussed including risk of aspiration, recall and dental damage. All patient questions answered. Will continue with POC.)  intravenous induction     Anesthetic plan, all risks, benefits, and alternatives have been provided, discussed and informed consent has been obtained with: patient.        CODE STATUS:

## 2022-03-22 LAB
LAB AP CASE REPORT: NORMAL
PATH REPORT.FINAL DX SPEC: NORMAL

## 2022-04-14 RX ORDER — FLURBIPROFEN SODIUM 0.3 MG/ML
SOLUTION/ DROPS OPHTHALMIC
Qty: 100 EACH | Refills: 0 | Status: SHIPPED | OUTPATIENT
Start: 2022-04-14

## 2022-05-02 ENCOUNTER — OFFICE VISIT (OUTPATIENT)
Dept: FAMILY MEDICINE CLINIC | Facility: CLINIC | Age: 57
End: 2022-05-02

## 2022-05-02 VITALS
TEMPERATURE: 97.2 F | WEIGHT: 253.8 LBS | SYSTOLIC BLOOD PRESSURE: 128 MMHG | HEART RATE: 97 BPM | RESPIRATION RATE: 16 BRPM | DIASTOLIC BLOOD PRESSURE: 72 MMHG | OXYGEN SATURATION: 100 % | HEIGHT: 69 IN | BODY MASS INDEX: 37.59 KG/M2

## 2022-05-02 DIAGNOSIS — E66.01 MORBID OBESITY: ICD-10-CM

## 2022-05-02 DIAGNOSIS — E11.65 UNCONTROLLED TYPE 2 DIABETES MELLITUS WITH HYPERGLYCEMIA: ICD-10-CM

## 2022-05-02 DIAGNOSIS — K21.00 GASTROESOPHAGEAL REFLUX DISEASE WITH ESOPHAGITIS WITHOUT HEMORRHAGE: ICD-10-CM

## 2022-05-02 DIAGNOSIS — I10 ESSENTIAL HYPERTENSION: Primary | ICD-10-CM

## 2022-05-02 DIAGNOSIS — K22.10 ESOPHAGEAL ULCER WITHOUT BLEEDING: ICD-10-CM

## 2022-05-02 DIAGNOSIS — Z99.89 OSA ON CPAP: ICD-10-CM

## 2022-05-02 DIAGNOSIS — E11.43 DIABETIC GASTROPARESIS ASSOCIATED WITH TYPE 2 DIABETES MELLITUS: ICD-10-CM

## 2022-05-02 DIAGNOSIS — E66.01 CLASS 2 SEVERE OBESITY DUE TO EXCESS CALORIES WITH SERIOUS COMORBIDITY AND BODY MASS INDEX (BMI) OF 36.0 TO 36.9 IN ADULT: ICD-10-CM

## 2022-05-02 DIAGNOSIS — K76.0 FATTY LIVER: ICD-10-CM

## 2022-05-02 DIAGNOSIS — G47.33 OSA ON CPAP: ICD-10-CM

## 2022-05-02 DIAGNOSIS — E78.2 MIXED HYPERLIPIDEMIA: ICD-10-CM

## 2022-05-02 DIAGNOSIS — K31.84 DIABETIC GASTROPARESIS ASSOCIATED WITH TYPE 2 DIABETES MELLITUS: ICD-10-CM

## 2022-05-02 PROCEDURE — 99214 OFFICE O/P EST MOD 30 MIN: CPT | Performed by: FAMILY MEDICINE

## 2022-05-02 NOTE — PROGRESS NOTES
Subjective   Myke Marcial is a 56 y.o. male.     History of Present Illness   MR. Marcial presents today for routine f/u on several chronic med problems.      Currently followed by gisele for uncont'd IDDM. A1c improved with trulicity, improved med compliance, struggling with dietary compliance.  Also on mixed insulin, metformin, statin, ARB.  Eye exam UTD. Also seeing retinoapthy specialist for injections as well.     Taking losartan, HCTZ,, statin as rx'd for HTN, HLP.      Using CPAP generally as directed.     Since last visit has had consultation with Dr. Goins for persistent nausea, vomiting, early satiety.  Told he had esophagitis, ulcer, gastroparesis.  He is DC'd SSRI as this was felt to be contributory.  Of course, recommend weight loss.  Does not appear to currently be on PPI.    Has chronic obesity, now morbid with associated PRATEEK, fatty liver, uncontrolled diabetes, dyslipidemia, hypertension.  No regular exercise, eating high-calorie/high salt diet    Pt's previous HPI reviewed and updated as indicated.       The following portions of the patient's history were reviewed and updated as appropriate: allergies, current medications, past family history, past medical history, past social history, past surgical history and problem list.    Review of Systems   Constitutional: Positive for fatigue. Negative for appetite change and unexpected weight change.   HENT: Positive for congestion and postnasal drip. Negative for nosebleeds, rhinorrhea, sore throat, tinnitus and trouble swallowing.    Eyes: Negative for visual disturbance.   Respiratory: Positive for shortness of breath (with exertion). Negative for cough and wheezing.    Cardiovascular: Positive for leg swelling (mild, stable). Negative for chest pain and palpitations.   Gastrointestinal: Positive for abdominal distention (mild), diarrhea, nausea and vomiting. Negative for abdominal pain, blood in stool and constipation.        Heartburn    Endocrine: Positive for heat intolerance.   Genitourinary: Negative for decreased urine volume, difficulty urinating, dysuria and hematuria.        ED   Musculoskeletal: Positive for arthralgias and back pain (chronic, stable).   Skin: Negative for rash and wound.   Neurological: Negative for dizziness, syncope, weakness and headaches.   Hematological: Negative for adenopathy. Does not bruise/bleed easily.   Psychiatric/Behavioral: Positive for dysphoric mood and sleep disturbance. Negative for confusion and suicidal ideas. The patient is not nervous/anxious.    Pt's previous ROS reviewed and updated as indicated.       Objective    Vitals:    05/02/22 0855   BP: 128/72   Pulse: 97   Resp: 16   Temp: 97.2 °F (36.2 °C)   SpO2: 100%     Body mass index is 37.48 kg/m².      05/02/22 0855   Weight: 115 kg (253 lb 12.8 oz)       Physical Exam  Vitals and nursing note reviewed.   Constitutional:       General: He is not in acute distress.     Appearance: He is well-developed and well-groomed. He is obese. He is not ill-appearing.   HENT:      Head: Normocephalic and atraumatic.   Eyes:      General: No scleral icterus.     Conjunctiva/sclera: Conjunctivae normal.   Neck:      Thyroid: No thyroid mass.   Cardiovascular:      Rate and Rhythm: Normal rate and regular rhythm.      Pulses: Normal pulses.      Heart sounds: Normal heart sounds.   Pulmonary:      Effort: Pulmonary effort is normal.      Breath sounds: Normal breath sounds.   Musculoskeletal:      Right lower leg: No edema.      Left lower leg: No edema.   Lymphadenopathy:      Cervical: No cervical adenopathy.   Skin:     General: Skin is warm and dry.      Coloration: Skin is not jaundiced or pale.   Neurological:      Mental Status: He is alert and oriented to person, place, and time.      Gait: Gait is intact.   Psychiatric:         Mood and Affect: Mood and affect normal.         Behavior: Behavior normal. Behavior is cooperative.         Cognition and  Memory: Cognition normal.     Pt's previous physical exam reviewed and updated as indicated.    Lab Results   Component Value Date    TSH 2.320 07/28/2021     Consultation notes from gastroenterology reviewed.  EGD report reviewed.    Assessment/Plan   Diagnoses and all orders for this visit:    1. Essential hypertension (Primary)  -     Comprehensive Metabolic Panel    2. Mixed hyperlipidemia  -     Lipid Panel  -     Comprehensive Metabolic Panel    3. Uncontrolled type 2 diabetes mellitus with hyperglycemia (HCC)  -     Hemoglobin A1c  -     Comprehensive Metabolic Panel    4. Class 2 severe obesity due to excess calories with serious comorbidity and body mass index (BMI) of 36.0 to 36.9 in adult (HCC)    5. Morbid obesity (HCC)    6. Fatty liver    7. PRATEEK on CPAP    8. Gastroesophageal reflux disease with esophagitis without hemorrhage    9. Diabetic gastroparesis associated with type 2 diabetes mellitus (HCC)    10. Esophageal ulcer without bleeding       Hypertension controlled, continue losartan, HCTZ.  Dyslipidemia unknown status.  Lipid panel as above.  Continue atorvastatin.  Encouraged more heart healthy eating and increased physical activity.    Follow-up with endocrinology for management of uncontrolled diabetes. Patient encouraged to take meds as rx'd, follow diabetic appropriate diet, exercise daily, perform feet check daily, and have yearly diabetic eye exams.    Class 2 Severe Obesity (BMI >=35 and <=39.9). Obesity-related health conditions include the following: obstructive sleep apnea, hypertension, diabetes mellitus, dyslipidemias and GERD. Obesity is unchanged. BMI is is above average; BMI management plan is completed. We discussed portion control and increasing exercise. Continue Trulicity. Nutrition and activity goals reviewed including: mainly water to drink, limit white flour/processed sugar, higher lean protein, high fiber carbs, regular meals, working toward 150 mins cardio per  week.    Follow-up with GI for management of esophagitis, subcu ulcer, gastroparesis etc.    Encourage CPAP compliance.  Routine follow-up in 6 months, sooner as needed/instructed.    I will contact patient regarding test results and provide instructions regarding any necessary changes in plan of care.  Patient was encouraged to keep me informed of any acute changes, lack of improvement, or any new concerning symptoms.  Pt is aware of reasons to seek emergent care.  Patient voiced understanding of all instructions and denied further questions.    Please note that portions of this note may have been completed with a voice recognition program. Efforts were made to edit the dictations, but occasionally words are mistranscribed.

## 2022-05-03 LAB
ALBUMIN SERPL-MCNC: 4 G/DL (ref 3.5–5.2)
ALBUMIN/GLOB SERPL: 1.3 G/DL
ALP SERPL-CCNC: 86 U/L (ref 39–117)
ALT SERPL-CCNC: 15 U/L (ref 1–41)
AST SERPL-CCNC: 13 U/L (ref 1–40)
BILIRUB SERPL-MCNC: 0.4 MG/DL (ref 0–1.2)
BUN SERPL-MCNC: 23 MG/DL (ref 6–20)
BUN/CREAT SERPL: 17.6 (ref 7–25)
CALCIUM SERPL-MCNC: 10 MG/DL (ref 8.6–10.5)
CHLORIDE SERPL-SCNC: 100 MMOL/L (ref 98–107)
CHOLEST SERPL-MCNC: 192 MG/DL (ref 0–200)
CO2 SERPL-SCNC: 24.8 MMOL/L (ref 22–29)
CREAT SERPL-MCNC: 1.31 MG/DL (ref 0.76–1.27)
EGFRCR SERPLBLD CKD-EPI 2021: 64.3 ML/MIN/1.73
GLOBULIN SER CALC-MCNC: 3.2 GM/DL
GLUCOSE SERPL-MCNC: 261 MG/DL (ref 65–99)
HBA1C MFR BLD: 10.1 % (ref 4.8–5.6)
HDLC SERPL-MCNC: 33 MG/DL (ref 40–60)
LDLC SERPL CALC-MCNC: 78 MG/DL (ref 0–100)
POTASSIUM SERPL-SCNC: 4.9 MMOL/L (ref 3.5–5.2)
PROT SERPL-MCNC: 7.2 G/DL (ref 6–8.5)
SODIUM SERPL-SCNC: 139 MMOL/L (ref 136–145)
TRIGL SERPL-MCNC: 507 MG/DL (ref 0–150)
VLDLC SERPL CALC-MCNC: 81 MG/DL (ref 5–40)

## 2022-05-05 PROBLEM — K31.84 DIABETIC GASTROPARESIS ASSOCIATED WITH TYPE 2 DIABETES MELLITUS (HCC): Status: ACTIVE | Noted: 2022-05-05

## 2022-05-05 PROBLEM — E11.43 DIABETIC GASTROPARESIS ASSOCIATED WITH TYPE 2 DIABETES MELLITUS (HCC): Status: ACTIVE | Noted: 2022-05-05

## 2022-05-05 PROBLEM — R19.4 CHANGE IN BOWEL HABITS: Status: RESOLVED | Noted: 2022-03-09 | Resolved: 2022-05-05

## 2022-05-05 PROBLEM — R11.2 NAUSEA AND VOMITING: Status: RESOLVED | Noted: 2022-03-09 | Resolved: 2022-05-05

## 2022-05-05 PROBLEM — K21.00 GASTROESOPHAGEAL REFLUX DISEASE WITH ESOPHAGITIS WITHOUT HEMORRHAGE: Status: ACTIVE | Noted: 2020-06-12

## 2022-05-05 PROBLEM — K22.10 ESOPHAGEAL ULCER WITHOUT BLEEDING: Status: ACTIVE | Noted: 2022-05-05

## 2022-05-12 ENCOUNTER — OFFICE VISIT (OUTPATIENT)
Dept: GASTROENTEROLOGY | Facility: CLINIC | Age: 57
End: 2022-05-12

## 2022-05-12 VITALS
TEMPERATURE: 97.4 F | WEIGHT: 256 LBS | BODY MASS INDEX: 37.92 KG/M2 | DIASTOLIC BLOOD PRESSURE: 66 MMHG | SYSTOLIC BLOOD PRESSURE: 122 MMHG | HEIGHT: 69 IN

## 2022-05-12 DIAGNOSIS — K21.00 GASTROESOPHAGEAL REFLUX DISEASE WITH ESOPHAGITIS WITHOUT HEMORRHAGE: ICD-10-CM

## 2022-05-12 DIAGNOSIS — Z86.010 HISTORY OF COLON POLYPS: ICD-10-CM

## 2022-05-12 DIAGNOSIS — K31.84 GASTROPARESIS: ICD-10-CM

## 2022-05-12 DIAGNOSIS — R11.2 NAUSEA AND VOMITING, UNSPECIFIED VOMITING TYPE: Primary | ICD-10-CM

## 2022-05-12 PROCEDURE — 99214 OFFICE O/P EST MOD 30 MIN: CPT | Performed by: PHYSICIAN ASSISTANT

## 2022-05-12 RX ORDER — PROMETHAZINE HYDROCHLORIDE 12.5 MG/1
12.5 TABLET ORAL EVERY 12 HOURS PRN
Qty: 30 TABLET | Refills: 1 | Status: SHIPPED | OUTPATIENT
Start: 2022-05-12

## 2022-05-12 RX ORDER — OMEPRAZOLE 20 MG/1
20 CAPSULE, DELAYED RELEASE ORAL DAILY
Qty: 30 CAPSULE | Refills: 1 | Status: SHIPPED | OUTPATIENT
Start: 2022-05-12 | End: 2022-08-26

## 2022-05-12 RX ORDER — SODIUM CHLORIDE 9 MG/ML
30 INJECTION, SOLUTION INTRAVENOUS CONTINUOUS PRN
Status: CANCELLED | OUTPATIENT
Start: 2022-05-12

## 2022-05-12 RX ORDER — POLYETHYLENE GLYCOL 3350 17 G/17G
POWDER, FOR SOLUTION ORAL
Qty: 238 G | Refills: 0 | Status: SHIPPED | OUTPATIENT
Start: 2022-05-12 | End: 2022-08-16 | Stop reason: HOSPADM

## 2022-05-12 RX ORDER — BISACODYL 5 MG
TABLET, DELAYED RELEASE (ENTERIC COATED) ORAL
Qty: 4 TABLET | Refills: 0 | Status: SHIPPED | OUTPATIENT
Start: 2022-05-12 | End: 2022-08-16 | Stop reason: HOSPADM

## 2022-05-12 NOTE — PATIENT INSTRUCTIONS
Small portions at meal times  Low fiber diet suggested  Phenergan as needed for relief of nausea  Start omeprazole 20 mg once daily for esophagitis next 2 months then take as needed  Colonoscopy will be arranged

## 2022-05-12 NOTE — PROGRESS NOTES
Follow Up Note     Date: 2022   Patient Name: Myke Marcial  MRN: [id number]  : 1965     Primary Care Provider: Jaimie Nathan MD     Chief Complaint   Patient presents with   • Follow-up     EGD 3/17/2022   • Nausea   • Vomiting     History of present illness:   2022  Myke Marcial is a 56 y.o. male who is here today for follow up regarding Follow-up (EGD 3/17/2022), Nausea, and Vomiting.    He is feeling much better over the past couple of months than previous. Has not had any further vomiting. He has had nausea a few times and taken phenergan with great relief. He has modified his portions sizes, eating small portions. He has stopped welbutrin. Metformin dose has been decreased to half recently. BMs have also improved to more regular. Last colonoscopy in 2018.     Interval History:  2022  Patient has been having issues with nausea and intermittent vomiting for the past over several months. He feels that food is sitting in the stomach prolonged period of time and has to through up with relief intermittently. No abdominal pain as such but feels burning sensation in the epigastrium. He burps excessively as well. He will have occasional heartburn depend on the diet. On prilosec as needed now.  Pt denies odynophagia or dysphagia.  He had a esophagram done on 2022 which revealed small hiatal hernia and esophageal dysmotility.  He has a poorly controlled diabetes mellitus on insulin.  His A1c improved from 11-8 recently. GB still there.      This patient deny any hematochezia or melena. He will have occasional lose stool and constipation. Normally will have 1-2 soft BM daily. Weight is stable. There is no history of anemia. No prior history of EGD.  He had a colonoscopy done in 2018 by Dr. Ahuja and was reported as having a 7 mm colon polyp in the transverse colon which was removed with hot biopsy forceps.  Biopsy revealed only benign colonic mucosa.  Reported as  having a minimal mid ascending colon mucosal erythema and biopsies showed only lymphoid aggregate. No family history of colon cancer. Grandmother had Breast cancer. No history of any abdominal surgery. Denies alcohol abuse or cigarette smoking.     Subjective     Past Medical History:   Diagnosis Date   • Anxiety and depression    • Arthritis     LOWER BACK   • Colon cancer screening 06/18/2018    Added automatically from request for surgery [id number]   • Diabetes mellitus (HCC)    • Diabetic foot ulcer (HCC) 11/29/2018   • Ear drum perforation, right    • Elevated cholesterol    • GERD (gastroesophageal reflux disease)    • Hyperlipidemia    • Hypertension    • Refusal of blood product    • Sleep apnea, obstructive     CPAP   • Wears glasses     for reading only     Past Surgical History:   Procedure Laterality Date   • CATARACT EXTRACTION, BILATERAL     • COLONOSCOPY N/A 07/10/2018    Procedure: COLONOSCOPY WITH POLYPECTOMIES;  Surgeon: Musa Berger MD;  Location: [facility] ENDOSCOPY;  Service: Gastroenterology   • ENDOSCOPY N/A 3/17/2022    Procedure: ESOPHAGOGASTRODUODENOSCOPY with biopsies;  Surgeon: Brittany Goins MD;  Location: [facility] ENDOSCOPY;  Service: Gastroenterology;  Laterality: N/A;   • MUSCLE REPAIR Right     biceps tendon     Family History   Problem Relation Age of Onset   • Sleep apnea Mother    • Hypertension Father    • Hyperlipidemia Father    • Cancer Maternal Grandmother    • Cancer Paternal Grandmother    • Colon cancer Neg Hx    • Liver cancer Neg Hx    • Rectal cancer Neg Hx    • Stomach cancer Neg Hx      Social History     Socioeconomic History   • Marital status:    Tobacco Use   • Smoking status: Never Smoker   • Smokeless tobacco: Never Used   Vaping Use   • Vaping Use: Never used   Substance and Sexual Activity   • Alcohol use: Yes     Comment: social   • Drug use: No   • Sexual activity: Defer     Current Outpatient Medications:   •  atorvastatin (LIPITOR) 40 MG tablet,  Take 1 tablet by mouth Daily. (Patient taking differently: Take 80 mg by mouth Daily.), Disp: 90 tablet, Rfl: 3  •  chlorthalidone (HYGROTON) 25 MG tablet, Take 1 tablet by mouth once daily, Disp: 90 tablet, Rfl: 0  •  cholecalciferol (VITAMIN D3) 25 MCG (1000 UT) tablet, Take 1,000 Units by mouth Daily., Disp: , Rfl:   •  insulin aspart prot & aspart (NovoLOG Mix 70/30 FlexPen) (70-30) 100 UNIT/ML suspension pen-injector injection, INJECT 25 UNITS SUBCUTANEOUSLY TWICE DAILY. INCREASE AS DIRECTED TO MAX DOSE OF 70 UNITS TWICE DAILY, Disp: 75 mL, Rfl: 1  •  losartan (COZAAR) 100 MG tablet, Take 1 tablet by mouth once daily, Disp: 90 tablet, Rfl: 0  •  metFORMIN ER (GLUCOPHAGE-XR) 500 MG 24 hr tablet, Take 1 tablet by mouth 2 (Two) Times a Day., Disp: 180 tablet, Rfl: 1  •  pramipexole (MIRAPEX) 0.25 MG tablet, Take 1 tablet by mouth 3 (Three) Times a Day. (Patient taking differently: Take 0.25 mg by mouth Every Night.), Disp: 90 tablet, Rfl: 5  •  promethazine (PHENERGAN) 12.5 MG tablet, Take 1 tablet by mouth Every 12 (Twelve) Hours As Needed for Nausea or Vomiting., Disp: 30 tablet, Rfl: 1  •  Semaglutide, 1 MG/DOSE, (Ozempic, 1 MG/DOSE,) 4 MG/3ML solution pen-injector, Inject 1 mg under the skin into the appropriate area as directed Every 7 (Seven) Days., Disp: 9 mL, Rfl: 1  •  B-D UF III MINI PEN NEEDLES 31G X 5 MM misc, USE 1  ONCE DAILY, Disp: 100 each, Rfl: 0    Allergies   Allergen Reactions   • Ace Inhibitors Cough   • Penicillins Swelling       The following portions of the patient's history were reviewed and updated as appropriate: allergies, current medications, past family history, past medical history, past social history, past surgical history and problem list.    Objective     Physical Exam  Vitals reviewed.   Constitutional:       General: He is not in acute distress.     Appearance: Normal appearance. He is well-developed. He is obese. He is not ill-appearing or diaphoretic.   HENT:      Head:  "Normocephalic and atraumatic.      Right Ear: External ear normal.      Left Ear: External ear normal.      Nose: Nose normal.      Mouth/Throat:      Comments: Wearing a mask  Eyes:      General: No scleral icterus.        Right eye: No discharge.         Left eye: No discharge.      Conjunctiva/sclera: Conjunctivae normal.   Neck:      Vascular: No JVD.   Cardiovascular:      Rate and Rhythm: Normal rate and regular rhythm.      Heart sounds: Normal heart sounds. No murmur heard.    No friction rub. No gallop.   Pulmonary:      Effort: Pulmonary effort is normal. No respiratory distress.      Breath sounds: Normal breath sounds. No wheezing or rales.   Chest:      Chest wall: No tenderness.   Abdominal:      General: Bowel sounds are normal. There is no distension.      Palpations: Abdomen is soft. There is no mass.      Tenderness: There is no abdominal tenderness. There is no guarding.      Hernia: A hernia (umbilical, small) is present.      Comments: Diastasis rectus   Musculoskeletal:         General: No deformity. Normal range of motion.      Cervical back: Normal range of motion.   Skin:     General: Skin is warm and dry.      Findings: No erythema or rash.   Neurological:      Mental Status: He is alert and oriented to person, place, and time.      Coordination: Coordination normal.   Psychiatric:         Mood and Affect: Mood normal.         Behavior: Behavior normal.         Thought Content: Thought content normal.         Judgment: Judgment normal.       Vitals:    05/12/22 1014   BP: 122/66   Temp: 97.4 °F (36.3 °C)   Weight: 116 kg (256 lb)   Height: 175.3 cm (69\")       Results Review:   I reviewed the patient's new clinical results.    Office Visit on 05/02/2022   Component Date Value Ref Range Status   • Hemoglobin A1C 05/02/2022 10.10 (A) 4.80 - 5.60 % Final    Comment: Hemoglobin A1C Ranges:  Increased Risk for Diabetes  5.7% to 6.4%  Diabetes                     >= 6.5%  Diabetic Goal            "     < 7.0%     • Total Cholesterol 05/02/2022 192  0 - 200 mg/dL Final    Comment: Cholesterol Reference Ranges  (U.S. Department of Health and Human Services ATP III  Classifications)  Desirable          <200 mg/dL  Borderline High    200-239 mg/dL  High Risk          >240 mg/dL  Triglyceride Reference Ranges  (U.S. Department of Health and Human Services ATP III  Classifications)  Normal           <150 mg/dL  Borderline High  150-199 mg/dL  High             200-499 mg/dL  Very High        >500 mg/dL  HDL Reference Ranges  (U.S. Department of Health and Human Services ATP III  Classifications)  Low     <40 mg/dl (major risk factor for CHD)  High    >60 mg/dl ('negative' risk factor for CHD)  LDL Reference Ranges  (U.S. Department of Health and Human Services ATP III  Classifications)  Optimal          <100 mg/dL  Near Optimal     100-129 mg/dL  Borderline High  130-159 mg/dL  High             160-189 mg/dL  Very High        >189 mg/dL     • Triglycerides 05/02/2022 507 (A) 0 - 150 mg/dL Final   • HDL Cholesterol 05/02/2022 33 (A) 40 - 60 mg/dL Final   • VLDL Cholesterol Adam 05/02/2022 81 (A) 5 - 40 mg/dL Final   • LDL Chol Calc (NIH) 05/02/2022 78  0 - 100 mg/dL Final   • Glucose 05/02/2022 261 (A) 65 - 99 mg/dL Final   • BUN 05/02/2022 23 (A) 6 - 20 mg/dL Final   • Creatinine 05/02/2022 1.31 (A) 0.76 - 1.27 mg/dL Final   • EGFR Result 05/02/2022 64.3  >60.0 mL/min/1.73 Final    Comment: National Kidney Foundation and American Society of  Nephrology (ASN) Task Force recommended calculation based  on the Chronic Kidney Disease Epidemiology Collaboration  (CKD-EPI) equation refit without adjustment for race.  GFR Normal >60  Chronic Kidney Disease <60  Kidney Failure <15     • BUN/Creatinine Ratio 05/02/2022 17.6  7.0 - 25.0 Final   • Sodium 05/02/2022 139  136 - 145 mmol/L Final   • Potassium 05/02/2022 4.9  3.5 - 5.2 mmol/L Final   • Chloride 05/02/2022 100  98 - 107 mmol/L Final   • Total CO2 05/02/2022 24.8  22.0  - 29.0 mmol/L Final   • Calcium 05/02/2022 10.0  8.6 - 10.5 mg/dL Final   • Total Protein 05/02/2022 7.2  6.0 - 8.5 g/dL Final   • Albumin 05/02/2022 4.00  3.50 - 5.20 g/dL Final   • Globulin 05/02/2022 3.2  gm/dL Final   • A/G Ratio 05/02/2022 1.3  g/dL Final   • Total Bilirubin 05/02/2022 0.4  0.0 - 1.2 mg/dL Final   • Alkaline Phosphatase 05/02/2022 86  39 - 117 U/L Final   • AST (SGOT) 05/02/2022 13  1 - 40 U/L Final   • ALT (SGPT) 05/02/2022 15  1 - 41 U/L Final      FL Esophagram Complete Single Contrast  Result Date: 2/16/2022  Small type I hiatal hernia and esophageal dysmotility.    This report was signed and finalized on 2/16/2022 11:17 AM by Juan Jade M.D..    US Renal Bilateral  Result Date: 2/11/2022  1. Unremarkable appearance of the liver. 2. Fatty infiltration of the liver.  This report was signed and finalized on 2/11/2022 3:02 PM by Kyler Morris MD.     EGD was completed by Dr. Goins on 3/17/2022:  - The oropharynx was normal.  - The Z-line was irregular and was found 39 cm from the incisors.  - LA Grade B (one or more mucosal breaks greater than 5 mm, not extending between the tops of two mucosal folds)  esophagitis with no bleeding was found in the lower third of the esophagus.  - Few linear and superficial esophageal ulcers with no stigmata of recent bleeding were found in the lower third of the  esophagus.  - A medium amount of food (residue) was found in the gastric fundus.  - Diffuse mildly erythematous mucosa without bleeding was found on the posterior wall of the stomach, in the gastric  antrum and in the prepyloric region of the stomach. Biopsies were taken with a cold forceps for Helicobacter pylori  testing.  - The duodenal bulb, first portion of the duodenum, second portion of the duodenum and third portion of the duodenum  were normal. Biopsies for histology were taken with a cold forceps for evaluation of celiac disease.  Pathology showed normal duodenum, gastric biopsy  negative for H pylori     Assessment / Plan      1. Nausea and vomiting, unspecified vomiting type  2. Gastroparesis  Patient's history was suggestive of diabetes associated nausea and diabetic gastroparesis.  His previous Wellbutrin use likely contributed to some extent for drug-induced gastroparesis.  he is feeling much better now since initial visit. He has discontinue welbutrin, changed portion sizes and take phenergan PRN nausea with great relief. He had EGD 3/2022 which showed food residue in the stomach and LA grade B esophagitis as well as erythema in the antrum. Pathology was benign, negative for celiac disease, H pylori. Patient has DM induced gastroparesis complicated by medications.    Recent esophagogram done on 2/16/2021 revealed only a small hiatal hernia and esophageal dysmotility.      Low fiber diet  Small portions at meals  Phenergan BID prn  Avoid NSAIDs  Will consider Reglan in 3-6 months if above measures not effective  We will get ultrasound gallbladder if any symptoms persist    - promethazine (PHENERGAN) 12.5 MG tablet; Take 1 tablet by mouth Every 12 (Twelve) Hours As Needed for Nausea or Vomiting.  Dispense: 30 tablet; Refill: 1    3. Gastroesophageal reflux disease with esophagitis without hemorrhage  No current heartburn or reflux symptoms. Has not been taking any PPI. He had LA grade B esophagitis on recent EGD. No history of Fuentes's esophagus. Previous esophagram showed small hiatal hernia.     Start PPI daily at least for next 8 weeks, then will change to PRN heartburn  Acid reflux measures    - omeprazole (priLOSEC) 20 MG capsule; Take 1 capsule by mouth Daily.  Dispense: 30 capsule; Refill: 1    4. History of colon polyps  Last colonoscopy was in 2018 by Dr. Berger, had 1 polyp removed but had poor prep. He was directed at that time to repeat the colonoscopy in 3 years (past due now). Will arrange colonoscopy.     He will have a colonoscopy performed with monitored anesthesia  care. The indications, technique, alternatives and potential risk and complications were discussed with the patient including but not limited to bleeding, bowel perforations, missing lesions and anesthetic complications. The patient understands and wishes to proceed with the procedure and has given their verbal consent. Written patient education information was given to the patient. He should follow up in the office after this procedure to discuss the results and further recommendations can be made at that time. The patient will call if they have further questions before procedure.  - Case Request  - polyethylene glycol (MIRALAX) 17 GM/SCOOP powder; Follow directions given at office  Dispense: 238 g; Refill: 0  - bisacodyl (Dulcolax) 5 MG EC tablet; Follow instructions given at office  Dispense: 4 tablet; Refill: 0                 Change in bowel habits  Patient does have a occasional loose stools, this is mostly associated with his diabetes, possible Metformin related.  Patient does have a CKD and may not be a candidate for Metformin.  He saw endocrinologist and his Metformin dosage was decreased which seemed to improve his bowel habits.   Advised to avoid excessive cheese carbonated beverages and daily foods.  As per record he had a colonoscopy done in 2018 by Dr. Musa Berger and had a 1 polyp removed and biopsy was unremarkable.     Nonalcoholic fatty liver disease  Class 2 obesity due to excess calories without serious comorbidity with body mass index (BMI) of 37.0 to 37.9 in adult  Recent ultrasound abdomen done revealed fatty liver disease without any liver lesions.  Recent CMP reviewed which revealed a normal liver enzymes.  Patient is high risk for progression of the liver disease. As per patient he used to weigh over 300 pounds and has come down to 250 pounds now.  Advised the lifestyle changes, dietary changes and losing weight down to less than 200 pounds.       Follow Up:   Return for follow up after  procedure to discuss results.      Yoko Kay PA-C  [facility]  5/12/2022  12:34 EDT    Please note that portions of this note may have been completed with a voice recognition program. Efforts were made to edit the dictations, but occasionally words are mistranscribed.

## 2022-05-24 ENCOUNTER — TELEPHONE (OUTPATIENT)
Dept: ENDOCRINOLOGY | Facility: CLINIC | Age: 57
End: 2022-05-24

## 2022-07-01 RX ORDER — FLUCONAZOLE 150 MG/1
TABLET ORAL
Qty: 2 TABLET | Refills: 0 | Status: SHIPPED | OUTPATIENT
Start: 2022-07-01 | End: 2022-08-16 | Stop reason: HOSPADM

## 2022-07-01 RX ORDER — PRAMIPEXOLE DIHYDROCHLORIDE 0.25 MG/1
TABLET ORAL
Qty: 270 TABLET | Refills: 0 | Status: SHIPPED | OUTPATIENT
Start: 2022-07-01 | End: 2023-01-04 | Stop reason: SDUPTHER

## 2022-07-12 ENCOUNTER — OFFICE VISIT (OUTPATIENT)
Dept: ENDOCRINOLOGY | Facility: CLINIC | Age: 57
End: 2022-07-12

## 2022-07-12 VITALS
SYSTOLIC BLOOD PRESSURE: 122 MMHG | HEART RATE: 81 BPM | OXYGEN SATURATION: 97 % | HEIGHT: 69 IN | WEIGHT: 263 LBS | BODY MASS INDEX: 38.95 KG/M2 | DIASTOLIC BLOOD PRESSURE: 83 MMHG

## 2022-07-12 DIAGNOSIS — E66.01 CLASS 2 SEVERE OBESITY DUE TO EXCESS CALORIES WITH SERIOUS COMORBIDITY AND BODY MASS INDEX (BMI) OF 38.0 TO 38.9 IN ADULT: ICD-10-CM

## 2022-07-12 DIAGNOSIS — E78.2 MIXED HYPERLIPIDEMIA: ICD-10-CM

## 2022-07-12 DIAGNOSIS — E11.65 UNCONTROLLED TYPE 2 DIABETES MELLITUS WITH HYPERGLYCEMIA: Primary | ICD-10-CM

## 2022-07-12 LAB
EXPIRATION DATE: NORMAL
GLUCOSE BLDC GLUCOMTR-MCNC: 130 MG/DL (ref 70–130)
Lab: NORMAL

## 2022-07-12 PROCEDURE — 82947 ASSAY GLUCOSE BLOOD QUANT: CPT | Performed by: INTERNAL MEDICINE

## 2022-07-12 PROCEDURE — 99214 OFFICE O/P EST MOD 30 MIN: CPT | Performed by: INTERNAL MEDICINE

## 2022-07-12 RX ORDER — SEMAGLUTIDE 2.68 MG/ML
2 INJECTION, SOLUTION SUBCUTANEOUS
Qty: 3 ML | Refills: 5 | Status: SHIPPED | OUTPATIENT
Start: 2022-07-12 | End: 2022-09-13 | Stop reason: ALTCHOICE

## 2022-07-12 NOTE — PROGRESS NOTES
"Chief Complaint   Patient presents with   • Diabetes          HPI   Myke Marcial is a 56 y.o. male had concerns including Diabetes.    He is checking blood sugars 2 times per day. BGs are now mostly in the 100s.   A1c was up to 10 recently.   Was missing doses of ozempic. Was also on vacation for a month to [place].   Current medications for diabetes include metformin 500 mg BID, ozempic 1 mg weekly, mixed insulin 25 units twice daily.  Typically takes the insulin before bed. Skips the morning dose if he doesn't eat.     Is still drinking soda. Last week after soda and large meal - BG was up to 400. He had missed an insulin dose as well as metformin.     Has been diagnosed with gastroparesis but EGD was done while he was taking the ozempic.       The following portions of the patient's history were reviewed and updated as appropriate: allergies, current medications, past family history, past medical history, past social history, past surgical history and problem list.      Review of Systems   Constitutional: Positive for unexpected weight gain.   Endocrine:        See HPI        Physical Exam  Vitals reviewed.   Constitutional:       Appearance: Normal appearance. He is obese.      Comments: Body mass index is 38.84 kg/m².   Cardiovascular:      Rate and Rhythm: Normal rate.   Pulmonary:      Effort: Pulmonary effort is normal.   Neurological:      General: No focal deficit present.      Mental Status: He is alert. Mental status is at baseline.   Psychiatric:         Mood and Affect: Mood normal.         Behavior: Behavior normal.        /83   Pulse 81   Ht 175.3 cm (69\")   Wt 119 kg (263 lb)   SpO2 97%   BMI 38.84 kg/m²      Labs and imaging    CMP:  Lab Results   Component Value Date    BUN 23 (H) 05/02/2022    CREATININE 1.31 (H) 05/02/2022    EGFRIFNONA 51 (L) 01/31/2022    EGFRIFAFRI 61 01/31/2022    BCR 17.6 05/02/2022     05/02/2022    K 4.9 05/02/2022    CO2 24.8 05/02/2022    " CALCIUM 10.0 05/02/2022    PROTENTOTREF 7.2 05/02/2022    ALBUMIN 4.00 05/02/2022    LABGLOBREF 3.2 05/02/2022    LABIL2 1.3 05/02/2022    BILITOT 0.4 05/02/2022    ALKPHOS 86 05/02/2022    AST 13 05/02/2022    ALT 15 05/02/2022     Lipid Panel:  Lab Results   Component Value Date    TRIG 507 (H) 05/02/2022    HDL 33 (L) 05/02/2022    VLDL 81 (H) 05/02/2022    LDL 78 05/02/2022     HbA1c:  Lab Results   Component Value Date    HGBA1C 10.10 (H) 05/02/2022    HGBA1C 8.6 01/13/2022     Glucose:    Lab Results   Component Value Date    POCGLU 130 07/12/2022     Microalbumin:  Lab Results   Component Value Date    MALBCRERATIO 21 02/16/2022     TSH:  Lab Results   Component Value Date    TSH 2.320 07/28/2021       Assessment and plan  Diagnoses and all orders for this visit:    1. Uncontrolled type 2 diabetes mellitus with hyperglycemia (HCC) (Primary)  Uncontrolled with hyperglycemia. A1c recently 10.1. Complicated by CKD 3, neuropathy, retinopathy.   Continue metformin 500 mg twice daily.   Increase ozempic to 2 mg weekly. Was diagnosed with gastroparesis but evaluation was while on ozempic - which causes gastroparesis.   If not tolerated, reduce dose to 1 mg.  Continue mixed insulin 25 units twice daily BEFORE meals.   Check BGs twice daily.   Discontinue regular soda.  Labs are up-to-date.  Lipid and CMP from May.  Urine microalbumin from February.  Ophtho exam is up-to-date.  Monofilament up-to-date from November.   -     POC Glucose, Blood  -     Cancel: POC Glycosylated Hemoglobin (Hb A1C)  -     Semaglutide, 2 MG/DOSE, (Ozempic, 2 MG/DOSE,) 8 MG/3ML solution pen-injector; Inject 2 mg under the skin into the appropriate area as directed Every 7 (Seven) Days.  Dispense: 3 mL; Refill: 5    2. Mixed hyperlipidemia  On atorvastatin 40 mg daily.  Lipids up-to-date from May.  Triglycerides are significantly elevated due to uncontrolled diabetes.  Monitor lipids yearly.  Diabetes management as above.      3. Class 2  severe obesity due to excess calories with serious comorbidity and body mass index (BMI) of 38.0 to 38.9 in adult (HCC)  BMI up to 38.8 with a 20 pound weight gain since March.  Due to dietary indiscretion, decrease in activity.  Increase Ozempic as above.  Discontinue regular soda.       Return in about 3 months (around 10/12/2022) for next scheduled follow up. The patient was instructed to contact the clinic with any interval questions or concerns.    Ondina Garcia, DO   Endocrinologist    Please note that portions of this note were completed with a voice recognition program.

## 2022-08-09 DIAGNOSIS — E11.8 TYPE 2 DIABETES MELLITUS WITH COMPLICATION, WITH LONG-TERM CURRENT USE OF INSULIN: Primary | ICD-10-CM

## 2022-08-09 DIAGNOSIS — Z79.4 TYPE 2 DIABETES MELLITUS WITH COMPLICATION, WITH LONG-TERM CURRENT USE OF INSULIN: Primary | ICD-10-CM

## 2022-08-09 RX ORDER — BLOOD-GLUCOSE METER
1 KIT MISCELLANEOUS 3 TIMES DAILY
Qty: 1 EACH | Refills: 0 | Status: SHIPPED | OUTPATIENT
Start: 2022-08-09 | End: 2022-11-15

## 2022-08-12 ENCOUNTER — PRIOR AUTHORIZATION (OUTPATIENT)
Dept: FAMILY MEDICINE CLINIC | Facility: CLINIC | Age: 57
End: 2022-08-12

## 2022-08-12 NOTE — TELEPHONE ENCOUNTER
A PRIOR AUTH HAS BEEN STARTED THROUGH [employer] FOR FREESTYLE LITE DEVICE.    WAITING ON A RESPONSE FROM THE INSURANCE.    Key: [id number]

## 2022-08-15 NOTE — PRE-PROCEDURE INSTRUCTIONS
PAT phone history completed with pt for upcoming procedure on 8/16/22, with Dr. Goins.     PAT PASS GIVEN/REVIEWED WITH PT.  VERBALIZED UNDERSTANDING OF THE FOLLOWING:  DO NOT EAT, DRINK, SMOKE, USE SMOKELESS TOBACCO OR CHEW GUM AFTER MIDNIGHT THE NIGHT BEFORE SURGERY.  THIS ALSO INCLUDES HARD CANDIES AND MINTS.    DO NOT SHAVE THE AREA TO BE OPERATED ON AT LEAST 48 HOURS PRIOR TO THE PROCEDURE.  DO NOT WEAR MAKE UP OR NAIL POLISH.  DO NOT LEAVE IN ANY PIERCING OR WEAR JEWELRY THE DAY OF SURGERY.      DO NOT USE ADHESIVES IF YOU WEAR DENTURES.    DO NOT WEAR EYE CONTACTS; BRING IN YOUR GLASSES.    ONLY TAKE MEDICATION THE MORNING OF YOUR PROCEDURE IF INSTRUCTED BY YOUR SURGEON WITH ENOUGH WATER TO SWALLOW THE MEDICATION.  IF YOUR SURGEON DID NOT SPECIFY WHICH MEDICATIONS TO TAKE, YOU WILL NEED TO CALL THEIR OFFICE FOR FURTHER INSTRUCTIONS AND DO AS THEY INSTRUCT.    LEAVE ANYTHING YOU CONSIDER VALUABLE AT HOME.    YOU WILL NEED TO ARRANGE FOR SOMEONE TO DRIVE YOU HOME AFTER SURGERY.  IT IS RECOMMENDED THAT YOU DO NOT DRIVE, WORK, DRINK ALCOHOL OR MAKE MAJOR DECISIONS FOR AT LEAST 24 HOURS AFTER YOUR PROCEDURE IS COMPLETE.      THE DAY OF YOUR PROCEDURE, BRING IN THE FOLLOWING IF APPLICABLE:   PICTURE ID AND INSURANCE/MEDICARE OR MEDICAID CARDS/ANY CO-PAY THAT MAY BE DUE   COPY OF ADVANCED DIRECTIVE/LIVING WILL/POWER OR    CPAP/BIPAP/INHALERS   SKIN PREP SHEET   YOUR PREADMISSION TESTING PASS (IF NOT A PHONE HISTORY)           COVID self-quarantine instructions reviewed with the pt.  Verbalized understanding.

## 2022-08-16 ENCOUNTER — ANESTHESIA (OUTPATIENT)
Dept: GASTROENTEROLOGY | Facility: HOSPITAL | Age: 57
End: 2022-08-16

## 2022-08-16 ENCOUNTER — HOSPITAL ENCOUNTER (OUTPATIENT)
Facility: HOSPITAL | Age: 57
Setting detail: HOSPITAL OUTPATIENT SURGERY
Discharge: HOME | End: 2022-08-16
Attending: INTERNAL MEDICINE | Admitting: INTERNAL MEDICINE

## 2022-08-16 ENCOUNTER — ANESTHESIA EVENT (OUTPATIENT)
Dept: GASTROENTEROLOGY | Facility: HOSPITAL | Age: 57
End: 2022-08-16

## 2022-08-16 VITALS
TEMPERATURE: 97 F | DIASTOLIC BLOOD PRESSURE: 89 MMHG | RESPIRATION RATE: 20 BRPM | SYSTOLIC BLOOD PRESSURE: 123 MMHG | OXYGEN SATURATION: 97 % | WEIGHT: 254 LBS | HEIGHT: 69 IN | HEART RATE: 84 BPM | BODY MASS INDEX: 37.62 KG/M2

## 2022-08-16 DIAGNOSIS — Z86.010 HISTORY OF COLON POLYPS: ICD-10-CM

## 2022-08-16 PROCEDURE — 25010000002 PROPOFOL 1000 MG/100ML EMULSION: Performed by: NURSE ANESTHETIST, CERTIFIED REGISTERED

## 2022-08-16 PROCEDURE — 82962 GLUCOSE BLOOD TEST: CPT

## 2022-08-16 PROCEDURE — 45385 COLONOSCOPY W/LESION REMOVAL: CPT | Performed by: INTERNAL MEDICINE

## 2022-08-16 RX ORDER — KETAMINE HYDROCHLORIDE 50 MG/ML
INJECTION, SOLUTION, CONCENTRATE INTRAMUSCULAR; INTRAVENOUS AS NEEDED
Status: DISCONTINUED | OUTPATIENT
Start: 2022-08-16 | End: 2022-08-16 | Stop reason: SURG

## 2022-08-16 RX ORDER — SODIUM CHLORIDE 9 MG/ML
30 INJECTION, SOLUTION INTRAVENOUS CONTINUOUS PRN
Status: DISCONTINUED | OUTPATIENT
Start: 2022-08-16 | End: 2022-08-16 | Stop reason: HOSPADM

## 2022-08-16 RX ORDER — LIDOCAINE HYDROCHLORIDE 20 MG/ML
INJECTION, SOLUTION INTRAVENOUS AS NEEDED
Status: DISCONTINUED | OUTPATIENT
Start: 2022-08-16 | End: 2022-08-16 | Stop reason: SURG

## 2022-08-16 RX ORDER — PROPOFOL 10 MG/ML
INJECTION, EMULSION INTRAVENOUS AS NEEDED
Status: DISCONTINUED | OUTPATIENT
Start: 2022-08-16 | End: 2022-08-16 | Stop reason: SURG

## 2022-08-16 RX ADMIN — LIDOCAINE HYDROCHLORIDE 50 MG: 20 INJECTION, SOLUTION INTRAVENOUS at 11:57

## 2022-08-16 RX ADMIN — KETAMINE HYDROCHLORIDE 25 MG: 50 INJECTION, SOLUTION INTRAMUSCULAR; INTRAVENOUS at 11:57

## 2022-08-16 RX ADMIN — PROPOFOL 50 MG: 10 INJECTION, EMULSION INTRAVENOUS at 12:22

## 2022-08-16 RX ADMIN — SODIUM CHLORIDE 30 ML/HR: 9 INJECTION, SOLUTION INTRAVENOUS at 10:47

## 2022-08-16 RX ADMIN — PROPOFOL 50 MG: 10 INJECTION, EMULSION INTRAVENOUS at 11:57

## 2022-08-16 RX ADMIN — PROPOFOL 50 MG: 10 INJECTION, EMULSION INTRAVENOUS at 12:10

## 2022-08-16 NOTE — H&P
[facility]  HISTORY AND PHYSICAL    Patient Name: Myke Marcial  : 1965  MRN: [id number]    Chief Complaint:   For surveillance colonoscopy    History Of Presenting Illness:    Personal h/o colon polyps 2018  Poor prep prior colonoscopy    Past Medical History:   Diagnosis Date   • Anxiety and depression    • Arthritis     LOWER BACK   • Colon cancer screening 2018    Added automatically from request for surgery [id number]   • Diabetes mellitus (HCC)    • Diabetic foot ulcer (HCC) 2018   • Ear drum perforation, right    • Elevated cholesterol    • GERD (gastroesophageal reflux disease)    • Gynecomastia    • Hyperlipidemia    • Hypertension    • Refusal of blood product    • Sleep apnea, obstructive     CPAP   • Type 2 diabetes mellitus (HCC)    • Wears glasses     for reading only       Past Surgical History:   Procedure Laterality Date   • CATARACT EXTRACTION, BILATERAL     • COLONOSCOPY N/A 07/10/2018    Procedure: COLONOSCOPY WITH POLYPECTOMIES;  Surgeon: Musa Berger MD;  Location: [facility] ENDOSCOPY;  Service: Gastroenterology   • ENDOSCOPY N/A 2022    Procedure: ESOPHAGOGASTRODUODENOSCOPY with biopsies;  Surgeon: Brittany Goins MD;  Location: [facility] ENDOSCOPY;  Service: Gastroenterology;  Laterality: N/A;   • MUSCLE REPAIR Right     biceps tendon       Social History     Socioeconomic History   • Marital status:    Tobacco Use   • Smoking status: Never Smoker   • Smokeless tobacco: Never Used   Vaping Use   • Vaping Use: Never used   Substance and Sexual Activity   • Alcohol use: Not Currently     Comment: social   • Drug use: No   • Sexual activity: Defer       Family History   Problem Relation Age of Onset   • Sleep apnea Mother    • Hypertension Father    • Hyperlipidemia Father    • Cancer Maternal Grandmother    • Cancer Paternal Grandmother    • Colon cancer Neg Hx    • Liver cancer Neg Hx    • Rectal cancer Neg Hx    • Stomach cancer  Neg Hx        Prior to Admission Medications:  Medications Prior to Admission   Medication Sig Dispense Refill Last Dose   • atorvastatin (LIPITOR) 40 MG tablet Take 1 tablet by mouth Daily. (Patient taking differently: Take 80 mg by mouth Daily.) 90 tablet 3 8/15/2022 at Unknown time   • B-D UF III MINI PEN NEEDLES 31G X 5 MM misc USE 1  ONCE DAILY 100 each 0 8/15/2022 at Unknown time   • bisacodyl (Dulcolax) 5 MG EC tablet Follow instructions given at office 4 tablet 0 8/15/2022 at Unknown time   • chlorthalidone (HYGROTON) 25 MG tablet Take 1 tablet by mouth once daily (Patient taking differently: Take 25 mg by mouth Daily.) 90 tablet 0 8/15/2022 at Unknown time   • glucose blood test strip Cover by insurance; e11.9 100 each 12 8/15/2022 at Unknown time   • glucose monitor monitoring kit 1 each 3 (Three) Times a Day. Covered by patients insurance; E11.9 1 each 0 8/15/2022 at Unknown time   • insulin aspart prot & aspart (NovoLOG Mix 70/30 FlexPen) (70-30) 100 UNIT/ML suspension pen-injector injection INJECT 25 UNITS SUBCUTANEOUSLY TWICE DAILY. INCREASE AS DIRECTED TO MAX DOSE OF 70 UNITS TWICE DAILY (Patient taking differently: 25 Units 2 (Two) Times a Day Before Meals. INJECT 25 UNITS SUBCUTANEOUSLY TWICE DAILY. INCREASE AS DIRECTED TO MAX DOSE OF 70 UNITS TWICE DAILY) 75 mL 1 8/15/2022 at Unknown time   • losartan (COZAAR) 100 MG tablet Take 1 tablet by mouth once daily (Patient taking differently: Take 100 mg by mouth Daily.) 90 tablet 0 8/15/2022 at Unknown time   • metFORMIN ER (GLUCOPHAGE-XR) 500 MG 24 hr tablet Take 1 tablet by mouth 2 (Two) Times a Day. 180 tablet 1 8/15/2022 at Unknown time   • omeprazole (priLOSEC) 20 MG capsule Take 1 capsule by mouth Daily. 30 capsule 1 8/15/2022 at Unknown time   • polyethylene glycol (MIRALAX) 17 GM/SCOOP powder Follow directions given at office 238 g 0 8/15/2022 at Unknown time   • pramipexole (MIRAPEX) 0.25 MG tablet TAKE 1 TABLET BY MOUTH THREE TIMES DAILY  (Patient taking differently: Take 0.25 mg by mouth 3 (Three) Times a Day.) 270 tablet 0 8/15/2022 at Unknown time   • Semaglutide, 2 MG/DOSE, (Ozempic, 2 MG/DOSE,) 8 MG/3ML solution pen-injector Inject 2 mg under the skin into the appropriate area as directed Every 7 (Seven) Days. 3 mL 5 Past Week at Unknown time   • cholecalciferol (VITAMIN D3) 25 MCG (1000 UT) tablet Take 1,000 Units by mouth Daily.      • fluconazole (Diflucan) 150 MG tablet 1 po now, repeat in 5 days 2 tablet 0    • promethazine (PHENERGAN) 12.5 MG tablet Take 1 tablet by mouth Every 12 (Twelve) Hours As Needed for Nausea or Vomiting. 30 tablet 1 Unknown at Unknown time       Allergies:  Allergies   Allergen Reactions   • Ace Inhibitors Cough   • Penicillins Swelling        Vitals: Temp:  [97 °F (36.1 °C)] 97 °F (36.1 °C)  Heart Rate:  [92] 92  Resp:  [16] 16  BP: (161)/(105) 161/105    Review Of Systems:  Constitutional:  Negative for chills, fever, and unexpected weight change.  Respiratory:  Negative for cough, chest tightness, shortness of breath, and wheezing.  Cardiovascular:  Negative for chest pain, palpitations, and leg swelling.  Gastrointestinal:  Negative for abdominal distention, abdominal pain, Nausea, vomiting.  Neurological:  Negative for Weakness, numbness, and headaches.     Physical Exam:    General Appearance:  Alert, cooperative, in no acute distress.   Lungs:   Clear to auscultation, respirations regular, even and                 unlabored.   Heart:  Regular rhythm and normal rate.   Abdomen:   Normal bowel sounds, no masses, no organomegaly. Soft, non-tender, non-distended   Neurologic: Alert and oriented x 3. Moves all four limbs equally       Plan: COLONOSCOPY FOR SCREENING CPT CODE:  (N/A)     Brittany Goins MD  8/16/2022

## 2022-08-16 NOTE — ANESTHESIA PREPROCEDURE EVALUATION
Anesthesia Evaluation     Patient summary reviewed and Nursing notes reviewed   no history of anesthetic complications:  NPO Solid Status: > 8 hours  NPO Liquid Status: > 8 hours           Airway   Mallampati: II  TM distance: >3 FB  Neck ROM: full  Possible difficult intubation and Large neck circumference  Dental - normal exam     Pulmonary    (+) sleep apnea on CPAP, decreased breath sounds,   (-) not a smoker  Cardiovascular - normal exam    Rhythm: regular  Rate: normal    (+) hypertension, hyperlipidemia,       Neuro/Psych  (+) psychiatric history Anxiety and Depression,    GI/Hepatic/Renal/Endo    (+) obesity, morbid obesity, GERD, PUD,  liver disease fatty liver disease, diabetes mellitus type 2 poorly controlled using insulin,     Musculoskeletal     Abdominal   (+) obese,     Abdomen: soft.  Bowel sounds: normal.   Substance History      OB/GYN          Other   arthritis,                        Anesthesia Plan    ASA 3     MAC     (Risks and benefits discussed including risk of aspiration, recall and dental damage. All patient questions answered. Will continue with POC.)  intravenous induction     Anesthetic plan, risks, benefits, and alternatives have been provided, discussed and informed consent has been obtained with: patient.  Pre-procedure education provided  Plan discussed with CRNA.        CODE STATUS:        128

## 2022-08-16 NOTE — ANESTHESIA POSTPROCEDURE EVALUATION
Patient: Myke Marcial    Procedure Summary     Date: 08/16/22 Room / Location: [facility] ENDOSCOPY 2 / [facility] ENDOSCOPY    Anesthesia Start: 1157 Anesthesia Stop: 1239    Procedure: COLONOSCOPY (N/A ) Diagnosis:       History of colon polyps      (History of colon polyps [Z86.010])    Surgeons: Brittany Goins MD Provider: Frederick Ayers CRNA    Anesthesia Type: MAC ASA Status: 3          Anesthesia Type: MAC    Vitals  No vitals data found for the desired time range.          Post Anesthesia Care and Evaluation    Patient location during evaluation: bedside  Patient participation: complete - patient participated  Level of consciousness: awake  Pain score: 0  Pain management: adequate    Airway patency: patent  Anesthetic complications: No anesthetic complications  PONV Status: controlled  Cardiovascular status: acceptable and stable  Respiratory status: acceptable and room air  Hydration status: acceptable    Comments: Vital signs as noted in nursing documentation as per protocol

## 2022-08-16 NOTE — DISCHARGE INSTRUCTIONS
- Discharge patient to home (ambulatory).   - High fiber diet.   - Continue present medications.   - Nom ASA/ NSAIDS for 3 days   - Await pathology results.   - Repeat colonoscopy in 1 year for surveillance.   - Return to GI office in 8 weeks  To assist you in voiding:  Drink plenty of fluids  Listen to running water while attempting to void.    If you are unable to urinate and you have an uncomfortable urge to void or it has been   6 hours since you were discharged, return to the Emergency Room Please follow all post op instructions and follow up appointment time from your physician's office included in your discharge packet.  .  Rest today  No pushing,pulling,tugging,heavy lifting, or strenuous activity   No major decision making,driving,or drinking alcoholic beverages for 24 hours due to the sedation you received  Always use good hand hygiene/washing technique  No driving on pain medication.

## 2022-08-18 LAB — REF LAB TEST METHOD: NORMAL

## 2022-08-19 LAB — GLUCOSE BLDC GLUCOMTR-MCNC: 169 MG/DL (ref 70–130)

## 2022-08-26 DIAGNOSIS — K21.00 GASTROESOPHAGEAL REFLUX DISEASE WITH ESOPHAGITIS WITHOUT HEMORRHAGE: ICD-10-CM

## 2022-08-26 RX ORDER — OMEPRAZOLE 20 MG/1
CAPSULE, DELAYED RELEASE ORAL
Qty: 30 CAPSULE | Refills: 0 | Status: SHIPPED | OUTPATIENT
Start: 2022-08-26 | End: 2022-10-27

## 2022-09-08 ENCOUNTER — TELEPHONE (OUTPATIENT)
Dept: FAMILY MEDICINE CLINIC | Facility: CLINIC | Age: 57
End: 2022-09-08

## 2022-09-08 NOTE — TELEPHONE ENCOUNTER
Recently received order/request for diabetic shoes. Required diabetic foot exam within past 6 months which pt has not had here.    He is followed by endocrinology for his diabetes.     We can do at his f/u apt in 2 months.

## 2022-09-08 NOTE — TELEPHONE ENCOUNTER
Patient said that we should have documentation of the foot exam from endo. He would like the paperwork for diabetic shoes to be filled out as soon as possible.

## 2022-09-13 DIAGNOSIS — E11.65 UNCONTROLLED TYPE 2 DIABETES MELLITUS WITH HYPERGLYCEMIA: Primary | ICD-10-CM

## 2022-09-13 RX ORDER — DULAGLUTIDE 4.5 MG/.5ML
4.5 INJECTION, SOLUTION SUBCUTANEOUS
Qty: 2 ML | Refills: 1 | Status: SHIPPED | OUTPATIENT
Start: 2022-09-13 | End: 2022-10-18 | Stop reason: SDUPTHER

## 2022-10-14 RX ORDER — LOSARTAN POTASSIUM 100 MG/1
TABLET ORAL
Qty: 90 TABLET | Refills: 0 | Status: SHIPPED | OUTPATIENT
Start: 2022-10-14 | End: 2023-02-01 | Stop reason: SDUPTHER

## 2022-10-18 ENCOUNTER — OFFICE VISIT (OUTPATIENT)
Dept: ENDOCRINOLOGY | Facility: CLINIC | Age: 57
End: 2022-10-18

## 2022-10-18 VITALS
HEART RATE: 97 BPM | DIASTOLIC BLOOD PRESSURE: 88 MMHG | HEIGHT: 69 IN | OXYGEN SATURATION: 98 % | BODY MASS INDEX: 38.66 KG/M2 | SYSTOLIC BLOOD PRESSURE: 132 MMHG | WEIGHT: 261 LBS

## 2022-10-18 DIAGNOSIS — E78.2 MIXED HYPERLIPIDEMIA: ICD-10-CM

## 2022-10-18 DIAGNOSIS — E66.01 CLASS 2 SEVERE OBESITY DUE TO EXCESS CALORIES WITH SERIOUS COMORBIDITY AND BODY MASS INDEX (BMI) OF 38.0 TO 38.9 IN ADULT: ICD-10-CM

## 2022-10-18 DIAGNOSIS — E11.65 UNCONTROLLED TYPE 2 DIABETES MELLITUS WITH HYPERGLYCEMIA: Primary | ICD-10-CM

## 2022-10-18 DIAGNOSIS — G62.9 NEUROPATHY: ICD-10-CM

## 2022-10-18 LAB
EXPIRATION DATE: ABNORMAL
EXPIRATION DATE: NORMAL
GLUCOSE BLDC GLUCOMTR-MCNC: 173 MG/DL (ref 70–130)
HBA1C MFR BLD: 10.6 %
Lab: ABNORMAL
Lab: NORMAL

## 2022-10-18 PROCEDURE — 82947 ASSAY GLUCOSE BLOOD QUANT: CPT | Performed by: INTERNAL MEDICINE

## 2022-10-18 PROCEDURE — 83036 HEMOGLOBIN GLYCOSYLATED A1C: CPT | Performed by: INTERNAL MEDICINE

## 2022-10-18 PROCEDURE — 3046F HEMOGLOBIN A1C LEVEL >9.0%: CPT | Performed by: INTERNAL MEDICINE

## 2022-10-18 PROCEDURE — 99214 OFFICE O/P EST MOD 30 MIN: CPT | Performed by: INTERNAL MEDICINE

## 2022-10-18 RX ORDER — METFORMIN HYDROCHLORIDE 500 MG/1
500 TABLET, EXTENDED RELEASE ORAL 2 TIMES DAILY
Qty: 180 TABLET | Refills: 1 | Status: SHIPPED | OUTPATIENT
Start: 2022-10-18 | End: 2023-01-18 | Stop reason: SDUPTHER

## 2022-10-18 RX ORDER — PROCHLORPERAZINE 25 MG/1
1 SUPPOSITORY RECTAL TAKE AS DIRECTED
Qty: 1 EACH | Refills: 3 | Status: SHIPPED | OUTPATIENT
Start: 2022-10-18

## 2022-10-18 RX ORDER — DULAGLUTIDE 4.5 MG/.5ML
4.5 INJECTION, SOLUTION SUBCUTANEOUS
Qty: 6 ML | Refills: 1 | Status: SHIPPED | OUTPATIENT
Start: 2022-10-18 | End: 2022-10-31 | Stop reason: ALTCHOICE

## 2022-10-18 RX ORDER — PROCHLORPERAZINE 25 MG/1
1 SUPPOSITORY RECTAL ONCE
Qty: 1 EACH | Refills: 0 | Status: SHIPPED | OUTPATIENT
Start: 2022-10-18 | End: 2022-10-18

## 2022-10-18 RX ORDER — INSULIN ASPART 100 [IU]/ML
INJECTION, SUSPENSION SUBCUTANEOUS
Qty: 75 ML | Refills: 1 | Status: SHIPPED | OUTPATIENT
Start: 2022-10-18 | End: 2023-01-18 | Stop reason: SDUPTHER

## 2022-10-18 RX ORDER — PROCHLORPERAZINE 25 MG/1
1 SUPPOSITORY RECTAL TAKE AS DIRECTED
Qty: 3 EACH | Refills: 11 | Status: SHIPPED | OUTPATIENT
Start: 2022-10-18

## 2022-10-18 NOTE — PROGRESS NOTES
Chief Complaint   Patient presents with   • Diabetes     Type II          HPI   Myke Marcial is a 56 y.o. male had concerns including Diabetes (Type II).    He is checking blood sugars inconsistenly. BG over 200 today.   Current medications for diabetes include metformin 500 mg BID, trulicity 4.5 mg weekly, mixed insulin 25 units twice daily - still taking his evening dose before bed rather than before supper.   Drinking a large regular soda once daily. Doesn't like diet soda. Eating more sugars/candies than he should.   Is under stress with work.     Weight is up 7 lbs. A1c up to 10.6.    Inquires about mounjaro today. Pharmacy ran out of Feusd and he was changed to trulicity.       The following portions of the patient's history were reviewed and updated as appropriate: allergies, current medications, past family history, past medical history, past social history, past surgical history and problem list.      Review of Systems   Constitutional: Positive for unexpected weight gain.   Endocrine:        See HPI        Physical Exam  Vitals reviewed.   Constitutional:       Appearance: Normal appearance. He is obese.      Comments: Body mass index is 38.54 kg/m².   Cardiovascular:      Rate and Rhythm: Normal rate.      Pulses:           Dorsalis pedis pulses are 1+ on the right side and 1+ on the left side.   Pulmonary:      Effort: Pulmonary effort is normal.   Feet:      Right foot:      Skin integrity: Callus and dry skin present.      Left foot:      Skin integrity: Callus and dry skin present.      Comments: Monofilament 3-4/5 bilaterally    Diabetic Foot Exam Performed and Monofilament Test Performed  Neurological:      General: No focal deficit present.      Mental Status: He is alert. Mental status is at baseline.   Psychiatric:         Mood and Affect: Mood normal.         Behavior: Behavior normal.        /88 (BP Location: Left arm, Patient Position: Sitting, Cuff Size: Adult)   Pulse 97   " Ht 175.3 cm (69\")   Wt 118 kg (261 lb)   SpO2 98%   BMI 38.54 kg/m²      Labs and imaging    CMP:  Lab Results   Component Value Date    BUN 23 (H) 05/02/2022    CREATININE 1.31 (H) 05/02/2022    EGFRIFNONA 51 (L) 01/31/2022    EGFRIFAFRI 61 01/31/2022    BCR 17.6 05/02/2022     05/02/2022    K 4.9 05/02/2022    CO2 24.8 05/02/2022    CALCIUM 10.0 05/02/2022    PROTENTOTREF 7.2 05/02/2022    ALBUMIN 4.00 05/02/2022    LABGLOBREF 3.2 05/02/2022    LABIL2 1.3 05/02/2022    BILITOT 0.4 05/02/2022    ALKPHOS 86 05/02/2022    AST 13 05/02/2022    ALT 15 05/02/2022     Lipid Panel:  Lab Results   Component Value Date    TRIG 507 (H) 05/02/2022    HDL 33 (L) 05/02/2022    VLDL 81 (H) 05/02/2022    LDL 78 05/02/2022     HbA1c:  Lab Results   Component Value Date    HGBA1C 10.6 10/18/2022    HGBA1C 10.10 (H) 05/02/2022     Glucose:    Lab Results   Component Value Date    POCGLU 173 (A) 10/18/2022     Microalbumin:  Lab Results   Component Value Date    MALBCRERATIO 21 02/16/2022     TSH:  Lab Results   Component Value Date    TSH 2.320 07/28/2021       Assessment and plan  Diagnoses and all orders for this visit:    1. Uncontrolled type 2 diabetes mellitus with hyperglycemia (HCC) (Primary)  Uncontrolled with hyperglycemia, A1c up to 10.6.  Complicated by neuropathy and CKD 3.  Still drinking regular soda, not checking BGs enough, not taking insulin at the times advised at his last visit.   He must do better.   NO regular soda. Decrease sweets.   Continue metformin 500 mg BID, trulicity 4.5 mg weekly.   Continue mixed insulin 25 units twice daily BEFORE meals.   Consider SGLT2 inhibitor in the future when BG's are better controlled.  Right now risk of UTI/yeast infections is too great.  Other non-insulin medications other not efficacious in the setting of insulin or contraindicated due to CKD.  Check BGs twice daily. CGM script sent.   Labs are up-to-date from May, urine microalbumin up-to-date from February.  " Monofilament updated today.  Ophtho exam up-to-date yearly.  -     POC Glucose, Blood  -     POC Glycosylated Hemoglobin (Hb A1C)  -     Continuous Blood Gluc  (Dexcom G6 ) device; 1 each 1 (One) Time for 1 dose.  Dispense: 1 each; Refill: 0  -     Continuous Blood Gluc Sensor (Dexcom G6 Sensor); 1 each Take As Directed.  Dispense: 3 each; Refill: 11  -     Continuous Blood Gluc Transmit (Dexcom G6 Transmitter) misc; 1 each Take As Directed.  Dispense: 1 each; Refill: 3  -     insulin aspart prot & aspart (NovoLOG Mix 70/30 FlexPen) (70-30) 100 UNIT/ML suspension pen-injector injection; INJECT 25 UNITS SUBCUTANEOUSLY TWICE DAILY. INCREASE AS DIRECTED TO MAX DOSE OF 70 UNITS TWICE DAILY  Dispense: 75 mL; Refill: 1  -     metFORMIN ER (GLUCOPHAGE-XR) 500 MG 24 hr tablet; Take 1 tablet by mouth 2 (Two) Times a Day.  Dispense: 180 tablet; Refill: 1  -     Dulaglutide (Trulicity) 4.5 MG/0.5ML solution pen-injector; Inject 0.5 mL under the skin into the appropriate area as directed Every 7 (Seven) Days.  Dispense: 6 mL; Refill: 1    2. Class 2 severe obesity due to excess calories with serious comorbidity and body mass index (BMI) of 38.0 to 38.9 in adult (HCC)  BMI up to 38.5.  Weight loss is necessary.  Discussed dietary modifications.    3. Mixed hyperlipidemia  With significant hypertriglyceridemia, due to uncontrolled diabetes and diet.  LDL at goal on atorvastatin.  Monitor yearly.    4. Neuropathy  Monofilament updated today.  Patient will have diabetic foot prescription sent to our office, I am happy to sign off on this.       Return in about 3 months (around 1/18/2023) for next scheduled follow up. The patient was instructed to contact the clinic with any interval questions or concerns.    Ondina Garcia, DO   Endocrinologist    Please note that portions of this note were completed with a voice recognition program.

## 2022-10-19 ENCOUNTER — TELEPHONE (OUTPATIENT)
Dept: ENDOCRINOLOGY | Facility: CLINIC | Age: 57
End: 2022-10-19

## 2022-10-19 ENCOUNTER — PRIOR AUTHORIZATION (OUTPATIENT)
Dept: ENDOCRINOLOGY | Facility: CLINIC | Age: 57
End: 2022-10-19

## 2022-10-19 NOTE — TELEPHONE ENCOUNTER
Pt wife Meli called inquiring on Dexcom G6 sensor would like to try out. Pt last f/u 10/18/22 pt next f/u 01/18/23

## 2022-10-19 NOTE — TELEPHONE ENCOUNTER
Spoke with patient.  Advised that Dexcom was approved by insurance so should be able to  at pharmacy.

## 2022-10-19 NOTE — TELEPHONE ENCOUNTER
Myke Marcial Key: [id number] - [place] Case ID: [id number] - Rx #: [id number] help? Call us at [phone]  Outcome  Appro[employer] October 18  The request has been approved. The authorization is effective from 10/18/2022 to 10/17/2023, as long as the member is enrolled in their current health plan. The request was approved as submitted. A written notification letter will follow with additional details.  Drug  Dexcom G6 Sensor  Form  [employer] [place] Medicaid ePA Form 2017 NCPDP    Myke Marcial Key: [id number] - [place] Case ID: [id number] - Rx #: [id number] help? Call us at [phone]  Outcome  Appro[employer]on October 18  The request has been approved. The authorization is effective from 10/18/2022 to 10/17/2023, as long as the member is enrolled in their current health plan. The request was approved as submitted. A written notification letter will follow with additional details.  Drug  Dexcom G6 Transmitter  Form  [employer] [place] Medicaid ePA Form 2017 NCPDP    Myke Marcial Key: [id number] - [place] Case ID: [id number] - Rx #: [id number] help? Call us at [phone]  Outcome  Appro[employer]on October 18  The request has been approved. The authorization is effective from 10/18/2022 to 10/17/2023, as long as the member is enrolled in their current health plan. The request was approved as submitted. A written notification letter will follow with additional details.  Drug  Dexcom G6  device  Form  [employer] [place] Medicaid ePA Form 2017 NCPDP

## 2022-10-26 DIAGNOSIS — K21.00 GASTROESOPHAGEAL REFLUX DISEASE WITH ESOPHAGITIS WITHOUT HEMORRHAGE: ICD-10-CM

## 2022-10-26 DIAGNOSIS — E78.2 MIXED HYPERLIPIDEMIA: ICD-10-CM

## 2022-10-26 RX ORDER — ATORVASTATIN CALCIUM 40 MG/1
40 TABLET, FILM COATED ORAL DAILY
Qty: 90 TABLET | Refills: 1 | Status: SHIPPED | OUTPATIENT
Start: 2022-10-26

## 2022-10-27 RX ORDER — OMEPRAZOLE 20 MG/1
CAPSULE, DELAYED RELEASE ORAL
Qty: 30 CAPSULE | Refills: 0 | Status: SHIPPED | OUTPATIENT
Start: 2022-10-27 | End: 2022-11-28

## 2022-10-28 ENCOUNTER — TELEPHONE (OUTPATIENT)
Dept: ENDOCRINOLOGY | Facility: CLINIC | Age: 57
End: 2022-10-28

## 2022-10-28 NOTE — TELEPHONE ENCOUNTER
Myke Marcial Key: [id number] - PA Case ID: [id number] - Rx #: [id number] help? Call us at [phone]  Outcome  Deniedon October 27  This request has not been approved. Based on the information submitted for review, you did not meet our guideline rules for the requested drug. In order for your request to be approved, your provider would need to show that you have met the guideline rules below. The details below are written in medical language. If you have questions, please contact your provider. In some cases, the requested medication or alternatives offered may have additional approval requirements. For the treatment of diabetes (condition of high blood sugar), our guideline named GLP-1 RECEPTOR AGONISTS (Trulicity) requires that you have had at least a 3-month trial and therapeutic failure [drug did not work], allergy, contraindication [harmful for] (including potential drug-drug interactions with other medications) or intolerance [side effect] to 1 preferred agent. Preferred agents include Byetta, Bydureon pen, Ozempic, and Victoza.

## 2022-10-31 DIAGNOSIS — E11.65 UNCONTROLLED TYPE 2 DIABETES MELLITUS WITH HYPERGLYCEMIA: Primary | ICD-10-CM

## 2022-10-31 RX ORDER — SEMAGLUTIDE 1.34 MG/ML
1 INJECTION, SOLUTION SUBCUTANEOUS
Qty: 9 ML | Refills: 1 | Status: SHIPPED | OUTPATIENT
Start: 2022-10-31 | End: 2023-01-18 | Stop reason: ALTCHOICE

## 2022-11-09 ENCOUNTER — OFFICE VISIT (OUTPATIENT)
Dept: FAMILY MEDICINE CLINIC | Facility: CLINIC | Age: 57
End: 2022-11-09

## 2022-11-09 ENCOUNTER — APPOINTMENT (OUTPATIENT)
Dept: GENERAL RADIOLOGY | Facility: HOSPITAL | Age: 57
End: 2022-11-09

## 2022-11-09 ENCOUNTER — HOSPITAL ENCOUNTER (EMERGENCY)
Facility: HOSPITAL | Age: 57
Discharge: HOME | End: 2022-11-09
Attending: EMERGENCY MEDICINE | Admitting: EMERGENCY MEDICINE

## 2022-11-09 VITALS
OXYGEN SATURATION: 96 % | HEART RATE: 103 BPM | TEMPERATURE: 98.2 F | RESPIRATION RATE: 18 BRPM | HEIGHT: 69 IN | BODY MASS INDEX: 38.95 KG/M2 | WEIGHT: 263 LBS | DIASTOLIC BLOOD PRESSURE: 82 MMHG | SYSTOLIC BLOOD PRESSURE: 132 MMHG

## 2022-11-09 DIAGNOSIS — S61.311A LACERATION OF LEFT INDEX FINGER WITHOUT FOREIGN BODY WITH DAMAGE TO NAIL, INITIAL ENCOUNTER: Primary | ICD-10-CM

## 2022-11-09 PROCEDURE — 99282 EMERGENCY DEPT VISIT SF MDM: CPT

## 2022-11-09 PROCEDURE — 90471 IMMUNIZATION ADMIN: CPT | Performed by: PHYSICIAN ASSISTANT

## 2022-11-09 PROCEDURE — 25010000002 TETANUS-DIPHTH-ACELL PERTUSSIS 5-2.5-18.5 LF-MCG/0.5 SUSPENSION PREFILLED SYRINGE: Performed by: PHYSICIAN ASSISTANT

## 2022-11-09 PROCEDURE — 99213 OFFICE O/P EST LOW 20 MIN: CPT | Performed by: NURSE PRACTITIONER

## 2022-11-09 PROCEDURE — 90715 TDAP VACCINE 7 YRS/> IM: CPT | Performed by: PHYSICIAN ASSISTANT

## 2022-11-09 PROCEDURE — 73130 X-RAY EXAM OF HAND: CPT

## 2022-11-09 RX ADMIN — TETANUS TOXOID, REDUCED DIPHTHERIA TOXOID AND ACELLULAR PERTUSSIS VACCINE, ADSORBED 0.5 ML: 5; 2.5; 8; 8; 2.5 SUSPENSION INTRAMUSCULAR at 17:31

## 2022-11-09 NOTE — ED PROVIDER NOTES
Subjective   History of Present Illness  57-year-old male presents with laceration to the left second digit of the hand.  Prior to arrival, he was using a table saw and became distracted by a car.  When he looked away he caught his finger on the sole.  He was seen by his primary care provider but she advised to come to the ED for x-ray and repair.  He denies any numbness and tingling.  He is unsure about his most recent tetanus shot.        Nurses Notes reviewed and agree, including vitals, allergies, social history and prior medical history.    REVIEW OF SYSTEMS: All systems reviewed and not pertinent unless noted.    Review of Systems    Past Medical History:   Diagnosis Date   • Anxiety and depression    • Arthritis     LOWER BACK   • Colon cancer screening 06/18/2018    Added automatically from request for surgery [id number]   • Diabetes mellitus (HCC)    • Diabetic foot ulcer (HCC) 11/29/2018   • Ear drum perforation, right    • Elevated cholesterol    • GERD (gastroesophageal reflux disease)    • Gynecomastia 2019   • Hyperlipidemia    • Hypertension    • Refusal of blood product    • Sleep apnea, obstructive     CPAP   • Type 2 diabetes mellitus (HCC)    • Wears glasses     for reading only       Allergies   Allergen Reactions   • Ace Inhibitors Cough   • Penicillins Swelling       Past Surgical History:   Procedure Laterality Date   • CATARACT EXTRACTION, BILATERAL     • COLONOSCOPY N/A 07/10/2018    Procedure: COLONOSCOPY WITH POLYPECTOMIES;  Surgeon: Musa Berger MD;  Location: [facility] ENDOSCOPY;  Service: Gastroenterology   • COLONOSCOPY N/A 8/16/2022    Procedure: COLONOSCOPY;  Surgeon: Brittany Goins MD;  Location: [facility] ENDOSCOPY;  Service: Gastroenterology;  Laterality: N/A;   • ENDOSCOPY N/A 03/17/2022    Procedure: ESOPHAGOGASTRODUODENOSCOPY with biopsies;  Surgeon: Brittany Goins MD;  Location: [facility] ENDOSCOPY;  Service: Gastroenterology;  Laterality: N/A;   • MUSCLE REPAIR Right      biceps tendon       Family History   Problem Relation Age of Onset   • Sleep apnea Mother    • Hypertension Father    • Hyperlipidemia Father    • Cancer Maternal Grandmother    • Cancer Paternal Grandmother    • Colon cancer Neg Hx    • Liver cancer Neg Hx    • Rectal cancer Neg Hx    • Stomach cancer Neg Hx        Social History     Socioeconomic History   • Marital status:    Tobacco Use   • Smoking status: Never   • Smokeless tobacco: Never   Vaping Use   • Vaping Use: Never used   Substance and Sexual Activity   • Alcohol use: Not Currently   • Drug use: No   • Sexual activity: Defer           Objective   Physical Exam   GENERAL APPEARANCE: Well developed, well nourished, in no acute distress.  PSYCH: appropriate affect  SKIN: Skin avulsion of the distal aspect of the left second digit of the hand.  Small, bleeding laceration.  Avulsion extends into the distal aspect of the nailbed EYES: perrla. EOMI.  ENT:    HEAD: Normocephalic, atraumatic.  NECK:   LUNGS: No dyspnea.   CARDIOVASCULAR:  regular rate and rhythm, no murmurs, gallops, rubs.    ABDOMEN: Soft  :   MUSCULOSKELETAL:    NEUROLOGIC: Alert, oriented x 3. No gross deficits.  Procedures       Indication: Laceration  Location: Left second finger  Length: 1 cm  Description: Skin avulsion with 1 cm laceration  Repair: 4-0 Ethilon used to place 3 simple interrupted sutures    ED Course                                           MDM  57-year-old male presents with injury to the left second digit of the hand after cutting it with a table saw.  Tetanus updated.  Physical exam shows skin avulsion extending into the distal aspect of the nailbed.  There is a small bleeding laceration.  He is concerned for fracture.  X-ray of the left hand showed no acute fracture or dislocation.  Wound thoroughly cleaned with Hibiclens and normal saline.  Laceration repaired by PA student and supervised by myself.  At home care as well as follow-up and return  precautions discussed.  Patient verbalized understanding and felt comfortable with the plan.  Final diagnoses:   Laceration of left index finger without foreign body with damage to nail, initial encounter       ED Disposition  ED Disposition     ED Disposition   Discharge    Condition   Stable    Comment   --             Jaimie Nathan MD  [address] [place] [address] [phone]    In 1 week           Medication List      No changes were made to your prescriptions during this visit.          Rogelio Macedo PAElderC  11/09/22 2951

## 2022-11-09 NOTE — DISCHARGE INSTRUCTIONS
You have been diagnosed with laceration of finger.  Clean daily with plain soap and water.  Keep covered and dry.  You may alternate ibuprofen and Tylenol every 4 hours as needed for pain.  Please follow-up with primary care provider in 7 to 10 days for suture removal.  Return to ER for signs of infection including increased pain, redness, swelling, discharge.  Thank you for choosing [facility].

## 2022-11-09 NOTE — PROGRESS NOTES
Established Patient        Chief Complaint:   Chief Complaint   Patient presents with   • Laceration         History of Present Illness:    Myke Marcial is a 57 y.o. male who presents today for laceration to left index finger. Patient states that he cut his finger on a table saw at work about 45 min ago. Laceration examined in clinic and found to involve nail bed. Finger continues to bleed even with applied pressure. Explained to patient that due to the severity of the injury and the involvement of the nail bed he would need to present to the ED for evaluation and treatment.     Subjective     The following portions of the patient's history were reviewed and updated as appropriate: allergies, current medications, past family history, past medical history, past social history, past surgical history and problem list.    ALLERGIES  Allergies   Allergen Reactions   • Ace Inhibitors Cough   • Penicillins Swelling       ROS  Review of Systems   Constitutional: Negative for fever.   Gastrointestinal: Negative for nausea and vomiting.   Skin: Positive for wound (laceration left index finger).   Neurological: Negative for dizziness and light-headedness.   All other systems reviewed and are negative.      Objective     Vital Signs:   There were no vitals taken for this visit.    Physical Exam   Physical Exam  Vitals and nursing note reviewed.   Constitutional:       General: He is not in acute distress.     Appearance: He is not ill-appearing.   Cardiovascular:      Rate and Rhythm: Normal rate.      Heart sounds: Normal heart sounds.   Pulmonary:      Effort: Pulmonary effort is normal.      Breath sounds: Normal breath sounds.   Musculoskeletal:         General: Tenderness, deformity and signs of injury present.      Left hand: Swelling, deformity, laceration and tenderness present. Decreased range of motion.   Skin:     Findings: Laceration present.          Neurological:      Mental Status:  He is alert.       Assessment and Plan      Assessment/Plan:   Diagnoses and all orders for this visit:    1. Laceration of left index finger without foreign body with damage to nail, initial encounter (Primary)      --patient instructed to present to ER urgently, patient refused ambulance transfer at this time and states that he can drive himself    Discussion Summary:  Discussed plan of care in detail with pt today; pt verb understanding and agrees.    Follow up:  Return for present to ER immediately.     Patient Education:  There are no Patient Instructions on file for this visit.    Tesha Connelly, TAE  11/09/2022          Please note that portions of this note may have been completed with a voice recognition program.

## 2022-11-15 ENCOUNTER — OFFICE VISIT (OUTPATIENT)
Dept: FAMILY MEDICINE CLINIC | Facility: CLINIC | Age: 57
End: 2022-11-15

## 2022-11-15 VITALS
SYSTOLIC BLOOD PRESSURE: 132 MMHG | TEMPERATURE: 97.8 F | BODY MASS INDEX: 39.1 KG/M2 | HEIGHT: 69 IN | HEART RATE: 92 BPM | WEIGHT: 264 LBS | OXYGEN SATURATION: 98 % | DIASTOLIC BLOOD PRESSURE: 80 MMHG

## 2022-11-15 DIAGNOSIS — I10 ESSENTIAL HYPERTENSION: Primary | ICD-10-CM

## 2022-11-15 DIAGNOSIS — Z23 NEED FOR INFLUENZA VACCINATION: ICD-10-CM

## 2022-11-15 DIAGNOSIS — S61.311D LACERATION OF LEFT INDEX FINGER WITHOUT FOREIGN BODY WITH DAMAGE TO NAIL, SUBSEQUENT ENCOUNTER: ICD-10-CM

## 2022-11-15 DIAGNOSIS — E78.2 MIXED HYPERLIPIDEMIA: ICD-10-CM

## 2022-11-15 DIAGNOSIS — E53.8 VITAMIN B 12 DEFICIENCY: ICD-10-CM

## 2022-11-15 DIAGNOSIS — Z23 NEED FOR COVID-19 VACCINE: ICD-10-CM

## 2022-11-15 DIAGNOSIS — E11.65 UNCONTROLLED TYPE 2 DIABETES MELLITUS WITH HYPERGLYCEMIA: ICD-10-CM

## 2022-11-15 PROCEDURE — 91312 COVID-19 (PFIZER) BIVALENT BOOSTER 12+YRS: CPT | Performed by: FAMILY MEDICINE

## 2022-11-15 PROCEDURE — 90471 IMMUNIZATION ADMIN: CPT | Performed by: FAMILY MEDICINE

## 2022-11-15 PROCEDURE — 90686 IIV4 VACC NO PRSV 0.5 ML IM: CPT | Performed by: FAMILY MEDICINE

## 2022-11-15 PROCEDURE — 0124A COVID-19 (PFIZER) BIVALENT BOOSTER 12+YRS: CPT | Performed by: FAMILY MEDICINE

## 2022-11-15 PROCEDURE — 99214 OFFICE O/P EST MOD 30 MIN: CPT | Performed by: FAMILY MEDICINE

## 2022-11-15 RX ORDER — MULTIPLE VITAMINS W/ MINERALS TAB 9MG-400MCG
1 TAB ORAL DAILY
COMMUNITY
End: 2023-02-15

## 2022-11-15 NOTE — PROGRESS NOTES
"Subjective   Myke Marcial is a 57 y.o. male.     History of Present Illness   Mr. Morales presents today for routine follow-up several medical problems. In addition he is requesting suture removal.    Seen in ER approximate 1 week ago for trauma to left index finger while using a salt.  No apparent fracture.  Did require laceration repair.  Recommended removal date actually tomorrow.  He reports improving range of motion in the finger.  No numbness.    Has had consultation with ENT.  Will be undergoing surgery for deviated septum in the spring.    Followed by endocrinology for management of insulin-dependent diabetes.  He is using his Dexcom 6, having improved control.  Reports taking medications as prescribed.  Symptoms of gastroparesis improved.  He denies vomiting episodes.  He would like A1c today to see if \"things are improving\".  Currently on mixed insulin, metformin, statin, ARB, Ozempic.    On HCTZ and losartan for hypertension.  Taking statin for hyperlipidemia.    Has PRATEEK for which he is compliant with his CPAP although his sleep cycle is irregular.    Has chronic morbid obesity associated with PRATEEK, fatty liver, IDDM, dyslipidemia and hypertension.  Getting intermittent exercise as he has a physical job.  Still eating a high calorie, high salt diet.    Pt's previous HPI reviewed and updated as indicated.     The following portions of the patient's history were reviewed and updated as appropriate: allergies, current medications, past family history, past medical history, past social history, past surgical history and problem list.    Review of Systems   Constitutional: Positive for fatigue. Negative for appetite change and unexpected weight change.   HENT: Positive for congestion and postnasal drip. Negative for nosebleeds, rhinorrhea, sore throat, tinnitus and trouble swallowing.    Eyes: Negative for visual disturbance.   Respiratory: Positive for shortness of breath (with exertion). Negative " for cough and wheezing.    Cardiovascular: Positive for leg swelling (mild, stable). Negative for chest pain and palpitations.   Gastrointestinal: Positive for nausea (mild, intermittent). Negative for abdominal pain, blood in stool, constipation and vomiting.        Heartburn   Endocrine: Positive for heat intolerance.   Genitourinary: Negative for decreased urine volume, difficulty urinating, dysuria and hematuria.        ED   Musculoskeletal: Positive for arthralgias and back pain (chronic, stable).   Skin: Negative for rash and wound.   Neurological: Negative for dizziness, syncope, weakness and headaches.   Hematological: Negative for adenopathy. Does not bruise/bleed easily.   Psychiatric/Behavioral: Positive for dysphoric mood and sleep disturbance. Negative for confusion and suicidal ideas. The patient is not nervous/anxious.    Pt's previous ROS reviewed and updated as indicated.       Objective    Vitals:    11/15/22 0906   BP: 132/80   Pulse: 92   Temp: 97.8 °F (36.6 °C)   SpO2: 98%     Body mass index is 38.99 kg/m².      11/15/22  0906   Weight: 120 kg (264 lb)       Physical Exam  Vitals and nursing note reviewed.   Constitutional:       General: He is not in acute distress.     Appearance: He is well-developed and well-groomed. He is obese. He is not ill-appearing.   HENT:      Head: Atraumatic.      Mouth/Throat:      Mouth: Mucous membranes are moist.   Eyes:      General: No scleral icterus.     Conjunctiva/sclera: Conjunctivae normal.   Neck:      Thyroid: No thyroid mass.      Vascular: Normal carotid pulses. No carotid bruit.   Cardiovascular:      Rate and Rhythm: Normal rate and regular rhythm.      Pulses: Normal pulses.      Heart sounds: Normal heart sounds.   Pulmonary:      Effort: Pulmonary effort is normal.      Breath sounds: Normal breath sounds.   Musculoskeletal:      Right lower leg: No edema.      Left lower leg: No edema.   Lymphadenopathy:      Cervical: No cervical adenopathy.    Skin:     General: Skin is warm and dry.      Coloration: Skin is not jaundiced or pale.      Comments: Laceration distal left index finger well approximated with 3 interrupted sutures.  Wound is clean/dry/intact.  Extremity neurovascularly intact.   Neurological:      Mental Status: He is alert and oriented to person, place, and time.      Sensory: Sensation is intact.      Motor: Motor function is intact.      Gait: Gait is intact.   Psychiatric:         Mood and Affect: Mood and affect normal.         Behavior: Behavior normal. Behavior is cooperative.         Cognition and Memory: Cognition normal.     Pt's previous physical exam reviewed and updated as indicated.        Assessment & Plan   Diagnoses and all orders for this visit:    1. Essential hypertension (Primary)  -     CBC & Differential  -     Comprehensive Metabolic Panel  -     TSH Rfx On Abnormal To Free T4    2. Mixed hyperlipidemia  -     Comprehensive Metabolic Panel  -     TSH Rfx On Abnormal To Free T4  -     Lipid Panel    3. Uncontrolled type 2 diabetes mellitus with hyperglycemia (HCC)  -     CBC & Differential  -     Comprehensive Metabolic Panel  -     Hemoglobin A1c  -     TSH Rfx On Abnormal To Free T4    4. Laceration of left index finger without foreign body with damage to nail, subsequent encounter    5. Need for influenza vaccination  -     FluLaval/Fluzone >6 mos (3118-2094)    6. Need for COVID-19 vaccine  -     COVID-19 Bivalent Booster (Pfizer) 12+yrs    7. Vitamin B 12 deficiency  -     Vitamin B12       Hypertension near goal.  Continue HCTZ, losartan.  Encouraged improved lifestyle management of hypertension.    Hyperlipidemia good tolerance of atorvastatin. Pt advised to eat a heart healthy diet and get regular aerobic exercise.  Follow-up lipid panel as above.    Previously uncontrolled IDDM.  He request recheck A1c today.  Numbers appear to be improving.  Improve compliance with medication use, blood glucose monitoring etc.   Good follow-up with Dr. Corona.  Continue mixed insulin, metformin, Ozempic, statin, aspirin, ARB. Patient encouraged to take meds as rx'd, follow diabetic appropriate diet, exercise daily, perform feet check daily, and have yearly diabetic eye exams.    Laceration distal left index finger status postrepair looks as if it is healing quite well.  Recommended he allow full healing time and not remove sutures until tomorrow.    Assess status of vit/min deficiencies and replace as indicated.    Routine follow-up in 3 months, sooner as needed/instructed.  I will contact patient regarding test results and provide instructions regarding any necessary changes in plan of care.  Patient was encouraged to keep me informed of any acute changes, lack of improvement, or any new concerning symptoms.  Pt is aware of reasons to seek emergent care.  Patient voiced understanding of all instructions and denied further questions.    Please note that portions of this note may have been completed with a voice recognition program.             NOTE TO PATIENT: The 21st Century Cures Act makes medical notes like these available to patients in the interest of transparency. However, be advised this is a medical document. It is intended as peer to peer communication. It is written in medical language and may contain abbreviations or verbiage that are unfamiliar. It may appear blunt or direct. Medical documents are intended to carry relevant information, facts as evident, and the clinical opinion of the practitioner.

## 2022-11-16 LAB
ALBUMIN SERPL-MCNC: 4 G/DL (ref 3.5–5.2)
ALBUMIN/GLOB SERPL: 1.3 G/DL
ALP SERPL-CCNC: 86 U/L (ref 39–117)
ALT SERPL-CCNC: 22 U/L (ref 1–41)
AST SERPL-CCNC: 16 U/L (ref 1–40)
BASOPHILS # BLD AUTO: 0.06 10*3/MM3 (ref 0–0.2)
BASOPHILS NFR BLD AUTO: 0.8 % (ref 0–1.5)
BILIRUB SERPL-MCNC: 0.4 MG/DL (ref 0–1.2)
BUN SERPL-MCNC: 19 MG/DL (ref 6–20)
BUN/CREAT SERPL: 16.1 (ref 7–25)
CALCIUM SERPL-MCNC: 9.6 MG/DL (ref 8.6–10.5)
CHLORIDE SERPL-SCNC: 98 MMOL/L (ref 98–107)
CHOLEST SERPL-MCNC: 148 MG/DL (ref 0–200)
CO2 SERPL-SCNC: 28 MMOL/L (ref 22–29)
CREAT SERPL-MCNC: 1.18 MG/DL (ref 0.76–1.27)
EGFRCR SERPLBLD CKD-EPI 2021: 72.4 ML/MIN/1.73
EOSINOPHIL # BLD AUTO: 0.05 10*3/MM3 (ref 0–0.4)
EOSINOPHIL NFR BLD AUTO: 0.7 % (ref 0.3–6.2)
ERYTHROCYTE [DISTWIDTH] IN BLOOD BY AUTOMATED COUNT: 13.4 % (ref 12.3–15.4)
GLOBULIN SER CALC-MCNC: 3 GM/DL
GLUCOSE SERPL-MCNC: 190 MG/DL (ref 65–99)
HBA1C MFR BLD: 8.9 % (ref 4.8–5.6)
HCT VFR BLD AUTO: 45.1 % (ref 37.5–51)
HDLC SERPL-MCNC: 33 MG/DL (ref 40–60)
HGB BLD-MCNC: 15.1 G/DL (ref 13–17.7)
IMM GRANULOCYTES # BLD AUTO: 0.02 10*3/MM3 (ref 0–0.05)
IMM GRANULOCYTES NFR BLD AUTO: 0.3 % (ref 0–0.5)
LDLC SERPL CALC-MCNC: 66 MG/DL (ref 0–100)
LYMPHOCYTES # BLD AUTO: 1.76 10*3/MM3 (ref 0.7–3.1)
LYMPHOCYTES NFR BLD AUTO: 24.1 % (ref 19.6–45.3)
MCH RBC QN AUTO: 30 PG (ref 26.6–33)
MCHC RBC AUTO-ENTMCNC: 33.5 G/DL (ref 31.5–35.7)
MCV RBC AUTO: 89.7 FL (ref 79–97)
MONOCYTES # BLD AUTO: 0.52 10*3/MM3 (ref 0.1–0.9)
MONOCYTES NFR BLD AUTO: 7.1 % (ref 5–12)
NEUTROPHILS # BLD AUTO: 4.89 10*3/MM3 (ref 1.7–7)
NEUTROPHILS NFR BLD AUTO: 67 % (ref 42.7–76)
NRBC BLD AUTO-RTO: 0 /100 WBC (ref 0–0.2)
PLATELET # BLD AUTO: 290 10*3/MM3 (ref 140–450)
POTASSIUM SERPL-SCNC: 4.3 MMOL/L (ref 3.5–5.2)
PROT SERPL-MCNC: 7 G/DL (ref 6–8.5)
RBC # BLD AUTO: 5.03 10*6/MM3 (ref 4.14–5.8)
SODIUM SERPL-SCNC: 138 MMOL/L (ref 136–145)
TRIGL SERPL-MCNC: 308 MG/DL (ref 0–150)
TSH SERPL DL<=0.005 MIU/L-ACNC: 4.31 UIU/ML (ref 0.27–4.2)
VIT B12 SERPL-MCNC: 250 PG/ML (ref 211–946)
VLDLC SERPL CALC-MCNC: 49 MG/DL (ref 5–40)
WBC # BLD AUTO: 7.3 10*3/MM3 (ref 3.4–10.8)

## 2022-11-18 ENCOUNTER — TELEPHONE (OUTPATIENT)
Dept: FAMILY MEDICINE CLINIC | Facility: CLINIC | Age: 57
End: 2022-11-18

## 2022-11-18 NOTE — TELEPHONE ENCOUNTER
Please contact patient and see if he had any difficulty with suture removal today.  Was provided materials to perform this at home.

## 2022-11-28 DIAGNOSIS — K21.00 GASTROESOPHAGEAL REFLUX DISEASE WITH ESOPHAGITIS WITHOUT HEMORRHAGE: ICD-10-CM

## 2022-11-28 RX ORDER — OMEPRAZOLE 20 MG/1
CAPSULE, DELAYED RELEASE ORAL
Qty: 30 CAPSULE | Refills: 0 | Status: SHIPPED | OUTPATIENT
Start: 2022-11-28 | End: 2023-01-10

## 2022-11-28 RX ORDER — CHLORTHALIDONE 25 MG/1
TABLET ORAL
Qty: 90 TABLET | Refills: 0 | Status: SHIPPED | OUTPATIENT
Start: 2022-11-28

## 2023-01-04 RX ORDER — PRAMIPEXOLE DIHYDROCHLORIDE 0.25 MG/1
0.25 TABLET ORAL 3 TIMES DAILY
Qty: 270 TABLET | Refills: 0 | Status: SHIPPED | OUTPATIENT
Start: 2023-01-04

## 2023-01-10 ENCOUNTER — OFFICE VISIT (OUTPATIENT)
Dept: GASTROENTEROLOGY | Facility: CLINIC | Age: 58
End: 2023-01-10
Payer: MEDICAID

## 2023-01-10 VITALS
HEIGHT: 69 IN | DIASTOLIC BLOOD PRESSURE: 68 MMHG | BODY MASS INDEX: 40.14 KG/M2 | OXYGEN SATURATION: 95 % | WEIGHT: 271 LBS | HEART RATE: 104 BPM | SYSTOLIC BLOOD PRESSURE: 126 MMHG

## 2023-01-10 DIAGNOSIS — K21.00 GASTROESOPHAGEAL REFLUX DISEASE WITH ESOPHAGITIS WITHOUT HEMORRHAGE: ICD-10-CM

## 2023-01-10 DIAGNOSIS — E66.01 CLASS 3 SEVERE OBESITY WITH BODY MASS INDEX (BMI) OF 40.0 TO 44.9 IN ADULT, UNSPECIFIED OBESITY TYPE, UNSPECIFIED WHETHER SERIOUS COMORBIDITY PRESENT: ICD-10-CM

## 2023-01-10 DIAGNOSIS — E11.43 DIABETIC GASTROPARESIS: ICD-10-CM

## 2023-01-10 DIAGNOSIS — K31.84 DIABETIC GASTROPARESIS: ICD-10-CM

## 2023-01-10 DIAGNOSIS — D12.6 TUBULAR ADENOMA OF COLON: Primary | ICD-10-CM

## 2023-01-10 DIAGNOSIS — R11.0 NAUSEA: ICD-10-CM

## 2023-01-10 DIAGNOSIS — K76.0 NAFLD (NONALCOHOLIC FATTY LIVER DISEASE): ICD-10-CM

## 2023-01-10 PROCEDURE — 99214 OFFICE O/P EST MOD 30 MIN: CPT | Performed by: PHYSICIAN ASSISTANT

## 2023-01-10 RX ORDER — OMEPRAZOLE 20 MG/1
CAPSULE, DELAYED RELEASE ORAL
Qty: 30 CAPSULE | Refills: 0 | Status: SHIPPED | OUTPATIENT
Start: 2023-01-10 | End: 2023-02-23

## 2023-01-10 RX ORDER — BLOOD SUGAR DIAGNOSTIC
STRIP MISCELLANEOUS
COMMUNITY
Start: 2023-01-09 | End: 2023-02-15

## 2023-01-18 ENCOUNTER — OFFICE VISIT (OUTPATIENT)
Dept: ENDOCRINOLOGY | Facility: CLINIC | Age: 58
End: 2023-01-18
Payer: MEDICAID

## 2023-01-18 VITALS
WEIGHT: 262 LBS | BODY MASS INDEX: 38.8 KG/M2 | HEART RATE: 96 BPM | DIASTOLIC BLOOD PRESSURE: 80 MMHG | HEIGHT: 69 IN | SYSTOLIC BLOOD PRESSURE: 132 MMHG | OXYGEN SATURATION: 95 % | RESPIRATION RATE: 18 BRPM

## 2023-01-18 DIAGNOSIS — E78.2 MIXED HYPERLIPIDEMIA: ICD-10-CM

## 2023-01-18 DIAGNOSIS — E66.01 CLASS 3 SEVERE OBESITY DUE TO EXCESS CALORIES WITH SERIOUS COMORBIDITY AND BODY MASS INDEX (BMI) OF 40.0 TO 44.9 IN ADULT: ICD-10-CM

## 2023-01-18 DIAGNOSIS — E11.65 UNCONTROLLED TYPE 2 DIABETES MELLITUS WITH HYPERGLYCEMIA: Primary | ICD-10-CM

## 2023-01-18 DIAGNOSIS — R79.89 ABNORMAL TSH: ICD-10-CM

## 2023-01-18 LAB
EXPIRATION DATE: NORMAL
GLUCOSE BLDC GLUCOMTR-MCNC: 145 MG/DL (ref 70–130)
HBA1C MFR BLD: 8.4 %
Lab: NORMAL

## 2023-01-18 PROCEDURE — 83036 HEMOGLOBIN GLYCOSYLATED A1C: CPT | Performed by: INTERNAL MEDICINE

## 2023-01-18 PROCEDURE — 95251 CONT GLUC MNTR ANALYSIS I&R: CPT | Performed by: INTERNAL MEDICINE

## 2023-01-18 PROCEDURE — 99214 OFFICE O/P EST MOD 30 MIN: CPT | Performed by: INTERNAL MEDICINE

## 2023-01-18 PROCEDURE — 3052F HG A1C>EQUAL 8.0%<EQUAL 9.0%: CPT | Performed by: INTERNAL MEDICINE

## 2023-01-18 RX ORDER — INSULIN ASPART 100 [IU]/ML
INJECTION, SUSPENSION SUBCUTANEOUS
Qty: 75 ML | Refills: 1 | Status: SHIPPED | OUTPATIENT
Start: 2023-01-18 | End: 2023-01-18 | Stop reason: SDUPTHER

## 2023-01-18 RX ORDER — INSULIN ASPART 100 [IU]/ML
INJECTION, SUSPENSION SUBCUTANEOUS
Qty: 75 ML | Refills: 1 | Status: SHIPPED | OUTPATIENT
Start: 2023-01-18

## 2023-01-18 RX ORDER — METFORMIN HYDROCHLORIDE 500 MG/1
500 TABLET, EXTENDED RELEASE ORAL 2 TIMES DAILY
Qty: 180 TABLET | Refills: 3 | Status: SHIPPED | OUTPATIENT
Start: 2023-01-18

## 2023-01-18 RX ORDER — DAPAGLIFLOZIN 10 MG/1
10 TABLET, FILM COATED ORAL DAILY
Qty: 30 TABLET | Refills: 5 | Status: SHIPPED | OUTPATIENT
Start: 2023-01-18

## 2023-01-18 RX ORDER — SEMAGLUTIDE 2.68 MG/ML
2 INJECTION, SOLUTION SUBCUTANEOUS
Qty: 9 ML | Refills: 1 | Status: SHIPPED | OUTPATIENT
Start: 2023-01-18

## 2023-01-18 NOTE — PROGRESS NOTES
"Chief Complaint   Patient presents with   • Diabetes     T2DM          HPI   Myke Marcial is a 57 y.o. male had concerns including Diabetes (T2DM).    He is checking blood sugars 4+ times per day with CGM. Data reviewed from the last two weeks shows average glucose 191 with SD 38. In target range 43%, high 51%, very high 6%, low 0%, very low 0%.   Pattern of: postprandial hyperglycemia    Diet hasn't been great the last month due to finances.     Current medications for diabetes include metformin 500 mg twice daily, mixed insulin 25 units twice daily, Ozempic 1 mg weekly.   Was out of the ozempic for a few weeks due to supply issue.    No longer drinking regular soda.   Taking insulin before meals most of the time 90%+ of the time.     Weight stable since his last visit here.  Had trended up but is back down.    The following portions of the patient's history were reviewed and updated as appropriate: allergies, current medications, past family history, past medical history, past social history, past surgical history and problem list.      Review of Systems   Constitutional: Positive for activity change.   Endocrine:        See HPI        Physical Exam  Vitals reviewed.   Constitutional:       Appearance: Normal appearance. He is obese.      Comments: Body mass index is 38.69 kg/m².   Cardiovascular:      Rate and Rhythm: Normal rate.   Pulmonary:      Effort: Pulmonary effort is normal.   Neurological:      General: No focal deficit present.      Mental Status: He is alert. Mental status is at baseline.   Psychiatric:         Mood and Affect: Mood normal.         Behavior: Behavior normal.        /80   Pulse 96   Resp 18   Ht 175.3 cm (69\")   Wt 119 kg (262 lb)   SpO2 95%   BMI 38.69 kg/m²      Labs and imaging    CMP:  Lab Results   Component Value Date    BUN 19 11/15/2022    CREATININE 1.18 11/15/2022    EGFRIFNONA 51 (L) 01/31/2022    EGFRIFAFRI 61 01/31/2022    BCR 16.1 11/15/2022    NA " 138 11/15/2022    K 4.3 11/15/2022    CO2 28.0 11/15/2022    CALCIUM 9.6 11/15/2022    PROTENTOTREF 7.0 11/15/2022    ALBUMIN 4.00 11/15/2022    LABGLOBREF 3.0 11/15/2022    LABIL2 1.3 11/15/2022    BILITOT 0.4 11/15/2022    ALKPHOS 86 11/15/2022    AST 16 11/15/2022    ALT 22 11/15/2022     Lipid Panel:  Lab Results   Component Value Date    TRIG 308 (H) 11/15/2022    HDL 33 (L) 11/15/2022    VLDL 49 (H) 11/15/2022    LDL 66 11/15/2022     HbA1c:  Lab Results   Component Value Date    HGBA1C 8.4 01/18/2023    HGBA1C 8.90 (H) 11/15/2022     Glucose:    Lab Results   Component Value Date    POCGLU 145 (A) 01/18/2023     Microalbumin:  Lab Results   Component Value Date    MALBCRERATIO 21 02/16/2022     TSH:  Lab Results   Component Value Date    TSH 4.310 (H) 11/15/2022       Assessment and plan  Diagnoses and all orders for this visit:    1. Uncontrolled type 2 diabetes mellitus with hyperglycemia (HCC) (Primary)  Uncontrolled but improved. With hyperglycemia - A1c has improved 8.4.  Complicated by retinoathy (getting injections), gastroparesis, neuropathy, CKD 3.   Continue metformin 500 mg twice daily.  Increase Ozempic to 2 mg weekly.  Does not seem to exacerbate gastroparesis.  Add Farxiga 10 mg daily.  Cautioned for possible UTI/yeast infections.  Continue mixed insulin 25 units twice daily.  If BG's are persistently greater than 180, increase insulin.  Labs are up-to-date from November.  Urine microalbumin up-to-date from February, unable to get a sample today but will check at follow-up visit.  Ophtho exam is up-to-date recently and report will be sent here.  Monofilament up-to-date from October.  -     POC Glucose, Blood  -     POC Glycosylated Hemoglobin (Hb A1C)  -     Semaglutide, 2 MG/DOSE, (Ozempic, 2 MG/DOSE,) 8 MG/3ML solution pen-injector; Inject 2 mg under the skin into the appropriate area as directed Every 7 (Seven) Days.  Dispense: 9 mL; Refill: 1  -     insulin aspart prot & aspart (NovoLOG Mix  70/30 FlexPen) (70-30) 100 UNIT/ML suspension pen-injector injection; INJECT 30 UNITS SUBCUTANEOUSLY TWICE DAILY. INCREASE AS DIRECTED TO MAX DOSE OF 70 UNITS TWICE DAILY  Dispense: 75 mL; Refill: 1  -     metFORMIN ER (GLUCOPHAGE-XR) 500 MG 24 hr tablet; Take 1 tablet by mouth 2 (Two) Times a Day.  Dispense: 180 tablet; Refill: 3    2. Abnormal TSH  TSH mildly elevated on last screening labs.  No change in symptoms.  Follow-up with PCP for repeat TSH level.    3. Mixed hyperlipidemia  With hypertriglyceridemia, due in part to uncontrolled diabetes.  LDL at goal.  Continue atorvastatin 40 mg daily.  Monitor lipids yearly.    4. Class 3 severe obesity due to excess calories with serious comorbidity and body mass index (BMI) of 40.0 to 44.9 in adult (HCC)  BMI 38.7.  Weight loss is necessary for overall health and diabetes management.  Titrating up on Ozempic may aid in weight loss.       Return in about 3 months (around 4/18/2023) for next scheduled follow up. The patient was instructed to contact the clinic with any interval questions or concerns.    Ondina Garcia, DO   Endocrinologist    Please note that portions of this note were completed with a voice recognition program.

## 2023-02-02 RX ORDER — LOSARTAN POTASSIUM 100 MG/1
100 TABLET ORAL DAILY
Qty: 90 TABLET | Refills: 0 | Status: SHIPPED | OUTPATIENT
Start: 2023-02-02

## 2023-02-15 ENCOUNTER — OFFICE VISIT (OUTPATIENT)
Dept: FAMILY MEDICINE CLINIC | Facility: CLINIC | Age: 58
End: 2023-02-15
Payer: MEDICAID

## 2023-02-15 VITALS
HEART RATE: 79 BPM | BODY MASS INDEX: 38.95 KG/M2 | SYSTOLIC BLOOD PRESSURE: 132 MMHG | OXYGEN SATURATION: 99 % | WEIGHT: 263 LBS | TEMPERATURE: 98.6 F | HEIGHT: 69 IN | DIASTOLIC BLOOD PRESSURE: 80 MMHG

## 2023-02-15 DIAGNOSIS — R79.89 ABNORMAL TSH: ICD-10-CM

## 2023-02-15 DIAGNOSIS — Z01.84 IMMUNITY STATUS TESTING: ICD-10-CM

## 2023-02-15 DIAGNOSIS — Z12.5 SCREENING FOR PROSTATE CANCER: ICD-10-CM

## 2023-02-15 DIAGNOSIS — Z00.00 WELL ADULT EXAM: Primary | ICD-10-CM

## 2023-02-15 DIAGNOSIS — N39.44 URINARY INCONTINENCE, NOCTURNAL ENURESIS: ICD-10-CM

## 2023-02-15 DIAGNOSIS — R39.9 LOWER URINARY TRACT SYMPTOMS (LUTS): ICD-10-CM

## 2023-02-15 PROBLEM — J34.2 DEVIATED NASAL SEPTUM: Status: ACTIVE | Noted: 2022-12-23

## 2023-02-15 PROBLEM — J34.89 NASAL OBSTRUCTION: Status: ACTIVE | Noted: 2022-12-23

## 2023-02-15 PROBLEM — J34.89 CONCHA BULLOSA: Status: ACTIVE | Noted: 2022-12-23

## 2023-02-15 PROBLEM — J34.3 HYPERTROPHY OF BOTH INFERIOR NASAL TURBINATES: Status: ACTIVE | Noted: 2022-12-23

## 2023-02-15 LAB
BILIRUB BLD-MCNC: NEGATIVE MG/DL
CLARITY, POC: CLEAR
COLOR UR: YELLOW
EXPIRATION DATE: ABNORMAL
GLUCOSE UR STRIP-MCNC: ABNORMAL MG/DL
KETONES UR QL: NEGATIVE
LEUKOCYTE EST, POC: NEGATIVE
Lab: ABNORMAL
NITRITE UR-MCNC: NEGATIVE MG/ML
PH UR: 5.5 [PH] (ref 5–8)
PROT UR STRIP-MCNC: NEGATIVE MG/DL
RBC # UR STRIP: NEGATIVE /UL
SP GR UR: 1.03 (ref 1–1.03)
UROBILINOGEN UR QL: NORMAL

## 2023-02-15 PROCEDURE — 99396 PREV VISIT EST AGE 40-64: CPT | Performed by: FAMILY MEDICINE

## 2023-02-15 PROCEDURE — 3052F HG A1C>EQUAL 8.0%<EQUAL 9.0%: CPT | Performed by: FAMILY MEDICINE

## 2023-02-15 PROCEDURE — 81003 URINALYSIS AUTO W/O SCOPE: CPT | Performed by: FAMILY MEDICINE

## 2023-02-15 NOTE — PROGRESS NOTES
Subjective   Myke Marcial is a 57 y.o. male.     History of Present Illness   Here today for annual physical. Also c/o urinary frequency/urgency, nocturnal enuresis. Having to use incontinence briefs at night.    Chronic med conditions include uncontrolled IDDM with retinopathy, gastroparesis, HLP, morbid obesity, PRATEEK on CPAP and depression. Diabetes managed per Dr. Corona. Also followed by sleep medicine. Reports using CPAP as rx'd.    Father recently dx'd with prostate cancer. No family h/o premature CAD or colon CA.    Non smoker, no h/o smoking.    No EtOH or illicit substance use.    , heterosexual monogamous relationship. No concern regarding need for STD screening.    Self employed as contractor/construction.    Varied diet, high starch, high calorie, high salt.    Generally sedentary this time of year.    Firearms in home, safely stored.    UTD on vaccinations other than Shingrix - may need Hep B booster.    Overdue for dental exam.    Sees optometry and retinal specialist regularly. Currently receiving injections.    Abnormal TSH last lab eval.    The following portions of the patient's history were reviewed and updated as appropriate: allergies, current medications, past family history, past medical history, past social history, past surgical history and problem list.    Review of Systems   Constitutional: Positive for fatigue. Negative for appetite change and unexpected weight change.   HENT: Positive for congestion and postnasal drip. Negative for nosebleeds, rhinorrhea, sore throat, tinnitus and trouble swallowing.    Eyes: Negative for visual disturbance.   Respiratory: Positive for shortness of breath (with exertion). Negative for cough and wheezing.    Cardiovascular: Positive for leg swelling (mild, stable). Negative for chest pain and palpitations.   Gastrointestinal: Positive for nausea (mild, intermittent). Negative for abdominal pain, blood in stool, constipation and vomiting.         Heartburn   Endocrine: Positive for heat intolerance.   Genitourinary: Positive for enuresis, frequency and urgency. Negative for decreased urine volume, difficulty urinating, dysuria and hematuria.        ED   Musculoskeletal: Positive for arthralgias and back pain (chronic, stable).   Skin: Negative for rash and wound.   Neurological: Negative for dizziness, syncope, weakness and headaches.   Hematological: Negative for adenopathy. Does not bruise/bleed easily.   Psychiatric/Behavioral: Positive for dysphoric mood and sleep disturbance. Negative for confusion and suicidal ideas. The patient is not nervous/anxious.    Pt's previous ROS reviewed and updated as indicated.       Objective    Vitals:    02/15/23 0801   BP: 132/80   Pulse: 79   Temp: 98.6 °F (37 °C)   SpO2: 99%     Body mass index is 38.84 kg/m².      02/15/23  0801   Weight: 119 kg (263 lb)       Physical Exam  Vitals and nursing note reviewed.   Constitutional:       General: He is not in acute distress.     Appearance: He is well-developed and well-groomed. He is obese. He is not ill-appearing.   HENT:      Head: Atraumatic.   Eyes:      General: No scleral icterus.     Conjunctiva/sclera: Conjunctivae normal.   Neck:      Thyroid: No thyroid mass.      Vascular: Normal carotid pulses. No carotid bruit.   Cardiovascular:      Rate and Rhythm: Normal rate and regular rhythm.      Pulses: Normal pulses.      Heart sounds: Normal heart sounds.   Pulmonary:      Effort: Pulmonary effort is normal.      Breath sounds: Normal breath sounds.   Abdominal:      General: Abdomen is protuberant. Bowel sounds are normal. There is no distension.      Palpations: Abdomen is soft. There is no hepatomegaly, splenomegaly or mass.      Tenderness: There is no abdominal tenderness.      Comments: Exam limited by body habitus. Diastasis recti   Musculoskeletal:      Right lower leg: No edema.      Left lower leg: No edema.   Lymphadenopathy:      Cervical: No  cervical adenopathy.   Skin:     General: Skin is warm and dry.      Coloration: Skin is not jaundiced or pale.   Neurological:      Mental Status: He is alert and oriented to person, place, and time.      Sensory: Sensation is intact.      Motor: Motor function is intact.      Gait: Gait is intact.   Psychiatric:         Mood and Affect: Mood and affect normal.         Behavior: Behavior normal. Behavior is cooperative.         Cognition and Memory: Cognition normal.     Pt's previous physical exam reviewed and updated as indicated.        Immunization History   Administered Date(s) Administered   • COVID-19 (MODERNA) 1st, 2nd, 3rd Dose Only 05/17/2021, 06/14/2021, 12/11/2021   • COVID-19 (PFIZER) BIVALENT BOOSTER 12+YRS 11/15/2022   • FluLaval/Fluzone >6mos 11/15/2022   • Pneumococcal Conjugate 13-Valent (PCV13) 08/23/2018   • Pneumococcal Polysaccharide (PPSV23) 01/13/2017   • Tdap 01/13/2017, 11/09/2022     Lab Results   Component Value Date    WBC 7.30 11/15/2022    HGB 15.1 11/15/2022    HCT 45.1 11/15/2022    MCV 89.7 11/15/2022     11/15/2022       Lab Results   Component Value Date    GLUCOSE 190 (H) 11/15/2022    BUN 19 11/15/2022    CREATININE 1.18 11/15/2022    EGFRIFNONA 51 (L) 01/31/2022    EGFRIFAFRI 61 01/31/2022    BCR 16.1 11/15/2022    K 4.3 11/15/2022    CO2 28.0 11/15/2022    CALCIUM 9.6 11/15/2022    PROTENTOTREF 7.0 11/15/2022    ALBUMIN 4.00 11/15/2022    LABIL2 1.3 11/15/2022    AST 16 11/15/2022    ALT 22 11/15/2022       Lab Results   Component Value Date    CHLPL 148 11/15/2022    TRIG 308 (H) 11/15/2022    HDL 33 (L) 11/15/2022    LDL 66 11/15/2022       Lab Results   Component Value Date    TSH 4.310 (H) 11/15/2022       Lab Results   Component Value Date    HGBA1C 8.4 01/18/2023    HGBA1C 8.90 (H) 11/15/2022    HGBA1C 10.6 10/18/2022     Lab Results   Component Value Date    MICROALBUR 28.1 02/16/2022    CREATININE 1.18 11/15/2022     Brief Urine Lab Results  (Last result in the  past 365 days)      Color   Clarity   Blood   Leuk Est   Nitrite   Protein   CREAT   Urine HCG        02/15/23 0858 Yellow   Clear   Negative   Negative   Negative   Negative                   Assessment & Plan   Diagnoses and all orders for this visit:    1. Well adult exam (Primary)  -     PSA Screen  -     Hepatitis B Surface Antibody  -     TSH Rfx On Abnormal To Free T4    2. Screening for prostate cancer  -     PSA Screen    3. Immunity status testing  -     Hepatitis B Surface Antibody    4. Abnormal TSH  -     TSH Rfx On Abnormal To Free T4    5. Lower urinary tract symptoms (LUTS)  -     Ambulatory Referral to Urology  -     POCT urinalysis dipstick, automated    6. Urinary incontinence, nocturnal enuresis  -     Ambulatory Referral to Urology       Age appropriate preventive care reviewed including cancer screenings, safety measures, mental health concerns, supplements, prevention of CV disease, etc. Handout provided.    F/u with specialists as scheduled.    Recommend further eval by urology. Recommend L adult incontinence briefs for night-time use as needed. R/o UTI as above.    He will check into cost/coverage of Shingrix with insurance and/or local pharmacy.    Routine f/u in 6 months, sooner as needed/instructed.  I will contact patient regarding test results and provide instructions regarding any necessary changes in plan of care.  Patient was encouraged to keep me informed of any acute changes, lack of improvement, or any new concerning symptoms.  Pt is aware of reasons to seek emergent care.  Patient voiced understanding of all instructions and denied further questions.    Please note that portions of this note may have been completed with a voice recognition program.

## 2023-02-16 LAB
HBV SURFACE AB SER QL: NON REACTIVE
PSA SERPL-MCNC: 0.59 NG/ML (ref 0–4)
TSH SERPL DL<=0.005 MIU/L-ACNC: 3.62 UIU/ML (ref 0.27–4.2)

## 2023-02-21 ENCOUNTER — TELEPHONE (OUTPATIENT)
Dept: FAMILY MEDICINE CLINIC | Facility: CLINIC | Age: 58
End: 2023-02-21

## 2023-02-21 NOTE — TELEPHONE ENCOUNTER
Caller: Deven Marcial    Relationship: Emergency Contact    Best call back number:  [phone]    Requested Prescriptions:   Requested Prescriptions      No prescriptions requested or ordered in this encounter       Additional details provided by patient:  DR FLOWER WILL BE RECEIVING A FAX FROM [employer]  FOR INCONTINENCE  SUPPLIES FOR THE PATIENT   DEVEN THE PATIENT'S WIFE SAID SHE  WOULD BE VERY THANKFUL IF SHE APPROVES THE PRESCRIPTION

## 2023-02-21 NOTE — TELEPHONE ENCOUNTER
Form received has Meli Marcial as pt needing supplies. I cannot complete this form under that name.   English

## 2023-02-23 DIAGNOSIS — K21.00 GASTROESOPHAGEAL REFLUX DISEASE WITH ESOPHAGITIS WITHOUT HEMORRHAGE: ICD-10-CM

## 2023-02-23 RX ORDER — OMEPRAZOLE 20 MG/1
CAPSULE, DELAYED RELEASE ORAL
Qty: 30 CAPSULE | Refills: 0 | Status: SHIPPED | OUTPATIENT
Start: 2023-02-23

## 2023-03-09 ENCOUNTER — OFFICE VISIT (OUTPATIENT)
Dept: UROLOGY | Facility: CLINIC | Age: 58
End: 2023-03-09
Payer: MEDICAID

## 2023-03-09 VITALS
TEMPERATURE: 98.1 F | WEIGHT: 263 LBS | DIASTOLIC BLOOD PRESSURE: 78 MMHG | SYSTOLIC BLOOD PRESSURE: 126 MMHG | BODY MASS INDEX: 38.95 KG/M2 | OXYGEN SATURATION: 98 % | HEIGHT: 69 IN | HEART RATE: 89 BPM

## 2023-03-09 DIAGNOSIS — Z80.42 FAMILY HX OF PROSTATE CANCER: ICD-10-CM

## 2023-03-09 DIAGNOSIS — N40.0 BENIGN PROSTATIC HYPERPLASIA, UNSPECIFIED WHETHER LOWER URINARY TRACT SYMPTOMS PRESENT: Primary | ICD-10-CM

## 2023-03-09 LAB
BILIRUB BLD-MCNC: NEGATIVE MG/DL
CLARITY, POC: CLEAR
COLOR UR: YELLOW
EXPIRATION DATE: ABNORMAL
GLUCOSE UR STRIP-MCNC: ABNORMAL MG/DL
KETONES UR QL: NEGATIVE
LEUKOCYTE EST, POC: NEGATIVE
Lab: ABNORMAL
NITRITE UR-MCNC: NEGATIVE MG/ML
PH UR: 6 [PH] (ref 5–8)
PROT UR STRIP-MCNC: NEGATIVE MG/DL
RBC # UR STRIP: NEGATIVE /UL
SP GR UR: 1.01 (ref 1–1.03)
UROBILINOGEN UR QL: NORMAL

## 2023-03-09 PROCEDURE — 3078F DIAST BP <80 MM HG: CPT | Performed by: NURSE PRACTITIONER

## 2023-03-09 PROCEDURE — 3074F SYST BP LT 130 MM HG: CPT | Performed by: NURSE PRACTITIONER

## 2023-03-09 PROCEDURE — 99213 OFFICE O/P EST LOW 20 MIN: CPT | Performed by: NURSE PRACTITIONER

## 2023-03-09 PROCEDURE — 1160F RVW MEDS BY RX/DR IN RCRD: CPT | Performed by: NURSE PRACTITIONER

## 2023-03-09 PROCEDURE — 1159F MED LIST DOCD IN RCRD: CPT | Performed by: NURSE PRACTITIONER

## 2023-03-09 PROCEDURE — 51798 US URINE CAPACITY MEASURE: CPT | Performed by: NURSE PRACTITIONER

## 2023-03-09 NOTE — PROGRESS NOTES
Office Visit Males LUTS      Patient Name: Myke Marcial  : 1965   MRN: [id number]     Chief Complaint:   Chief Complaint   Patient presents with   • LUTS       Referring Provider: Jaimie Nathan MD    History of Present Illness: Myke Marcial is a 57 y.o. male who presents today with history of DM who presents with what.  Primary symptom includes: Frequency urgency and nocturia  Onset was about 1 year ago.  Patient desires conception in the Future no  Previous treatments include: None  Wife is present for visit  Patient does have a family history of prostate cancer with his father who was recently diagnosed at age 77    IPSS Questionnaire (AUA-7):  Incomplete emptying  Over the past month, how often have you had a sensation of not emptying your bladder completely after you finish?: Less than half the time (23)  Frequency  Over the past month, how often have you had to urinate again less than two hours after you finishing urinating ?: About half the time (23)  Intermittency  Over the past month, how often have you found you stopped and started again several time when you urinated ?: Less thank 1 time in 5 (23)  Urgency  Over the last month, how difficult  have you found it to postpone urination ?: About half the time (23)  Weak Stream  Over the past month, how often have you had a weak urinary stream ?: Less than 1 time in 5 (23)  Straining  Over the past month, how often have you had to push or strain to begin urination ?: Less than 1 time 5 (23)  Nocturia  Over the past month, how many times did you most typically get up to urinate from the time you went to bed until the time you got up in the morning ?: Less than half the time (23)  Quality of life due to urinary symptoms  If you were to spend the rest of your life with your urinary condition the way it is now, how would feel about that?: Mixed - about  equally satisfied (03/09/23 1201)    Scores  Total IPSS Score: 13 (03/09/23 1201)  Total Score = Symtomatic Level: moderately symptomatic: 8-19 (03/09/23 1201)       Subjective      Review of System:   As noted in HPI    Past Medical History:   Past Medical History:   Diagnosis Date   • Anxiety and depression    • Arthritis     LOWER BACK   • Colon cancer screening 06/18/2018    Added automatically from request for surgery [id number]   • Diabetes mellitus (HCC)    • Diabetic foot ulcer (HCC) 11/29/2018   • Ear drum perforation, right    • Elevated cholesterol    • GERD (gastroesophageal reflux disease)    • Gynecomastia 2019   • Hyperlipidemia    • Hypertension    • Refusal of blood product    • Sleep apnea, obstructive     CPAP   • Type 2 diabetes mellitus (HCC)    • Wears glasses     for reading only       Past Surgical History:   Past Surgical History:   Procedure Laterality Date   • CATARACT EXTRACTION, BILATERAL     • COLONOSCOPY N/A 07/10/2018    Procedure: COLONOSCOPY WITH POLYPECTOMIES;  Surgeon: Musa Berger MD;  Location: [facility] END[facility]COPY;  Service: Gastroenterology   • COLONOSCOPY N/A 8/16/2022    Procedure: COLONOSCOPY;  Surgeon: Brittany Goins MD;  Location: [facility] ENDOSCOPY;  Service: Gastroenterology;  Laterality: N/A;   • ENDOSCOPY N/A 03/17/2022    Procedure: ESOPHAGOGASTRODUODENOSCOPY with biopsies;  Surgeon: Brittany Goins MD;  Location: [facility] ENDOSCOPY;  Service: Gastroenterology;  Laterality: N/A;   • MUSCLE REPAIR Right     biceps tendon       Family History:   Family History   Problem Relation Age of Onset   • Sleep apnea Mother    • Hypertension Father    • Hyperlipidemia Father    • Cancer Maternal Grandmother    • Cancer Paternal Grandmother    • Colon cancer Neg Hx    • Liver cancer Neg Hx    • Rectal cancer Neg Hx    • Stomach cancer Neg Hx        Social History:   Social History     Socioeconomic History   • Marital status:    Tobacco Use   • Smoking status: Never    • Smokeless tobacco: Never   Vaping Use   • Vaping Use: Never used   Substance and Sexual Activity   • Alcohol use: Not Currently   • Drug use: No   • Sexual activity: Not Currently     Partners: Female     Birth control/protection: Condom       Medications:     Current Outpatient Medications:   •  atorvastatin (LIPITOR) 40 MG tablet, Take 1 tablet by mouth Daily., Disp: 90 tablet, Rfl: 1  •  B-D UF III MINI PEN NEEDLES 31G X 5 MM misc, USE 1  ONCE DAILY, Disp: 100 each, Rfl: 0  •  chlorthalidone (HYGROTON) 25 MG tablet, Take 1 tablet by mouth once daily, Disp: 90 tablet, Rfl: 0  •  Continuous Blood Gluc Sensor (Dexcom G6 Sensor), 1 each Take As Directed., Disp: 3 each, Rfl: 11  •  Continuous Blood Gluc Transmit (Dexcom G6 Transmitter) misc, 1 each Take As Directed., Disp: 1 each, Rfl: 3  •  dapagliflozin Propanediol (Farxiga) 10 MG tablet, Take 10 mg by mouth Daily., Disp: 30 tablet, Rfl: 5  •  insulin aspart prot & aspart (NovoLOG Mix 70/30 FlexPen) (70-30) 100 UNIT/ML suspension pen-injector injection, INJECT 25 UNITS SUBCUTANEOUSLY TWICE DAILY. INCREASE AS DIRECTED TO MAX DOSE OF 70 UNITS TWICE DAILY, Disp: 75 mL, Rfl: 1  •  losartan (COZAAR) 100 MG tablet, Take 1 tablet by mouth Daily., Disp: 90 tablet, Rfl: 0  •  metFORMIN ER (GLUCOPHAGE-XR) 500 MG 24 hr tablet, Take 1 tablet by mouth 2 (Two) Times a Day., Disp: 180 tablet, Rfl: 3  •  omeprazole (priLOSEC) 20 MG capsule, Take 1 capsule by mouth once daily, Disp: 30 capsule, Rfl: 0  •  pramipexole (MIRAPEX) 0.25 MG tablet, Take 1 tablet by mouth 3 (Three) Times a Day., Disp: 270 tablet, Rfl: 0  •  promethazine (PHENERGAN) 12.5 MG tablet, Take 1 tablet by mouth Every 12 (Twelve) Hours As Needed for Nausea or Vomiting., Disp: 30 tablet, Rfl: 1  •  Semaglutide, 2 MG/DOSE, (Ozempic, 2 MG/DOSE,) 8 MG/3ML solution pen-injector, Inject 2 mg under the skin into the appropriate area as directed Every 7 (Seven) Days., Disp: 9 mL, Rfl: 1    Allergies:   Allergies  "  Allergen Reactions   • Ace Inhibitors Cough   • Penicillins Swelling       Objective     Physical Exam:   Vital Signs:   Vitals:    03/09/23 1156   BP: 126/78   BP Location: Left arm   Patient Position: Sitting   Cuff Size: Adult   Pulse: 89   Temp: 98.1 °F (36.7 °C)   TempSrc: Temporal   SpO2: 98%   Weight: 119 kg (263 lb)   Height: 175.3 cm (69.02\")     Body mass index is 38.82 kg/m².     Constitutional: NAD, WDWN.   Neurological: A + O x 3.    Skin: Pink, warm and dry.  No rashes noted.  Psych: Normal mood and affect     Labs  Lab Results   Component Value Date    PSA 0.589 02/15/2023       Brief Urine Lab Results  (Last result in the past 365 days)      Color   Clarity   Blood   Leuk Est   Nitrite   Protein   CREAT   Urine HCG        03/09/23 1205 Yellow   Clear   Negative   Negative   Negative   Negative                 PVR  Post-void residual performed by staff - ml      Assessment / Plan      Assessment  Mr. Marcial is a 57 y.o. male who presents with LUTS, primarily frequency urgency and nocturia.  We discussed options including medication management versus surgical management.  Patient is interested in BPH work-up and considering UroLift as he is a type II diabetic and on multiple chronic medication he would like to avoid chronic medications if possible.  He also works with heavy equipment and does not want to risk taking any medication that the side effect could be dizziness.  BPH pamphlet given to patient  We discussed his most recent PSA in February was 0.5 and with his father being diagnosed with prostate cancer recommend yearly PSA screenings.    Plan  1.  Follow up for cystoscopy, TRUS, Uroflow, WANDA, GE      Follow Up:   Return for Dr. Edgar cystoscopy, TRUS, and uroflo.    TAE Marshall, NP-C  [facility]   "

## 2023-04-12 RX ORDER — CHLORTHALIDONE 25 MG/1
TABLET ORAL
Qty: 90 TABLET | Refills: 0 | Status: SHIPPED | OUTPATIENT
Start: 2023-04-12

## 2023-04-20 ENCOUNTER — OFFICE VISIT (OUTPATIENT)
Dept: ENDOCRINOLOGY | Facility: CLINIC | Age: 58
End: 2023-04-20
Payer: MEDICAID

## 2023-04-20 VITALS
DIASTOLIC BLOOD PRESSURE: 74 MMHG | HEIGHT: 69 IN | SYSTOLIC BLOOD PRESSURE: 120 MMHG | OXYGEN SATURATION: 98 % | BODY MASS INDEX: 38.36 KG/M2 | WEIGHT: 259 LBS | HEART RATE: 83 BPM

## 2023-04-20 DIAGNOSIS — E11.65 UNCONTROLLED TYPE 2 DIABETES MELLITUS WITH HYPERGLYCEMIA: Primary | ICD-10-CM

## 2023-04-20 LAB
EXPIRATION DATE: ABNORMAL
EXPIRATION DATE: NORMAL
GLUCOSE BLDC GLUCOMTR-MCNC: 195 MG/DL (ref 70–130)
HBA1C MFR BLD: 9.4 %
Lab: ABNORMAL
Lab: NORMAL

## 2023-04-20 PROCEDURE — 82570 ASSAY OF URINE CREATININE: CPT | Performed by: INTERNAL MEDICINE

## 2023-04-20 PROCEDURE — 82043 UR ALBUMIN QUANTITATIVE: CPT | Performed by: INTERNAL MEDICINE

## 2023-04-20 RX ORDER — DAPAGLIFLOZIN 10 MG/1
10 TABLET, FILM COATED ORAL DAILY
Qty: 90 TABLET | Refills: 1 | Status: SHIPPED | OUTPATIENT
Start: 2023-04-20

## 2023-04-20 RX ORDER — TIRZEPATIDE 2.5 MG/.5ML
2.5 INJECTION, SOLUTION SUBCUTANEOUS
Qty: 2 ML | Refills: 0 | Status: SHIPPED | OUTPATIENT
Start: 2023-04-20

## 2023-04-20 RX ORDER — INSULIN ASPART 100 [IU]/ML
INJECTION, SUSPENSION SUBCUTANEOUS
Qty: 75 ML | Refills: 1 | Status: SHIPPED | OUTPATIENT
Start: 2023-04-20

## 2023-04-20 NOTE — PROGRESS NOTES
"Chief Complaint   Patient presents with   • Diabetes          HPI   Myke Marcial is a 57 y.o. male had concerns including Diabetes.    He is checking blood sugars 4+ times per day with CGM. Data reviewed from the last two weeks shows average glucose 252 with SD of 72. In target range 20%, high 34%, very high 46%, low 0%, very low 0%.   Pattern of: hyperglycemia most all day.    Current medications for diabetes include metformin 500 mg twice daily, Ozempic 2 mg weekly, Farxiga 10 mg daily, mixed insulin 25 units twice daily.  Was off insulin for about a week for deviated septum surgery - not sure why this was stopped. Also off ozempic some due to supply issues.   Farxiga was added and Ozempic increased at his last visit.    The following portions of the patient's history were reviewed and updated as appropriate: allergies, current medications, past family history, past medical history, past social history, past surgical history and problem list.      Review of Systems   Constitutional: Negative.    Endocrine: Positive for polyuria.        See HPI   Genitourinary: Positive for frequency and urgency.        Physical Exam  Vitals reviewed.   Constitutional:       Appearance: Normal appearance. He is obese.      Comments: Body mass index is 38.25 kg/m².   Cardiovascular:      Rate and Rhythm: Normal rate.   Pulmonary:      Effort: Pulmonary effort is normal.   Neurological:      General: No focal deficit present.      Mental Status: He is alert. Mental status is at baseline.   Psychiatric:         Mood and Affect: Mood normal.         Behavior: Behavior normal.        /74   Pulse 83   Ht 175.3 cm (69\")   Wt 117 kg (259 lb)   SpO2 98%   BMI 38.25 kg/m²      Labs and imaging    CMP:  Lab Results   Component Value Date    BUN 19 11/15/2022    CREATININE 1.18 11/15/2022    EGFRIFNONA 51 (L) 01/31/2022    EGFRIFAFRI 61 01/31/2022    BCR 16.1 11/15/2022     11/15/2022    K 4.3 11/15/2022    CO2 28.0 " 11/15/2022    CALCIUM 9.6 11/15/2022    PROTENTOTREF 7.0 11/15/2022    ALBUMIN 4.00 11/15/2022    LABGLOBREF 3.0 11/15/2022    LABIL2 1.3 11/15/2022    BILITOT 0.4 11/15/2022    ALKPHOS 86 11/15/2022    AST 16 11/15/2022    ALT 22 11/15/2022     Lipid Panel:  Lab Results   Component Value Date    TRIG 308 (H) 11/15/2022    HDL 33 (L) 11/15/2022    VLDL 49 (H) 11/15/2022    LDL 66 11/15/2022     HbA1c:  Lab Results   Component Value Date    HGBA1C 9.4 04/20/2023    HGBA1C 8.4 01/18/2023     Glucose:    Lab Results   Component Value Date    POCGLU 195 (A) 04/20/2023     Microalbumin:  Lab Results   Component Value Date    MALBCRERATIO 21 02/16/2022     TSH:  Lab Results   Component Value Date    TSH 3.620 02/15/2023       Assessment and plan  Diagnoses and all orders for this visit:    1. Uncontrolled type 2 diabetes mellitus with hyperglycemia (Primary)  Uncontrolled with hyperglycemia.  A1c up to 9.4.  Due in part to missed insulin for about a week surrounding sinus surgery and also missed doses of Ozempic due to supply issues.  Complicated by CKD 3, neuropathy, retinopathy, gastroparesis.  Continue metformin 500 mg twice daily and Farxiga 10 mg daily.    Discontinue Ozempic and transition to Mounjaro.  Hoping for additional weight loss and glucose control.  Titrate up on dose monthly as tolerated.  Increase mixed insulin to 30 units twice daily.  Labs are up-to-date from November.  Thyroid level from February.  Urine microalbumin is due and will be checked today.  Ophtho exam is up-to-date and the report is scanned in the chart.  Monofilament up-to-date from October.  -     POC Glucose, Blood  -     POC Glycosylated Hemoglobin (Hb A1C)  -     insulin aspart prot & aspart (NovoLOG Mix 70/30 FlexPen) (70-30) 100 UNIT/ML suspension pen-injector injection; INJECT 30 UNITS SUBCUTANEOUSLY TWICE DAILY. INCREASE AS DIRECTED TO MAX DOSE OF 70 UNITS TWICE DAILY  Dispense: 75 mL; Refill: 1  -     dapagliflozin Propanediol  (Farxiga) 10 MG tablet; Take 10 mg by mouth Daily.  Dispense: 90 tablet; Refill: 1  -     Tirzepatide (Mounjaro) 2.5 MG/0.5ML solution pen-injector; Inject 0.5 mL under the skin into the appropriate area as directed Every 7 (Seven) Days.  Dispense: 2 mL; Refill: 0  -     Microalbumin / Creatinine Urine Ratio - Urine, Clean Catch         Return in about 4 months (around 8/20/2023) for next scheduled follow up. The patient was instructed to contact the clinic with any interval questions or concerns.    Ondina Garcia, DO   Endocrinologist    Please note that portions of this note were completed with a voice recognition program.

## 2023-04-21 LAB
ALBUMIN UR-MCNC: 3 MG/DL
CREAT UR-MCNC: 89.7 MG/DL
MICROALBUMIN/CREAT UR: 33.4 MG/G

## 2023-04-22 DIAGNOSIS — K21.00 GASTROESOPHAGEAL REFLUX DISEASE WITH ESOPHAGITIS WITHOUT HEMORRHAGE: ICD-10-CM

## 2023-04-24 RX ORDER — OMEPRAZOLE 20 MG/1
20 CAPSULE, DELAYED RELEASE ORAL DAILY
Qty: 30 CAPSULE | Refills: 0 | Status: SHIPPED | OUTPATIENT
Start: 2023-04-24

## 2023-05-01 ENCOUNTER — TELEPHONE (OUTPATIENT)
Dept: FAMILY MEDICINE CLINIC | Facility: CLINIC | Age: 58
End: 2023-05-01
Payer: MEDICAID

## 2023-05-01 NOTE — TELEPHONE ENCOUNTER
THEY SENT A REQUEST FOR INCONTINENCE SUPPLIES (ON THE 24TH). THEY ARE SENDING ANOTHER ONE IN CASE WE DIDN'T GET IT.

## 2023-05-08 ENCOUNTER — TELEPHONE (OUTPATIENT)
Dept: FAMILY MEDICINE CLINIC | Facility: CLINIC | Age: 58
End: 2023-05-08

## 2023-05-08 NOTE — TELEPHONE ENCOUNTER
Caller: Meli Marcial    Relationship: Emergency Contact    Best call back number: [phone]    What orders are you requesting (i.e. lab or imaging): ORDERS FOR NEW SIZE OF PANTS (DEPENDS ) FROM [facility]    In what timeframe would the patient need to come in: ASAP - THEY ARE FAXING THE REQUEST OVER AND HE NEEDS A NEW SIZE OF BRIEFS.    Where will you receive your lab/imaging services: [facility]

## 2023-05-11 NOTE — TELEPHONE ENCOUNTER
PHONE CALL FROM [employer].  HAS THE PAPERWORK BEEN RECEIVED AND COMPLETED?    PLEASE CALL [phone] SELENE

## 2023-05-15 DIAGNOSIS — E11.65 UNCONTROLLED TYPE 2 DIABETES MELLITUS WITH HYPERGLYCEMIA: ICD-10-CM

## 2023-05-17 RX ORDER — TIRZEPATIDE 5 MG/.5ML
5 INJECTION, SOLUTION SUBCUTANEOUS
Qty: 2 ML | Refills: 1 | Status: SHIPPED | OUTPATIENT
Start: 2023-05-17

## 2023-05-17 NOTE — TELEPHONE ENCOUNTER
Caller: MARJORIE BLANK    Relationship: Other    Best call back number:[phone]    What form or medical record are you requesting: PAPERWORK FOR INCONTINENCE SUPPLIES    Who is requesting this form or medical record from you: MARJORIE BLANK    How would you like to receive the form or medical records (pick-up, mail, fax): FAX   If fax, what is the fax number:[phone]      Timeframe paperwork needed: ASAP    Additional notes:MARJORIE REPORTS THE REQUEST TO CHANGE THE PATIENT'S PULLUP SIZE WAS FAXED TO THE PRACTICE ON 05/12/2023 - PLEASE CALL AND ADVISE

## 2023-05-18 NOTE — TELEPHONE ENCOUNTER
Spoke with yessenia and advised I need new order form with 2 xl  as size.  Currently have form for large.

## 2023-05-19 NOTE — TELEPHONE ENCOUNTER
Caller: MARJORIE WITH [employer]    Relationship: Other    Best call back number: [phone]    Best call back number:[phone]     What form or medical record are you requesting: PAPERWORK FOR INCONTINENCE SUPPLIES FOR SIZE CHANGE      Who is requesting this form or medical record from you: MARJORIE WITH [employer]     How would you like to receive the form or medical records (pick-up, mail, fax): FAX   If fax, what is the fax number:[phone]        Timeframe paperwork needed: ASAP     Additional notes:MARJORIE REPORTS HE FAXED THE NEW ORDER WITH THE 2 XL AS THE SIZE ON 05/18/2023 AND IS REQUESTING A CALL BACK WITH THE STATUS OF GETTING IT SIGNED AND FAXED BACK.     PLEASE CALL AND ADVISE

## 2023-05-22 ENCOUNTER — PROCEDURE VISIT (OUTPATIENT)
Dept: UROLOGY | Facility: CLINIC | Age: 58
End: 2023-05-22
Payer: MEDICAID

## 2023-05-22 DIAGNOSIS — N40.0 BENIGN PROSTATIC HYPERPLASIA, UNSPECIFIED WHETHER LOWER URINARY TRACT SYMPTOMS PRESENT: Primary | ICD-10-CM

## 2023-05-22 DIAGNOSIS — N39.41 URGE INCONTINENCE OF URINE: ICD-10-CM

## 2023-05-22 DIAGNOSIS — N52.9 ERECTILE DYSFUNCTION, UNSPECIFIED ERECTILE DYSFUNCTION TYPE: ICD-10-CM

## 2023-05-22 PROCEDURE — 51741 ELECTRO-UROFLOWMETRY FIRST: CPT | Performed by: UROLOGY

## 2023-05-22 PROCEDURE — 52000 CYSTOURETHROSCOPY: CPT | Performed by: UROLOGY

## 2023-05-22 PROCEDURE — 99214 OFFICE O/P EST MOD 30 MIN: CPT | Performed by: UROLOGY

## 2023-05-22 PROCEDURE — 76872 US TRANSRECTAL: CPT | Performed by: UROLOGY

## 2023-05-22 NOTE — PROGRESS NOTES
CC  LUTS / BPH Workup    HPI  Ms. Marcial is a 57 y.o. male with history below in assessment, who presents for follow up.     At this visit patient is here for BPH workup and discussion.     Past Medical History:   Diagnosis Date   • Anxiety and depression    • Arthritis     LOWER BACK   • Colon cancer screening 06/18/2018    Added automatically from request for surgery [id number]   • Diabetes mellitus    • Diabetic foot ulcer 11/29/2018   • Ear drum perforation, right    • Elevated cholesterol    • GERD (gastroesophageal reflux disease)    • Gynecomastia 2019   • Hyperlipidemia    • Hypertension    • Refusal of blood product    • Sleep apnea, obstructive     CPAP   • Type 2 diabetes mellitus    • Wears glasses     for reading only       Past Surgical History:   Procedure Laterality Date   • CATARACT EXTRACTION, BILATERAL     • COLONOSCOPY N/A 07/10/2018    Procedure: COLONOSCOPY WITH POLYPECTOMIES;  Surgeon: Musa Berger MD;  Location: [facility] ENDOSCOPY;  Service: Gastroenterology   • COLONOSCOPY N/A 8/16/2022    Procedure: COLONOSCOPY;  Surgeon: Brittany Goins MD;  Location: [facility] ENDOSCOPY;  Service: Gastroenterology;  Laterality: N/A;   • ENDOSCOPY N/A 03/17/2022    Procedure: ESOPHAGOGASTRODUODENOSCOPY with biopsies;  Surgeon: Brittany Goins MD;  Location: [facility] ENDOSCOPY;  Service: Gastroenterology;  Laterality: N/A;   • MUSCLE REPAIR Right     biceps tendon         Current Outpatient Medications:   •  atorvastatin (LIPITOR) 40 MG tablet, Take 1 tablet by mouth Daily., Disp: 90 tablet, Rfl: 1  •  B-D UF III MINI PEN NEEDLES 31G X 5 MM misc, USE 1  ONCE DAILY, Disp: 100 each, Rfl: 0  •  chlorthalidone (HYGROTON) 25 MG tablet, Take 1 tablet by mouth once daily, Disp: 90 tablet, Rfl: 0  •  Continuous Blood Gluc Sensor (Dexcom G6 Sensor), 1 each Take As Directed., Disp: 3 each, Rfl: 11  •  Continuous Blood Gluc Transmit (Dexcom G6 Transmitter) misc, 1 each Take As Directed., Disp: 1 each, Rfl:  3  •  dapagliflozin Propanediol (Farxiga) 10 MG tablet, Take 10 mg by mouth Daily., Disp: 90 tablet, Rfl: 1  •  insulin aspart prot & aspart (NovoLOG Mix 70/30 FlexPen) (70-30) 100 UNIT/ML suspension pen-injector injection, INJECT 30 UNITS SUBCUTANEOUSLY TWICE DAILY. INCREASE AS DIRECTED TO MAX DOSE OF 70 UNITS TWICE DAILY, Disp: 75 mL, Rfl: 1  •  losartan (COZAAR) 100 MG tablet, Take 1 tablet by mouth Daily., Disp: 90 tablet, Rfl: 0  •  metFORMIN ER (GLUCOPHAGE-XR) 500 MG 24 hr tablet, Take 1 tablet by mouth 2 (Two) Times a Day., Disp: 180 tablet, Rfl: 3  •  omeprazole (priLOSEC) 20 MG capsule, Take 1 capsule by mouth once daily, Disp: 30 capsule, Rfl: 0  •  pramipexole (MIRAPEX) 0.25 MG tablet, Take 1 tablet by mouth 3 (Three) Times a Day., Disp: 270 tablet, Rfl: 0  •  promethazine (PHENERGAN) 12.5 MG tablet, Take 1 tablet by mouth Every 12 (Twelve) Hours As Needed for Nausea or Vomiting., Disp: 30 tablet, Rfl: 1  •  Tirzepatide (Mounjaro) 5 MG/0.5ML solution pen-injector, Inject 0.5 mL under the skin into the appropriate area as directed Every 7 (Seven) Days., Disp: 2 mL, Rfl: 1     Physical Exam  There were no vitals taken for this visit.    Labs  Brief Urine Lab Results  (Last result in the past 365 days)      Color   Clarity   Blood   Leuk Est   Nitrite   Protein   CREAT   Urine HCG        04/20/23 1232             89.7               Lab Results   Component Value Date    GLUCOSE 190 (H) 11/15/2022    CALCIUM 9.6 11/15/2022     11/15/2022    K 4.3 11/15/2022    CO2 28.0 11/15/2022    CL 98 11/15/2022    BUN 19 11/15/2022    CREATININE 1.18 11/15/2022    EGFRIFAFRI 61 01/31/2022    EGFRIFNONA 51 (L) 01/31/2022    BCR 16.1 11/15/2022    ANIONGAP 19 10/03/2016       Lab Results   Component Value Date    WBC 7.30 11/15/2022    HGB 15.1 11/15/2022    HCT 45.1 11/15/2022    MCV 89.7 11/15/2022     11/15/2022            Lab Results   Component Value Date    PSA 0.589 02/15/2023       Radiographic  Studies  No Images in the past 120 days found..    I have reviewed above labs and imaging.     • CYSTOSCOPY  Preprocedure diagnosis  LUTS  Postprocedure diagnosis  Same  Procedure  Flexible Cystourethroscopy  Attending surgeon  Jagjit Edgar MD  Anesthesia  2% lidocaine jelly intraurethrally  Complications  None  Indications  57 y.o. male undergoing a flexible cystoscopy for the above mentioned indications.  Informed consent was obtained.    Findings  Cystoscopic findings included one right and left ureteral orifice in the normal anatomic position with normal bladder mucosa and no tumors, masses or stones. The urethral urothelium was within normal limits with no strictures.  There was not a prominent median lobe.  The lateral lobes were obstructive in appearance.    Procedure  The patient was placed in supine position and prepped and draped in sterile fashion with lidocaine jelly per urethra for anesthesia.  A timeout was performed.  The 14F flexible cystoscope was lubricated and gently placed through the penile urethra and into the bladder.  The bladder was completely visualized.  The cystoscope was retroflexed and the bladder neck and prostate visualized.  The cystoscope was slowly withdrawn while visualizing the urethra and the procedure terminated.  The patient tolerated the procedure well.      • TRUS OF PROSTATE   Preoperative diagnosis  LUTS  Postoperative diagnosis  Same  Procedure  1.  Transrectal ultrasound of the prostate  Attending Surgeon  Jagjit Edgar MD  Anesthesia  2% lidocaine jelly, intrarectal instillation, 10mL  Complications  None  Specimen  None  Indications  Mr. Marcial is a 57 y.o. male with LUTS.  He presents for prostate ultrasound to evaluate prostate size.  Procedure  The patient was positioned and prepped in a left lateral position with lower extremities flexed.  Lidocaine jelly, 2%, was injected per rectum. A digital rectal exam was performed which demonstrated a smooth  prostate without nodules or induration. The [employer] E8CS rectal ultrasound probe was slowly introduced into the rectum without difficulty.  The prostate and seminal vesicles were inspected systematically using cross and sagittal views with the ultrasound. The dimensions of the prostate were measured, for a calculated volume of 42 mL with 4 cm prostatic width.  The seminal vesicles appeared normal.  The rectal ultrasound probe was removed.  The patient tolerated the procedure well.    • UROFLOW  Peak flow rate - 14.7 mL/sec  Average flow rate - 9.6 mL/sec  Flow curve - flat top hump  Voided volume - 311 mL    I personally reviewed  and interpreted this study.       Assessment  57 y.o. male with worsening of chronic lower urinary tract symptoms. chrinuc ED. Poorly controlled DM.     In a separate room and encounter after his workup, we discussed different options. He wants to try Rx first.     Plan  1. Start Myrbetriq 25 mg PO QD. Discussed checking BP  2. FU in 3 mo w/ PARUL  3. Hold off on ED rx for now

## 2023-05-24 ENCOUNTER — TELEPHONE (OUTPATIENT)
Dept: FAMILY MEDICINE CLINIC | Facility: CLINIC | Age: 58
End: 2023-05-24

## 2023-05-24 NOTE — TELEPHONE ENCOUNTER
Caller: MARJORIE    Relationship:     CALLER FROM [facility]    Best call back number:      [phone]     What form or medical record are you requesting:     CALLER STATED AN INCONTINENT SUPPLIES ORDER WAS FAXED TO OFFICE ON 5/18 FOR DR FLOWER TO REVIEW AND SIGN AND RETURN FAX TO:    [phone]    CALLER MADE CONTACT TO CHECK THE STATUS OF THE FORM AND TO SEE IF THERE WERE ANY QUESTIONS

## 2023-06-08 RX ORDER — LOSARTAN POTASSIUM 100 MG/1
TABLET ORAL
Qty: 90 TABLET | Refills: 0 | Status: SHIPPED | OUTPATIENT
Start: 2023-06-08

## 2023-06-08 RX ORDER — PRAMIPEXOLE DIHYDROCHLORIDE 0.25 MG/1
TABLET ORAL
Qty: 270 TABLET | Refills: 0 | Status: SHIPPED | OUTPATIENT
Start: 2023-06-08

## 2023-06-12 DIAGNOSIS — K21.00 GASTROESOPHAGEAL REFLUX DISEASE WITH ESOPHAGITIS WITHOUT HEMORRHAGE: ICD-10-CM

## 2023-06-13 ENCOUNTER — TELEPHONE (OUTPATIENT)
Dept: CASE MANAGEMENT | Facility: OTHER | Age: 58
End: 2023-06-13
Payer: MEDICAID

## 2023-06-13 RX ORDER — OMEPRAZOLE 20 MG/1
20 CAPSULE, DELAYED RELEASE ORAL DAILY
Qty: 30 CAPSULE | Refills: 0 | Status: SHIPPED | OUTPATIENT
Start: 2023-06-13

## 2023-06-30 PROBLEM — I21.4 NSTEMI (NON-ST ELEVATED MYOCARDIAL INFARCTION): Status: ACTIVE | Noted: 2023-06-30

## 2023-06-30 PROBLEM — I21.4 TYPE 1 NON-ST ELEVATION MYOCARDIAL INFARCTION (NSTEMI): Status: ACTIVE | Noted: 2023-06-30

## 2023-06-30 PROBLEM — I21.9 TYPE 1 MYOCARDIAL INFARCTION: Status: ACTIVE | Noted: 2023-06-30

## 2023-07-21 NOTE — PROGRESS NOTES
Subjective   Myke Marcial is a 54 y.o. male.     History of Present Illness   Mr. Marcial presents today for f/u on several chronic medical problems.     He has IDDM which has not been well controlled. He is on 3 oral meds. Not taking mixed insulin as rx'd. Often only getting one shot per day. Reports taking his oral meds as well including amaryl, januvia, and metformin. Not checking BG on regular basis. When he does check it, still running over 200 in AM. He is not exercising regularly, not following diabetic appropriate diet. He has h/o diabetic foot ulcer now healed.      Has HTN. Has been farily well controlled on norvasc, HCTZ and ARB. Taking meds as rx'd. Denies side effects. Denies CP, palpitations, orthopnea. Stable mild swelling in feet.     Has HLP for which he is taking statin. Tolerating well. Denies symptoms of claudication.     Has PRATEEK and his using his CPAP regularly. Denies apneic spells. Daytime sleepiness has improved. recently having increased insomnia which he relates to stress. Has had recent f/u with Dr. Nazario for equipment needs.     He is Overdue for eye exam - [facility] usually. Has noticed worsening blurring of vision.    Following visit in Sept he was referred to endocrinology but cx'd apt. He is not sure why.    Pt's previous HPI reviewed and updated as indicated.     The following portions of the patient's history were reviewed and updated as appropriate: allergies, current medications, past family history, past medical history, past social history, past surgical history and problem list.    Review of Systems   Constitutional: Positive for fatigue. Negative for appetite change and unexpected weight change.   HENT: Positive for congestion and postnasal drip. Negative for nosebleeds, rhinorrhea, sore throat, tinnitus and trouble swallowing.    Eyes: Negative for visual disturbance.   Respiratory: Positive for shortness of breath (with exertion). Negative for cough and wheezing.     Cardiovascular: Positive for leg swelling (mild, stable). Negative for chest pain and palpitations.   Gastrointestinal: Positive for nausea and vomiting (occasional with severe reflux). Negative for abdominal pain, blood in stool, constipation and diarrhea.        Heartburn   Endocrine: Positive for heat intolerance.   Genitourinary: Negative for decreased urine volume, difficulty urinating, dysuria and hematuria.        ED   Musculoskeletal: Positive for arthralgias and back pain (chronic, stable).   Skin: Negative for rash and wound.   Neurological: Negative for dizziness, syncope, weakness and headaches.   Hematological: Negative for adenopathy. Does not bruise/bleed easily.   Psychiatric/Behavioral: Positive for dysphoric mood (mildly) and sleep disturbance. Negative for confusion and suicidal ideas. The patient is not nervous/anxious.    Pt's previous ROS reviewed and updated as indicated.       Objective    Vitals:    12/09/19 0803   BP: 112/78   Pulse: 91   Temp: 98.6 °F (37 °C)   SpO2: 98%     Body mass index is 39.43 kg/m².      12/09/19 0803   Weight: 121 kg (267 lb)       Physical Exam   Constitutional: He is oriented to person, place, and time. He appears well-developed and well-nourished. He is cooperative. He does not appear ill. No distress.   obese   HENT:   Head: Normocephalic and atraumatic.   Mouth/Throat: Mucous membranes are normal. Mucous membranes are not dry.   Eyes: No scleral icterus.   Neck: Neck supple. Normal carotid pulses present. Carotid bruit is not present.   Cardiovascular: Normal rate, regular rhythm, normal heart sounds and intact distal pulses. Exam reveals no gallop.   No murmur heard.  Pulmonary/Chest: Effort normal and breath sounds normal.     Vascular Status -  His right foot exhibits no edema. His left foot exhibits no edema.  Neurological: He is alert and oriented to person, place, and time. He displays no tremor. Gait normal.   Skin: Skin is warm and dry. No bruising  and no rash noted.   Psychiatric: He has a normal mood and affect. His behavior is normal. Cognition and memory are normal.   Good eye contact. Answers questions appropriately. Good personal hygiene and grooming.   Nursing note and vitals reviewed.  Pt's previous physical exam reviewed and updated as indicated.    Lab Results   Component Value Date    HGBA1C 12.9 12/09/2019    HGBA1C 12 09/09/2019    HGBA1C 13.0 06/03/2019     Lab Results   Component Value Date    MICROALBUR 4.2 09/09/2019    CREATININE 1.20 09/09/2019     Lab Results   Component Value Date    WBC 7.11 09/09/2019    HGB 16.1 09/09/2019    HCT 52.4 (H) 09/09/2019    MCV 95.8 09/09/2019     09/09/2019       Lab Results   Component Value Date    BUN 23 (H) 09/09/2019    CREATININE 1.20 09/09/2019    EGFRIFNONA 64 09/09/2019    EGFRIFAFRI 77 09/09/2019    BCR 19.2 09/09/2019    K 4.9 09/09/2019    CO2 26.1 09/09/2019    CALCIUM 9.4 09/09/2019    PROTENTOTREF 7.7 09/09/2019    ALBUMIN 4.30 09/09/2019    LABIL2 1.3 09/09/2019    AST 34 09/09/2019    ALT 31 09/09/2019       Lab Results   Component Value Date    CHLPL 186 09/09/2019    TRIG 679 (H) 09/09/2019    HDL 28 (L) 09/09/2019    LDL CANCELED 09/09/2019       Lab Results   Component Value Date    TSH 2.52 03/01/2019       Assessment/Plan   Myke was seen today for diabetes, hyperlipidemia and hypertension.    Diagnoses and all orders for this visit:    Type 2 diabetes mellitus with complication, with long-term current use of insulin (CMS/[facility])  -     POC Glycosylated Hemoglobin (Hb A1C)    Essential hypertension    Mixed hyperlipidemia    PRATEEK on CPAP       IDDM poorly controlled with increased A1c to 12.9. Refer back to endocrinology for treatment recommendations. Continue 3 oral meds, advised to take mixed insulin as rx'd bid. Patient encouraged to take meds as rx'd, follow diabetic appropriate diet, exercise daily, perform feet check daily, and have yearly diabetic eye exams. He declines  assistanced with scheduling diabetic eye exam. Advised to avoid sugary beverages, sweet snacks specifically.    HTN controlled. Continue norvasc, HCTZ, ARB.    HLP with good tolerance of statin, not at goal due to persistently elevated TGs. Pt advised to eat a heart healthy diet and get regular aerobic exercise.    PRATEEK with improving CPAP compliance. F/u with Dr. holland as scheduled    Routine f/u in 3 months, sooner as needed.  Patient was encouraged to keep me informed of any acute changes, lack of improvement, or any new concerning symptoms.  Pt is aware of reasons to seek emergent care.  Patient voiced understanding of all instructions and denied further questions.            Previous Accession (Optional): [id number] Previous Accession (Optional): [id number]

## 2023-07-25 ENCOUNTER — OFFICE VISIT (OUTPATIENT)
Dept: FAMILY MEDICINE CLINIC | Facility: CLINIC | Age: 58
End: 2023-07-25
Payer: MEDICAID

## 2023-07-25 VITALS
WEIGHT: 244.6 LBS | BODY MASS INDEX: 36.23 KG/M2 | OXYGEN SATURATION: 96 % | DIASTOLIC BLOOD PRESSURE: 70 MMHG | HEIGHT: 69 IN | HEART RATE: 85 BPM | TEMPERATURE: 98.1 F | SYSTOLIC BLOOD PRESSURE: 90 MMHG

## 2023-07-25 DIAGNOSIS — Z23 NEED FOR HEPATITIS B BOOSTER VACCINATION: ICD-10-CM

## 2023-07-25 DIAGNOSIS — I25.10 CORONARY ARTERY DISEASE INVOLVING NATIVE CORONARY ARTERY OF NATIVE HEART WITHOUT ANGINA PECTORIS: ICD-10-CM

## 2023-07-25 DIAGNOSIS — I25.2 HISTORY OF MYOCARDIAL INFARCTION: ICD-10-CM

## 2023-07-25 DIAGNOSIS — Z95.1 STATUS POST CORONARY ARTERY BYPASS GRAFTING: ICD-10-CM

## 2023-07-25 DIAGNOSIS — Z09 HOSPITAL DISCHARGE FOLLOW-UP: Primary | ICD-10-CM

## 2023-07-25 DIAGNOSIS — Z78.9 NONIMMUNE TO HEPATITIS B VIRUS: ICD-10-CM

## 2023-07-25 DIAGNOSIS — D72.829 LEUKOCYTOSIS, UNSPECIFIED TYPE: ICD-10-CM

## 2023-07-25 PROBLEM — I21.4 TYPE 1 NON-ST ELEVATION MYOCARDIAL INFARCTION (NSTEMI): Status: RESOLVED | Noted: 2023-06-30 | Resolved: 2023-07-25

## 2023-07-25 PROBLEM — I21.4 NSTEMI (NON-ST ELEVATED MYOCARDIAL INFARCTION): Status: RESOLVED | Noted: 2023-06-30 | Resolved: 2023-07-25

## 2023-07-25 PROBLEM — I21.9 TYPE 1 MYOCARDIAL INFARCTION: Status: RESOLVED | Noted: 2023-06-30 | Resolved: 2023-07-25

## 2023-07-25 NOTE — PROGRESS NOTES
Subjective   Myke Marcial is a 57 y.o. male.     History of Present Illness  Here for f/u after hospital d/c. On 6/30/23. Presented to ER with CP. Dx'd with MI. Underwent cardiac cath at [facility]. Multiple blockages. Required CABG x 3 (Transferred to .). dc'd from  7/7/23.     Has done quite well. Already had f/u with cardiology clinic. Healing well. On ASA, metoprolol, lipitor 80 mg, Farxiga, mixed insulin. BG control improving.    Notable leukocytosis at time of d/c. Needs recheck.    Receiving HH services.    Will be seeing Dr. Thomas in September for routine f/u by primary cardiologist.    Noted to be non-immune to Hepatitis B.    Denies depression.     The following portions of the patient's history were reviewed and updated as appropriate: allergies, current medications, past family history, past medical history, past social history, past surgical history, and problem list.    Review of Systems   Constitutional:  Positive for fatigue. Negative for unexpected weight change.   HENT:  Positive for congestion and postnasal drip. Negative for nosebleeds, rhinorrhea, sore throat, tinnitus and trouble swallowing.    Eyes:  Negative for visual disturbance.   Respiratory:  Positive for shortness of breath (with exertion). Negative for cough and wheezing.    Cardiovascular:  Positive for leg swelling (mild, stable). Negative for chest pain and palpitations.   Gastrointestinal:  Positive for nausea (mild, intermittent). Negative for abdominal pain, blood in stool, constipation and vomiting.        Heartburn   Endocrine: Positive for heat intolerance.   Genitourinary:  Negative for decreased urine volume, difficulty urinating, dysuria and hematuria.        ED   Musculoskeletal:  Positive for arthralgias and back pain (chronic, stable).   Skin:  Positive for wound. Negative for rash.   Neurological:  Negative for dizziness, syncope, weakness and headaches.   Hematological:  Negative for adenopathy. Does not  bruise/bleed easily.   Psychiatric/Behavioral:  Positive for sleep disturbance. Negative for confusion, dysphoric mood and suicidal ideas. The patient is not nervous/anxious.    Pt's previous ROS reviewed and updated as indicated.     Objective   Vitals:    07/25/23 0811   BP: 90/70   Pulse: 85   Temp: 98.1 °F (36.7 °C)   SpO2: 96%     Body mass index is 36.12 kg/m².      07/25/23  0811   Weight: 111 kg (244 lb 9.6 oz)       Physical Exam  Vitals and nursing note reviewed.   Constitutional:       General: He is not in acute distress.     Appearance: He is well-developed and well-groomed. He is obese. He is not ill-appearing.   HENT:      Head: Atraumatic.      Mouth/Throat:      Mouth: Mucous membranes are moist. No oral lesions.   Eyes:      General: No scleral icterus.     Conjunctiva/sclera: Conjunctivae normal.   Neck:      Thyroid: No thyroid mass.   Cardiovascular:      Rate and Rhythm: Normal rate and regular rhythm.      Pulses: Normal pulses.      Heart sounds: Normal heart sounds.   Pulmonary:      Effort: Pulmonary effort is normal.      Breath sounds: Normal breath sounds. No wheezing, rhonchi or rales.   Chest:      Chest wall: Tenderness present.      Comments: Mid line thoracotomy surgical scar, clean/dry/intact, minimally TTP.  Abdominal:      Palpations: Abdomen is soft.      Tenderness: There is no abdominal tenderness.   Musculoskeletal:      Right lower leg: No edema.      Left lower leg: No edema.   Lymphadenopathy:      Cervical: No cervical adenopathy.   Skin:     General: Skin is warm and dry.      Coloration: Skin is not jaundiced or pale.      Findings: Wound (as above) present.      Comments: Vein harvest site RLE clean, dry, intact   Neurological:      Mental Status: He is alert and oriented to person, place, and time.      Sensory: Sensation is intact.      Motor: Motor function is intact.      Gait: Gait is intact.   Psychiatric:         Mood and Affect: Mood and affect normal.          Behavior: Behavior normal. Behavior is cooperative.         Thought Content: Thought content normal. Thought content is not paranoid or delusional. Thought content does not include suicidal ideation.         Cognition and Memory: Cognition normal.   Pt's previous physical exam reviewed and updated as indicated.    Lab Results   Component Value Date    CKTOTAL 100 10/03/2016    TROPONINT 133 (C) 06/30/2023       Lab Results   Component Value Date    GLUCOSE 183 (H) 07/03/2023    BUN 18 06/30/2023    CREATININE 1.08 06/30/2023    EGFRIFNONA 51 (L) 01/31/2022    EGFRIFAFRI 61 01/31/2022    BCR 16.7 06/30/2023    K 4.5 06/30/2023    CO2 26.8 06/30/2023    CALCIUM 9.0 06/30/2023    PROTENTOTREF 7.0 11/15/2022    ALBUMIN 3.6 06/30/2023    LABIL2 1.3 11/15/2022    AST 17 06/30/2023    ALT 16 06/30/2023       Lab Results   Component Value Date    CHOL 166 06/30/2023    CHLPL 148 11/15/2022    TRIG 513 (H) 06/30/2023    HDL 31 (L) 06/30/2023    LDL 58 06/30/2023       Lab Results   Component Value Date    HGBA1C 7.90 (H) 06/30/2023       Lab Results   Component Value Date    TSH 3.620 02/15/2023       Assessment & Plan   Diagnoses and all orders for this visit:    1. Hospital discharge follow-up (Primary)    2. Status post coronary artery bypass grafting    3. Leukocytosis, unspecified type  -     CBC & Differential    4. Coronary artery disease involving native coronary artery of native heart without angina pectoris    5. History of myocardial infarction    6. Nonimmune to hepatitis B virus  -     Hepatitis B Vaccine Adult IM (ENERGIX/RECOMBIVAX)    7. Need for hepatitis B booster vaccination  -     Hepatitis B Vaccine Adult IM (ENERGIX/RECOMBIVAX)       Doing well just 3 weeks s/ CABG x 3. Good medication compliance. Improving dietary habits. Clinically stable today. Encouraged f/u with CT surgery, cardiology as scheduled. He will begin cardiac rehab soon.    F/u with Dr. Garcia as scheduled for mgnt of IDDM. Patient  encouraged to take meds as rx'd, follow diabetic appropriate diet, exercise daily, perform feet check daily, and have yearly diabetic eye exams.    Class 2 Severe Obesity (BMI >=35 and <=39.9). Obesity-related health conditions include the following: obstructive sleep apnea, hypertension, coronary heart disease, diabetes mellitus, and dyslipidemias. Obesity is improving with treatment. BMI is is above average; BMI management plan is completed. We discussed portion control, increasing exercise, joining a fitness center or start home based exercise program, Weight Watchers or other Commercial based weight reduction program, pharmacologic options including GLP meds, and an bruce-based approach such as [employer] or Lose It.    Routine f/u in 3 months, sooner as needed/instructed.  I will contact patient regarding test results and provide instructions regarding any necessary changes in plan of care.  Patient was encouraged to keep me informed of any acute changes, lack of improvement, or any new concerning symptoms.  Pt is aware of reasons to seek emergent care.  Patient voiced understanding of all instructions and denied further questions.    Please note that portions of this note may have been completed with a voice recognition program.

## 2023-07-25 NOTE — Clinical Note
He only needs nurse visit in August for next Hep B shot. He will not need to see my until late october

## 2023-07-26 LAB
BASOPHILS # BLD AUTO: 0.07 10*3/MM3 (ref 0–0.2)
BASOPHILS NFR BLD AUTO: 0.6 % (ref 0–1.5)
EOSINOPHIL # BLD AUTO: 0.07 10*3/MM3 (ref 0–0.4)
EOSINOPHIL NFR BLD AUTO: 0.6 % (ref 0.3–6.2)
ERYTHROCYTE [DISTWIDTH] IN BLOOD BY AUTOMATED COUNT: 14.5 % (ref 12.3–15.4)
HCT VFR BLD AUTO: 47.4 % (ref 37.5–51)
HGB BLD-MCNC: 15.2 G/DL (ref 13–17.7)
IMM GRANULOCYTES # BLD AUTO: 0.05 10*3/MM3 (ref 0–0.05)
IMM GRANULOCYTES NFR BLD AUTO: 0.4 % (ref 0–0.5)
LYMPHOCYTES # BLD AUTO: 2.28 10*3/MM3 (ref 0.7–3.1)
LYMPHOCYTES NFR BLD AUTO: 18.5 % (ref 19.6–45.3)
MCH RBC QN AUTO: 27.7 PG (ref 26.6–33)
MCHC RBC AUTO-ENTMCNC: 32.1 G/DL (ref 31.5–35.7)
MCV RBC AUTO: 86.3 FL (ref 79–97)
MONOCYTES # BLD AUTO: 0.7 10*3/MM3 (ref 0.1–0.9)
MONOCYTES NFR BLD AUTO: 5.7 % (ref 5–12)
NEUTROPHILS # BLD AUTO: 9.15 10*3/MM3 (ref 1.7–7)
NEUTROPHILS NFR BLD AUTO: 74.2 % (ref 42.7–76)
NRBC BLD AUTO-RTO: 0 /100 WBC (ref 0–0.2)
PLATELET # BLD AUTO: 632 10*3/MM3 (ref 140–450)
RBC # BLD AUTO: 5.49 10*6/MM3 (ref 4.14–5.8)
WBC # BLD AUTO: 12.32 10*3/MM3 (ref 3.4–10.8)

## 2023-08-08 DIAGNOSIS — E11.65 UNCONTROLLED TYPE 2 DIABETES MELLITUS WITH HYPERGLYCEMIA: Primary | ICD-10-CM

## 2023-08-08 RX ORDER — TIRZEPATIDE 7.5 MG/.5ML
7.5 INJECTION, SOLUTION SUBCUTANEOUS
Qty: 2 ML | Refills: 1 | Status: SHIPPED | OUTPATIENT
Start: 2023-08-08

## 2023-08-10 ENCOUNTER — TRANSCRIBE ORDERS (OUTPATIENT)
Dept: CARDIAC REHAB | Facility: HOSPITAL | Age: 58
End: 2023-08-10
Payer: MEDICAID

## 2023-08-10 DIAGNOSIS — Z95.1 S/P CABG (CORONARY ARTERY BYPASS GRAFT): Primary | ICD-10-CM

## 2023-08-16 ENCOUNTER — OFFICE VISIT (OUTPATIENT)
Dept: ENDOCRINOLOGY | Facility: CLINIC | Age: 58
End: 2023-08-16
Payer: MEDICAID

## 2023-08-16 VITALS
DIASTOLIC BLOOD PRESSURE: 88 MMHG | OXYGEN SATURATION: 95 % | BODY MASS INDEX: 37.33 KG/M2 | HEART RATE: 89 BPM | SYSTOLIC BLOOD PRESSURE: 132 MMHG | WEIGHT: 252 LBS | HEIGHT: 69 IN

## 2023-08-16 DIAGNOSIS — E11.65 UNCONTROLLED TYPE 2 DIABETES MELLITUS WITH HYPERGLYCEMIA: Primary | ICD-10-CM

## 2023-08-16 DIAGNOSIS — E78.2 MIXED HYPERLIPIDEMIA: ICD-10-CM

## 2023-08-16 LAB
EXPIRATION DATE: NORMAL
GLUCOSE BLDC GLUCOMTR-MCNC: 116 MG/DL (ref 70–130)
Lab: NORMAL

## 2023-08-16 PROCEDURE — 99214 OFFICE O/P EST MOD 30 MIN: CPT | Performed by: INTERNAL MEDICINE

## 2023-08-16 PROCEDURE — 3075F SYST BP GE 130 - 139MM HG: CPT | Performed by: INTERNAL MEDICINE

## 2023-08-16 PROCEDURE — 3051F HG A1C>EQUAL 7.0%<8.0%: CPT | Performed by: INTERNAL MEDICINE

## 2023-08-16 PROCEDURE — 3079F DIAST BP 80-89 MM HG: CPT | Performed by: INTERNAL MEDICINE

## 2023-08-16 PROCEDURE — 95251 CONT GLUC MNTR ANALYSIS I&R: CPT | Performed by: INTERNAL MEDICINE

## 2023-08-16 RX ORDER — ATORVASTATIN CALCIUM 80 MG/1
80 TABLET, FILM COATED ORAL DAILY
Start: 2023-08-16

## 2023-08-16 RX ORDER — DAPAGLIFLOZIN 10 MG/1
10 TABLET, FILM COATED ORAL DAILY
Qty: 90 TABLET | Refills: 1 | Status: SHIPPED | OUTPATIENT
Start: 2023-08-16

## 2023-08-16 RX ORDER — TERBINAFINE HYDROCHLORIDE 250 MG/1
250 TABLET ORAL DAILY
COMMUNITY
Start: 2023-07-26

## 2023-08-16 NOTE — PROGRESS NOTES
"Chief Complaint   Patient presents with    Diabetes          HPI   Myke Marcial is a 57 y.o. male had concerns including Diabetes.    He is checking blood sugars 4+ times per day with CGM. Data reviewed from the last two weeks shows average glucose 165 with variability of 27.3%. In target range 73%, high 21%, very high 6%, low 0%, very low 0%.   Pattern of: Hyperglycemia after lunch.  Improved in the last 3 days.    Current medications for diabetes include metformin 500 mg twice daily, Farxiga 10 mg daily, Mounjaro 7.5 mg weekly (dose increased last week), and mixed insulin 30 units twice daily    Since his last visit he had a CABG x 3 July 3, 2023. Is feeling much better. Incision nearly healed. Open areas at the top and base of the incision.       The following portions of the patient's history were reviewed and updated as appropriate: allergies, current medications, past family history, past medical history, past social history, past surgical history, and problem list.      Review of Systems   Constitutional: Negative.    Endocrine:        See HPI      Physical Exam  Vitals reviewed.   Constitutional:       Appearance: Normal appearance. He is obese.      Comments: Body mass index is 37.21 kg/mý.   Cardiovascular:      Rate and Rhythm: Normal rate.   Pulmonary:      Effort: Pulmonary effort is normal.   Neurological:      General: No focal deficit present.      Mental Status: He is alert. Mental status is at baseline.   Psychiatric:         Mood and Affect: Mood normal.         Behavior: Behavior normal.      /88   Pulse 89   Ht 175.3 cm (69\")   Wt 114 kg (252 lb)   SpO2 95%   BMI 37.21 kg/mý      Labs and imaging    CMP:  Lab Results   Component Value Date    BUN 18 06/30/2023    CREATININE 1.08 06/30/2023    EGFRIFNONA 51 (L) 01/31/2022    EGFRIFAFRI 61 01/31/2022    BCR 16.7 06/30/2023     07/03/2023    K 4.5 06/30/2023    CO2 26.8 06/30/2023    CALCIUM 9.0 06/30/2023    PROTENTOTREF " 7.0 11/15/2022    ALBUMIN 3.6 06/30/2023    LABGLOBREF 3.0 11/15/2022    LABIL2 1.3 11/15/2022    BILITOT 0.4 06/30/2023    ALKPHOS 94 06/30/2023    AST 17 06/30/2023    ALT 16 06/30/2023     Lipid Panel:  Lab Results   Component Value Date    CHOL 166 06/30/2023    TRIG 513 (H) 06/30/2023    HDL 31 (L) 06/30/2023    VLDL 77 (H) 06/30/2023    LDL 58 06/30/2023     HbA1c:  Lab Results   Component Value Date    HGBA1C 7.90 (H) 06/30/2023    HGBA1C 9.4 04/20/2023     Glucose:  Lab Results   Component Value Date    POCGLU 116 08/16/2023     Microalbumin:  Lab Results   Component Value Date    MALBCRERATIO 33.4 04/20/2023     TSH:  Lab Results   Component Value Date    TSH 3.620 02/15/2023       Assessment and plan  Diagnoses and all orders for this visit:    1. Uncontrolled type 2 diabetes mellitus with hyperglycemia (Primary)  Uncontrolled with hyperglycemia though improved in the recent week.  A1c down to 7.9 June 30.  Diabetes complicated by CKD 3, microalbuminuria, neuropathy, gastroparesis, retinopathy, recent CABG.  Continue metformin 500 mg twice daily.  Continue Farxiga 10 mg daily.  Continue Mounjaro 7.5 mg weekly and continue titrating up on dose monthly as tolerated.  Continue mixed insulin 30 units twice daily.  If BG is trend up after lunch/supper consistently, increase dose and/or add lower dose with lunch.  Continues to CGM.  Labs are up-to-date from June.  Urine microalbumin from April.  Monofilament up-to-date from October.  -     POC Glucose, Blood  -     dapagliflozin Propanediol (Farxiga) 10 MG tablet; Take 10 mg by mouth Daily.  Dispense: 90 tablet; Refill: 1    2. Mixed hyperlipidemia  Atorvastatin was increased to 80 mg daily after recent CABG.  Monitor lipids yearly.  -     atorvastatin (LIPITOR) 80 MG tablet; Take 1 tablet by mouth Daily.         Return in about 4 months (around 12/16/2023) for next scheduled follow up. The patient was instructed to contact the clinic with any interval questions  or concerns.    Ondina Garcia, DO   Endocrinologist    Please note that portions of this note were completed with a voice recognition program.

## 2023-08-22 ENCOUNTER — TREATMENT (OUTPATIENT)
Dept: CARDIAC REHAB | Facility: HOSPITAL | Age: 58
End: 2023-08-22
Payer: MEDICAID

## 2023-08-22 DIAGNOSIS — Z95.1 STATUS POST CORONARY ARTERY BYPASS GRAFT: Primary | ICD-10-CM

## 2023-08-22 PROCEDURE — 93798 PHYS/QHP OP CAR RHAB W/ECG: CPT

## 2023-08-22 NOTE — PROGRESS NOTES
Pt was seen today in CR for a Phase II visit. Vital signs and session notes recorded in [employer] and will be scanned into Epic by HIM.

## 2023-08-24 ENCOUNTER — TREATMENT (OUTPATIENT)
Dept: CARDIAC REHAB | Facility: HOSPITAL | Age: 58
End: 2023-08-24
Payer: MEDICAID

## 2023-08-24 ENCOUNTER — TELEPHONE (OUTPATIENT)
Dept: FAMILY MEDICINE CLINIC | Facility: CLINIC | Age: 58
End: 2023-08-24

## 2023-08-24 DIAGNOSIS — Z95.1 STATUS POST CORONARY ARTERY BYPASS GRAFT: Primary | ICD-10-CM

## 2023-08-24 PROCEDURE — 93798 PHYS/QHP OP CAR RHAB W/ECG: CPT

## 2023-08-24 NOTE — TELEPHONE ENCOUNTER
Caller: Myke Justice    Relationship: Self    Best call back number: [phone]     What form or medical record are you requesting:  ENA JUSTICE     Additional notes: ENA FRANCOIS,  WILL BE FAXING YOU ORDERS FOR PT TO BE SIGNED, SHE IS ASKING YOU TO SIGN TO [employer]

## 2023-08-24 NOTE — PROGRESS NOTES
Pt was seen today in CR for a Phase II visit. Vital signs and session notes recorded in [employer] and will be scanned into Epic by HIM.

## 2023-08-28 ENCOUNTER — CLINICAL SUPPORT (OUTPATIENT)
Dept: FAMILY MEDICINE CLINIC | Facility: CLINIC | Age: 58
End: 2023-08-28
Payer: MEDICAID

## 2023-08-28 ENCOUNTER — TELEPHONE (OUTPATIENT)
Dept: FAMILY MEDICINE CLINIC | Facility: CLINIC | Age: 58
End: 2023-08-28

## 2023-08-28 PROCEDURE — 90746 HEPB VACCINE 3 DOSE ADULT IM: CPT | Performed by: FAMILY MEDICINE

## 2023-08-28 PROCEDURE — 90471 IMMUNIZATION ADMIN: CPT | Performed by: FAMILY MEDICINE

## 2023-08-29 ENCOUNTER — APPOINTMENT (OUTPATIENT)
Dept: CARDIAC REHAB | Facility: HOSPITAL | Age: 58
End: 2023-08-29
Payer: MEDICAID

## 2023-08-30 RX ORDER — PRAMIPEXOLE DIHYDROCHLORIDE 0.25 MG/1
TABLET ORAL
Qty: 270 TABLET | Refills: 0 | Status: SHIPPED | OUTPATIENT
Start: 2023-08-30

## 2023-09-01 ENCOUNTER — TELEPHONE (OUTPATIENT)
Dept: ENDOCRINOLOGY | Facility: CLINIC | Age: 58
End: 2023-09-01
Payer: MEDICAID

## 2023-09-01 DIAGNOSIS — E11.65 UNCONTROLLED TYPE 2 DIABETES MELLITUS WITH HYPERGLYCEMIA: ICD-10-CM

## 2023-09-01 RX ORDER — PROCHLORPERAZINE 25 MG/1
1 SUPPOSITORY RECTAL TAKE AS DIRECTED
Qty: 1 EACH | Refills: 3 | Status: SHIPPED | OUTPATIENT
Start: 2023-09-01

## 2023-09-01 NOTE — TELEPHONE ENCOUNTER
----- Message from Myke Marcial sent at 9/1/2023  1:21 PM EDT -----  Regarding: Transmitter   Contact: [phone]  I took my dexcom off to change and sat it on the table and my wife did not see it now we can not find the transmitter. Please send renewal for a new transmitter thank you

## 2023-09-06 ENCOUNTER — TREATMENT (OUTPATIENT)
Dept: CARDIAC REHAB | Facility: HOSPITAL | Age: 58
End: 2023-09-06
Payer: MEDICAID

## 2023-09-06 DIAGNOSIS — Z95.1 STATUS POST CORONARY ARTERY BYPASS GRAFT: Primary | ICD-10-CM

## 2023-09-06 PROCEDURE — 93798 PHYS/QHP OP CAR RHAB W/ECG: CPT

## 2023-09-06 NOTE — PROGRESS NOTES
Pt was seen today in CR for a Phase 2 visit.  Vital signs and session notes recorded in [employer] and will be scanned into Epic by HIM.

## 2023-09-08 ENCOUNTER — TELEPHONE (OUTPATIENT)
Dept: FAMILY MEDICINE CLINIC | Facility: CLINIC | Age: 58
End: 2023-09-08
Payer: MEDICAID

## 2023-09-08 NOTE — TELEPHONE ENCOUNTER
PATIENT NEEDS REFERRAL FOR SPECIAL [facility] PHYSICAL THERAPY.  THIS WAS REQUESTED EARLIER.  HE HAS AN APPOINTMENT MONDAY.    PLEASE FAX [phone].    PLEASE CALL [phone]

## 2023-09-08 NOTE — TELEPHONE ENCOUNTER
Pt spouse called and said that they both have appointments at [facility] on Monday around noon. I did advise her that Dr Nathan was out of the office on Fridays, but I would send this to her as high priority so that she could get the order entered in and I can fax it to them before his appointment on Monday.

## 2023-09-11 ENCOUNTER — APPOINTMENT (OUTPATIENT)
Dept: CARDIAC REHAB | Facility: HOSPITAL | Age: 58
End: 2023-09-11
Payer: MEDICAID

## 2023-09-11 DIAGNOSIS — I25.2 HISTORY OF MYOCARDIAL INFARCTION: ICD-10-CM

## 2023-09-11 DIAGNOSIS — R53.81 DEBILITATED PATIENT: Primary | ICD-10-CM

## 2023-09-18 ENCOUNTER — TREATMENT (OUTPATIENT)
Dept: CARDIAC REHAB | Facility: HOSPITAL | Age: 58
End: 2023-09-18
Payer: MEDICAID

## 2023-09-18 DIAGNOSIS — Z95.1 STATUS POST CORONARY ARTERY BYPASS GRAFT: Primary | ICD-10-CM

## 2023-09-18 PROCEDURE — 93798 PHYS/QHP OP CAR RHAB W/ECG: CPT

## 2023-09-18 NOTE — PROGRESS NOTES
Pt was seen today in CR for a Phase II visit. Vital signs and session notes recorded in [employer] and will be scanned into Epic by HIM.

## 2023-09-19 ENCOUNTER — APPOINTMENT (OUTPATIENT)
Dept: CARDIAC REHAB | Facility: HOSPITAL | Age: 58
End: 2023-09-19
Payer: MEDICAID

## 2023-09-21 ENCOUNTER — TELEPHONE (OUTPATIENT)
Dept: FAMILY MEDICINE CLINIC | Facility: CLINIC | Age: 58
End: 2023-09-21

## 2023-09-21 NOTE — TELEPHONE ENCOUNTER
The [facility] received a fax that requires your attention. The document has been indexed to the patient’s chart for your review.      Reason for sending: EXTERNAL MEDICAL RECORD NOTIFICATION    Documents Description: PHYSICAL THERAPY PLAN OF CARE    Name of Sender: [facility] [employer]    Date Indexed: 9/21/23    Notes (if needed): SEE FAX IN CHART UNDER CARE COORDINATION

## 2023-09-28 ENCOUNTER — OFFICE VISIT (OUTPATIENT)
Dept: CARDIOLOGY | Facility: CLINIC | Age: 58
End: 2023-09-28
Payer: MEDICAID

## 2023-09-28 VITALS
SYSTOLIC BLOOD PRESSURE: 112 MMHG | BODY MASS INDEX: 38.8 KG/M2 | HEART RATE: 104 BPM | WEIGHT: 262 LBS | DIASTOLIC BLOOD PRESSURE: 80 MMHG | OXYGEN SATURATION: 96 % | HEIGHT: 69 IN

## 2023-09-28 DIAGNOSIS — E11.65 UNCONTROLLED TYPE 2 DIABETES MELLITUS WITH HYPERGLYCEMIA: ICD-10-CM

## 2023-09-28 DIAGNOSIS — Z95.1 S/P CABG X 3: ICD-10-CM

## 2023-09-28 DIAGNOSIS — I10 ESSENTIAL HYPERTENSION: ICD-10-CM

## 2023-09-28 DIAGNOSIS — E78.2 MIXED HYPERLIPIDEMIA: ICD-10-CM

## 2023-09-28 DIAGNOSIS — E66.01 CLASS 2 SEVERE OBESITY DUE TO EXCESS CALORIES WITH SERIOUS COMORBIDITY AND BODY MASS INDEX (BMI) OF 36.0 TO 36.9 IN ADULT: ICD-10-CM

## 2023-09-28 DIAGNOSIS — I25.10 CORONARY ARTERY DISEASE INVOLVING NATIVE CORONARY ARTERY OF NATIVE HEART WITHOUT ANGINA PECTORIS: Primary | ICD-10-CM

## 2023-09-28 RX ORDER — TERBINAFINE HYDROCHLORIDE 250 MG/1
TABLET ORAL
COMMUNITY
Start: 2023-08-23

## 2023-09-28 NOTE — PROGRESS NOTES
[facility] Cardiology Office Follow Up Note    Myke Marcial  [id number]  2023    Primary Care Provider: Jaimie Nathan MD    Chief Complaint: Hospital follow-up    History of Present Illness:   Mr. Myke Marcial is a 57 y.o. male who presents to the Cardiology Clinic for hospital follow-up.  The patient has a past medical history significant for type 2 diabetes mellitus, hypertension, hyperlipidemia, PRATEEK, and obesity with a BMI 38.  He has a past cardiac history significant for presentation with an NSTEMI in , being diagnosed with severe two-vessel coronary artery disease poorly suited for PCI.  He subsequently underwent a 3V CABG with a LIMA-LAD, SVG-diagonal, and SVG-OM.  He presents today for follow-up.  Since hospital discharge, the patient reports he has been doing well.  He reports overall improvement in shortness of breath and fatigue following revascularization.  He has not had any significant anginal symptoms.  He did have some difficulty with wound healing at the proximal and distal portion of the sternotomy, which is now healing well.  He is participating in cardiac rehab.  No other specific complaints today.    Past Cardiac Testin. Last Coronary Angio:   1.  Moderate to severe stenosis of the ostial to proximal LAD, with severe focal stenosis in the mid LAD.  Small to moderate caliber first diagonal branch with severe ostial stenosis.  2.  Severe stenosis in the mid LCx, with subtotal occlusion of an OM 2 at the ostium which is located at the level of the trifurcation  3.  Minimal nonobstructive disease in the ramus branch.  4.  Mild nonobstructive disease throughout the RCA.  5.  Normal LVEDP, 12 mmHg.  2. Prior Stress Testing: None  3. Last Echo:    1.  LVEF 55-60%   2.  No significant structural valvular abnormalities  4. Prior Holter Monitor: None    Review of Systems:   Review of Systems   Constitutional:  Positive for fatigue.  Negative for activity change, appetite change, chills, diaphoresis, fever, unexpected weight gain and unexpected weight loss.   Eyes:  Negative for blurred vision and double vision.   Respiratory:  Negative for cough, chest tightness, shortness of breath and wheezing.    Cardiovascular:  Negative for chest pain, palpitations and leg swelling.   Gastrointestinal:  Negative for abdominal pain, anal bleeding, blood in stool and GERD.   Endocrine: Negative for cold intolerance and heat intolerance.   Genitourinary:  Negative for hematuria.   Neurological:  Negative for dizziness, syncope, weakness and light-headedness.   Hematological:  Does not bruise/bleed easily.   Psychiatric/Behavioral:  Negative for depressed mood and stress. The patient is not nervous/anxious.      I have reviewed and/or updated the patient's past medical, past surgical, family, social history, problem list and allergies as appropriate.     Medications:     Current Outpatient Medications:     aspirin 81 MG EC tablet, Take 1 tablet by mouth Daily., Disp: , Rfl:     atorvastatin (LIPITOR) 80 MG tablet, Take 1 tablet by mouth Daily., Disp: , Rfl:     B-D UF III MINI PEN NEEDLES 31G X 5 MM misc, USE 1  ONCE DAILY, Disp: 100 each, Rfl: 0    chlorthalidone (HYGROTON) 25 MG tablet, Take 1 tablet by mouth once daily, Disp: 90 tablet, Rfl: 0    Continuous Blood Gluc Sensor (Dexcom G6 Sensor), 1 each Take As Directed., Disp: 3 each, Rfl: 11    Continuous Blood Gluc Transmit (Dexcom G6 Transmitter) misc, Use 1 each Take As Directed., Disp: 1 each, Rfl: 3    dapagliflozin Propanediol (Farxiga) 10 MG tablet, Take 10 mg by mouth Daily., Disp: 90 tablet, Rfl: 1    insulin aspart prot & aspart (NovoLOG Mix 70/30 FlexPen) (70-30) 100 UNIT/ML suspension pen-injector injection, INJECT 30 UNITS SUBCUTANEOUSLY TWICE DAILY. INCREASE AS DIRECTED TO MAX DOSE OF 70 UNITS TWICE DAILY, Disp: 75 mL, Rfl: 1    metFORMIN ER (GLUCOPHAGE-XR) 500 MG 24 hr tablet, Take 1 tablet  "by mouth 2 (Two) Times a Day., Disp: 180 tablet, Rfl: 3    metoprolol tartrate (LOPRESSOR) 25 MG tablet, Take 0.5 tablets by mouth., Disp: , Rfl:     omeprazole (priLOSEC) 20 MG capsule, Take 1 capsule by mouth once daily, Disp: 30 capsule, Rfl: 3    pramipexole (MIRAPEX) 0.25 MG tablet, TAKE 1 TABLET BY MOUTH THREE TIMES DAILY, Disp: 270 tablet, Rfl: 0    terbinafine (lamiSIL) 250 MG tablet, TAKE 1 TABLET BY MOUTH ONCE DAILY WITH MEALS, Disp: , Rfl:     Tirzepatide (Mounjaro) 7.5 MG/0.5ML solution pen-injector, Inject 0.5 mL under the skin into the appropriate area as directed Every 7 (Seven) Days., Disp: 2 mL, Rfl: 1    vitamin D3 125 MCG (5000 UT) capsule capsule, Take 1 capsule by mouth Daily., Disp: , Rfl:     Physical Exam:  Vital Signs:   Vitals:    09/28/23 1518   BP: 112/80   BP Location: Right arm   Patient Position: Sitting   Cuff Size: Adult   Pulse: 104   SpO2: 96%   Weight: 119 kg (262 lb)   Height: 175.3 cm (69\")       Physical Exam  Constitutional:       General: He is not in acute distress.     Appearance: He is obese. He is not diaphoretic.   HENT:      Head: Normocephalic and atraumatic.   Cardiovascular:      Rate and Rhythm: Normal rate and regular rhythm.      Heart sounds: No murmur heard.  Pulmonary:      Effort: Pulmonary effort is normal. No respiratory distress.      Breath sounds: Normal breath sounds. No stridor. No wheezing, rhonchi or rales.   Abdominal:      General: Bowel sounds are normal. There is no distension.      Palpations: Abdomen is soft.      Tenderness: There is no abdominal tenderness. There is no guarding or rebound.   Musculoskeletal:         General: No swelling. Normal range of motion.      Cervical back: Neck supple. No tenderness.   Skin:     General: Skin is warm and dry.   Neurological:      General: No focal deficit present.      Mental Status: He is alert and oriented to person, place, and time.   Psychiatric:         Mood and Affect: Mood normal.         " Behavior: Behavior normal.       Results Review:   I reviewed the patient's new clinical results.        Assessment / Plan:     1.  Coronary artery disease  -- Status post 3V CABG in 7/23  -- Currently doing well postoperatively  -- Continue aspirin 81 mg daily  -- Continue high intensity statin  -- Encouraged continued participation in cardiac rehab  -- Follow-up in 3 months, sooner if required    2.  Hypertension  --BP well controlled today    3.  Hyperlipidemia  --Lipid profile in 6/23 demonstrated LDL 80, HDL 28, triglycerides 320  --Continue statin  --Repeat lipid profile in 6 months    4.  Type 2 diabetes mellitus  --Hemoglobin A1c 7.9% in 6/23  --Management per PCP    5.  Obesity, BMI 38  --Weight loss advised      Follow Up:   Return in about 3 months (around 12/28/2023).      Thank you for allowing me to participate in the care of your patient. Please to not hesitate to contact me with additional questions or concerns.     GREG Thomas MD  Interventional Cardiology   09/28/2023  15:19 EDT

## 2023-10-02 ENCOUNTER — TREATMENT (OUTPATIENT)
Dept: CARDIAC REHAB | Facility: HOSPITAL | Age: 58
End: 2023-10-02
Payer: MEDICAID

## 2023-10-02 DIAGNOSIS — Z95.1 STATUS POST CORONARY ARTERY BYPASS GRAFT: Primary | ICD-10-CM

## 2023-10-02 PROCEDURE — 93798 PHYS/QHP OP CAR RHAB W/ECG: CPT

## 2023-10-02 NOTE — PROGRESS NOTES
Pt was seen today in CR for a Phase 2 visit.  Vital signs and session notes recorded in [employer] and will be scanned into Epic by HIM.

## 2023-10-05 ENCOUNTER — PRIOR AUTHORIZATION (OUTPATIENT)
Dept: ENDOCRINOLOGY | Facility: CLINIC | Age: 58
End: 2023-10-05
Payer: MEDICAID

## 2023-10-06 NOTE — TELEPHONE ENCOUNTER
Myke Iniguezandrae Key: [id number] - PA Case ID: [id number] help? Call us at [phone]  Outcome  Approvedon October 5  The request has been approved. The authorization is effective from 10/05/2023 to 10/04/2024, as long as the member is enrolled in their current health plan. The request was approved as submitted. A written notification letter will follow with additional details.  Drug  Dexcom G6 Transmitter  Form  [facility] [place] [employer] ePA Form 2017 NCPDP

## 2023-10-09 ENCOUNTER — TREATMENT (OUTPATIENT)
Dept: CARDIAC REHAB | Facility: HOSPITAL | Age: 58
End: 2023-10-09
Payer: MEDICAID

## 2023-10-09 DIAGNOSIS — Z95.1 STATUS POST CORONARY ARTERY BYPASS GRAFT: Primary | ICD-10-CM

## 2023-10-09 PROCEDURE — 93798 PHYS/QHP OP CAR RHAB W/ECG: CPT

## 2023-10-09 NOTE — PROGRESS NOTES
Pt was seen today in CR for a Phase 2 visit.  Vital signs and session notes recorded in [employer] and will be scanned into Epic by HIM.

## 2023-10-12 DIAGNOSIS — E11.65 UNCONTROLLED TYPE 2 DIABETES MELLITUS WITH HYPERGLYCEMIA: Primary | ICD-10-CM

## 2023-10-12 RX ORDER — ACYCLOVIR 400 MG/1
1 TABLET ORAL
Qty: 9 EACH | Refills: 3 | Status: SHIPPED | OUTPATIENT
Start: 2023-10-12

## 2023-10-12 RX ORDER — ACYCLOVIR 400 MG/1
1 TABLET ORAL DAILY
Qty: 1 EACH | Refills: 0 | Status: SHIPPED | OUTPATIENT
Start: 2023-10-12

## 2023-10-15 DIAGNOSIS — E11.65 UNCONTROLLED TYPE 2 DIABETES MELLITUS WITH HYPERGLYCEMIA: ICD-10-CM

## 2023-10-16 RX ORDER — CHLORTHALIDONE 25 MG/1
25 TABLET ORAL DAILY
Qty: 90 TABLET | Refills: 0 | Status: SHIPPED | OUTPATIENT
Start: 2023-10-16

## 2023-10-16 RX ORDER — TIRZEPATIDE 7.5 MG/.5ML
INJECTION, SOLUTION SUBCUTANEOUS
Qty: 2 ML | Refills: 3 | Status: SHIPPED | OUTPATIENT
Start: 2023-10-16

## 2023-10-16 NOTE — TELEPHONE ENCOUNTER
Rx Refill Note  Requested Prescriptions     Pending Prescriptions Disp Refills    Mounjaro 7.5 MG/0.5ML solution pen-injector [Pharmacy Med Name: Mounjaro 7.5 MG/0.5ML Subcutaneous Solution Pen-injector] 4 mL 0     Sig: INJECT 7.5MG  ONCE A WEEK SUBCUTANEOUSLY      Last office visit with prescribing clinician: 8/16/2023     Next office visit with prescribing clinician: 1/18/2024       Aashish Toro MA  10/16/23, 08:26 EDT

## 2023-10-17 ENCOUNTER — TELEPHONE (OUTPATIENT)
Dept: FAMILY MEDICINE CLINIC | Facility: CLINIC | Age: 58
End: 2023-10-17

## 2023-10-17 NOTE — TELEPHONE ENCOUNTER
Caller: Meli Marcial    Relationship: Emergency Contact    Best call back number:  [phone]     What form or medical record are you requesting: [employer] IS FAXING PAPERWORK FOR DIABETIC SHOES. DR FLOWER MUST SIGN AND FAX BACK PLEASE.    Who is requesting this form or medical record from you: [facility]    How would you like to receive the form or medical records (pick-up, mail, fax): FAX  If fax, what is the fax number: [phone]      Timeframe paperwork needed: ASAP, PLEASE

## 2023-10-19 ENCOUNTER — TELEPHONE (OUTPATIENT)
Dept: FAMILY MEDICINE CLINIC | Facility: CLINIC | Age: 58
End: 2023-10-19
Payer: MEDICAID

## 2023-10-19 NOTE — TELEPHONE ENCOUNTER
Spoke with patients emergency contact and informed her she would have to have orders sent to Dr Corona to have filled out. Demonstrated understanding.

## 2023-10-25 ENCOUNTER — OFFICE VISIT (OUTPATIENT)
Dept: FAMILY MEDICINE CLINIC | Facility: CLINIC | Age: 58
End: 2023-10-25
Payer: MEDICAID

## 2023-10-25 VITALS
SYSTOLIC BLOOD PRESSURE: 125 MMHG | HEART RATE: 92 BPM | WEIGHT: 261 LBS | BODY MASS INDEX: 38.66 KG/M2 | OXYGEN SATURATION: 96 % | HEIGHT: 69 IN | DIASTOLIC BLOOD PRESSURE: 85 MMHG

## 2023-10-25 DIAGNOSIS — I25.10 CORONARY ARTERY DISEASE INVOLVING NATIVE CORONARY ARTERY OF NATIVE HEART WITHOUT ANGINA PECTORIS: Primary | ICD-10-CM

## 2023-10-25 DIAGNOSIS — R63.5 WEIGHT GAIN: ICD-10-CM

## 2023-10-25 DIAGNOSIS — E78.2 MIXED HYPERLIPIDEMIA: ICD-10-CM

## 2023-10-25 DIAGNOSIS — R06.02 SHORTNESS OF BREATH: ICD-10-CM

## 2023-10-25 DIAGNOSIS — G47.33 OSA ON CPAP: ICD-10-CM

## 2023-10-25 DIAGNOSIS — K31.84 DIABETIC GASTROPARESIS ASSOCIATED WITH TYPE 2 DIABETES MELLITUS: ICD-10-CM

## 2023-10-25 DIAGNOSIS — E11.43 DIABETIC GASTROPARESIS ASSOCIATED WITH TYPE 2 DIABETES MELLITUS: ICD-10-CM

## 2023-10-25 DIAGNOSIS — K76.0 FATTY LIVER: ICD-10-CM

## 2023-10-25 DIAGNOSIS — E66.01 CLASS 2 SEVERE OBESITY DUE TO EXCESS CALORIES WITH SERIOUS COMORBIDITY AND BODY MASS INDEX (BMI) OF 38.0 TO 38.9 IN ADULT: ICD-10-CM

## 2023-10-25 DIAGNOSIS — E11.65 UNCONTROLLED TYPE 2 DIABETES MELLITUS WITH HYPERGLYCEMIA: ICD-10-CM

## 2023-10-25 DIAGNOSIS — E11.319 DIABETIC RETINOPATHY ASSOCIATED WITH TYPE 2 DIABETES MELLITUS, MACULAR EDEMA PRESENCE UNSPECIFIED, UNSPECIFIED LATERALITY, UNSPECIFIED RETINOPATHY SEVERITY: ICD-10-CM

## 2023-10-25 DIAGNOSIS — Z28.21 INFLUENZA VACCINATION DECLINED BY PATIENT: ICD-10-CM

## 2023-10-25 DIAGNOSIS — I10 ESSENTIAL HYPERTENSION: ICD-10-CM

## 2023-10-25 NOTE — PROGRESS NOTES
Subjective   Myke Marcial is a 58 y.o. male.     History of Present Illness  Here for routine f/u on several chronic med problems. Last seen 3 months ago for hosp f/u after CABG.    He is currently attempting to be granted disability. Scheduled in Nov for pysch eval, spine and back xray eval, etc.    Followed by Dr. Thomas for CAD. Comorbid HTN, HLP. Next f/u apt in Jan. Currently on ASA, statin, chlorthalidone, farxiga, metoprolol. Reports taking as rx'd. Denies CP. Some shortness of air with exertion, weight up from last visit - 10 lbs.    Followed by Dr. Garcia for IDDM. On ASA, statin, farxiga, mixed insulin, metformin, mounjaro. Generally taking meds as rx'd. Not following diabetic appropriate diet. No formal regular exercise. Assoc'd diabetic complications include retinopathy, gastroparesis, [place], PRATEEK. Awaiting supplies to restart use of his CGM.    Reports using CPAP as rx'd.    The following portions of the patient's history were reviewed and updated as appropriate: allergies, current medications, past family history, past medical history, past social history, past surgical history, and problem list.    Review of Systems   Constitutional:  Positive for fatigue and unexpected weight change.   HENT:  Positive for congestion and postnasal drip. Negative for nosebleeds, rhinorrhea, sore throat, tinnitus and trouble swallowing.    Eyes:  Negative for visual disturbance.   Respiratory:  Positive for shortness of breath (with exertion). Negative for cough and wheezing.    Cardiovascular:  Positive for leg swelling (mild, stable). Negative for chest pain and palpitations.   Gastrointestinal:  Positive for nausea (mild, intermittent). Negative for abdominal pain, blood in stool, constipation and vomiting.        Heartburn   Endocrine: Positive for heat intolerance.   Genitourinary:  Negative for decreased urine volume, difficulty urinating, dysuria and hematuria.        ED   Musculoskeletal:  Positive for  arthralgias and back pain (chronic, stable).   Skin:  Positive for wound. Negative for rash.   Neurological:  Negative for dizziness, syncope, weakness and headaches.   Hematological:  Negative for adenopathy. Does not bruise/bleed easily.   Psychiatric/Behavioral:  Positive for sleep disturbance. Negative for confusion, dysphoric mood and suicidal ideas. The patient is not nervous/anxious.    Pt's previous ROS reviewed and updated as indicated.       Objective   Vitals:    10/25/23 0928   BP: 125/85   Pulse: 92   SpO2: 96%     Body mass index is 38.54 kg/m².      10/25/23  0928   Weight: 118 kg (261 lb)       Physical Exam  Vitals and nursing note reviewed.   Constitutional:       General: He is not in acute distress.     Appearance: He is well-developed and well-groomed. He is obese. He is not ill-appearing.   HENT:      Head: Atraumatic.      Mouth/Throat:      Mouth: Mucous membranes are moist. No oral lesions.   Eyes:      General: No scleral icterus.     Conjunctiva/sclera: Conjunctivae normal.   Neck:      Thyroid: No thyroid mass.   Cardiovascular:      Rate and Rhythm: Normal rate and regular rhythm.      Pulses: Normal pulses.      Heart sounds: Normal heart sounds.   Pulmonary:      Effort: Pulmonary effort is normal.      Breath sounds: Normal breath sounds. No wheezing, rhonchi or rales.   Musculoskeletal:      Right lower leg: No edema.      Left lower leg: No edema.   Lymphadenopathy:      Cervical: No cervical adenopathy.   Skin:     General: Skin is warm and dry.      Coloration: Skin is not jaundiced or pale.   Neurological:      Mental Status: He is alert and oriented to person, place, and time.      Gait: Gait is intact.   Psychiatric:         Mood and Affect: Mood and affect normal.         Speech: Speech normal.         Behavior: Behavior normal. Behavior is cooperative.         Thought Content: Thought content normal. Thought content is not paranoid or delusional. Thought content does not  include suicidal ideation.         Cognition and Memory: Cognition normal.   Pt's previous physical exam reviewed and updated as indicated.      Assessment & Plan   Diagnoses and all orders for this visit:    1. Coronary artery disease involving native coronary artery of native heart without angina pectoris (Primary)  -     CBC & Differential  -     Comprehensive Metabolic Panel    2. Mixed hyperlipidemia  -     Comprehensive Metabolic Panel    3. Essential hypertension  -     CBC & Differential  -     Comprehensive Metabolic Panel    4. Uncontrolled type 2 diabetes mellitus with hyperglycemia  -     CBC & Differential  -     Comprehensive Metabolic Panel  -     Hemoglobin A1c    5. Class 2 severe obesity due to excess calories with serious comorbidity and body mass index (BMI) of 38.0 to 38.9 in adult    6. Diabetic gastroparesis associated with type 2 diabetes mellitus    7. Diabetic retinopathy associated with type 2 diabetes mellitus, macular edema presence unspecified, unspecified laterality, unspecified retinopathy severity    8. Fatty liver    9. PRATEEK on CPAP    10. Weight gain  -     proBNP    11. Shortness of breath  -     proBNP    12. Influenza vaccination declined by patient       Some weight gain, shortness of air since last visit. proBNP as above. Otherwise appearing stable. Continue current med mgnt and f/u with Dr. Thomas as scheduled in January.    Recheck A1c, forward to Dr. Garcia. Patient encouraged to take meds as rx'd, follow diabetic appropriate diet, exercise daily, perform feet check daily, and have yearly diabetic eye exams.    Encouraged CPAP compliance.    Nutrition and activity goals reviewed including: mainly water to drink, limit white flour/processed sugar, higher lean protein, high fiber carbs, regular meals, working toward 150 mins cardio per week.    Routine f/u in 6 months, sooner as needed/instructed.  I will contact patient regarding test results and provide instructions regarding  any necessary changes in plan of care.  Patient was encouraged to keep me informed of any acute changes, lack of improvement, or any new concerning symptoms.  Pt is aware of reasons to seek emergent care.  Patient voiced understanding of all instructions and denied further questions.    Please note that portions of this note may have been completed with a voice recognition program.

## 2023-10-26 LAB
ALBUMIN SERPL-MCNC: 4.1 G/DL (ref 3.5–5.2)
ALBUMIN/GLOB SERPL: 1.4 G/DL
ALP SERPL-CCNC: 112 U/L (ref 39–117)
ALT SERPL-CCNC: 19 U/L (ref 1–41)
AST SERPL-CCNC: 16 U/L (ref 1–40)
BASOPHILS # BLD AUTO: 0.02 10*3/MM3 (ref 0–0.2)
BASOPHILS NFR BLD AUTO: 0.4 % (ref 0–1.5)
BILIRUB SERPL-MCNC: 0.4 MG/DL (ref 0–1.2)
BUN SERPL-MCNC: 14 MG/DL (ref 6–20)
BUN/CREAT SERPL: 13.9 (ref 7–25)
CALCIUM SERPL-MCNC: 9.5 MG/DL (ref 8.6–10.5)
CHLORIDE SERPL-SCNC: 100 MMOL/L (ref 98–107)
CO2 SERPL-SCNC: 29.3 MMOL/L (ref 22–29)
CREAT SERPL-MCNC: 1.01 MG/DL (ref 0.76–1.27)
EGFRCR SERPLBLD CKD-EPI 2021: 86.7 ML/MIN/1.73
EOSINOPHIL # BLD AUTO: 0.05 10*3/MM3 (ref 0–0.4)
EOSINOPHIL NFR BLD AUTO: 1 % (ref 0.3–6.2)
ERYTHROCYTE [DISTWIDTH] IN BLOOD BY AUTOMATED COUNT: 15.5 % (ref 12.3–15.4)
GLOBULIN SER CALC-MCNC: 3 GM/DL
GLUCOSE SERPL-MCNC: 239 MG/DL (ref 65–99)
HBA1C MFR BLD: 8.8 % (ref 4.8–5.6)
HCT VFR BLD AUTO: 49 % (ref 37.5–51)
HGB BLD-MCNC: 15.6 G/DL (ref 13–17.7)
IMM GRANULOCYTES # BLD AUTO: 0.01 10*3/MM3 (ref 0–0.05)
IMM GRANULOCYTES NFR BLD AUTO: 0.2 % (ref 0–0.5)
LYMPHOCYTES # BLD AUTO: 1.17 10*3/MM3 (ref 0.7–3.1)
LYMPHOCYTES NFR BLD AUTO: 23.7 % (ref 19.6–45.3)
MCH RBC QN AUTO: 27.1 PG (ref 26.6–33)
MCHC RBC AUTO-ENTMCNC: 31.8 G/DL (ref 31.5–35.7)
MCV RBC AUTO: 85.2 FL (ref 79–97)
MONOCYTES # BLD AUTO: 0.35 10*3/MM3 (ref 0.1–0.9)
MONOCYTES NFR BLD AUTO: 7.1 % (ref 5–12)
NEUTROPHILS # BLD AUTO: 3.33 10*3/MM3 (ref 1.7–7)
NEUTROPHILS NFR BLD AUTO: 67.6 % (ref 42.7–76)
NRBC BLD AUTO-RTO: 0 /100 WBC (ref 0–0.2)
NT-PROBNP SERPL-MCNC: 178 PG/ML (ref 0–210)
PLATELET # BLD AUTO: 326 10*3/MM3 (ref 140–450)
POTASSIUM SERPL-SCNC: 4.6 MMOL/L (ref 3.5–5.2)
PROT SERPL-MCNC: 7.1 G/DL (ref 6–8.5)
RBC # BLD AUTO: 5.75 10*6/MM3 (ref 4.14–5.8)
SODIUM SERPL-SCNC: 136 MMOL/L (ref 136–145)
WBC # BLD AUTO: 4.93 10*3/MM3 (ref 3.4–10.8)

## 2023-10-27 ENCOUNTER — PRIOR AUTHORIZATION (OUTPATIENT)
Dept: ENDOCRINOLOGY | Facility: CLINIC | Age: 58
End: 2023-10-27
Payer: MEDICAID

## 2023-10-27 NOTE — TELEPHONE ENCOUNTER
Deniedtoday  This request has not been approved. Based on the information submitted for review, you did not meet our guideline rules for the requested drug. In order for your request to be approved, your provider would need to show that you have met the guideline rules below. The details below are written in medical language. If you have questions, please contact your provider. In some cases, the requested medication or alternatives offered may have additional approval requirements. For the treatment of type 2 diabetes mellitus (a chronic condition in which your body is resistant to insulin, a hormone that regulates your blood sugar), our guideline named CONTINUOUS GLUCOSE METERS (reviewed for Dexcom G7 sensors) requires that there is a clinical justification (such as being legally blind, having a vision impairment [a type of eye condition] so that you are unable to see the numbers, the product has a feature that enables the you to use the meter that is not available on any of the preferred products, etc.) why you cannot use all of the preferred products: Dexcom G6 (sensor, transmitter, and ) and FreeStyle Rad 3 Sensor.This request has been denied because your doctor told us that you do not meet this requirement. Please work with your doctor to use a different product or get us more information if it will allow us to approve this request. A written notification letter will follow with additional details.  Drug  Dexcom G7 Sensor  Form  [employer] [place] Medicaid ePA Form 2017 NCPDP

## 2023-11-07 RX ORDER — PRAMIPEXOLE DIHYDROCHLORIDE 0.25 MG/1
TABLET ORAL
Qty: 270 TABLET | Refills: 0 | Status: SHIPPED | OUTPATIENT
Start: 2023-11-07

## 2023-11-15 DIAGNOSIS — I25.10 CORONARY ARTERY DISEASE INVOLVING NATIVE CORONARY ARTERY OF NATIVE HEART WITHOUT ANGINA PECTORIS: Primary | ICD-10-CM

## 2023-11-15 NOTE — TELEPHONE ENCOUNTER
Rx Refill Note  Requested Prescriptions     Pending Prescriptions Disp Refills    metoprolol tartrate (LOPRESSOR) 25 MG tablet       Sig: Take 0.5 tablets by mouth.      Last office visit with prescribing clinician: 10/25/2023   Last telemedicine visit with prescribing clinician: Visit date not found   Next office visit with prescribing clinician: 4/25/2024                         Would you like a call back once the refill request has been completed: [] Yes [] No    If the office needs to give you a call back, can they leave a voicemail: [] Yes [] No    Lianne Alvarado MA  11/15/23, 12:58 EST

## 2023-12-01 ENCOUNTER — PRIOR AUTHORIZATION (OUTPATIENT)
Dept: ENDOCRINOLOGY | Facility: CLINIC | Age: 58
End: 2023-12-01
Payer: MEDICAID

## 2023-12-01 ENCOUNTER — TELEPHONE (OUTPATIENT)
Dept: ENDOCRINOLOGY | Facility: CLINIC | Age: 58
End: 2023-12-01

## 2023-12-01 NOTE — TELEPHONE ENCOUNTER
Provider: YUMIKO LYNN DO     Caller: [phone]    Relationship to Patient: [employer]     Phone Number: [phone]    Reason for Call: TREMAYNE FROM [facility] WAS CALLING ABOUT PT'S DEXCOM 7 AND STATED THAT IT NEEDS A PA FOR THIS

## 2023-12-04 NOTE — TELEPHONE ENCOUNTER
Myke Marcial Key: [id number] - PA Case ID: [id number] help? Call us at [phone]  Outcome  Approvedon December 1  The request has been approved. The authorization is effective from 12/01/2023 to 11/30/2024, as long as the member is enrolled in their current health plan. The request was approved as submitted. An additional authorization has been entered for Dexcom G7 meter effective 12/01/2023 through 11/30/2024; please reference PA [id number]. A written notification letter will follow with additional details.  Drug  Dexcom G7 Sensor  Form  [employer] [place] Medicaid ePA Form 2017 NCPDP

## 2023-12-11 ENCOUNTER — PATIENT MESSAGE (OUTPATIENT)
Dept: ENDOCRINOLOGY | Facility: CLINIC | Age: 58
End: 2023-12-11
Payer: MEDICAID

## 2023-12-20 ENCOUNTER — TELEPHONE (OUTPATIENT)
Dept: FAMILY MEDICINE CLINIC | Facility: CLINIC | Age: 58
End: 2023-12-20
Payer: MEDICAID

## 2023-12-20 DIAGNOSIS — R53.81 DEBILITATED PATIENT: ICD-10-CM

## 2023-12-20 DIAGNOSIS — I25.10 CORONARY ARTERY DISEASE INVOLVING NATIVE CORONARY ARTERY OF NATIVE HEART WITHOUT ANGINA PECTORIS: Primary | ICD-10-CM

## 2023-12-20 NOTE — TELEPHONE ENCOUNTER
"Caller: Meli Marcial \"Meli Marcial\"     Relationship: [employer]     Best call back number: [phone]      What is the medical concern/diagnosis: BACK ISSUES     What specialty or service is being requested: PHYSICAL AND OCCUPATIONAL THERAPY     What is the provider, practice or medical service name: [facility] ()      What is the office location: [facility]     What is the office phone number: FAX NUMBER:  [phone]     Any additional details: REQUESTING NEW PHYSICAL AND OCCUPATIONAL THERAPY REFERRALS, AND SIGNED.     STATED THEY ARE NEEDING ASAP AND HE IS SCHEDULE MIDDLE OF JANUARY.        "

## 2023-12-20 NOTE — TELEPHONE ENCOUNTER
Attempted to contact patient regarding referral. No answer, left vm and advised patient to call back with any questions or concerns regarding referral.

## 2023-12-27 ENCOUNTER — TREATMENT (OUTPATIENT)
Dept: CARDIAC REHAB | Facility: HOSPITAL | Age: 58
End: 2023-12-27
Payer: MEDICAID

## 2023-12-27 DIAGNOSIS — Z95.1 STATUS POST CORONARY ARTERY BYPASS GRAFT: Primary | ICD-10-CM

## 2023-12-27 PROCEDURE — 93798 PHYS/QHP OP CAR RHAB W/ECG: CPT

## 2023-12-27 NOTE — PROGRESS NOTES
Pt was seen today in CR for a Phase 2 visit.  Vital signs and session notes recorded in [employer] and will be scanned into Epic by HIM.

## 2023-12-28 ENCOUNTER — TELEPHONE (OUTPATIENT)
Dept: CARDIOLOGY | Facility: CLINIC | Age: 58
End: 2023-12-28
Payer: MEDICAID

## 2023-12-28 NOTE — TELEPHONE ENCOUNTER
Called and left  12.28.23 for pt to R/S appt due to Dr. Thomas Out of the office.    Office can schedule with Dr. Mcleod

## 2024-01-11 RX ORDER — CHLORTHALIDONE 25 MG/1
25 TABLET ORAL DAILY
Qty: 90 TABLET | Refills: 0 | Status: SHIPPED | OUTPATIENT
Start: 2024-01-11

## 2024-01-18 ENCOUNTER — OFFICE VISIT (OUTPATIENT)
Dept: ENDOCRINOLOGY | Facility: CLINIC | Age: 59
End: 2024-01-18
Payer: MEDICAID

## 2024-01-18 VITALS
OXYGEN SATURATION: 96 % | SYSTOLIC BLOOD PRESSURE: 120 MMHG | BODY MASS INDEX: 37.62 KG/M2 | DIASTOLIC BLOOD PRESSURE: 72 MMHG | WEIGHT: 254 LBS | HEIGHT: 69 IN | HEART RATE: 87 BPM

## 2024-01-18 DIAGNOSIS — E78.2 MIXED HYPERLIPIDEMIA: ICD-10-CM

## 2024-01-18 DIAGNOSIS — E11.65 UNCONTROLLED TYPE 2 DIABETES MELLITUS WITH HYPERGLYCEMIA: Primary | ICD-10-CM

## 2024-01-18 LAB
EXPIRATION DATE: ABNORMAL
EXPIRATION DATE: NORMAL
GLUCOSE BLDC GLUCOMTR-MCNC: 111 MG/DL (ref 70–130)
HBA1C MFR BLD: 8.4 % (ref 4.5–5.7)
Lab: ABNORMAL
Lab: NORMAL

## 2024-01-18 RX ORDER — INSULIN ASPART 100 [IU]/ML
INJECTION, SUSPENSION SUBCUTANEOUS
Qty: 75 ML | Refills: 1 | Status: SHIPPED | OUTPATIENT
Start: 2024-01-18

## 2024-01-18 RX ORDER — ATORVASTATIN CALCIUM 80 MG/1
80 TABLET, FILM COATED ORAL DAILY
Qty: 90 TABLET | Refills: 3 | Status: SHIPPED | OUTPATIENT
Start: 2024-01-18

## 2024-01-18 NOTE — PROGRESS NOTES
Chief Complaint   Patient presents with    Diabetes     Uncontrolled type 2 diabetes mellitus with hyperglycemia            HPI   Myke Marcial is a 58 y.o. male had concerns including Diabetes (Uncontrolled type 2 diabetes mellitus with hyperglycemia/).    He is checking blood sugars 4+ times per day with CGM. Data reviewed from the last two weeks shows average glucose 161 with variability of 27%. In target range 70%, high 27%, very high 3%, low 0%, very low <1%.   Pattern of: occasional postprandial hyperglycemia    Current medications for diabetes include metformin 500 mg twice daily, farxiga 10 mg daily, mounjaro 7.5 mg weekly, mixed insulin 30 units twice daily.  Has episodes of nausea/vomiting/diarrhea on occasion. Has been told this was gastroparesis. Doesn't recall his symptoms are worse with mounjaro. Was advised to stop ozempic in the past due to this. Symptoms seem stable.  Last episode after pizza. No issues with portion control. Hasn't picked up a pattern of GI issues.      Sometimes getting the insulin after meals - resulting in higher glucose levels.     Is on atorvastatin 80 mg daily.       The following portions of the patient's history were reviewed and updated as appropriate: allergies, current medications, past family history, past medical history, past social history, past surgical history, and problem list.      Review of Systems   Constitutional: Negative.    Endocrine:        See HPI        Physical Exam  Vitals reviewed.   Constitutional:       Appearance: Normal appearance. He is obese.      Comments: Body mass index is 37.51 kg/m².     Cardiovascular:      Rate and Rhythm: Normal rate.      Pulses:           Dorsalis pedis pulses are 2+ on the right side and 2+ on the left side.   Pulmonary:      Effort: Pulmonary effort is normal.   Feet:      Right foot:      Skin integrity: Callus present.      Toenail Condition: Fungal disease present.     Left foot:      Skin integrity:  "Callus present.      Toenail Condition: Fungal disease present.     Comments: Diabetic Foot Exam Performed and Monofilament Test Performed      Monofilament 2-3/5 left, 5/5 right    Neurological:      General: No focal deficit present.      Mental Status: He is alert. Mental status is at baseline.   Psychiatric:         Mood and Affect: Mood normal.         Behavior: Behavior normal.        /72 (BP Location: Right arm, Patient Position: Sitting, Cuff Size: Adult)   Pulse 87   Ht 175.3 cm (69\")   Wt 115 kg (254 lb)   SpO2 96%   BMI 37.51 kg/m²      Labs and imaging    CMP:  Lab Results   Component Value Date    BUN 14 10/25/2023    CREATININE 1.01 10/25/2023    EGFR 80.5 06/30/2023    BCR 13.9 10/25/2023     10/25/2023    K 4.6 10/25/2023    CO2 29.3 (H) 10/25/2023    CALCIUM 9.5 10/25/2023    ALBUMIN 4.1 10/25/2023    BILITOT 0.4 10/25/2023    ALKPHOS 112 10/25/2023    AST 16 10/25/2023    ALT 19 10/25/2023     Lipid Panel:  Lab Results   Component Value Date    CHOL 166 06/30/2023    TRIG 513 (H) 06/30/2023    HDL 31 (L) 06/30/2023    VLDL 77 (H) 06/30/2023    LDL 58 06/30/2023     HbA1c:  Lab Results   Component Value Date    HGBA1C 8.4 (A) 01/18/2024    HGBA1C 8.80 (H) 10/25/2023     Glucose:  Lab Results   Component Value Date    POCGLU 111 01/18/2024     Microalbumin:  Lab Results   Component Value Date    MALBCRERATIO 33.4 04/20/2023     TSH:  Lab Results   Component Value Date    TSH 3.620 02/15/2023       Assessment and plan  Diagnoses and all orders for this visit:    1. Uncontrolled type 2 diabetes mellitus with hyperglycemia (Primary)  Uncontrolled with hyperglycemia.  A1c down to 8.4.  Highest BGs after taking insulin late.  Complicated by microalbuminuria, gastroparesis, retinopathy, neuropathy.  Continue metformin 500 mg twice daily, Farxiga 10 mg daily, mixed insulin 30 units twice daily.  Be sure to take insulin prior to meals to avoid postprandial hyperglycemia.  Continue Mounjaro " 7.5 mg for now.  He has occasional GI upset, unclear etiology.  Was told he has gastroparesis.  Does not detect that Mounjaro has made these issues worse.  I asked him to keep a food journal when symptoms occur.  Consider dose reduction or discontinuing Mounjaro for a trial to see if symptoms improve.  Labs are up-to-date.  Urine microalbumin updated in April, lipid panel from June, metabolic panel from October.  Ophtho exam is up-to-date and the report is scanned into the chart from February.  Monofilament updated today.  -     POC Glycosylated Hemoglobin (Hb A1C)  -     POC Glucose, Blood    2. Mixed hyperlipidemia  On atorvastatin 80 mg daily.  History of CAD.  Monitor lipids yearly.  LDL at goal.  With persistent hypertriglyceridemia due in part to uncontrolled diabetes.  Management as above. Decrease foods high in fat.  -     atorvastatin (LIPITOR) 80 MG tablet; Take 1 tablet by mouth Daily.  Dispense: 90 tablet; Refill: 3         Return in about 4 months (around 5/18/2024) for next scheduled follow up. The patient was instructed to contact the clinic with any interval questions or concerns.    Ondina Garcia, DO   Endocrinologist    Please note that portions of this note were completed with a voice recognition program.

## 2024-01-25 ENCOUNTER — TELEPHONE (OUTPATIENT)
Dept: FAMILY MEDICINE CLINIC | Facility: CLINIC | Age: 59
End: 2024-01-25

## 2024-01-25 NOTE — TELEPHONE ENCOUNTER
The [facility] received a fax that requires your attention. The document has been indexed to the patient’s chart for your review.      Reason for sending: EXTERNAL MEDICAL RECORD NOTIFICATION    Documents Description: PHYSICAL THERAPY PLAN OF CARE    Name of Sender: [facility]    Date Indexed: 1/25/24    Notes (if needed): SEE FAX IN CHART UNDER THERAPY PLAN OF CARE

## 2024-01-26 DIAGNOSIS — E11.65 UNCONTROLLED TYPE 2 DIABETES MELLITUS WITH HYPERGLYCEMIA: ICD-10-CM

## 2024-01-26 DIAGNOSIS — K21.00 GASTROESOPHAGEAL REFLUX DISEASE WITH ESOPHAGITIS WITHOUT HEMORRHAGE: ICD-10-CM

## 2024-01-26 RX ORDER — METFORMIN HYDROCHLORIDE 500 MG/1
500 TABLET, EXTENDED RELEASE ORAL 2 TIMES DAILY
Qty: 180 TABLET | Refills: 0 | Status: SHIPPED | OUTPATIENT
Start: 2024-01-26

## 2024-01-26 RX ORDER — OMEPRAZOLE 20 MG/1
20 CAPSULE, DELAYED RELEASE ORAL DAILY
Qty: 90 CAPSULE | Refills: 0 | OUTPATIENT
Start: 2024-01-26

## 2024-01-26 NOTE — TELEPHONE ENCOUNTER
Rx Refill Note  Requested Prescriptions     Pending Prescriptions Disp Refills    metFORMIN ER (GLUCOPHAGE-XR) 500 MG 24 hr tablet [Pharmacy Med Name: metFORMIN HCl  MG Oral Tablet Extended Release 24 Hour] 180 tablet 0     Sig: Take 1 tablet by mouth twice daily      Last office visit with prescribing clinician: 1/18/2024     Next office visit with prescribing clinician: 6/17/2024       Aashish Toro MA  01/26/24, 10:27 EST

## 2024-02-06 ENCOUNTER — TREATMENT (OUTPATIENT)
Dept: CARDIAC REHAB | Facility: HOSPITAL | Age: 59
End: 2024-02-06
Payer: MEDICAID

## 2024-02-06 DIAGNOSIS — Z95.1 STATUS POST CORONARY ARTERY BYPASS GRAFT: Primary | ICD-10-CM

## 2024-02-06 PROCEDURE — 93798 PHYS/QHP OP CAR RHAB W/ECG: CPT

## 2024-02-06 NOTE — PROGRESS NOTES
Pt was seen today in CR for a Phase II visit. Vital signs and session notes recorded in [employer] and will be scanned into Epic by HIM.

## 2024-02-09 NOTE — PROGRESS NOTES
[facility] Office Follow Up Note    Myke Marcial  [id number]  2024    Primary Care Provider: Jaimie Nathan MD   Referring Provider: No ref. provider found    Chief Complaint: Follow-up CAD    History of Present Illness:   Mr. Myke Marcial is a 58 y.o. male who presents to the Cardiology Clinic for follow up of CAD.  The patient has a past medical history significant for type 2 diabetes mellitus, hypertension, hyperlipidemia, PRATEEK, and obesity with a BMI 38.  He has a past cardiac history significant for presentation with an NSTEMI in , being diagnosed with severe two-vessel coronary artery disease poorly suited for PCI.  He subsequently underwent a 3V CABG with a LIMA-LAD, SVG-diagonal, and SVG-OM.  She presents today for routine follow-up of CAD.  The patient reports feeling well today.  She specifically denies chest pain and exertional angina.  She denies dyspnea.  She denies palpitations, dizziness, syncope.  She denies orthopnea, PND, and lower extremity edema.  She continues to take her medications as prescribed without perceived side effects.  He offers no other complaints or concerns at this time.    Past Cardiac Testin. Last Coronary Angio: 2023, NSTEMI (referred to CT sx for CABG at this time)  1.  Moderate to severe stenosis of the ostial to proximal LAD, with severe focal stenosis in the mid LAD.  Small to moderate caliber first diagonal branch with severe ostial stenosis.  2.  Severe stenosis in the mid LCx, with subtotal occlusion of an OM 2 at the ostium which is located at the level of the trifurcation  3.  Minimal nonobstructive disease in the ramus branch.  4.  Mild nonobstructive disease throughout the RCA.  5.  Normal LVEDP, 12 mmHg.  2. Prior Stress Testing: None  3. Last Echo: 7/3/2023    Left Ventricle: The left ventricle is not well visualized, but is   grossly normal in size. The LVEF is visually estimated at 50 -  60%. The   diastolic function is normal. No regional wall motion abnormalities are   seen.    Right Ventricle: The right ventricle is normal in size. The right   ventricular systolic function is normal.     No significant valvular abnormalities noted.   4. Prior Holter Monitor: None    Review of Systems:   Review of Systems  As Noted in HPI.   I have reviewed and confirmed the accuracy of the ROS as documented by the MA/LPN/RN TAE Saul    I have reviewed and/or updated the patient's past medical, past surgical, family, social history, problem list and allergies as appropriate.     Medications:     Current Outpatient Medications:     aspirin 81 MG EC tablet, Take 1 tablet by mouth Daily., Disp: , Rfl:     atorvastatin (LIPITOR) 80 MG tablet, Take 1 tablet by mouth Daily., Disp: 90 tablet, Rfl: 3    B-D UF III MINI PEN NEEDLES 31G X 5 MM misc, USE 1  ONCE DAILY, Disp: 100 each, Rfl: 0    chlorthalidone (HYGROTON) 25 MG tablet, Take 1 tablet by mouth once daily, Disp: 90 tablet, Rfl: 0    Continuous Blood Gluc  (Dexcom G7 ) device, Use 1 each Daily., Disp: 1 each, Rfl: 0    Continuous Blood Gluc Sensor (Dexcom G7 Sensor) misc, Use 1 each Every 10 (Ten) Days., Disp: 9 each, Rfl: 3    dapagliflozin Propanediol (Farxiga) 10 MG tablet, Take 10 mg by mouth Daily., Disp: 90 tablet, Rfl: 1    insulin aspart prot & aspart (NovoLOG Mix 70/30 FlexPen) (70-30) 100 UNIT/ML suspension pen-injector injection, INJECT 30 UNITS SUBCUTANEOUSLY TWICE DAILY. INCREASE AS DIRECTED TO MAX DOSE OF 70 UNITS TWICE DAILY, Disp: 75 mL, Rfl: 1    metFORMIN ER (GLUCOPHAGE-XR) 500 MG 24 hr tablet, Take 1 tablet by mouth twice daily, Disp: 180 tablet, Rfl: 0    metoprolol tartrate (LOPRESSOR) 25 MG tablet, Take 0.5 tablets by mouth 2 (Two) Times a Day., Disp: 90 tablet, Rfl: 3    pramipexole (MIRAPEX) 0.25 MG tablet, TAKE 1 TABLET BY MOUTH THREE TIMES DAILY, Disp: 270 tablet, Rfl: 0    Tirzepatide (Mounjaro) 7.5  "MG/0.5ML solution pen-injector pen, Inject 0.5 mL under the skin into the appropriate area as directed 1 (One) Time Per Week., Disp: 2 mL, Rfl: 5    vitamin D3 125 MCG (5000 UT) capsule capsule, Take 1 capsule by mouth Daily., Disp: , Rfl:     omeprazole (priLOSEC) 20 MG capsule, Take 1 capsule by mouth once daily (Patient not taking: Reported on 2/23/2024), Disp: 30 capsule, Rfl: 3    Physical Exam:  Vital Signs:   Vitals:    02/23/24 1105   BP: 114/76   BP Location: Right arm   Patient Position: Sitting   Cuff Size: Adult   Pulse: 96   Resp: 16   Temp: 97.1 °F (36.2 °C)   TempSrc: Infrared   SpO2: 96%  Comment: RA   Weight: 116 kg (255 lb)   Height: 175.3 cm (69\")     Body mass index is 37.66 kg/m².    Physical Exam  Vitals and nursing note reviewed.   Constitutional:       General: He is not in acute distress.     Appearance: He is obese.   HENT:      Head: Normocephalic and atraumatic.   Neck:      Trachea: Trachea normal.   Cardiovascular:      Rate and Rhythm: Normal rate and regular rhythm.      Pulses: Normal pulses.      Heart sounds: Normal heart sounds. No murmur heard.     No friction rub. No gallop.   Pulmonary:      Effort: Pulmonary effort is normal.      Breath sounds: Normal breath sounds.   Musculoskeletal:      Cervical back: Neck supple.      Right lower leg: No edema.      Left lower leg: No edema.   Skin:     General: Skin is warm and dry.   Neurological:      Mental Status: He is alert and oriented to person, place, and time.   Psychiatric:         Mood and Affect: Mood normal.         Behavior: Behavior normal. Behavior is cooperative.         Thought Content: Thought content does not include suicidal ideation.         Results Review:   I reviewed the patient's new clinical results.    Procedures    Assessment / Plan:   Diagnoses and all orders for this visit:    1. Coronary artery disease involving native coronary artery of native heart without angina pectoris (Primary)  Stable and angina " free  Continue aspirin and statin therapies    2. Essential hypertension  Acceptable blood pressure control    3. Mixed hyperlipidemia  6/23, triglycerides 513, HDL 31, LDL 58  Continue statin therapy  Lipid panel for surveillance; patient understands she should go fasting for this lab draw  She is agreeable to additional medication if necessary    4. Type 2 diabetes mellitus treated with insulin  10/23, hemoglobin A1c 8.8  Hemoglobin A1c goal less than 7      Preventative Cardiology:   Tobacco Cessation: N/A   Advance Care Planning: ACP discussion was declined by the patient. Patient has an advance directive in EMR which is still valid.      Follow Up:   Return in about 6 months (around 8/23/2024) for Follow-up with Dr. Thomas.      Thank you for allowing me to participate in the care of your patient. Please do not hesitate to contact me with additional questions or concerns.     TAE Bailey

## 2024-02-12 ENCOUNTER — TREATMENT (OUTPATIENT)
Dept: CARDIAC REHAB | Facility: HOSPITAL | Age: 59
End: 2024-02-12
Payer: MEDICAID

## 2024-02-12 DIAGNOSIS — Z95.1 STATUS POST CORONARY ARTERY BYPASS GRAFT: Primary | ICD-10-CM

## 2024-02-12 PROCEDURE — 93798 PHYS/QHP OP CAR RHAB W/ECG: CPT

## 2024-02-21 RX ORDER — PRAMIPEXOLE DIHYDROCHLORIDE 0.25 MG/1
TABLET ORAL
Qty: 270 TABLET | Refills: 0 | Status: SHIPPED | OUTPATIENT
Start: 2024-02-21

## 2024-02-23 ENCOUNTER — OFFICE VISIT (OUTPATIENT)
Dept: CARDIOLOGY | Facility: CLINIC | Age: 59
End: 2024-02-23
Payer: MEDICAID

## 2024-02-23 ENCOUNTER — LAB (OUTPATIENT)
Dept: LAB | Facility: HOSPITAL | Age: 59
End: 2024-02-23
Payer: MEDICAID

## 2024-02-23 VITALS
WEIGHT: 255 LBS | SYSTOLIC BLOOD PRESSURE: 114 MMHG | DIASTOLIC BLOOD PRESSURE: 76 MMHG | TEMPERATURE: 97.1 F | BODY MASS INDEX: 37.77 KG/M2 | OXYGEN SATURATION: 96 % | RESPIRATION RATE: 16 BRPM | HEART RATE: 96 BPM | HEIGHT: 69 IN

## 2024-02-23 DIAGNOSIS — I25.10 CORONARY ARTERY DISEASE INVOLVING NATIVE CORONARY ARTERY OF NATIVE HEART WITHOUT ANGINA PECTORIS: ICD-10-CM

## 2024-02-23 DIAGNOSIS — E11.65 UNCONTROLLED TYPE 2 DIABETES MELLITUS WITH HYPERGLYCEMIA: ICD-10-CM

## 2024-02-23 DIAGNOSIS — I25.10 CORONARY ARTERY DISEASE INVOLVING NATIVE CORONARY ARTERY OF NATIVE HEART WITHOUT ANGINA PECTORIS: Primary | ICD-10-CM

## 2024-02-23 DIAGNOSIS — E78.2 MIXED HYPERLIPIDEMIA: ICD-10-CM

## 2024-02-23 DIAGNOSIS — I10 ESSENTIAL HYPERTENSION: ICD-10-CM

## 2024-02-23 PROCEDURE — 1159F MED LIST DOCD IN RCRD: CPT | Performed by: NURSE PRACTITIONER

## 2024-02-23 PROCEDURE — 3074F SYST BP LT 130 MM HG: CPT | Performed by: NURSE PRACTITIONER

## 2024-02-23 PROCEDURE — 80061 LIPID PANEL: CPT

## 2024-02-23 PROCEDURE — 1160F RVW MEDS BY RX/DR IN RCRD: CPT | Performed by: NURSE PRACTITIONER

## 2024-02-23 PROCEDURE — 99214 OFFICE O/P EST MOD 30 MIN: CPT | Performed by: NURSE PRACTITIONER

## 2024-02-23 PROCEDURE — 36415 COLL VENOUS BLD VENIPUNCTURE: CPT

## 2024-02-23 PROCEDURE — 3078F DIAST BP <80 MM HG: CPT | Performed by: NURSE PRACTITIONER

## 2024-02-24 LAB
CHOLEST SERPL-MCNC: 138 MG/DL (ref 0–200)
HDLC SERPL-MCNC: 31 MG/DL (ref 40–60)
LDLC SERPL CALC-MCNC: 44 MG/DL (ref 0–100)
LDLC/HDLC SERPL: 0.66 {RATIO}
TRIGL SERPL-MCNC: 432 MG/DL (ref 0–150)
VLDLC SERPL-MCNC: 63 MG/DL (ref 5–40)

## 2024-02-26 ENCOUNTER — TELEPHONE (OUTPATIENT)
Dept: GASTROENTEROLOGY | Facility: CLINIC | Age: 59
End: 2024-02-26
Payer: MEDICAID

## 2024-02-26 ENCOUNTER — TREATMENT (OUTPATIENT)
Dept: CARDIAC REHAB | Facility: HOSPITAL | Age: 59
End: 2024-02-26
Payer: MEDICAID

## 2024-02-26 ENCOUNTER — TELEPHONE (OUTPATIENT)
Dept: CARDIOLOGY | Facility: CLINIC | Age: 59
End: 2024-02-26
Payer: MEDICAID

## 2024-02-26 DIAGNOSIS — K21.00 GASTROESOPHAGEAL REFLUX DISEASE WITH ESOPHAGITIS WITHOUT HEMORRHAGE: Primary | ICD-10-CM

## 2024-02-26 DIAGNOSIS — E78.2 MIXED HYPERLIPIDEMIA: Primary | ICD-10-CM

## 2024-02-26 DIAGNOSIS — Z95.1 STATUS POST CORONARY ARTERY BYPASS GRAFT: Primary | ICD-10-CM

## 2024-02-26 PROCEDURE — 93798 PHYS/QHP OP CAR RHAB W/ECG: CPT

## 2024-02-26 RX ORDER — OMEPRAZOLE 20 MG/1
20 CAPSULE, DELAYED RELEASE ORAL DAILY
Qty: 90 CAPSULE | Refills: 1 | Status: SHIPPED | OUTPATIENT
Start: 2024-02-26

## 2024-02-26 RX ORDER — FENOFIBRATE 145 MG/1
145 TABLET, COATED ORAL DAILY
Qty: 90 TABLET | Refills: 3 | Status: SHIPPED | OUTPATIENT
Start: 2024-02-26

## 2024-02-26 NOTE — TELEPHONE ENCOUNTER
I reviewed his labs from Friday, and his triglycerides are still >400.  START fenofibrate.  Follow a low-fat/Mediterranean-style diet.  Please let me know if he would like a referral to speak with a registered dietician.

## 2024-02-26 NOTE — TELEPHONE ENCOUNTER
----- Message from Ezra Rodriguez MA sent at 2/26/2024  1:22 PM EST -----  Regarding: FW: Medication Refill Request  Last visit 1/10/23  ----- Message -----  From: Evelin Banegas RegSched Rep  Sent: 2/26/2024  10:50 AM EST  To: Ezra Rodriguez MA  Subject: Medication Refill Request                        Navi,    Patient stopped by office today to request refill for Omeprazole 20mg.    Thank you.  Evelin

## 2024-02-26 NOTE — PROGRESS NOTES
Pt was seen today in CR for a Phase 2 visit.  Vital signs and session notes recorded in [employer] and will be scanned into Epic by HIM.

## 2024-03-13 ENCOUNTER — TREATMENT (OUTPATIENT)
Dept: CARDIAC REHAB | Facility: HOSPITAL | Age: 59
End: 2024-03-13
Payer: MEDICAID

## 2024-03-13 DIAGNOSIS — Z95.1 STATUS POST CORONARY ARTERY BYPASS GRAFT: Primary | ICD-10-CM

## 2024-03-13 PROCEDURE — 93798 PHYS/QHP OP CAR RHAB W/ECG: CPT

## 2024-03-13 NOTE — PROGRESS NOTES
Pt was seen today in CR for a Phase 2 visit.  Vital signs and session notes recorded in [employer] and will be scanned into Epic by HIM.

## 2024-03-20 ENCOUNTER — APPOINTMENT (OUTPATIENT)
Dept: CARDIAC REHAB | Facility: HOSPITAL | Age: 59
End: 2024-03-20
Payer: MEDICAID

## 2024-03-26 ENCOUNTER — TREATMENT (OUTPATIENT)
Dept: CARDIAC REHAB | Facility: HOSPITAL | Age: 59
End: 2024-03-26
Payer: MEDICAID

## 2024-03-26 DIAGNOSIS — Z95.1 STATUS POST CORONARY ARTERY BYPASS GRAFT: Primary | ICD-10-CM

## 2024-03-26 PROCEDURE — 93798 PHYS/QHP OP CAR RHAB W/ECG: CPT

## 2024-03-26 NOTE — PROGRESS NOTES
Pt was seen today in CR for a Phase 2 visit.  Vital signs and session notes recorded in [employer] and will be scanned into Epic by HIM.

## 2024-04-01 ENCOUNTER — APPOINTMENT (OUTPATIENT)
Dept: CARDIAC REHAB | Facility: HOSPITAL | Age: 59
End: 2024-04-01
Payer: MEDICAID

## 2024-04-10 RX ORDER — CHLORTHALIDONE 25 MG/1
25 TABLET ORAL DAILY
Qty: 90 TABLET | Refills: 0 | Status: SHIPPED | OUTPATIENT
Start: 2024-04-10

## 2024-04-22 ENCOUNTER — TREATMENT (OUTPATIENT)
Dept: CARDIAC REHAB | Facility: HOSPITAL | Age: 59
End: 2024-04-22
Payer: MEDICAID

## 2024-04-22 DIAGNOSIS — Z95.1 STATUS POST CORONARY ARTERY BYPASS GRAFT: Primary | ICD-10-CM

## 2024-04-22 DIAGNOSIS — E11.65 UNCONTROLLED TYPE 2 DIABETES MELLITUS WITH HYPERGLYCEMIA: ICD-10-CM

## 2024-04-22 PROCEDURE — 93798 PHYS/QHP OP CAR RHAB W/ECG: CPT

## 2024-04-22 RX ORDER — METFORMIN HYDROCHLORIDE 500 MG/1
500 TABLET, EXTENDED RELEASE ORAL 2 TIMES DAILY
Qty: 180 TABLET | Refills: 1 | Status: SHIPPED | OUTPATIENT
Start: 2024-04-22

## 2024-04-22 NOTE — TELEPHONE ENCOUNTER
Rx Refill Note  Requested Prescriptions     Pending Prescriptions Disp Refills    metFORMIN ER (GLUCOPHAGE-XR) 500 MG 24 hr tablet [Pharmacy Med Name: metFORMIN HCl  MG Oral Tablet Extended Release 24 Hour] 180 tablet 0     Sig: Take 1 tablet by mouth twice daily        Last office visit with prescribing clinician: 1/18/24     Next office visit with prescribing clinician: 6/17/24      Ericka Orona (Jodi)  04/22/24, 12:53 EDT

## 2024-04-22 NOTE — PROGRESS NOTES
Pt was seen today in CR for a Phase II visit. Vital signs and session notes recorded in [employer] and will be scanned into Epic by HIM.

## 2024-04-29 DIAGNOSIS — E11.65 UNCONTROLLED TYPE 2 DIABETES MELLITUS WITH HYPERGLYCEMIA: ICD-10-CM

## 2024-04-29 RX ORDER — INSULIN ASPART 100 [IU]/ML
INJECTION, SUSPENSION SUBCUTANEOUS
Qty: 75 ML | Refills: 0 | Status: SHIPPED | OUTPATIENT
Start: 2024-04-29

## 2024-04-29 NOTE — TELEPHONE ENCOUNTER
Rx Refill Note  Requested Prescriptions     Pending Prescriptions Disp Refills    NovoLOG 70/30 FlexPen ReliOn (70-30) 100 UNIT/ML suspension pen-injector injection [Pharmacy Med Name: NovoLOG 70/30 FlexPen ReliOn (70-30) 100 UNIT/ML Subcutaneous Suspension Pen-injector] 75 mL 0     Sig: INJECT 30 UNITS SUBCUTANEOUSLY TWICE DAILY INCREASE  AS  DIRECTED  TO  MAX  DOSE  OF  70  UNITS  TWICE  DAILY      Last office visit with prescribing clinician: 1/18/2024   Last telemedicine visit with prescribing clinician: Visit date not found   Next office visit with prescribing clinician: 6/17/2024                         Would you like a call back once the refill request has been completed: [] Yes [] No    If the office needs to give you a call back, can they leave a voicemail: [] Yes [] No    Melissa Castro MA  04/29/24, 14:34 EDT

## 2024-05-01 ENCOUNTER — OFFICE VISIT (OUTPATIENT)
Dept: FAMILY MEDICINE CLINIC | Facility: CLINIC | Age: 59
End: 2024-05-01
Payer: MEDICAID

## 2024-05-01 VITALS
DIASTOLIC BLOOD PRESSURE: 70 MMHG | HEIGHT: 69 IN | BODY MASS INDEX: 38.42 KG/M2 | HEART RATE: 82 BPM | OXYGEN SATURATION: 97 % | SYSTOLIC BLOOD PRESSURE: 116 MMHG | WEIGHT: 259.4 LBS

## 2024-05-01 DIAGNOSIS — Z51.81 MEDICATION MONITORING ENCOUNTER: ICD-10-CM

## 2024-05-01 DIAGNOSIS — Z00.00 WELL ADULT EXAM: Primary | ICD-10-CM

## 2024-05-01 DIAGNOSIS — E11.319 DIABETIC RETINOPATHY ASSOCIATED WITH TYPE 2 DIABETES MELLITUS, MACULAR EDEMA PRESENCE UNSPECIFIED, UNSPECIFIED LATERALITY, UNSPECIFIED RETINOPATHY SEVERITY: ICD-10-CM

## 2024-05-01 DIAGNOSIS — K76.0 FATTY LIVER: ICD-10-CM

## 2024-05-01 DIAGNOSIS — Z12.5 SCREENING FOR PROSTATE CANCER: ICD-10-CM

## 2024-05-01 DIAGNOSIS — Z23 NEED FOR HEPATITIS B BOOSTER VACCINATION: ICD-10-CM

## 2024-05-01 DIAGNOSIS — E78.2 MIXED HYPERLIPIDEMIA: ICD-10-CM

## 2024-05-01 DIAGNOSIS — E11.65 UNCONTROLLED TYPE 2 DIABETES MELLITUS WITH HYPERGLYCEMIA: ICD-10-CM

## 2024-05-01 DIAGNOSIS — I10 ESSENTIAL HYPERTENSION: ICD-10-CM

## 2024-05-01 PROBLEM — M20.42 ACQUIRED HAMMER TOE OF LEFT FOOT: Status: ACTIVE | Noted: 2022-11-27

## 2024-05-01 PROBLEM — B35.1 ONYCHOMYCOSIS: Status: ACTIVE | Noted: 2022-11-27

## 2024-05-01 PROBLEM — E11.42 DIABETIC PERIPHERAL NEUROPATHY ASSOCIATED WITH TYPE 2 DIABETES MELLITUS: Status: ACTIVE | Noted: 2022-11-27

## 2024-05-01 PROBLEM — M20.41 ACQUIRED HAMMER TOE OF RIGHT FOOT: Status: ACTIVE | Noted: 2022-11-27

## 2024-05-01 NOTE — PROGRESS NOTES
Subjective   Myke Marcial is a 58 y.o. male.     History of Present Illness  Here today for annual physical.      Chronic med conditions include uncontrolled IDDM with retinopathy, gastroparesis, HLP,  CAD, morbid obesity, PRATEEK on CPAP and depression. Diabetes managed per Dr. Corona. Also followed by sleep medicine. Reports using CPAP as rx'd.     Father recently with h/o prostate cancer. No family h/o premature CAD or colon CA.     Non smoker, no h/o smoking.     No EtOH or illicit substance use.     , heterosexual monogamous relationship. He denies concern regarding need for STD screening.     Previously self employed as contractor/construction. Currently working to obtain his disability as he has not been able to return to gainful employment since CABG last year.     Varied diet, high starch, high calorie, high salt.     Generally sedentary.     Firearms in home, safely stored.     UTD on vaccinations other than Shingrix and needs 3rd Hep B booster.     Overdue for dental exam.     Sees optometry and retinal specialist regularly. Has required injections in the past. Currently stable. No injections rec'd at this time. Eye exam 2/2024, [facility]. Also had f/u with retinal speciaslst.    Last dental visit march 2023,   Denies hearing loss.    Pt's previous HPI reviewed and updated as indicated.     The following portions of the patient's history were reviewed and updated as appropriate: allergies, current medications, past family history, past medical history, past social history, past surgical history, and problem list.    Review of Systems   Constitutional:  Positive for fatigue and unexpected weight change. Negative for fever.   HENT:  Positive for congestion and postnasal drip. Negative for nosebleeds, rhinorrhea, sore throat, tinnitus and trouble swallowing.    Eyes:  Negative for visual disturbance.   Respiratory:  Positive for shortness of breath (with exertion). Negative for cough and  wheezing.    Cardiovascular:  Positive for leg swelling (mild, stable). Negative for chest pain and palpitations.   Gastrointestinal:  Positive for nausea (mild, intermittent). Negative for abdominal pain, blood in stool, constipation and vomiting.        Heartburn   Endocrine: Positive for heat intolerance.   Genitourinary:  Negative for decreased urine volume, difficulty urinating, dysuria and hematuria.        ED   Musculoskeletal:  Positive for arthralgias and back pain (chronic, stable).   Skin:  Negative for rash and wound.   Neurological:  Negative for dizziness, syncope, weakness and headaches.   Hematological:  Negative for adenopathy. Does not bruise/bleed easily.   Psychiatric/Behavioral:  Negative for confusion, dysphoric mood, sleep disturbance and suicidal ideas. The patient is not nervous/anxious.    Pt's previous ROS reviewed and updated as indicated.       Objective   Vitals:    05/01/24 0935   BP: 116/70   Pulse: 82   SpO2: 97%     Body mass index is 38.31 kg/m².      05/01/24  0935   Weight: 118 kg (259 lb 6.4 oz)       Physical Exam  Vitals and nursing note reviewed.   Constitutional:       General: He is not in acute distress.     Appearance: He is well-groomed. He is obese. He is not ill-appearing.   HENT:      Head: Atraumatic.      Right Ear: Tympanic membrane and ear canal normal.      Left Ear: Tympanic membrane and ear canal normal.      Nose: Nose normal.      Mouth/Throat:      Mouth: Mucous membranes are moist. No oral lesions.      Pharynx: Oropharynx is clear.   Eyes:      General: No scleral icterus.     Conjunctiva/sclera: Conjunctivae normal.   Neck:      Thyroid: No thyroid mass.      Vascular: Normal carotid pulses. No carotid bruit.   Cardiovascular:      Rate and Rhythm: Normal rate and regular rhythm.      Pulses: Normal pulses.      Heart sounds: Normal heart sounds.   Pulmonary:      Effort: Pulmonary effort is normal.      Breath sounds: Normal breath sounds.   Abdominal:       General: Abdomen is protuberant. Bowel sounds are normal. There is no distension.      Palpations: Abdomen is soft. There is no hepatomegaly, splenomegaly or mass.      Tenderness: There is no abdominal tenderness.      Comments: Exam limited by body habitus. Diastasis recti   Musculoskeletal:      Right lower leg: No edema.      Left lower leg: No edema.   Lymphadenopathy:      Cervical: No cervical adenopathy.   Skin:     General: Skin is warm and dry.      Coloration: Skin is not jaundiced or pale.   Neurological:      Mental Status: He is alert and oriented to person, place, and time.      Gait: Gait is intact.   Psychiatric:         Mood and Affect: Mood and affect normal.         Behavior: Behavior normal. Behavior is cooperative.         Cognition and Memory: Cognition normal.     Pt's previous physical exam reviewed and updated as indicated.    Lab Results   Component Value Date    PSA 0.589 02/15/2023       Assessment & Plan   Diagnoses and all orders for this visit:    1. Well adult exam (Primary)  -     Hepatitis B Vaccine Adult IM (ENERGIX/RECOMBIVAX)    2. Need for hepatitis B booster vaccination  -     Hepatitis B Vaccine Adult IM (ENERGIX/RECOMBIVAX)    3. Uncontrolled type 2 diabetes mellitus with hyperglycemia  -     Hemoglobin A1c  -     CBC & Differential  -     Comprehensive Metabolic Panel  -     TSH Rfx On Abnormal To Free T4  -     Microalbumin / Creatinine Urine Ratio - Urine, Clean Catch    4. Mixed hyperlipidemia  -     Comprehensive Metabolic Panel    5. Fatty liver  -     CBC & Differential  -     Comprehensive Metabolic Panel    6. Essential hypertension  -     CBC & Differential  -     Comprehensive Metabolic Panel  -     TSH Rfx On Abnormal To Free T4    7. Diabetic retinopathy associated with type 2 diabetes mellitus, macular edema presence unspecified, unspecified laterality, unspecified retinopathy severity    8. Medication monitoring encounter  -     CBC & Differential  -      Comprehensive Metabolic Panel    9. Screening for prostate cancer  -     PSA Screen       Age appropriate preventive care reviewed including cancer screenings, safety measures, mental health concerns, supplements, prevention of chronic disease, etc. Handout provided.    Patient encouraged to take meds as rx'd, follow diabetic appropriate diet, exercise daily, perform feet check daily, and have yearly diabetic eye exams.    F/u with endocrinology as scheduled.     It is my medical opinion that he is not longer able to be gainfully employed.    Routine f/u in 6 months, sooner as needed/instructed.  I will contact patient regarding test results and provide instructions regarding any necessary changes in plan of care.  Patient was encouraged to keep me informed of any acute changes, or any new concerning symptoms.  Pt is aware of reasons to seek emergent care.  Patient voiced understanding of all instructions and denied further questions.    Please note that portions of this note may have been completed with a voice recognition program.

## 2024-05-02 ENCOUNTER — OFFICE VISIT (OUTPATIENT)
Dept: GASTROENTEROLOGY | Facility: CLINIC | Age: 59
End: 2024-05-02
Payer: MEDICAID

## 2024-05-02 VITALS
DIASTOLIC BLOOD PRESSURE: 68 MMHG | BODY MASS INDEX: 38.69 KG/M2 | SYSTOLIC BLOOD PRESSURE: 122 MMHG | WEIGHT: 262 LBS | OXYGEN SATURATION: 97 % | HEART RATE: 86 BPM

## 2024-05-02 DIAGNOSIS — Z86.010 HISTORY OF ADENOMATOUS POLYP OF COLON: ICD-10-CM

## 2024-05-02 DIAGNOSIS — K21.00 GASTROESOPHAGEAL REFLUX DISEASE WITH ESOPHAGITIS WITHOUT HEMORRHAGE: Primary | ICD-10-CM

## 2024-05-02 LAB
ALBUMIN SERPL-MCNC: 4.4 G/DL (ref 3.5–5.2)
ALBUMIN/CREAT UR: 12 MG/G CREAT (ref 0–29)
ALBUMIN/GLOB SERPL: 1.5 G/DL
ALP SERPL-CCNC: 84 U/L (ref 39–117)
ALT SERPL-CCNC: 18 U/L (ref 1–41)
AST SERPL-CCNC: 16 U/L (ref 1–40)
BASOPHILS # BLD AUTO: 0.05 10*3/MM3 (ref 0–0.2)
BASOPHILS NFR BLD AUTO: 0.7 % (ref 0–1.5)
BILIRUB SERPL-MCNC: 0.4 MG/DL (ref 0–1.2)
BUN SERPL-MCNC: 29 MG/DL (ref 6–20)
BUN/CREAT SERPL: 22 (ref 7–25)
CALCIUM SERPL-MCNC: 10.1 MG/DL (ref 8.6–10.5)
CHLORIDE SERPL-SCNC: 97 MMOL/L (ref 98–107)
CO2 SERPL-SCNC: 26.5 MMOL/L (ref 22–29)
CREAT SERPL-MCNC: 1.32 MG/DL (ref 0.76–1.27)
CREAT UR-MCNC: 85.2 MG/DL
EGFRCR SERPLBLD CKD-EPI 2021: 62.9 ML/MIN/1.73
EOSINOPHIL # BLD AUTO: 0.06 10*3/MM3 (ref 0–0.4)
EOSINOPHIL NFR BLD AUTO: 0.9 % (ref 0.3–6.2)
ERYTHROCYTE [DISTWIDTH] IN BLOOD BY AUTOMATED COUNT: 13.9 % (ref 12.3–15.4)
GLOBULIN SER CALC-MCNC: 3 GM/DL
GLUCOSE SERPL-MCNC: 122 MG/DL (ref 65–99)
HBA1C MFR BLD: 8.2 % (ref 4.8–5.6)
HCT VFR BLD AUTO: 51.9 % (ref 37.5–51)
HGB BLD-MCNC: 17 G/DL (ref 13–17.7)
IMM GRANULOCYTES # BLD AUTO: 0.02 10*3/MM3 (ref 0–0.05)
IMM GRANULOCYTES NFR BLD AUTO: 0.3 % (ref 0–0.5)
LYMPHOCYTES # BLD AUTO: 1.47 10*3/MM3 (ref 0.7–3.1)
LYMPHOCYTES NFR BLD AUTO: 21 % (ref 19.6–45.3)
MCH RBC QN AUTO: 28.6 PG (ref 26.6–33)
MCHC RBC AUTO-ENTMCNC: 32.8 G/DL (ref 31.5–35.7)
MCV RBC AUTO: 87.2 FL (ref 79–97)
MICROALBUMIN UR-MCNC: 10.1 UG/ML
MONOCYTES # BLD AUTO: 0.49 10*3/MM3 (ref 0.1–0.9)
MONOCYTES NFR BLD AUTO: 7 % (ref 5–12)
NEUTROPHILS # BLD AUTO: 4.91 10*3/MM3 (ref 1.7–7)
NEUTROPHILS NFR BLD AUTO: 70.1 % (ref 42.7–76)
NRBC BLD AUTO-RTO: 0 /100 WBC (ref 0–0.2)
PLATELET # BLD AUTO: 354 10*3/MM3 (ref 140–450)
POTASSIUM SERPL-SCNC: 4.2 MMOL/L (ref 3.5–5.2)
PROT SERPL-MCNC: 7.4 G/DL (ref 6–8.5)
RBC # BLD AUTO: 5.95 10*6/MM3 (ref 4.14–5.8)
SODIUM SERPL-SCNC: 136 MMOL/L (ref 136–145)
TSH SERPL DL<=0.005 MIU/L-ACNC: 4.29 UIU/ML (ref 0.27–4.2)
WBC # BLD AUTO: 7 10*3/MM3 (ref 3.4–10.8)

## 2024-05-02 PROCEDURE — 3074F SYST BP LT 130 MM HG: CPT | Performed by: PHYSICIAN ASSISTANT

## 2024-05-02 PROCEDURE — 1159F MED LIST DOCD IN RCRD: CPT | Performed by: PHYSICIAN ASSISTANT

## 2024-05-02 PROCEDURE — 3078F DIAST BP <80 MM HG: CPT | Performed by: PHYSICIAN ASSISTANT

## 2024-05-02 PROCEDURE — 1160F RVW MEDS BY RX/DR IN RCRD: CPT | Performed by: PHYSICIAN ASSISTANT

## 2024-05-02 PROCEDURE — 99214 OFFICE O/P EST MOD 30 MIN: CPT | Performed by: PHYSICIAN ASSISTANT

## 2024-05-02 RX ORDER — BISACODYL 5 MG/1
TABLET, DELAYED RELEASE ORAL
Qty: 4 TABLET | Refills: 0 | Status: SHIPPED | OUTPATIENT
Start: 2024-05-02

## 2024-05-02 RX ORDER — SODIUM CHLORIDE 9 MG/ML
30 INJECTION, SOLUTION INTRAVENOUS CONTINUOUS PRN
OUTPATIENT
Start: 2024-05-02

## 2024-05-02 NOTE — PROGRESS NOTES
Follow Up Note     Date: 2024   Patient Name: Myke Marcial  MRN: [id number]  : 1965     Primary Care Provider: Jaimie Nathan MD     Chief Complaint   Patient presents with    Heartburn     History of present illness:   2024  Myke Marcial is a 58 y.o. male who is here today for follow up regarding Heartburn.    He has been taking omeprazole 20 mg once daily with good improvements in heartburn symptoms. He has not been able to stop taking it or reflux comes back. No dysphagia. No current abdominal pain. No vomiting. Has intermittent nausea. On Mounjaro now.     He is having mostly daily stools, sometimes with mild constipation. Had MI last summer 2023.     Interval History:  2022  Patient has been having issues with nausea and intermittent vomiting for the past over several months. He feels that food is sitting in the stomach prolonged period of time and has to through up with relief intermittently. No abdominal pain as such but feels burning sensation in the epigastrium. He burps excessively as well. He will have occasional heartburn depend on the diet. On prilosec as needed now.  Pt denies odynophagia or dysphagia.  He had a esophagram done on 2022 which revealed small hiatal hernia and esophageal dysmotility.  He has a poorly controlled diabetes mellitus on insulin.  His A1c improved from 11-8 recently. GB still there.      This patient deny any hematochezia or melena. He will have occasional lose stool and constipation. Normally will have 1-2 soft BM daily. Weight is stable. There is no history of anemia. No prior history of EGD.  He had a colonoscopy done in 2018 by Dr. Ahuja and was reported as having a 7 mm colon polyp in the transverse colon which was removed with hot biopsy forceps.  Biopsy revealed only benign colonic mucosa.  Reported as having a minimal mid ascending colon mucosal erythema and biopsies showed only lymphoid aggregate. No family  history of colon cancer. Grandmother had Breast cancer. No history of any abdominal surgery. Denies alcohol abuse or cigarette smoking.     Subjective     Past Medical History:   Diagnosis Date    Anxiety and depression     Arthritis     LOWER BACK    Colon cancer screening 06/18/2018    Added automatically from request for surgery [id number]    Diabetes mellitus     Diabetic foot ulcer 11/29/2018    Ear drum perforation, right     Elevated cholesterol     GERD (gastroesophageal reflux disease)     Gynecomastia 2019    Hernia     Hyperlipidemia     Hypertension     Myocardial infarction     Refusal of blood product     Sleep apnea, obstructive     CPAP    Type 2 diabetes mellitus     Vitamin D deficiency disease 08/26/2016    Wears glasses     for reading only     Past Surgical History:   Procedure Laterality Date    CARDIAC CATHETERIZATION N/A 06/30/2023    Procedure: Coronary angiography;  Surgeon: Irwin Thomas MD;  Location: [facility] CATH INVASIVE LOCATION;  Service: Cardiovascular;  Laterality: N/A;    CATARACT EXTRACTION, BILATERAL      COLONOSCOPY N/A 07/10/2018    Procedure: COLONOSCOPY WITH POLYPECTOMIES;  Surgeon: Musa Berger MD;  Location: [facility] ENDOSCOPY;  Service: Gastroenterology    COLONOSCOPY N/A 08/16/2022    Procedure: COLONOSCOPY;  Surgeon: Brittany Goins MD;  Location: [facility] ENDOSCOPY;  Service: Gastroenterology;  Laterality: N/A;    CORONARY ARTERY BYPASS GRAFT  07/2023    ENDOSCOPY N/A 03/17/2022    Procedure: ESOPHAGOGASTRODUODENOSCOPY with biopsies;  Surgeon: Brittany Goins MD;  Location: [facility] ENDOSCOPY;  Service: Gastroenterology;  Laterality: N/A;    MUSCLE REPAIR Right     biceps tendon     Family History   Problem Relation Age of Onset    Sleep apnea Mother     Hypertension Father     Hyperlipidemia Father     Prostate cancer Father     Cancer Maternal Grandmother     Cancer Paternal Grandmother     Colon cancer Neg Hx     Liver cancer Neg Hx     Rectal cancer Neg  Hx     Stomach cancer Neg Hx      Social History     Socioeconomic History    Marital status:    Tobacco Use    Smoking status: Never     Passive exposure: Never    Smokeless tobacco: Never   Vaping Use    Vaping status: Never Used   Substance and Sexual Activity    Alcohol use: Not Currently    Drug use: Never    Sexual activity: Not Currently     Partners: Female     Birth control/protection: Condom     Current Outpatient Medications:     aspirin 81 MG EC tablet, Take 1 tablet by mouth Daily., Disp: , Rfl:     atorvastatin (LIPITOR) 80 MG tablet, Take 1 tablet by mouth Daily., Disp: 90 tablet, Rfl: 3    B-D UF III MINI PEN NEEDLES 31G X 5 MM misc, USE 1  ONCE DAILY, Disp: 100 each, Rfl: 0    chlorthalidone (HYGROTON) 25 MG tablet, Take 1 tablet by mouth once daily, Disp: 90 tablet, Rfl: 0    Continuous Blood Gluc  (Dexcom G7 ) device, Use 1 each Daily., Disp: 1 each, Rfl: 0    Continuous Blood Gluc Sensor (Dexcom G7 Sensor) misc, Use 1 each Every 10 (Ten) Days., Disp: 9 each, Rfl: 3    dapagliflozin Propanediol (Farxiga) 10 MG tablet, Take 10 mg by mouth Daily., Disp: 90 tablet, Rfl: 1    fenofibrate (TRICOR) 145 MG tablet, Take 1 tablet by mouth Daily., Disp: 90 tablet, Rfl: 3    metFORMIN ER (GLUCOPHAGE-XR) 500 MG 24 hr tablet, Take 1 tablet by mouth twice daily, Disp: 180 tablet, Rfl: 1    metoprolol tartrate (LOPRESSOR) 25 MG tablet, Take 0.5 tablets by mouth 2 (Two) Times a Day., Disp: 90 tablet, Rfl: 3    NovoLOG 70/30 FlexPen ReliOn (70-30) 100 UNIT/ML suspension pen-injector injection, INJECT 30 UNITS SUBCUTANEOUSLY TWICE DAILY INCREASE  AS  DIRECTED  TO  MAX  DOSE  OF  70  UNITS  TWICE  DAILY, Disp: 75 mL, Rfl: 0    omeprazole (priLOSEC) 20 MG capsule, Take 1 capsule by mouth Daily., Disp: 90 capsule, Rfl: 1    pramipexole (MIRAPEX) 0.25 MG tablet, TAKE 1 TABLET BY MOUTH THREE TIMES DAILY, Disp: 270 tablet, Rfl: 0    Tirzepatide (Mounjaro) 7.5 MG/0.5ML solution pen-injector pen,  Inject 0.5 mL under the skin into the appropriate area as directed 1 (One) Time Per Week., Disp: 2 mL, Rfl: 5    vitamin D3 125 MCG (5000 UT) capsule capsule, Take 1 capsule by mouth Daily., Disp: , Rfl:     Allergies   Allergen Reactions    Ace Inhibitors Cough    Bee Venom Anaphylaxis    Penicillins Swelling       The following portions of the patient's history were reviewed and updated as appropriate: allergies, current medications, past family history, past medical history, past social history, past surgical history and problem list.    Objective     Physical Exam  Constitutional:       General: He is not in acute distress.     Appearance: Normal appearance. He is well-developed. He is obese. He is not diaphoretic.   HENT:      Head: Normocephalic and atraumatic.      Right Ear: External ear normal.      Left Ear: External ear normal.      Nose: Nose normal.   Eyes:      General: No scleral icterus.        Right eye: No discharge.         Left eye: No discharge.      Conjunctiva/sclera: Conjunctivae normal.   Neck:      Trachea: No tracheal deviation.   Pulmonary:      Effort: Pulmonary effort is normal. No respiratory distress.   Musculoskeletal:         General: Normal range of motion.      Cervical back: Normal range of motion.   Skin:     Coloration: Skin is not pale.      Findings: No erythema or rash.   Neurological:      Mental Status: He is alert and oriented to person, place, and time.      Coordination: Coordination normal.   Psychiatric:         Mood and Affect: Mood normal.         Behavior: Behavior normal.         Thought Content: Thought content normal.         Judgment: Judgment normal.       Vitals:    05/02/24 0816   BP: 122/68   Pulse: 86   SpO2: 97%   Weight: 119 kg (262 lb)     Results Review:   I reviewed the patient's new clinical results.    Office Visit on 05/01/2024   Component Date Value Ref Range Status    Hemoglobin A1C 05/01/2024 8.20 (H)  4.80 - 5.60 % Final    Comment: Hemoglobin  A1C Ranges:  Increased Risk for Diabetes  5.7% to 6.4%  Diabetes                     >= 6.5%  Diabetic Goal                < 7.0%      WBC 05/01/2024 7.00  3.40 - 10.80 10*3/mm3 Final    RBC 05/01/2024 5.95 (H)  4.14 - 5.80 10*6/mm3 Final    Hemoglobin 05/01/2024 17.0  13.0 - 17.7 g/dL Final    Hematocrit 05/01/2024 51.9 (H)  37.5 - 51.0 % Final    MCV 05/01/2024 87.2  79.0 - 97.0 fL Final    MCH 05/01/2024 28.6  26.6 - 33.0 pg Final    MCHC 05/01/2024 32.8  31.5 - 35.7 g/dL Final    RDW 05/01/2024 13.9  12.3 - 15.4 % Final    Platelets 05/01/2024 354  140 - 450 10*3/mm3 Final    Glucose 05/01/2024 122 (H)  65 - 99 mg/dL Final    BUN 05/01/2024 29 (H)  6 - 20 mg/dL Final    Creatinine 05/01/2024 1.32 (H)  0.76 - 1.27 mg/dL Final    EGFR Result 05/01/2024 62.9  >60.0 mL/min/1.73 Final    Comment: GFR Normal >60  Chronic Kidney Disease <60  Kidney Failure <15      BUN/Creatinine Ratio 05/01/2024 22.0  7.0 - 25.0 Final    Sodium 05/01/2024 136  136 - 145 mmol/L Final    Potassium 05/01/2024 4.2  3.5 - 5.2 mmol/L Final    Chloride 05/01/2024 97 (L)  98 - 107 mmol/L Final    Total CO2 05/01/2024 26.5  22.0 - 29.0 mmol/L Final    Calcium 05/01/2024 10.1  8.6 - 10.5 mg/dL Final    Total Protein 05/01/2024 7.4  6.0 - 8.5 g/dL Final    Albumin 05/01/2024 4.4  3.5 - 5.2 g/dL Final    Globulin 05/01/2024 3.0  gm/dL Final    A/G Ratio 05/01/2024 1.5  g/dL Final    Total Bilirubin 05/01/2024 0.4  0.0 - 1.2 mg/dL Final    Alkaline Phosphatase 05/01/2024 84  39 - 117 U/L Final    AST (SGOT) 05/01/2024 16  1 - 40 U/L Final    ALT (SGPT) 05/01/2024 18  1 - 41 U/L Final    TSH 05/01/2024 4.290 (H)  0.270 - 4.200 uIU/mL Final    Creatinine, Urine 05/01/2024 85.2  Not Estab. mg/dL Final    Microalbumin, Urine 05/01/2024 10.1  Not Estab. ug/mL Final    Microalbumin/Creatinine Ratio 05/01/2024 12  0 - 29 mg/g creat Final    Comment:                        Normal:                0 -  29                         Moderately increased: 30 -  300                         Severely increased:       >300     Lab on 02/23/2024   Component Date Value Ref Range Status    Total Cholesterol 02/23/2024 138  0 - 200 mg/dL Final    Triglycerides 02/23/2024 432 (H)  0 - 150 mg/dL Final    HDL Cholesterol 02/23/2024 31 (L)  40 - 60 mg/dL Final    LDL Cholesterol  02/23/2024 44  0 - 100 mg/dL Final    VLDL Cholesterol 02/23/2024 63 (H)  5 - 40 mg/dL Final    LDL/HDL Ratio 02/23/2024 0.66   Final      FL Esophagram Complete Single Contrast  Result Date: 2/16/2022  Small type I hiatal hernia and esophageal dysmotility.    This report was signed and finalized on 2/16/2022 11:17 AM by Juan Jade M.D..     US Renal Bilateral  Result Date: 2/11/2022  1. Unremarkable appearance of the liver. 2. Fatty infiltration of the liver.  This report was signed and finalized on 2/11/2022 3:02 PM by Kyler Morris MD.     EGD was completed by Dr. Goins on 3/17/2022:  - The oropharynx was normal.  - The Z-line was irregular and was found 39 cm from the incisors.  - LA Grade B (one or more mucosal breaks greater than 5 mm, not extending between the tops of two mucosal folds)  esophagitis with no bleeding was found in the lower third of the esophagus.  - Few linear and superficial esophageal ulcers with no stigmata of recent bleeding were found in the lower third of the  esophagus.  - A medium amount of food (residue) was found in the gastric fundus.  - Diffuse mildly erythematous mucosa without bleeding was found on the posterior wall of the stomach, in the gastric  antrum and in the prepyloric region of the stomach. Biopsies were taken with a cold forceps for Helicobacter pylori  testing.  - The duodenal bulb, first portion of the duodenum, second portion of the duodenum and third portion of the duodenum  were normal. Biopsies for histology were taken with a cold forceps for evaluation of celiac disease.  Pathology showed normal duodenum, gastric biopsy negative for H pylori       Colonoscopy by Dr. Goins 8/16/2022  - The perianal and digital rectal examinations were normal.  - A 3 to 4 mm polyp was found in the cecum. The polyp was flat. The polyp was removed with a cold snare. Resection and retrieval were complete.  - Four sessile polyps were found in the proximal ascending colon, mid ascending colon and distal ascending colon. The polyps were 4 to 5 mm in size. These polyps were removed with a cold snare. Resection and retrieval were complete.  - An 8 to 10 mm polyp was found in the mid transverse colon. The polyp was semi-pedunculated. The polyp was removed with a hot snare. Resection and retrieval were complete.  - Six sessile polyps were found in the proximal transverse colon, mid transverse colon and distal transverse colon. The polyps were 3 to 4 mm in size. These polyps were removed with a cold snare. Resection and retrieval were complete.  - Two sessile polyps were found in the proximal descending colon. The polyps were 4 to 5 mm in size. These polyps were removed with a hot snare. Resection and retrieval were complete.  - A moderate amount of liquid semi-liquid semi-solid stool was found in the entire colon, interfering with visualization. Lavage of the area was performed using a moderate amount of sterile water, resulting in incomplete clearance with fair visualization.  - The terminal ileum appeared normal.  Pathology DIAGNOSIS:   A.   ASCENDING COLON, POLYPS, BIOPSIES:   Tubular adenomas   B.   CECUM, POLYP, BIOPSIES:   Benign colonic mucosa with lymphoid aggregate   C.   TRANSVERSE COLON, POLYPS, BIOPSIES:   Tubular adenomas   D.   DESCENDING COLON, POLYPS, BIOPSIES:   Tubular adenomas    Assessment / Plan      1. Gastroesophageal reflux disease with esophagitis without hemorrhage  No current heartburn or reflux symptoms. Has not been taking any PPI. He had LA grade B esophagitis on recent EGD. No history of Fuentes's esophagus. Previous esophagram showed small hiatal  hernia.      Acid reflux measures  Can continue omeprazole 20 mg once daily for now, should try to taper and discontinue over next 3-6 months if able    2. History of adenomatous polyp of colon  Colonoscopy 8/2022 had 14 colon polyps removed. Prep was fair. One of the polyps measured 8-10 mm. All polyps removed were tubular adenoma except for cecal polyp. He needs repeat colonoscopy in 1 year, due 8/2023.     Discussed taking miralax daily for at least 1 week prior to procedure  Drink all of prep as directed     He will have a colonoscopy performed with monitored anesthesia care. The indications, technique, alternatives and potential risk and complications were discussed with the patient including but not limited to bleeding, bowel perforations, missing lesions and anesthetic complications. The patient understands and wishes to proceed with the procedure and has given their verbal consent. Written patient education information was given to the patient. He should follow up in the office after this procedure to discuss the results and further recommendations can be made at that time. The patient will call if they have further questions before procedure.  - Case Request  - polyethylene glycol (GoLYTELY) 236 g solution; Follow instructions given at office  Dispense: 4000 mL; Refill: 0  - bisacodyl (Dulcolax) 5 MG EC tablet; Follow instructions given at office  Dispense: 4 tablet; Refill: 0         Prior history  Diabetic gastroparesis   Nausea  Patient's history was suggestive of diabetes associated nausea and diabetic gastroparesis.  His previous Wellbutrin use likely contributed to some extent for drug-induced gastroparesis.  he is feeling much better now since initial visit and has not had any further episodes of severe nausea. He has discontinue welbutrin, changed portion sizes and take phenergan PRN nausea with great relief. He had EGD 3/2022 which showed food residue in the stomach and LA grade B esophagitis as  well as erythema in the antrum. Pathology was benign, negative for celiac disease, H pylori. Patient has DM induced gastroparesis complicated by medications. Recent esophagogram done on 2/16/2021 revealed only a small hiatal hernia and esophageal dysmotility.    Low fiber diet  Small portions at meals  Phenergan BID prn  Avoid NSAIDs  Will consider Reglan in 3-6 months if above measures not effective  We will get ultrasound gallbladder if any symptoms persist     NAFLD (nonalcoholic fatty liver disease)  Recent ultrasound abdomen done revealed fatty liver disease without any liver lesions.  Recent CMP reviewed which revealed normal liver enzymes.  Patient is high risk for progression of the liver disease. As per patient he used to weigh over 300 pounds and has come down to 262 pounds now. he has gained some weight since last visit.  Advised the lifestyle changes, dietary changes and losing weight down to less than 200 pounds.      Follow Up:   Return for follow up after procedure to discuss results only if needed.    Yoko Kay PA-C  [facility]  5/2/2024  15:59 EDT    Dictated Utilizing Dragon Dictation: Part of this note may be an electronic transcription/translation of spoken language to printed text using the Dragon Dictation System.

## 2024-05-03 PROBLEM — Z86.0101 HISTORY OF ADENOMATOUS POLYP OF COLON: Status: ACTIVE | Noted: 2024-05-02

## 2024-05-03 PROBLEM — Z86.010 HISTORY OF ADENOMATOUS POLYP OF COLON: Status: ACTIVE | Noted: 2024-05-02

## 2024-05-07 LAB
PSA SERPL-MCNC: 0.57 NG/ML (ref 0–4)
WRITTEN AUTHORIZATION: NORMAL

## 2024-05-15 ENCOUNTER — ANESTHESIA EVENT (OUTPATIENT)
Dept: GASTROENTEROLOGY | Facility: HOSPITAL | Age: 59
End: 2024-05-15
Payer: MEDICAID

## 2024-05-15 ENCOUNTER — ANESTHESIA (OUTPATIENT)
Dept: GASTROENTEROLOGY | Facility: HOSPITAL | Age: 59
End: 2024-05-15
Payer: MEDICAID

## 2024-05-15 ENCOUNTER — HOSPITAL ENCOUNTER (OUTPATIENT)
Facility: HOSPITAL | Age: 59
Setting detail: HOSPITAL OUTPATIENT SURGERY
Discharge: HOME | End: 2024-05-15
Attending: INTERNAL MEDICINE | Admitting: INTERNAL MEDICINE
Payer: MEDICAID

## 2024-05-15 VITALS
RESPIRATION RATE: 18 BRPM | HEART RATE: 79 BPM | DIASTOLIC BLOOD PRESSURE: 87 MMHG | SYSTOLIC BLOOD PRESSURE: 126 MMHG | OXYGEN SATURATION: 96 % | TEMPERATURE: 97.4 F

## 2024-05-15 DIAGNOSIS — Z86.010 HISTORY OF ADENOMATOUS POLYP OF COLON: ICD-10-CM

## 2024-05-15 LAB — GLUCOSE BLDC GLUCOMTR-MCNC: 147 MG/DL (ref 70–130)

## 2024-05-15 PROCEDURE — 25010000002 PROPOFOL 10 MG/ML EMULSION: Performed by: NURSE ANESTHETIST, CERTIFIED REGISTERED

## 2024-05-15 PROCEDURE — 82948 REAGENT STRIP/BLOOD GLUCOSE: CPT | Performed by: PHYSICIAN ASSISTANT

## 2024-05-15 PROCEDURE — 25810000003 SODIUM CHLORIDE 0.9 % SOLUTION: Performed by: PHYSICIAN ASSISTANT

## 2024-05-15 RX ORDER — SIMETHICONE 40MG/0.6ML
SUSPENSION, DROPS(FINAL DOSAGE FORM)(ML) ORAL AS NEEDED
Status: DISCONTINUED | OUTPATIENT
Start: 2024-05-15 | End: 2024-05-15 | Stop reason: HOSPADM

## 2024-05-15 RX ORDER — PROPOFOL 10 MG/ML
VIAL (ML) INTRAVENOUS AS NEEDED
Status: DISCONTINUED | OUTPATIENT
Start: 2024-05-15 | End: 2024-05-15 | Stop reason: SURG

## 2024-05-15 RX ORDER — SODIUM CHLORIDE 9 MG/ML
30 INJECTION, SOLUTION INTRAVENOUS CONTINUOUS PRN
Status: DISCONTINUED | OUTPATIENT
Start: 2024-05-15 | End: 2024-05-15 | Stop reason: HOSPADM

## 2024-05-15 RX ADMIN — SODIUM CHLORIDE 30 ML/HR: 9 INJECTION, SOLUTION INTRAVENOUS at 08:05

## 2024-05-15 RX ADMIN — PROPOFOL 200 MG: 10 INJECTION, EMULSION INTRAVENOUS at 08:45

## 2024-05-15 RX ADMIN — LIDOCAINE HYDROCHLORIDE 60 MG: 20 INJECTION, SOLUTION INTRAVENOUS at 08:45

## 2024-05-15 NOTE — ANESTHESIA POSTPROCEDURE EVALUATION
Patient: Myke Marcial    Procedure Summary       Date: 05/15/24 Room / Location: [facility] 2 / [facility]    Anesthesia Start: 0840 Anesthesia Stop: 0920    Procedure: COLONOSCOPY  WITH POLYPECTOMY X 3 Diagnosis:       History of adenomatous polyp of colon      (History of adenomatous polyp of colon [Z86.010])    Surgeons: Brittany Goins MD Provider: Lobito Sal CRNA    Anesthesia Type: MAC ASA Status: 3            Anesthesia Type: MAC    Vitals  No vitals data found for the desired time range.          Post Anesthesia Care and Evaluation    Patient location during evaluation: bedside  Patient participation: complete - patient participated  Level of consciousness: awake  Pain score: 0  Pain management: adequate    Airway patency: patent  Anesthetic complications: No anesthetic complications  PONV Status: controlled  Cardiovascular status: acceptable and stable  Respiratory status: acceptable and room air  Hydration status: acceptable    Comments: See nursing documentation for post op vital signs

## 2024-05-15 NOTE — H&P
[facility]  HISTORY AND PHYSICAL    Patient Name: Myke Marcial  : 1965  MRN: [id number]    Chief Complaint:   For surveillance colonoscopy    History Of Presenting Illness:    H/o colon polyps     Past Medical History:   Diagnosis Date    Anxiety and depression     Arthritis     LOWER BACK    Colon cancer screening 2018    Added automatically from request for surgery [id number]    Diabetes mellitus     Diabetic foot ulcer 2018    Ear drum perforation, right     Elevated cholesterol     GERD (gastroesophageal reflux disease)     Gynecomastia 2019    Hernia     Hyperlipidemia     Hypertension     Myocardial infarction     Refusal of blood product     Sleep apnea, obstructive     CPAP    Type 2 diabetes mellitus     Vitamin D deficiency disease 2016    Wears glasses     for reading only       Past Surgical History:   Procedure Laterality Date    CARDIAC CATHETERIZATION N/A 2023    Procedure: Coronary angiography;  Surgeon: Irwin Thomas MD;  Location: [facility] CATH INVASIVE LOCATION;  Service: Cardiovascular;  Laterality: N/A;    CATARACT EXTRACTION, BILATERAL      COLONOSCOPY N/A 07/10/2018    Procedure: COLONOSCOPY WITH POLYPECTOMIES;  Surgeon: Musa Berger MD;  Location: [facility] ENDOSCOPY;  Service: Gastroenterology    COLONOSCOPY N/A 2022    Procedure: COLONOSCOPY;  Surgeon: Brittany Goins MD;  Location: [facility] ENDOSCOPY;  Service: Gastroenterology;  Laterality: N/A;    CORONARY ARTERY BYPASS GRAFT  2023    ENDOSCOPY N/A 2022    Procedure: ESOPHAGOGASTRODUODENOSCOPY with biopsies;  Surgeon: Brittany Goins MD;  Location: [facility] ENDOSCOPY;  Service: Gastroenterology;  Laterality: N/A;    MUSCLE REPAIR Right     biceps tendon       Social History     Socioeconomic History    Marital status:    Tobacco Use    Smoking status: Never     Passive exposure: Never    Smokeless tobacco: Never   Vaping Use    Vaping status:  Never Used   Substance and Sexual Activity    Alcohol use: Not Currently    Drug use: Never    Sexual activity: Not Currently     Partners: Female     Birth control/protection: Condom       Family History   Problem Relation Age of Onset    Sleep apnea Mother     Hypertension Father     Hyperlipidemia Father     Prostate cancer Father     Cancer Maternal Grandmother     Cancer Paternal Grandmother     Colon cancer Neg Hx     Liver cancer Neg Hx     Rectal cancer Neg Hx     Stomach cancer Neg Hx        Prior to Admission Medications:  Medications Prior to Admission   Medication Sig Dispense Refill Last Dose    aspirin 81 MG EC tablet Take 1 tablet by mouth Daily.   Past Week    atorvastatin (LIPITOR) 80 MG tablet Take 1 tablet by mouth Daily. 90 tablet 3 Past Week    bisacodyl (Dulcolax) 5 MG EC tablet Follow instructions given at office 4 tablet 0 5/14/2024    chlorthalidone (HYGROTON) 25 MG tablet Take 1 tablet by mouth once daily 90 tablet 0 Past Week    dapagliflozin Propanediol (Farxiga) 10 MG tablet Take 10 mg by mouth Daily. 90 tablet 1 Past Week    fenofibrate (TRICOR) 145 MG tablet Take 1 tablet by mouth Daily. 90 tablet 3 Past Week    metFORMIN ER (GLUCOPHAGE-XR) 500 MG 24 hr tablet Take 1 tablet by mouth twice daily 180 tablet 1 5/14/2024    metoprolol tartrate (LOPRESSOR) 25 MG tablet Take 0.5 tablets by mouth 2 (Two) Times a Day. 90 tablet 3 5/14/2024    NovoLOG 70/30 FlexPen ReliOn (70-30) 100 UNIT/ML suspension pen-injector injection INJECT 30 UNITS SUBCUTANEOUSLY TWICE DAILY INCREASE  AS  DIRECTED  TO  MAX  DOSE  OF  70  UNITS  TWICE  DAILY 75 mL 0 5/14/2024    omeprazole (priLOSEC) 20 MG capsule Take 1 capsule by mouth Daily. 90 capsule 1 Past Week    polyethylene glycol (GoLYTELY) 236 g solution Follow instructions given at office 4000 mL 0 5/14/2024    pramipexole (MIRAPEX) 0.25 MG tablet TAKE 1 TABLET BY MOUTH THREE TIMES DAILY 270 tablet 0 Past Week    vitamin D3 125 MCG (5000 UT) capsule capsule  Take 1 capsule by mouth Daily.   Past Week    B-D UF III MINI PEN NEEDLES 31G X 5 MM misc USE 1  ONCE DAILY 100 each 0 Unknown    Continuous Blood Gluc  (Dexcom G7 ) device Use 1 each Daily. 1 each 0 Unknown    Continuous Blood Gluc Sensor (Dexcom G7 Sensor) misc Use 1 each Every 10 (Ten) Days. 9 each 3 Unknown    Tirzepatide (Mounjaro) 7.5 MG/0.5ML solution pen-injector pen Inject 0.5 mL under the skin into the appropriate area as directed 1 (One) Time Per Week. 2 mL 5 5/5/2024       Allergies:  Allergies   Allergen Reactions    Ace Inhibitors Cough    Bee Venom Anaphylaxis    Penicillins Swelling        Vitals: Temp:  [97.3 °F (36.3 °C)] 97.3 °F (36.3 °C)  Heart Rate:  [90] 90  Resp:  [18] 18  BP: (129)/(97) 129/97    Review Of Systems:  Constitutional:  Negative for chills, fever, and unexpected weight change.  Respiratory:  Negative for cough, chest tightness, shortness of breath, and wheezing.  Cardiovascular:  Negative for chest pain, palpitations, and leg swelling.  Gastrointestinal:  Negative for abdominal distention, abdominal pain, nausea, vomiting.  Neurological:  Negative for weakness, numbness, and headaches.     Physical Exam:    General Appearance:  Alert, cooperative, in no acute distress.   Lungs:   Clear to auscultation, respirations regular, even and                 unlabored.   Heart:  Regular rhythm and normal rate.   Abdomen:   Normal bowel sounds, no masses, no organomegaly. Soft, nontender, nondistended   Neurologic: Alert and oriented x 3. Moves all four limbs equally       Assessment & Plan     Assessment:  Principal Problem:    History of adenomatous polyp of colon      Plan: COLONOSCOPY FOR SCREENING CPT CODE:  (N/A)     Brittany Goins MD  5/15/2024

## 2024-05-15 NOTE — DISCHARGE INSTRUCTIONS
- Discharge patient to home (ambulatory).   - High fiber diet.   - Hold ASA for 3 days .   - Work up for PHT as op   - Await pathology results.   - Repeat colonoscopy in 3-5 years for surveillance pending path.   - Return to GI office in 8 weeks.

## 2024-05-15 NOTE — ANESTHESIA PREPROCEDURE EVALUATION
Anesthesia Evaluation     Patient summary reviewed and Nursing notes reviewed   no history of anesthetic complications:   NPO Solid Status: > 8 hours  NPO Liquid Status: > 8 hours           Airway   Mallampati: II  TM distance: >3 FB  Neck ROM: full  Possible difficult intubation and Large neck circumference  Dental - normal exam     Pulmonary    (+) ,sleep apnea on CPAP, decreased breath sounds  (-) not a smoker  Cardiovascular - normal exam    Rhythm: regular  Rate: normal    (+) hypertension, past MI , CAD, CABG, hyperlipidemia      Neuro/Psych  (+) psychiatric history Anxiety and Depression  GI/Hepatic/Renal/Endo    (+) obesity, morbid obesity, GERD, PUD, liver disease fatty liver disease, diabetes mellitus type 2 poorly controlled using insulin    Musculoskeletal     Abdominal   (+) obese    Abdomen: soft.  Bowel sounds: normal.   Substance History      OB/GYN          Other   arthritis,                       Anesthesia Plan    ASA 3     MAC     (Risks and benefits discussed including risk of aspiration, recall and dental damage. All patient questions answered. Will continue with POC.)  intravenous induction     Anesthetic plan, risks, benefits, and alternatives have been provided, discussed and informed consent has been obtained with: patient.  Pre-procedure education provided  Plan discussed with CRNA.        CODE STATUS:

## 2024-05-16 LAB — REF LAB TEST METHOD: NORMAL

## 2024-05-16 RX ORDER — PRAMIPEXOLE DIHYDROCHLORIDE 0.25 MG/1
TABLET ORAL
Qty: 270 TABLET | Refills: 0 | Status: SHIPPED | OUTPATIENT
Start: 2024-05-16

## 2024-05-22 ENCOUNTER — OFFICE VISIT (OUTPATIENT)
Dept: UROLOGY | Facility: CLINIC | Age: 59
End: 2024-05-22
Payer: MEDICAID

## 2024-05-22 VITALS
TEMPERATURE: 98 F | HEIGHT: 69 IN | BODY MASS INDEX: 38.8 KG/M2 | HEART RATE: 90 BPM | SYSTOLIC BLOOD PRESSURE: 128 MMHG | DIASTOLIC BLOOD PRESSURE: 64 MMHG | WEIGHT: 262 LBS | OXYGEN SATURATION: 94 %

## 2024-05-22 DIAGNOSIS — N39.41 URGE INCONTINENCE: Primary | ICD-10-CM

## 2024-05-22 PROBLEM — L85.1 ACQUIRED KERATODERMA: Status: ACTIVE | Noted: 2024-04-29

## 2024-05-22 LAB
BILIRUB BLD-MCNC: NEGATIVE MG/DL
CLARITY, POC: CLEAR
COLOR UR: ABNORMAL
EXPIRATION DATE: ABNORMAL
GLUCOSE UR STRIP-MCNC: ABNORMAL MG/DL
KETONES UR QL: NEGATIVE
LEUKOCYTE EST, POC: NEGATIVE
Lab: ABNORMAL
NITRITE UR-MCNC: NEGATIVE MG/ML
PH UR: 6 [PH] (ref 5–8)
PROT UR STRIP-MCNC: ABNORMAL MG/DL
RBC # UR STRIP: NEGATIVE /UL
SP GR UR: 1.02 (ref 1–1.03)
UROBILINOGEN UR QL: NORMAL

## 2024-05-22 RX ORDER — VIBEGRON 75 MG/1
75 TABLET, FILM COATED ORAL DAILY
Qty: 30 TABLET | Refills: 11 | Status: SHIPPED | OUTPATIENT
Start: 2024-05-22

## 2024-05-22 NOTE — PROGRESS NOTES
Office Visit Established Male Patient     Patient Name: Myke Marcial  : 1965   MRN: [id number]     Chief Complaint:   Chief Complaint   Patient presents with    Follow-up     Some random urgency       History of Present Illness: Mr. Myke Marcial is a 58 y.o. male who presents today for UUI.  Patient underwent BPH workup in May 2023 and was to follow up in 3 months.  He was started on myrbetriq at last visit but did not get it.  Patient is here with c/o UUI averaging 2-3 diapers per day, nocturia x2.  Patient has sleep apnea and uses CPAP.  Patient is diabetic with A1C ~8%.  Reports having constipation and then diarrhea with some fecal incontinence.  Recent colonoscopy showed 3 polyps, no other findings.  He drinks around 2 coffees a day and diet mountain dew.  He does report ED and is not concerned about it right now as he is rarely sexually active.      IPSS Questionnaire (AUA-7):  Incomplete emptying  Over the past month, how often have you had a sensation of not emptying your bladder completely after you finished urinating?: Less than 1 time in 5 (24)  Frequency  Over the past month, how often have you had to urinate again less than two hours after you finishied urinating ?: Less than half the time (24)  Intermittency  Over the past month, how often have you found you stopped and started again several time when you urinated ?: Less than 1 time in 5 (24)  Urgency  Over the last month, how often have you found it difficult  have you found it difficult to postpone urination ?: More than half the time (24)  Weak Stream  Over the past month, how often have you had a weak urinary stream ?: Less than half the time (24)  Straining  Over the past month, how often have you had to push or strain to begin urination ?: Less than half the time (24)  Nocturia  Over the past month, how many times did you most typically get up to  urinate from the time you went to bed until the time you got up in the morning ?: 2 times (05/22/24 1053)  Quality of life due to urinary symptoms  If you were to spend the rest of your life with your urinary condition the way it is now, how would feel about that?: Mixed - about equally satisfied (05/22/24 1053)    Scores  Total IPSS Score: (!) 14 (05/22/24 1053)  Total Score = Symptomatic Level: Moderately symptomatic: 8-19 (05/22/24 1053)        Subjective      Review of System:   As noted in HPI.    Past Medical History:   Past Medical History:   Diagnosis Date    Anxiety and depression     Arthritis     LOWER BACK    Colon cancer screening 06/18/2018    Added automatically from request for surgery [id number]    Diabetes mellitus     Diabetic foot ulcer 11/29/2018    Ear drum perforation, right     Elevated cholesterol     GERD (gastroesophageal reflux disease)     Gynecomastia 2019    Hernia     Hyperlipidemia     Hypertension     Myocardial infarction     Refusal of blood product     Sleep apnea, obstructive     CPAP    Type 2 diabetes mellitus     Vitamin D deficiency disease 08/26/2016    Wears glasses     for reading only       Past Surgical History:   Past Surgical History:   Procedure Laterality Date    CARDIAC CATHETERIZATION N/A 06/30/2023    Procedure: Coronary angiography;  Surgeon: Irwin Thomas MD;  Location: [facility] CATH INVASIVE LOCATION;  Service: Cardiovascular;  Laterality: N/A;    CATARACT EXTRACTION, BILATERAL      COLONOSCOPY N/A 07/10/2018    Procedure: COLONOSCOPY WITH POLYPECTOMIES;  Surgeon: Musa Berger MD;  Location: [facility] ENDOSCOPY;  Service: Gastroenterology    COLONOSCOPY N/A 08/16/2022    Procedure: COLONOSCOPY;  Surgeon: Brittany Goins MD;  Location: [facility] ENDOSCOPY;  Service: Gastroenterology;  Laterality: N/A;    COLONOSCOPY N/A 5/15/2024    Procedure: COLONOSCOPY  WITH POLYPECTOMY X 3;  Surgeon: Brittany Goins MD;  Location: [facility] ENDOSCOPY;  Service:  Gastroenterology;  Laterality: N/A;    CORONARY ARTERY BYPASS GRAFT  07/2023    ENDOSCOPY N/A 03/17/2022    Procedure: ESOPHAGOGASTRODUODENOSCOPY with biopsies;  Surgeon: Brittany Goins MD;  Location: [facility] ENDOSCOPY;  Service: Gastroenterology;  Laterality: N/A;    MUSCLE REPAIR Right     biceps tendon       Family History:   Family History   Problem Relation Age of Onset    Sleep apnea Mother     Hypertension Father     Hyperlipidemia Father     Prostate cancer Father     Cancer Maternal Grandmother     Cancer Paternal Grandmother     Colon cancer Neg Hx     Liver cancer Neg Hx     Rectal cancer Neg Hx     Stomach cancer Neg Hx        Social History:   Social History     Socioeconomic History    Marital status:    Tobacco Use    Smoking status: Never     Passive exposure: Never    Smokeless tobacco: Never   Vaping Use    Vaping status: Never Used   Substance and Sexual Activity    Alcohol use: Not Currently    Drug use: Never    Sexual activity: Not Currently     Partners: Female     Birth control/protection: Condom       Medications:     Current Outpatient Medications:     aspirin 81 MG EC tablet, Take 1 tablet by mouth Daily., Disp: , Rfl:     atorvastatin (LIPITOR) 80 MG tablet, Take 1 tablet by mouth Daily., Disp: 90 tablet, Rfl: 3    B-D UF III MINI PEN NEEDLES 31G X 5 MM misc, USE 1  ONCE DAILY, Disp: 100 each, Rfl: 0    chlorthalidone (HYGROTON) 25 MG tablet, Take 1 tablet by mouth once daily, Disp: 90 tablet, Rfl: 0    Continuous Blood Gluc  (Dexcom G7 ) device, Use 1 each Daily., Disp: 1 each, Rfl: 0    Continuous Blood Gluc Sensor (Dexcom G7 Sensor) misc, Use 1 each Every 10 (Ten) Days., Disp: 9 each, Rfl: 3    dapagliflozin Propanediol (Farxiga) 10 MG tablet, Take 10 mg by mouth Daily., Disp: 90 tablet, Rfl: 1    fenofibrate (TRICOR) 145 MG tablet, Take 1 tablet by mouth Daily., Disp: 90 tablet, Rfl: 3    metFORMIN ER (GLUCOPHAGE-XR) 500 MG 24 hr tablet, Take 1 tablet by  "mouth twice daily, Disp: 180 tablet, Rfl: 1    metoprolol tartrate (LOPRESSOR) 25 MG tablet, Take 0.5 tablets by mouth 2 (Two) Times a Day., Disp: 90 tablet, Rfl: 3    NovoLOG 70/30 FlexPen ReliOn (70-30) 100 UNIT/ML suspension pen-injector injection, INJECT 30 UNITS SUBCUTANEOUSLY TWICE DAILY INCREASE  AS  DIRECTED  TO  MAX  DOSE  OF  70  UNITS  TWICE  DAILY, Disp: 75 mL, Rfl: 0    omeprazole (priLOSEC) 20 MG capsule, Take 1 capsule by mouth Daily., Disp: 90 capsule, Rfl: 1    pramipexole (MIRAPEX) 0.25 MG tablet, TAKE 1 TABLET BY MOUTH THREE TIMES DAILY, Disp: 270 tablet, Rfl: 0    Tirzepatide (Mounjaro) 7.5 MG/0.5ML solution pen-injector pen, Inject 0.5 mL under the skin into the appropriate area as directed 1 (One) Time Per Week., Disp: 2 mL, Rfl: 5    vitamin D3 125 MCG (5000 UT) capsule capsule, Take 1 capsule by mouth Daily., Disp: , Rfl:     Vibegron (Gemtesa) 75 MG tablet, Take 1 tablet by mouth Daily., Disp: 30 tablet, Rfl: 11    Allergies:   Allergies   Allergen Reactions    Ace Inhibitors Cough    Bee Venom Anaphylaxis    Penicillins Swelling       Objective     Physical Exam:   Vital Signs:   Vitals:    05/22/24 1054   BP: 128/64   Pulse: 90   Temp: 98 °F (36.7 °C)   SpO2: 94%   Weight: 119 kg (262 lb)   Height: 175.3 cm (69.02\")     Body mass index is 38.67 kg/m².   Physical Exam  Vitals and nursing note reviewed.   Constitutional:       General: He is awake. He is not in acute distress.     Appearance: He is obese. He is not ill-appearing.   Pulmonary:      Effort: Pulmonary effort is normal.   Skin:     General: Skin is warm and dry.   Neurological:      Mental Status: He is alert and oriented to person, place, and time. Mental status is at baseline.   Psychiatric:         Mood and Affect: Mood normal.         Behavior: Behavior is cooperative.            Labs  Brief Urine Lab Results  (Last result in the past 365 days)        Color   Clarity   Blood   Leuk Est   Nitrite   Protein   CREAT   Urine HCG "        24 1029             85.2                 Lab Results   Component Value Date    GLUCOSE 122 (H) 2024    CALCIUM 10.1 2024     2024    K 4.2 2024    CO2 26.5 2024    CL 97 (L) 2024    BUN 29 (H) 2024    CREATININE 1.32 (H) 2024    EGFRIFAFRI 61 2022    EGFRIFNONA 51 (L) 2022    BCR 22.0 2024    ANIONGAP 10.2 2023       Lab Results   Component Value Date    WBC 7.00 2024    HGB 17.0 2024    HCT 51.9 (H) 2024    MCV 87.2 2024     2024            Lab Results   Component Value Date    PSA 0.567 2024       Admission on 05/15/2024, Discharged on 05/15/2024   Component Date Value Ref Range Status    Glucose 05/15/2024 147 (H)  70 - 130 mg/dL Final    Serial Number: [id number]:  [id number]    Reference Lab Report 05/15/2024    Final                    Value:[facility]  [address] [place] [address]  Phone: [phone] or [phone]  Fax: [phone]  Andrew Harding M.D., Medical Director    PATIENT NAME                                     LABORATORY NO.  BLAYNE ALBERT.                         [id number]  [id number]                                 AGE                    SEX   SSN              CLIENT REF #  [facility]                    58        1965           [id number]      [id number]    [address] [place] [address] BY-PASS                        REQUESTING M.D.           ATTENDING MBELEN         COPY TO.  [address]                                [place] [address]                         formerly [facility]  DATE COLLECTED            DATE RECEIVED          DATE REPORTED  05/15/2024                05/15/2024             2024    DIAGNOSIS:  A.     HEPATIC FLEXURE POLYP:  Tubular adenoma  Negative for carcinoma or high-grade                           dysplasia    B.     DESCENDING COLON  "POLYP, X2:  Hyperplastic polyps x2  Negative for dysplasia or carcinoma      CLINICAL HISTORY:  History of adenomatous polyp of colon    SPECIMENS RECEIVED:  A.    HEPATIC FLEXURE POLYP  B.    DESCENDING COLON POLYP , X2    MICROSCOPIC DESCRIPTION:  Tissue blocks are prepared and slides are examined microscopically on all  specimens. See diagnosis for details.    Professional interpretation rendered by Kyler Washburn M.D.,YUSEF at [employer], [address] [place] [address].    GROSS DESCRIPTION:  A.    Labeled as \"hepatic flexure polyp\", consisting of 1 piece of tan soft tissue  measuring 0.6 x 0.3 x 0.2 cm, submitted entirely in 1 cassette.  SOG  B.    Labeled as \"descending colon polyp x 2\", consisting of 2 pieces of tan soft  tissue measuring 0.8 x 0.6 x 0.2 cm, submitted entirely in 1 cassette.    REVIEWED, DIAGNOSED AND ELECTRONICALLY  SIGNED BY:    Kyler Washburn M.D.,CHYNAP.  CPT CODES:  88305x2     Office Visit on 05/01/2024   Component Date Value Ref Range Status    Hemoglobin A1C 05/01/2024 8.20 (H)  4.80 - 5.60 % Final    Comment: Hemoglobin A1C Ranges:  Increased Risk for Diabetes  5.7% to 6.4%  Diabetes                     >= 6.5%  Diabetic Goal                < 7.0%      WBC 05/01/2024 7.00  3.40 - 10.80 10*3/mm3 Final    RBC 05/01/2024 5.95 (H)  4.14 - 5.80 10*6/mm3 Final    Hemoglobin 05/01/2024 17.0  13.0 - 17.7 g/dL Final    Hematocrit 05/01/2024 51.9 (H)  37.5 - 51.0 % Final    MCV 05/01/2024 87.2  79.0 - 97.0 fL Final    MCH 05/01/2024 28.6  26.6 - 33.0 pg Final    MCHC 05/01/2024 32.8  31.5 - 35.7 g/dL Final    RDW 05/01/2024 13.9  12.3 - 15.4 % Final    Platelets 05/01/2024 354  140 - 450 10*3/mm3 Final    Neutrophil Rel % 05/01/2024 70.1  42.7 - 76.0 % Final    Lymphocyte Rel % 05/01/2024 21.0  19.6 - 45.3 % Final    Monocyte Rel % 05/01/2024 7.0  5.0 - 12.0 % Final    Eosinophil Rel % 05/01/2024 0.9  0.3 - 6.2 % Final    Basophil Rel % 05/01/2024 0.7  0.0 - 1.5 % Final    " Neutrophils Absolute 05/01/2024 4.91  1.70 - 7.00 10*3/mm3 Final    Lymphocytes Absolute 05/01/2024 1.47  0.70 - 3.10 10*3/mm3 Final    Monocytes Absolute 05/01/2024 0.49  0.10 - 0.90 10*3/mm3 Final    Eosinophils Absolute 05/01/2024 0.06  0.00 - 0.40 10*3/mm3 Final    Basophils Absolute 05/01/2024 0.05  0.00 - 0.20 10*3/mm3 Final    Immature Granulocyte Rel % 05/01/2024 0.3  0.0 - 0.5 % Final    Immature Grans Absolute 05/01/2024 0.02  0.00 - 0.05 10*3/mm3 Final    nRBC 05/01/2024 0.0  0.0 - 0.2 /100 WBC Final    Glucose 05/01/2024 122 (H)  65 - 99 mg/dL Final    BUN 05/01/2024 29 (H)  6 - 20 mg/dL Final    Creatinine 05/01/2024 1.32 (H)  0.76 - 1.27 mg/dL Final    EGFR Result 05/01/2024 62.9  >60.0 mL/min/1.73 Final    Comment: GFR Normal >60  Chronic Kidney Disease <60  Kidney Failure <15      BUN/Creatinine Ratio 05/01/2024 22.0  7.0 - 25.0 Final    Sodium 05/01/2024 136  136 - 145 mmol/L Final    Potassium 05/01/2024 4.2  3.5 - 5.2 mmol/L Final    Chloride 05/01/2024 97 (L)  98 - 107 mmol/L Final    Total CO2 05/01/2024 26.5  22.0 - 29.0 mmol/L Final    Calcium 05/01/2024 10.1  8.6 - 10.5 mg/dL Final    Total Protein 05/01/2024 7.4  6.0 - 8.5 g/dL Final    Albumin 05/01/2024 4.4  3.5 - 5.2 g/dL Final    Globulin 05/01/2024 3.0  gm/dL Final    A/G Ratio 05/01/2024 1.5  g/dL Final    Total Bilirubin 05/01/2024 0.4  0.0 - 1.2 mg/dL Final    Alkaline Phosphatase 05/01/2024 84  39 - 117 U/L Final    AST (SGOT) 05/01/2024 16  1 - 40 U/L Final    ALT (SGPT) 05/01/2024 18  1 - 41 U/L Final    TSH 05/01/2024 4.290 (H)  0.270 - 4.200 uIU/mL Final    Creatinine, Urine 05/01/2024 85.2  Not Estab. mg/dL Final    Microalbumin, Urine 05/01/2024 10.1  Not Estab. ug/mL Final    Microalbumin/Creatinine Ratio 05/01/2024 12  0 - 29 mg/g creat Final    Comment:                        Normal:                0 -  29                         Moderately increased: 30 - 300                         Severely increased:       >300      PSA  05/01/2024 0.567  0.000 - 4.000 ng/mL Final    Comment: Testing Method: Roche [employer]ostics Electrochemiluminescence  Immunoassay(ECLIA)  Values obtained with different assay methods or kits cannot  be used interchangeably.      Written Authorization 05/01/2024 Comment   Final    Comment: Written Authorization Received.  Authorization received from ROHINI SANTANA 05-  Logged by [place] [employer]     Lab on 02/23/2024   Component Date Value Ref Range Status    Total Cholesterol 02/23/2024 138  0 - 200 mg/dL Final    Triglycerides 02/23/2024 432 (H)  0 - 150 mg/dL Final    HDL Cholesterol 02/23/2024 31 (L)  40 - 60 mg/dL Final    LDL Cholesterol  02/23/2024 44  0 - 100 mg/dL Final    VLDL Cholesterol 02/23/2024 63 (H)  5 - 40 mg/dL Final    LDL/HDL Ratio 02/23/2024 0.66   Final   Office Visit on 01/18/2024   Component Date Value Ref Range Status    Hemoglobin A1C 01/18/2024 8.4 (A)  4.5 - 5.7 % Final    Lot Number 01/18/2024 [id number]   Final    Expiration Date 01/18/2024 07/30/2025   Final    Glucose 01/18/2024 111  70 - 130 mg/dL Final    Lot Number 01/18/2024 [id number]   Final    Expiration Date 01/18/2024 06/30/2024   Final   Office Visit on 10/25/2023   Component Date Value Ref Range Status    WBC 10/25/2023 4.93  3.40 - 10.80 10*3/mm3 Final    RBC 10/25/2023 5.75  4.14 - 5.80 10*6/mm3 Final    Hemoglobin 10/25/2023 15.6  13.0 - 17.7 g/dL Final    Hematocrit 10/25/2023 49.0  37.5 - 51.0 % Final    MCV 10/25/2023 85.2  79.0 - 97.0 fL Final    MCH 10/25/2023 27.1  26.6 - 33.0 pg Final    MCHC 10/25/2023 31.8  31.5 - 35.7 g/dL Final    RDW 10/25/2023 15.5 (H)  12.3 - 15.4 % Final    Platelets 10/25/2023 326  140 - 450 10*3/mm3 Final    Neutrophil Rel % 10/25/2023 67.6  42.7 - 76.0 % Final    Lymphocyte Rel % 10/25/2023 23.7  19.6 - 45.3 % Final    Monocyte Rel % 10/25/2023 7.1  5.0 - 12.0 % Final    Eosinophil Rel % 10/25/2023 1.0  0.3 - 6.2 % Final    Basophil Rel % 10/25/2023 0.4  0.0 - 1.5 % Final    Neutrophils  Absolute 10/25/2023 3.33  1.70 - 7.00 10*3/mm3 Final    Lymphocytes Absolute 10/25/2023 1.17  0.70 - 3.10 10*3/mm3 Final    Monocytes Absolute 10/25/2023 0.35  0.10 - 0.90 10*3/mm3 Final    Eosinophils Absolute 10/25/2023 0.05  0.00 - 0.40 10*3/mm3 Final    Basophils Absolute 10/25/2023 0.02  0.00 - 0.20 10*3/mm3 Final    Immature Granulocyte Rel % 10/25/2023 0.2  0.0 - 0.5 % Final    Immature Grans Absolute 10/25/2023 0.01  0.00 - 0.05 10*3/mm3 Final    nRBC 10/25/2023 0.0  0.0 - 0.2 /100 WBC Final    Glucose 10/25/2023 239 (H)  65 - 99 mg/dL Final    BUN 10/25/2023 14  6 - 20 mg/dL Final    Creatinine 10/25/2023 1.01  0.76 - 1.27 mg/dL Final    EGFR Result 10/25/2023 86.7  >60.0 mL/min/1.73 Final    Comment: GFR Normal >60  Chronic Kidney Disease <60  Kidney Failure <15      BUN/Creatinine Ratio 10/25/2023 13.9  7.0 - 25.0 Final    Sodium 10/25/2023 136  136 - 145 mmol/L Final    Potassium 10/25/2023 4.6  3.5 - 5.2 mmol/L Final    Chloride 10/25/2023 100  98 - 107 mmol/L Final    Total CO2 10/25/2023 29.3 (H)  22.0 - 29.0 mmol/L Final    Calcium 10/25/2023 9.5  8.6 - 10.5 mg/dL Final    Total Protein 10/25/2023 7.1  6.0 - 8.5 g/dL Final    Albumin 10/25/2023 4.1  3.5 - 5.2 g/dL Final    Globulin 10/25/2023 3.0  gm/dL Final    A/G Ratio 10/25/2023 1.4  g/dL Final    Total Bilirubin 10/25/2023 0.4  0.0 - 1.2 mg/dL Final    Alkaline Phosphatase 10/25/2023 112  39 - 117 U/L Final    AST (SGOT) 10/25/2023 16  1 - 40 U/L Final    ALT (SGPT) 10/25/2023 19  1 - 41 U/L Final    Hemoglobin A1C 10/25/2023 8.80 (H)  4.80 - 5.60 % Final    Comment: Hemoglobin A1C Ranges:  Increased Risk for Diabetes  5.7% to 6.4%  Diabetes                     >= 6.5%  Diabetic Goal                < 7.0%      proBNP 10/25/2023 178  0 - 210 pg/mL Final    Comment: The following cut-points have been suggested for the  use of proBNP for the diagnostic evaluation of heart  failure (HF) in patients with acute dyspnea:  Modality                      Age           Optimal Cut                             (years)            Point  ------------------------------------------------------  Diagnosis (rule in HF)        <50            450 pg/mL                            50 - 75            900 pg/mL                                >75           1800 pg/mL  Exclusion (rule out HF)  Age independent     300 pg/mL     Office Visit on 08/16/2023   Component Date Value Ref Range Status    Glucose 08/16/2023 116  70 - 130 mg/dL Final    Lot Number 08/16/2023 [id number]   Final    Expiration Date 08/16/2023 02/12/24   Final   Office Visit on 07/25/2023   Component Date Value Ref Range Status    WBC 07/25/2023 12.32 (H)  3.40 - 10.80 10*3/mm3 Final    RBC 07/25/2023 5.49  4.14 - 5.80 10*6/mm3 Final    Hemoglobin 07/25/2023 15.2  13.0 - 17.7 g/dL Final    Hematocrit 07/25/2023 47.4  37.5 - 51.0 % Final    MCV 07/25/2023 86.3  79.0 - 97.0 fL Final    MCH 07/25/2023 27.7  26.6 - 33.0 pg Final    MCHC 07/25/2023 32.1  31.5 - 35.7 g/dL Final    RDW 07/25/2023 14.5  12.3 - 15.4 % Final    Platelets 07/25/2023 632 (H)  140 - 450 10*3/mm3 Final    Neutrophil Rel % 07/25/2023 74.2  42.7 - 76.0 % Final    Lymphocyte Rel % 07/25/2023 18.5 (L)  19.6 - 45.3 % Final    Monocyte Rel % 07/25/2023 5.7  5.0 - 12.0 % Final    Eosinophil Rel % 07/25/2023 0.6  0.3 - 6.2 % Final    Basophil Rel % 07/25/2023 0.6  0.0 - 1.5 % Final    Neutrophils Absolute 07/25/2023 9.15 (H)  1.70 - 7.00 10*3/mm3 Final    Lymphocytes Absolute 07/25/2023 2.28  0.70 - 3.10 10*3/mm3 Final    Monocytes Absolute 07/25/2023 0.70  0.10 - 0.90 10*3/mm3 Final    Eosinophils Absolute 07/25/2023 0.07  0.00 - 0.40 10*3/mm3 Final    Basophils Absolute 07/25/2023 0.07  0.00 - 0.20 10*3/mm3 Final    Immature Granulocyte Rel % 07/25/2023 0.4  0.0 - 0.5 % Final    Immature Grans Absolute 07/25/2023 0.05  0.00 - 0.05 10*3/mm3 Final    nRBC 07/25/2023 0.0  0.0 - 0.2 /100 WBC Final   Admission on 06/30/2023, Discharged on 06/30/2023    Component Date Value Ref Range Status    HS Troponin T 06/30/2023 133 (C)  <15 ng/L Final    WBC 06/30/2023 7.29  3.40 - 10.80 10*3/mm3 Final    RBC 06/30/2023 5.27  4.14 - 5.80 10*6/mm3 Final    Hemoglobin 06/30/2023 15.4  13.0 - 17.7 g/dL Final    Hematocrit 06/30/2023 45.9  37.5 - 51.0 % Final    MCV 06/30/2023 87.1  79.0 - 97.0 fL Final    MCH 06/30/2023 29.2  26.6 - 33.0 pg Final    MCHC 06/30/2023 33.6  31.5 - 35.7 g/dL Final    RDW 06/30/2023 14.5  12.3 - 15.4 % Final    RDW-SD 06/30/2023 46.1  37.0 - 54.0 fl Final    MPV 06/30/2023 9.2  6.0 - 12.0 fL Final    Platelets 06/30/2023 291  140 - 450 10*3/mm3 Final    Glucose 06/30/2023 130 (H)  65 - 99 mg/dL Final    BUN 06/30/2023 18  6 - 20 mg/dL Final    Creatinine 06/30/2023 1.08  0.76 - 1.27 mg/dL Final    Sodium 06/30/2023 139  136 - 145 mmol/L Final    Potassium 06/30/2023 4.5  3.5 - 5.2 mmol/L Final    Slight hemolysis detected by analyzer. Results may be affected.    Chloride 06/30/2023 102  98 - 107 mmol/L Final    CO2 06/30/2023 26.8  22.0 - 29.0 mmol/L Final    Calcium 06/30/2023 9.0  8.6 - 10.5 mg/dL Final    Total Protein 06/30/2023 7.0  6.0 - 8.5 g/dL Final    Albumin 06/30/2023 3.6  3.5 - 5.2 g/dL Final    ALT (SGPT) 06/30/2023 16  1 - 41 U/L Final    AST (SGOT) 06/30/2023 17  1 - 40 U/L Final    Alkaline Phosphatase 06/30/2023 94  39 - 117 U/L Final    Total Bilirubin 06/30/2023 0.4  0.0 - 1.2 mg/dL Final    Globulin 06/30/2023 3.4  gm/dL Final    A/G Ratio 06/30/2023 1.1  g/dL Final    BUN/Creatinine Ratio 06/30/2023 16.7  7.0 - 25.0 Final    Anion Gap 06/30/2023 10.2  5.0 - 15.0 mmol/L Final    eGFR 06/30/2023 80.5  >60.0 mL/min/1.73 Final    Lactate 06/30/2023 1.3  0.5 - 2.0 mmol/L Final    PTT 06/30/2023 32.8 (L)  70.0 - 100.0 seconds Final    Protime 06/30/2023 13.2  12.3 - 15.1 Seconds Final    INR 06/30/2023 0.95  0.90 - 1.10 Final    Hemoglobin A1C 06/30/2023 7.90 (H)  4.80 - 5.60 % Final    Total Cholesterol 06/30/2023 166  0 - 200  mg/dL Final    Triglycerides 06/30/2023 513 (H)  0 - 150 mg/dL Final    HDL Cholesterol 06/30/2023 31 (L)  40 - 60 mg/dL Final    LDL Cholesterol  06/30/2023 58  0 - 100 mg/dL Final    VLDL Cholesterol 06/30/2023 77 (H)  5 - 40 mg/dL Final    LDL/HDL Ratio 06/30/2023 1.05   Final    Magnesium 06/30/2023 2.0  1.6 - 2.6 mg/dL Final    Phosphorus 06/30/2023 3.5  2.5 - 4.5 mg/dL Final    Glucose 06/30/2023 118  70 - 130 mg/dL Final    Serial Number: [id number]:  [id number]    Glucose 06/30/2023 126  70 - 130 mg/dL Final    Serial Number: [id number]:  [id number]    EF(MOD-bp) 06/30/2023 56.4  % Final    LV GLOBAL STRAIN  06/30/2023 -15.8  % Final    LVIDd 06/30/2023 4.0  cm Final    LVIDs 06/30/2023 3.3  cm Final    IVSd 06/30/2023 0.82  cm Final    LVPWd 06/30/2023 2.47  cm Final    FS 06/30/2023 18.0  % Final    IVS/LVPW 06/30/2023 0.33  cm Final    ESV(cubed) 06/30/2023 35.0  ml Final    LV Sys Vol (BSA corrected) 06/30/2023 13.4  cm2 Final    EDV(cubed) 06/30/2023 63.5  ml Final    LV Keller Vol (BSA corrected) 06/30/2023 35.6  cm2 Final    LVOT area 06/30/2023 2.5  cm2 Final    LV mass(C)d 06/30/2023 268.8  grams Final    LVOT diam 06/30/2023 1.80  cm Final    EDV(MOD-sp2) 06/30/2023 65.9  ml Final    EDV(MOD-sp4) 06/30/2023 82.1  ml Final    ESV(MOD-sp2) 06/30/2023 33.3  ml Final    ESV(MOD-sp4) 06/30/2023 31.0  ml Final    SV(MOD-sp2) 06/30/2023 32.6  ml Final    SV(MOD-sp4) 06/30/2023 51.1  ml Final    SVi(MOD-SP2) 06/30/2023 14.1  ml/m2 Final    SVi(MOD-SP4) 06/30/2023 22.1  ml/m2 Final    EF(MOD-sp2) 06/30/2023 49.5  % Final    EF(MOD-sp4) 06/30/2023 62.2  % Final    MV E max omid 06/30/2023 60.0  cm/sec Final    MV A max omid 06/30/2023 65.6  cm/sec Final    MV dec time 06/30/2023 0.14  msec Final    MV E/A 06/30/2023 0.91   Final    LA ESV Index (BP) 06/30/2023 18.0  ml/m2 Final    Med Peak E' Omid 06/30/2023 4.5  cm/sec Final    Lat Peak E' Omid 06/30/2023 5.0  cm/sec Final    Avg E/e' ratio 06/30/2023  12.63   Final    SV(LVOT) 06/30/2023 41.0  ml Final    RV Base 06/30/2023 2.23  cm Final    RV Mid 06/30/2023 1.80  cm Final    RV Length 06/30/2023 8.9  cm Final    TAPSE (>1.6) 06/30/2023 1.68  cm Final    RV S' 06/30/2023 8.1  cm/sec Final    LV V1 max 06/30/2023 80.9  cm/sec Final    LV V1 max PG 06/30/2023 2.6  mmHg Final    LV V1 mean PG 06/30/2023 1.00  mmHg Final    LV V1 VTI 06/30/2023 16.1  cm Final    Ao pk harman 06/30/2023 131.0  cm/sec Final    Ao max PG 06/30/2023 6.9  mmHg Final    Ao mean PG 06/30/2023 3.0  mmHg Final    Ao V2 VTI 06/30/2023 27.6  cm Final    SHABNAM(I,D) 06/30/2023 1.48  cm2 Final    MV max PG 06/30/2023 2.6  mmHg Final    MV mean PG 06/30/2023 1.00  mmHg Final    MV V2 VTI 06/30/2023 14.8  cm Final    MVA(VTI) 06/30/2023 2.8  cm2 Final    MV dec slope 06/30/2023 445.0  cm/sec2 Final    RV V1 max PG 06/30/2023 1.04  mmHg Final    RV V1 max 06/30/2023 50.9  cm/sec Final    RV V1 VTI 06/30/2023 9.8  cm Final    PA V2 max 06/30/2023 93.9  cm/sec Final    PA acc time 06/30/2023 0.08  sec Final    Ao root diam 06/30/2023 3.3  cm Final    Glucose 06/30/2023 117  70 - 130 mg/dL Final    Serial Number: [id number]:  [id number]         I have reviewed the above labs.    PVR  Post-void residual performed with ultrasound by staff and interpreted by me - 0 mL     Assessment / Plan      Assessment:   Diagnoses and all orders for this visit:    1. Urge incontinence (Primary)  -     Vibegron (Gemtesa) 75 MG tablet; Take 1 tablet by mouth Daily.  Dispense: 30 tablet; Refill: 11         58 y.o. male presents for UUI.  Patient underwent BPH workup in May 2023 and was to follow up in 3 months.  Findings show obstructive lateral lobes, no prominent median lobe.  Prostate volume 42 mL with 4 cm prostatic width.  Patient was started on myrbetric at that time with c/o frequency, urgency, and nocturia.   He did not start myrbetriq at that time and reports UUI has worsened.  He is now wearing diapers 2-3 per  day.  UA unremarkable, IPSS 14, PVR 0 mL.  Will start on gemtesa 75 mg daily.  Discussed anticholinergics and their side effects if gemtesa is not covered.      Last PSA 05/01/24 was 0.567, reports father had prostate cancer, recommend annual screening.     Plan:    Start gemtesa 75 mg daily  Follow up in 3 months with PVR to assess medication efficacy.     Follow Up:   Return in about 3 months (around 8/22/2024) for with PVR, recheck with Julia.      TAE Swift  [facility]

## 2024-05-28 ENCOUNTER — TELEPHONE (OUTPATIENT)
Dept: UROLOGY | Facility: CLINIC | Age: 59
End: 2024-05-28
Payer: MEDICAID

## 2024-05-28 DIAGNOSIS — N39.41 URGE INCONTINENCE: Primary | ICD-10-CM

## 2024-05-29 NOTE — TELEPHONE ENCOUNTER
Gemtesa denied as patient has not tried and failed behavioral therapy, including PFPT.  Discussed with patient and he has failed bladder training, bladder control strategies, and fluid management.  He agrees to referral to PFPT.  Patient is located in [place], will send to [address] [facility].

## 2024-06-17 ENCOUNTER — OFFICE VISIT (OUTPATIENT)
Dept: ENDOCRINOLOGY | Facility: CLINIC | Age: 59
End: 2024-06-17
Payer: MEDICAID

## 2024-06-17 VITALS
BODY MASS INDEX: 39.1 KG/M2 | DIASTOLIC BLOOD PRESSURE: 78 MMHG | WEIGHT: 264 LBS | HEART RATE: 80 BPM | OXYGEN SATURATION: 99 % | SYSTOLIC BLOOD PRESSURE: 122 MMHG | HEIGHT: 69 IN

## 2024-06-17 DIAGNOSIS — E11.65 UNCONTROLLED TYPE 2 DIABETES MELLITUS WITH HYPERGLYCEMIA: Primary | ICD-10-CM

## 2024-06-17 DIAGNOSIS — R94.6 NONSPECIFIC ABNORMAL RESULTS OF THYROID FUNCTION STUDY: ICD-10-CM

## 2024-06-17 LAB
EXPIRATION DATE: ABNORMAL
EXPIRATION DATE: ABNORMAL
GLUCOSE BLDC GLUCOMTR-MCNC: 180 MG/DL (ref 70–130)
HBA1C MFR BLD: 8.5 % (ref 4.5–5.7)
Lab: ABNORMAL
Lab: ABNORMAL

## 2024-06-17 PROCEDURE — 3078F DIAST BP <80 MM HG: CPT | Performed by: INTERNAL MEDICINE

## 2024-06-17 PROCEDURE — 3052F HG A1C>EQUAL 8.0%<EQUAL 9.0%: CPT | Performed by: INTERNAL MEDICINE

## 2024-06-17 PROCEDURE — 99214 OFFICE O/P EST MOD 30 MIN: CPT | Performed by: INTERNAL MEDICINE

## 2024-06-17 PROCEDURE — 95251 CONT GLUC MNTR ANALYSIS I&R: CPT | Performed by: INTERNAL MEDICINE

## 2024-06-17 PROCEDURE — 83036 HEMOGLOBIN GLYCOSYLATED A1C: CPT | Performed by: INTERNAL MEDICINE

## 2024-06-17 PROCEDURE — 3074F SYST BP LT 130 MM HG: CPT | Performed by: INTERNAL MEDICINE

## 2024-06-17 RX ORDER — INSULIN ASPART 100 [IU]/ML
INJECTION, SUSPENSION SUBCUTANEOUS
Qty: 90 ML | Refills: 1 | Status: SHIPPED | OUTPATIENT
Start: 2024-06-17

## 2024-06-17 RX ORDER — DAPAGLIFLOZIN 10 MG/1
10 TABLET, FILM COATED ORAL DAILY
Qty: 90 TABLET | Refills: 1 | Status: SHIPPED | OUTPATIENT
Start: 2024-06-17

## 2024-06-17 NOTE — PROGRESS NOTES
"Chief Complaint   Patient presents with    Diabetes          HPI   Myke Marcial is a 58 y.o. male had concerns including Diabetes.    He is checking blood sugars 4+ times per day with CGM. Data reviewed from the last two weeks shows average glucose 203 with variability of 28.6%. In target range 40%, high 41%, very high 19%, low 0%, very low 0%.   Pattern of: postprandial hyperglycemia    Current medications for diabetes include mixed insulin 30 units twice daily, mounjaro 7.5 mg weekly, farxiga 10 mg daily, metformin 500 mg BID.    Highest Bgs after meals/fast food.     The following portions of the patient's history were reviewed and updated as appropriate: allergies, current medications, past family history, past medical history, past social history, past surgical history, and problem list.      Review of Systems   Constitutional: Negative.    Endocrine: Negative.         Physical Exam  Vitals reviewed.   Constitutional:       Appearance: Normal appearance. He is obese.      Comments: Body mass index is 38.99 kg/m².     Cardiovascular:      Rate and Rhythm: Normal rate.   Pulmonary:      Effort: Pulmonary effort is normal.   Neurological:      General: No focal deficit present.      Mental Status: He is alert. Mental status is at baseline.   Psychiatric:         Mood and Affect: Mood normal.         Behavior: Behavior normal.        /78   Pulse 80   Ht 175.3 cm (69\")   Wt 120 kg (264 lb)   SpO2 99%   BMI 38.99 kg/m²      Labs and imaging    CMP:  Lab Results   Component Value Date    BUN 29 (H) 05/01/2024    CREATININE 1.32 (H) 05/01/2024    EGFR 80.5 06/30/2023    BCR 22.0 05/01/2024     05/01/2024    K 4.2 05/01/2024    CO2 26.5 05/01/2024    CALCIUM 10.1 05/01/2024    ALBUMIN 4.4 05/01/2024    BILITOT 0.4 05/01/2024    ALKPHOS 84 05/01/2024    AST 16 05/01/2024    ALT 18 05/01/2024     Lipid Panel:  Lab Results   Component Value Date    CHOL 138 02/23/2024    TRIG 432 (H) 02/23/2024 "    HDL 31 (L) 02/23/2024    VLDL 63 (H) 02/23/2024    LDL 44 02/23/2024     HbA1c:  Lab Results   Component Value Date    HGBA1C 8.5 (A) 06/17/2024    HGBA1C 8.20 (H) 05/01/2024     Glucose:  Lab Results   Component Value Date    POCGLU 180 (A) 06/17/2024     Microalbumin:  Lab Results   Component Value Date    MALBCRERATIO 12 05/01/2024     TSH:  Lab Results   Component Value Date    TSH 4.290 (H) 05/01/2024    TSH 3.620 02/15/2023    TSH 4.310 (H) 11/15/2022    TSH 2.320 07/28/2021       Assessment and plan  Diagnoses and all orders for this visit:    1. Uncontrolled type 2 diabetes mellitus with hyperglycemia (Primary)  A1c up to 8.5. Complicated by gastroparesis, retinopathy, neuropathy, history of microalbuminuria.   Continue metformin 500 mg BID.   Continue farxiga 10 mg daily.   Increase mounjaro to 10 mg. Titrate up monthly as tolerated. This hasn't affected high gastroparesis.   Increase insulin to 32 units twice daily, up to 38 units for higher carb meal.   Ophtho exam is UTD. MF updated 1/2024. Labs are UTD from 5/2024.   -     POC Glucose, Blood  -     POC Glycosylated Hemoglobin (Hb A1C)  -     insulin aspart prot & aspart (NovoLOG 70/30 FlexPen ReliOn) (70-30) 100 UNIT/ML suspension pen-injector injection; 32-38 units twice daily before meals  Dispense: 90 mL; Refill: 1  -     dapagliflozin Propanediol (Farxiga) 10 MG tablet; Take 10 mg by mouth Daily.  Dispense: 90 tablet; Refill: 1  -     Tirzepatide (MOUNJARO) 10 MG/0.5ML solution pen-injector pen; Inject 0.5 mL under the skin into the appropriate area as directed 1 (One) Time Per Week.  Dispense: 2 mL; Refill: 3    2. Abnormal thyroid function test  TSH high- subclinical. Can monitor. Recheck now.       Return in about 4 months (around 10/17/2024) for next scheduled follow up. The patient was instructed to contact the clinic with any interval questions or concerns.    Electronically signed by: Ondina C Pacitti, DO   Endocrinologist    Please note  that portions of this note were completed with a voice recognition program.

## 2024-06-17 NOTE — PATIENT INSTRUCTIONS
Increase mixed insulin to 32 unit twice daily. If eating a higher carb meal (fast food or otherwise) increase to 38 units BEFORE the meal.     Increase mounjaro to 10 mg weekly.

## 2024-07-03 DIAGNOSIS — K21.00 GASTROESOPHAGEAL REFLUX DISEASE WITH ESOPHAGITIS WITHOUT HEMORRHAGE: ICD-10-CM

## 2024-07-03 DIAGNOSIS — E78.2 MIXED HYPERLIPIDEMIA: ICD-10-CM

## 2024-07-03 RX ORDER — OMEPRAZOLE 20 MG/1
20 CAPSULE, DELAYED RELEASE ORAL DAILY
Qty: 90 CAPSULE | Refills: 1 | Status: SHIPPED | OUTPATIENT
Start: 2024-07-03

## 2024-07-03 RX ORDER — FENOFIBRATE 145 MG/1
145 TABLET, COATED ORAL DAILY
Qty: 90 TABLET | Refills: 3 | Status: SHIPPED | OUTPATIENT
Start: 2024-07-03

## 2024-07-04 LAB — TSH SERPL DL<=0.005 MIU/L-ACNC: 3.88 UIU/ML (ref 0.27–4.2)

## 2024-07-09 RX ORDER — CHLORTHALIDONE 25 MG/1
25 TABLET ORAL DAILY
Qty: 90 TABLET | Refills: 0 | Status: SHIPPED | OUTPATIENT
Start: 2024-07-09

## 2024-07-28 DIAGNOSIS — E11.65 UNCONTROLLED TYPE 2 DIABETES MELLITUS WITH HYPERGLYCEMIA: ICD-10-CM

## 2024-07-29 RX ORDER — TIRZEPATIDE 7.5 MG/.5ML
INJECTION, SOLUTION SUBCUTANEOUS
Qty: 4 ML | Refills: 0 | OUTPATIENT
Start: 2024-07-29

## 2024-08-14 RX ORDER — PRAMIPEXOLE DIHYDROCHLORIDE 0.25 MG/1
TABLET ORAL
Qty: 270 TABLET | Refills: 0 | Status: SHIPPED | OUTPATIENT
Start: 2024-08-14

## 2024-08-22 ENCOUNTER — OFFICE VISIT (OUTPATIENT)
Dept: UROLOGY | Facility: CLINIC | Age: 59
End: 2024-08-22
Payer: MEDICAID

## 2024-08-22 VITALS
HEIGHT: 69 IN | OXYGEN SATURATION: 100 % | DIASTOLIC BLOOD PRESSURE: 76 MMHG | TEMPERATURE: 97.7 F | HEART RATE: 85 BPM | BODY MASS INDEX: 39.1 KG/M2 | SYSTOLIC BLOOD PRESSURE: 124 MMHG | WEIGHT: 264 LBS

## 2024-08-22 DIAGNOSIS — N52.9 ERECTILE DYSFUNCTION, UNSPECIFIED ERECTILE DYSFUNCTION TYPE: ICD-10-CM

## 2024-08-22 DIAGNOSIS — N39.41 URGE INCONTINENCE: Primary | ICD-10-CM

## 2024-08-22 RX ORDER — BUPROPION HYDROCHLORIDE 150 MG/1
150 TABLET ORAL
COMMUNITY

## 2024-08-22 RX ORDER — TADALAFIL 5 MG/1
5 TABLET ORAL DAILY
Qty: 30 TABLET | Refills: 5 | Status: SHIPPED | OUTPATIENT
Start: 2024-08-22

## 2024-08-22 NOTE — PROGRESS NOTES
Office Visit Established Male Patient     Patient Name: Myke Marcial  : 1965   MRN: [id number]     Chief Complaint:   Chief Complaint   Patient presents with    Follow-up     3 month check for Urge incontinence  States urgency is gone       History of Present Illness: Mr. Myke Marcial is a 58 y.o. male who presents today for follow up for UUI.  Patient previously on myrbetriq but did not .  Patient averaging 2-3 diapers per day with nocturia x2.  Was started on gemtesa at last visit and was denied by insurance.  He started PFPT.  Gemtesa has not been approved and he is taking it.  He reports urgency, incontinence, and nocturia has resolved.  He is mostly compliant with CPAP unless he falls asleep in his chair.  He is very pleased with results.  He does wish to discuss ED.  Reports rigidity is 0/10 with 6 being able to penetrate.  He has not taken any medications or treatment in the past.  He does have CRAIG he has used successfully in the past.          Subjective      Review of System:   As noted in HPI.    Past Medical History:   Past Medical History:   Diagnosis Date    Anxiety and depression     Arthritis     LOWER BACK    Colon cancer screening 2018    Added automatically from request for surgery [id number]    Diabetes mellitus     Diabetic foot ulcer 2018    Ear drum perforation, right     Elevated cholesterol     GERD (gastroesophageal reflux disease)     Gynecomastia 2019    Hernia     Hyperlipidemia     Hypertension     Myocardial infarction     Refusal of blood product     Sleep apnea, obstructive     CPAP    Type 2 diabetes mellitus     Vitamin D deficiency disease 2016    Wears glasses     for reading only       Past Surgical History:   Past Surgical History:   Procedure Laterality Date    CARDIAC CATHETERIZATION N/A 2023    Procedure: Coronary angiography;  Surgeon: Irwin Thomas MD;  Location: [facility] CATH INVASIVE LOCATION;  Service:  Cardiovascular;  Laterality: N/A;    CATARACT EXTRACTION, BILATERAL      COLONOSCOPY N/A 07/10/2018    Procedure: COLONOSCOPY WITH POLYPECTOMIES;  Surgeon: Musa Berger MD;  Location: [facility] ENDOSCOPY;  Service: Gastroenterology    COLONOSCOPY N/A 08/16/2022    Procedure: COLONOSCOPY;  Surgeon: Brittany Goins MD;  Location: [facility] ENDOSCOPY;  Service: Gastroenterology;  Laterality: N/A;    COLONOSCOPY N/A 5/15/2024    Procedure: COLONOSCOPY  WITH POLYPECTOMY X 3;  Surgeon: Brittany Goins MD;  Location: [facility] ENDOSCOPY;  Service: Gastroenterology;  Laterality: N/A;    CORONARY ARTERY BYPASS GRAFT  07/2023    ENDOSCOPY N/A 03/17/2022    Procedure: ESOPHAGOGASTRODUODENOSCOPY with biopsies;  Surgeon: Brittany Goins MD;  Location: [facility] ENDOSCOPY;  Service: Gastroenterology;  Laterality: N/A;    MUSCLE REPAIR Right     biceps tendon       Family History:   Family History   Problem Relation Age of Onset    Sleep apnea Mother     Hypertension Father     Hyperlipidemia Father     Prostate cancer Father     Cancer Father         Prostate   stage1    Cancer Maternal Grandmother     Cancer Paternal Grandmother     Colon cancer Neg Hx     Liver cancer Neg Hx     Rectal cancer Neg Hx     Stomach cancer Neg Hx        Social History:   Social History     Socioeconomic History    Marital status:    Tobacco Use    Smoking status: Never     Passive exposure: Never    Smokeless tobacco: Never   Vaping Use    Vaping status: Never Used   Substance and Sexual Activity    Alcohol use: Not Currently    Drug use: Never    Sexual activity: Not Currently     Partners: Female     Birth control/protection: Condom       Medications:     Current Outpatient Medications:     aspirin 81 MG EC tablet, Take 1 tablet by mouth Daily., Disp: , Rfl:     atorvastatin (LIPITOR) 80 MG tablet, Take 1 tablet by mouth Daily., Disp: 90 tablet, Rfl: 3    B-D UF III MINI PEN NEEDLES 31G X 5 MM misc, USE 1  ONCE DAILY, Disp: 100  each, Rfl: 0    buPROPion XL (WELLBUTRIN XL) 150 MG 24 hr tablet, Take 1 tablet by mouth., Disp: , Rfl:     chlorthalidone (HYGROTON) 25 MG tablet, Take 1 tablet by mouth once daily, Disp: 90 tablet, Rfl: 0    Continuous Blood Gluc  (Dexcom G7 ) device, Use 1 each Daily., Disp: 1 each, Rfl: 0    Continuous Blood Gluc Sensor (Dexcom G7 Sensor) misc, Use 1 each Every 10 (Ten) Days., Disp: 9 each, Rfl: 3    dapagliflozin Propanediol (Farxiga) 10 MG tablet, Take 10 mg by mouth Daily., Disp: 90 tablet, Rfl: 1    fenofibrate (TRICOR) 145 MG tablet, Take 1 tablet by mouth Daily., Disp: 90 tablet, Rfl: 3    insulin aspart prot & aspart (NovoLOG 70/30 FlexPen ReliOn) (70-30) 100 UNIT/ML suspension pen-injector injection, 32-38 units twice daily before meals, Disp: 90 mL, Rfl: 1    metFORMIN ER (GLUCOPHAGE-XR) 500 MG 24 hr tablet, Take 1 tablet by mouth twice daily, Disp: 180 tablet, Rfl: 1    metoprolol tartrate (LOPRESSOR) 25 MG tablet, Take 0.5 tablets by mouth 2 (Two) Times a Day., Disp: 90 tablet, Rfl: 3    omeprazole (priLOSEC) 20 MG capsule, Take 1 capsule by mouth Daily., Disp: 90 capsule, Rfl: 1    pramipexole (MIRAPEX) 0.25 MG tablet, TAKE 1 TABLET BY MOUTH THREE TIMES DAILY, Disp: 270 tablet, Rfl: 0    Tirzepatide (MOUNJARO) 10 MG/0.5ML solution pen-injector pen, Inject 0.5 mL under the skin into the appropriate area as directed 1 (One) Time Per Week., Disp: 2 mL, Rfl: 3    Vibegron (Gemtesa) 75 MG tablet, Take 1 tablet by mouth Daily., Disp: 30 tablet, Rfl: 11    vitamin D3 125 MCG (5000 UT) capsule capsule, Take 1 capsule by mouth Daily., Disp: , Rfl:     tadalafil (Cialis) 5 MG tablet, Take 1 tablet by mouth Daily., Disp: 30 tablet, Rfl: 5    Allergies:   Allergies   Allergen Reactions    Ace Inhibitors Cough    Bee Venom Anaphylaxis    Penicillins Swelling       Objective     Physical Exam:   Vital Signs:   Vitals:    08/22/24 1012   BP: 124/76   Pulse: 85   Temp: 97.7 °F (36.5 °C)   SpO2: 100%  "  Weight: 120 kg (264 lb)   Height: 175.3 cm (69.02\")     Body mass index is 38.97 kg/m².   Physical Exam  Vitals and nursing note reviewed.   Constitutional:       General: He is awake. He is not in acute distress.     Appearance: He is obese. He is not ill-appearing.   Pulmonary:      Effort: Pulmonary effort is normal.   Skin:     General: Skin is warm and dry.   Neurological:      Mental Status: He is alert and oriented to person, place, and time. Mental status is at baseline.   Psychiatric:         Mood and Affect: Mood normal.         Behavior: Behavior is cooperative.              Labs  Brief Urine Lab Results  (Last result in the past 365 days)        Color   Clarity   Blood   Leuk Est   Nitrite   Protein   CREAT   Urine HCG        05/22/24 1255 Dark Yellow   Clear   Negative   Negative   Negative   Trace                   Lab Results   Component Value Date    GLUCOSE 122 (H) 05/01/2024    CALCIUM 10.1 05/01/2024     05/01/2024    K 4.2 05/01/2024    CO2 26.5 05/01/2024    CL 97 (L) 05/01/2024    BUN 29 (H) 05/01/2024    CREATININE 1.32 (H) 05/01/2024    EGFRIFAFRI 61 01/31/2022    EGFRIFNONA 51 (L) 01/31/2022    BCR 22.0 05/01/2024    ANIONGAP 10.2 06/30/2023       Lab Results   Component Value Date    WBC 7.00 05/01/2024    HGB 17.0 05/01/2024    HCT 51.9 (H) 05/01/2024    MCV 87.2 05/01/2024     05/01/2024            Lab Results   Component Value Date    PSA 0.567 05/01/2024       Orders Only on 07/03/2024   Component Date Value Ref Range Status    TSH 07/03/2024 3.880  0.270 - 4.200 uIU/mL Final   Office Visit on 06/17/2024   Component Date Value Ref Range Status    Glucose 06/17/2024 180 (A)  70 - 130 mg/dL Final    Lot Number 06/17/2024 [id number]   Final    Expiration Date 06/17/2024 2/20/26   Final    Hemoglobin A1C 06/17/2024 8.5 (A)  4.5 - 5.7 % Final    Lot Number 06/17/2024 [id number]   Final    Expiration Date 06/17/2024 2/20/26   Final   Office Visit on 05/22/2024   Component " Date Value Ref Range Status    Color 2024 Dark Yellow  Yellow, Straw, Dark Yellow, Tisha Final    Clarity, UA 2024 Clear  Clear Final    Specific Gravity  2024 1.020  1.005 - 1.030 Final    pH, Urine 2024 6.0  5.0 - 8.0 Final    Leukocytes 2024 Negative  Negative Final    Nitrite, UA 2024 Negative  Negative Final    Protein, POC 2024 Trace (A)  Negative mg/dL Final    Glucose, UA 2024 Trace (A)  Negative mg/dL Final    Ketones, UA 2024 Negative  Negative Final    Urobilinogen, UA 2024 Normal  Normal, 0.2 E.U./dL Final    Bilirubin 2024 Negative  Negative Final    Blood, UA 2024 Negative  Negative Final    Lot Number 2024 [id number]   Final    Expiration Date 2024 10/21/2025   Final   Admission on 05/15/2024, Discharged on 05/15/2024   Component Date Value Ref Range Status    Glucose 05/15/2024 147 (H)  70 - 130 mg/dL Final    Serial Number: [id number]:  [id number]    Reference Lab Report 05/15/2024    Final                    Value:[facility]  [address] [place] [address]  Phone: [phone] or [phone]  Fax: [phone]  Andrew Harding M.D., Medical Director    PATIENT NAME                                     LABORATORY NO.  BLAYNE ALBERT.                         [id number]  [id number]                                 AGE                    SEX   SSN              CLIENT REF #  [facility]                    58        1965      M     [id number]      [id number]    [address] [place] [address] BY-PASS                        REQUESTING M.D.           ATTENDING M.D.         COPY TO.   [address]                                MUNDO STERLING [place] [id number]                         [facility]  DATE COLLECTED            DATE RECEIVED          DATE REPORTED  05/15/2024                05/15/2024             2024    DIAGNOSIS:  A.      "HEPATIC FLEXURE POLYP:  Tubular adenoma  Negative for carcinoma or high-grade                           dysplasia    B.     DESCENDING COLON POLYP, X2:  Hyperplastic polyps x2  Negative for dysplasia or carcinoma      CLINICAL HISTORY:  History of adenomatous polyp of colon    SPECIMENS RECEIVED:  A.    HEPATIC FLEXURE POLYP  B.    DESCENDING COLON POLYP , X2    MICROSCOPIC DESCRIPTION:  Tissue blocks are prepared and slides are examined microscopically on all  specimens. See diagnosis for details.    Professional interpretation rendered by Kyler Washburn M.D.,YUSEF at [employer], [address] [place] [address].    GROSS DESCRIPTION:  A.    Labeled as \"hepatic flexure polyp\", consisting of 1 piece of tan soft tissue  measuring 0.6 x 0.3 x 0.2 cm, submitted entirely in 1 cassette.  SOG  B.    Labeled as \"descending colon polyp x 2\", consisting of 2 pieces of tan soft  tissue measuring 0.8 x 0.6 x 0.2 cm, submitted entirely in 1 cassette.    REVIEWED, DIAGNOSED AND ELECTRONICALLY  SIGNED BY:    Kyler Washburn M.D.,F.C.A.P.  CPT CODES:  88305x2     Office Visit on 05/01/2024   Component Date Value Ref Range Status    Hemoglobin A1C 05/01/2024 8.20 (H)  4.80 - 5.60 % Final    Comment: Hemoglobin A1C Ranges:  Increased Risk for Diabetes  5.7% to 6.4%  Diabetes                     >= 6.5%  Diabetic Goal                < 7.0%      WBC 05/01/2024 7.00  3.40 - 10.80 10*3/mm3 Final    RBC 05/01/2024 5.95 (H)  4.14 - 5.80 10*6/mm3 Final    Hemoglobin 05/01/2024 17.0  13.0 - 17.7 g/dL Final    Hematocrit 05/01/2024 51.9 (H)  37.5 - 51.0 % Final    MCV 05/01/2024 87.2  79.0 - 97.0 fL Final    MCH 05/01/2024 28.6  26.6 - 33.0 pg Final    MCHC 05/01/2024 32.8  31.5 - 35.7 g/dL Final    RDW 05/01/2024 13.9  12.3 - 15.4 % Final    Platelets 05/01/2024 354  140 - 450 10*3/mm3 Final    Neutrophil Rel % 05/01/2024 70.1  42.7 - 76.0 % Final    Lymphocyte Rel % 05/01/2024 21.0  19.6 - 45.3 % Final    Monocyte Rel % " 05/01/2024 7.0  5.0 - 12.0 % Final    Eosinophil Rel % 05/01/2024 0.9  0.3 - 6.2 % Final    Basophil Rel % 05/01/2024 0.7  0.0 - 1.5 % Final    Neutrophils Absolute 05/01/2024 4.91  1.70 - 7.00 10*3/mm3 Final    Lymphocytes Absolute 05/01/2024 1.47  0.70 - 3.10 10*3/mm3 Final    Monocytes Absolute 05/01/2024 0.49  0.10 - 0.90 10*3/mm3 Final    Eosinophils Absolute 05/01/2024 0.06  0.00 - 0.40 10*3/mm3 Final    Basophils Absolute 05/01/2024 0.05  0.00 - 0.20 10*3/mm3 Final    Immature Granulocyte Rel % 05/01/2024 0.3  0.0 - 0.5 % Final    Immature Grans Absolute 05/01/2024 0.02  0.00 - 0.05 10*3/mm3 Final    nRBC 05/01/2024 0.0  0.0 - 0.2 /100 WBC Final    Glucose 05/01/2024 122 (H)  65 - 99 mg/dL Final    BUN 05/01/2024 29 (H)  6 - 20 mg/dL Final    Creatinine 05/01/2024 1.32 (H)  0.76 - 1.27 mg/dL Final    EGFR Result 05/01/2024 62.9  >60.0 mL/min/1.73 Final    Comment: GFR Normal >60  Chronic Kidney Disease <60  Kidney Failure <15      BUN/Creatinine Ratio 05/01/2024 22.0  7.0 - 25.0 Final    Sodium 05/01/2024 136  136 - 145 mmol/L Final    Potassium 05/01/2024 4.2  3.5 - 5.2 mmol/L Final    Chloride 05/01/2024 97 (L)  98 - 107 mmol/L Final    Total CO2 05/01/2024 26.5  22.0 - 29.0 mmol/L Final    Calcium 05/01/2024 10.1  8.6 - 10.5 mg/dL Final    Total Protein 05/01/2024 7.4  6.0 - 8.5 g/dL Final    Albumin 05/01/2024 4.4  3.5 - 5.2 g/dL Final    Globulin 05/01/2024 3.0  gm/dL Final    A/G Ratio 05/01/2024 1.5  g/dL Final    Total Bilirubin 05/01/2024 0.4  0.0 - 1.2 mg/dL Final    Alkaline Phosphatase 05/01/2024 84  39 - 117 U/L Final    AST (SGOT) 05/01/2024 16  1 - 40 U/L Final    ALT (SGPT) 05/01/2024 18  1 - 41 U/L Final    TSH 05/01/2024 4.290 (H)  0.270 - 4.200 uIU/mL Final    Creatinine, Urine 05/01/2024 85.2  Not Estab. mg/dL Final    Microalbumin, Urine 05/01/2024 10.1  Not Estab. ug/mL Final    Microalbumin/Creatinine Ratio 05/01/2024 12  0 - 29 mg/g creat Final    Comment:                         Normal:                0 -  29                         Moderately increased: 30 - 300                         Severely increased:       >300      PSA 05/01/2024 0.567  0.000 - 4.000 ng/mL Final    Comment: Testing Method: Roche [employer] Electrochemiluminescence  Immunoassay(ECLIA)  Values obtained with different assay methods or kits cannot  be used interchangeably.      Written Authorization 05/01/2024 Comment   Final    Comment: Written Authorization Received.  Authorization received from [facility] 05-  Logged by [place]     Lab on 02/23/2024   Component Date Value Ref Range Status    Total Cholesterol 02/23/2024 138  0 - 200 mg/dL Final    Triglycerides 02/23/2024 432 (H)  0 - 150 mg/dL Final    HDL Cholesterol 02/23/2024 31 (L)  40 - 60 mg/dL Final    LDL Cholesterol  02/23/2024 44  0 - 100 mg/dL Final    VLDL Cholesterol 02/23/2024 63 (H)  5 - 40 mg/dL Final    LDL/HDL Ratio 02/23/2024 0.66   Final   Office Visit on 01/18/2024   Component Date Value Ref Range Status    Hemoglobin A1C 01/18/2024 8.4 (A)  4.5 - 5.7 % Final    Lot Number 01/18/2024 [id number]   Final    Expiration Date 01/18/2024 07/30/2025   Final    Glucose 01/18/2024 111  70 - 130 mg/dL Final    Lot Number 01/18/2024 [id number]   Final    Expiration Date 01/18/2024 06/30/2024   Final   Office Visit on 10/25/2023   Component Date Value Ref Range Status    WBC 10/25/2023 4.93  3.40 - 10.80 10*3/mm3 Final    RBC 10/25/2023 5.75  4.14 - 5.80 10*6/mm3 Final    Hemoglobin 10/25/2023 15.6  13.0 - 17.7 g/dL Final    Hematocrit 10/25/2023 49.0  37.5 - 51.0 % Final    MCV 10/25/2023 85.2  79.0 - 97.0 fL Final    MCH 10/25/2023 27.1  26.6 - 33.0 pg Final    MCHC 10/25/2023 31.8  31.5 - 35.7 g/dL Final    RDW 10/25/2023 15.5 (H)  12.3 - 15.4 % Final    Platelets 10/25/2023 326  140 - 450 10*3/mm3 Final    Neutrophil Rel % 10/25/2023 67.6  42.7 - 76.0 % Final    Lymphocyte Rel % 10/25/2023 23.7  19.6 - 45.3 % Final    Monocyte Rel % 10/25/2023  7.1  5.0 - 12.0 % Final    Eosinophil Rel % 10/25/2023 1.0  0.3 - 6.2 % Final    Basophil Rel % 10/25/2023 0.4  0.0 - 1.5 % Final    Neutrophils Absolute 10/25/2023 3.33  1.70 - 7.00 10*3/mm3 Final    Lymphocytes Absolute 10/25/2023 1.17  0.70 - 3.10 10*3/mm3 Final    Monocytes Absolute 10/25/2023 0.35  0.10 - 0.90 10*3/mm3 Final    Eosinophils Absolute 10/25/2023 0.05  0.00 - 0.40 10*3/mm3 Final    Basophils Absolute 10/25/2023 0.02  0.00 - 0.20 10*3/mm3 Final    Immature Granulocyte Rel % 10/25/2023 0.2  0.0 - 0.5 % Final    Immature Grans Absolute 10/25/2023 0.01  0.00 - 0.05 10*3/mm3 Final    nRBC 10/25/2023 0.0  0.0 - 0.2 /100 WBC Final    Glucose 10/25/2023 239 (H)  65 - 99 mg/dL Final    BUN 10/25/2023 14  6 - 20 mg/dL Final    Creatinine 10/25/2023 1.01  0.76 - 1.27 mg/dL Final    EGFR Result 10/25/2023 86.7  >60.0 mL/min/1.73 Final    Comment: GFR Normal >60  Chronic Kidney Disease <60  Kidney Failure <15      BUN/Creatinine Ratio 10/25/2023 13.9  7.0 - 25.0 Final    Sodium 10/25/2023 136  136 - 145 mmol/L Final    Potassium 10/25/2023 4.6  3.5 - 5.2 mmol/L Final    Chloride 10/25/2023 100  98 - 107 mmol/L Final    Total CO2 10/25/2023 29.3 (H)  22.0 - 29.0 mmol/L Final    Calcium 10/25/2023 9.5  8.6 - 10.5 mg/dL Final    Total Protein 10/25/2023 7.1  6.0 - 8.5 g/dL Final    Albumin 10/25/2023 4.1  3.5 - 5.2 g/dL Final    Globulin 10/25/2023 3.0  gm/dL Final    A/G Ratio 10/25/2023 1.4  g/dL Final    Total Bilirubin 10/25/2023 0.4  0.0 - 1.2 mg/dL Final    Alkaline Phosphatase 10/25/2023 112  39 - 117 U/L Final    AST (SGOT) 10/25/2023 16  1 - 40 U/L Final    ALT (SGPT) 10/25/2023 19  1 - 41 U/L Final    Hemoglobin A1C 10/25/2023 8.80 (H)  4.80 - 5.60 % Final    Comment: Hemoglobin A1C Ranges:  Increased Risk for Diabetes  5.7% to 6.4%  Diabetes                     >= 6.5%  Diabetic Goal                < 7.0%      proBNP 10/25/2023 178  0 - 210 pg/mL Final    Comment: The following cut-points have been  suggested for the  use of proBNP for the diagnostic evaluation of heart  failure (HF) in patients with acute dyspnea:  Modality                     Age           Optimal Cut                             (years)            Point  ------------------------------------------------------  Diagnosis (rule in HF)        <50            450 pg/mL                            50 - 75            900 pg/mL                                >75           1800 pg/mL  Exclusion (rule out HF)  Age independent     300 pg/mL           I have reviewed the above labs.    PVR  Post-void residual performed with ultrasound by staff and interpreted by me - 4 mL     Assessment / Plan      Assessment:   Diagnoses and all orders for this visit:    1. Urge incontinence (Primary)    2. Erectile dysfunction, unspecified erectile dysfunction type  -     tadalafil (Cialis) 5 MG tablet; Take 1 tablet by mouth Daily.  Dispense: 30 tablet; Refill: 5         58 y.o. male presents today for follow up for UUI.  Patient previously on myrbetriq but did not .  Patient averaging 2-3 diapers per day with nocturia x2.  Was started on gemtesa at last visit and was denied by insurance.  He started PFPT.  Gemtesa has not been approved and he is taking it.  He reports urgency, incontinence, and nocturia has resolved.  He is mostly compliant with CPAP unless he falls asleep in his chair.  He is very pleased with results.  Will continue with gemtesa and PFPT.  Advised to continue PFPT at home as well.  PVR 4 mL.     He does wish to discuss ED.  Reports rigidity is 0/10 with 6 being able to penetrate.  He has not taken any medications or treatment in the past.  He does have CRAIG he has used successfully in the past.  Will start on cialis 5 mg daily, may take up to 20 mg 1 hour prior to intercourse on empty stomach.  He will also use CRAIG if needed.  Discussed trimix as well and given pamphlet.    He was counseled on appropriate use, timing and the need for sexual  stimulation.  In addition, he understands not to use this medication with nitrates. He was instructed to take the medication about 1 hour prior to sexual activity.  Side effects were explained to the patient such as headache, visual changes, upset stomach, and back pain. Lastly, he was instructed to go immediately to his nearest emergency room in the event he developed an erection lasting longer than 3 hours. He voiced understanding of all these instructions and the importance of seeking prompt medical attention for an erection lasting longer than 3 hours.  Patient is followed by Dr. Thomas and does not take nitrites.          Plan:    Start cialis 5 mg daily, may take up to 20 mg 1 hour prior to intercourse on empty stomach  Continue gemtesa  Continue PFPT exercises  Follow up in 6 months with IPSS, PVR, and UA.     Follow Up:   Return in about 6 months (around 2/22/2025) for recheck with Julia, with IPSS, with PVR, UA.      TAE Swift  [facility] [place]

## 2024-08-27 ENCOUNTER — OFFICE VISIT (OUTPATIENT)
Dept: CARDIOLOGY | Facility: CLINIC | Age: 59
End: 2024-08-27
Payer: MEDICAID

## 2024-08-27 VITALS
WEIGHT: 265.4 LBS | DIASTOLIC BLOOD PRESSURE: 64 MMHG | HEART RATE: 89 BPM | HEIGHT: 69 IN | RESPIRATION RATE: 17 BRPM | BODY MASS INDEX: 39.31 KG/M2 | SYSTOLIC BLOOD PRESSURE: 118 MMHG | OXYGEN SATURATION: 94 %

## 2024-08-27 DIAGNOSIS — I10 ESSENTIAL HYPERTENSION: ICD-10-CM

## 2024-08-27 DIAGNOSIS — E11.65 UNCONTROLLED TYPE 2 DIABETES MELLITUS WITH HYPERGLYCEMIA: ICD-10-CM

## 2024-08-27 DIAGNOSIS — E66.01 SEVERE OBESITY (BMI 35.0-39.9) WITH COMORBIDITY: ICD-10-CM

## 2024-08-27 DIAGNOSIS — E78.2 MIXED HYPERLIPIDEMIA: ICD-10-CM

## 2024-08-27 DIAGNOSIS — I25.10 CORONARY ARTERY DISEASE INVOLVING NATIVE CORONARY ARTERY OF NATIVE HEART WITHOUT ANGINA PECTORIS: Primary | ICD-10-CM

## 2024-08-27 RX ORDER — MULTIPLE VITAMINS W/ MINERALS TAB 9MG-400MCG
1 TAB ORAL DAILY
COMMUNITY

## 2024-08-27 NOTE — PROGRESS NOTES
[facility] Cardiology Office Follow Up Note    Myke Marcial  [id number]  2024    Primary Care Provider: Jaimie Nathan MD    Chief Complaint: Routine follow-up    History of Present Illness:   Mr. Myke Marcial is a 58 y.o. male who presents to the Cardiology Clinic for routine follow-up.  The patient has a past medical history significant for type 2 diabetes mellitus, hypertension, hyperlipidemia, PRATEEK, and obesity with a BMI 39.  He has a past cardiac history significant for presentation with an NSTEMI in , being diagnosed with severe two-vessel coronary artery disease poorly suited for PCI.  He subsequently underwent a 3V CABG with a LIMA-LAD, SVG-diagonal, and SVG-OM.  Today, the patient reports he is doing well from a cardiac standpoint.  He has not had any significant episodes of chest pain or exertional chest discomfort.  No significant exertional dyspnea.  He continues to tolerate his current cardiac medications without significant difficulty.  Other than fatigue and decreased stamina following surgery, no other specific complaints today.    Past Cardiac Testin. Last Coronary Angio:   1.  Moderate to severe stenosis of the ostial to proximal LAD, with severe focal stenosis in the mid LAD.  Small to moderate caliber first diagonal branch with severe ostial stenosis.  2.  Severe stenosis in the mid LCx, with subtotal occlusion of an OM 2 at the ostium which is located at the level of the trifurcation  3.  Minimal nonobstructive disease in the ramus branch.  4.  Mild nonobstructive disease throughout the RCA.  5.  Normal LVEDP, 12 mmHg.  2. Prior Stress Testing: None  3. Last Echo:               1.  LVEF 55-60%              2.  No significant structural valvular abnormalities  4. Prior Holter Monitor: None       Review of Systems:   Review of Systems   Constitutional:  Positive for fatigue. Negative for activity change, appetite change,  chills, diaphoresis, fever, unexpected weight gain and unexpected weight loss.   Eyes:  Negative for blurred vision and double vision.   Respiratory:  Negative for cough, chest tightness, shortness of breath and wheezing.    Cardiovascular:  Negative for chest pain, palpitations and leg swelling.   Gastrointestinal:  Negative for abdominal pain, anal bleeding, blood in stool and GERD.   Endocrine: Negative for cold intolerance and heat intolerance.   Genitourinary:  Negative for hematuria.   Neurological:  Negative for dizziness, syncope, weakness and light-headedness.   Hematological:  Does not bruise/bleed easily.   Psychiatric/Behavioral:  Negative for depressed mood and stress. The patient is not nervous/anxious.        I have reviewed and/or updated the patient's past medical, past surgical, family, social history, problem list and allergies as appropriate.     Medications:     Current Outpatient Medications:     aspirin 81 MG EC tablet, Take 1 tablet by mouth Daily., Disp: , Rfl:     atorvastatin (LIPITOR) 80 MG tablet, Take 1 tablet by mouth Daily., Disp: 90 tablet, Rfl: 3    B-D UF III MINI PEN NEEDLES 31G X 5 MM misc, USE 1  ONCE DAILY, Disp: 100 each, Rfl: 0    buPROPion XL (WELLBUTRIN XL) 150 MG 24 hr tablet, Take 1 tablet by mouth., Disp: , Rfl:     chlorthalidone (HYGROTON) 25 MG tablet, Take 1 tablet by mouth once daily, Disp: 90 tablet, Rfl: 0    Continuous Blood Gluc  (Dexcom G7 ) device, Use 1 each Daily., Disp: 1 each, Rfl: 0    Continuous Blood Gluc Sensor (Dexcom G7 Sensor) misc, Use 1 each Every 10 (Ten) Days., Disp: 9 each, Rfl: 3    dapagliflozin Propanediol (Farxiga) 10 MG tablet, Take 10 mg by mouth Daily., Disp: 90 tablet, Rfl: 1    fenofibrate (TRICOR) 145 MG tablet, Take 1 tablet by mouth Daily., Disp: 90 tablet, Rfl: 3    insulin aspart prot & aspart (NovoLOG 70/30 FlexPen ReliOn) (70-30) 100 UNIT/ML suspension pen-injector injection, 32-38 units twice daily before meals,  "Disp: 90 mL, Rfl: 1    metFORMIN ER (GLUCOPHAGE-XR) 500 MG 24 hr tablet, Take 1 tablet by mouth twice daily, Disp: 180 tablet, Rfl: 1    metoprolol tartrate (LOPRESSOR) 25 MG tablet, Take 0.5 tablets by mouth 2 (Two) Times a Day., Disp: 90 tablet, Rfl: 3    multivitamin with minerals tablet tablet, Take 1 tablet by mouth Daily., Disp: , Rfl:     omeprazole (priLOSEC) 20 MG capsule, Take 1 capsule by mouth Daily., Disp: 90 capsule, Rfl: 1    pramipexole (MIRAPEX) 0.25 MG tablet, TAKE 1 TABLET BY MOUTH THREE TIMES DAILY, Disp: 270 tablet, Rfl: 0    Tirzepatide (MOUNJARO) 10 MG/0.5ML solution pen-injector pen, Inject 0.5 mL under the skin into the appropriate area as directed 1 (One) Time Per Week., Disp: 2 mL, Rfl: 3    Vibegron (Gemtesa) 75 MG tablet, Take 1 tablet by mouth Daily., Disp: 30 tablet, Rfl: 11    vitamin D3 125 MCG (5000 UT) capsule capsule, Take 1 capsule by mouth Daily., Disp: , Rfl:     tadalafil (Cialis) 5 MG tablet, Take 1 tablet by mouth Daily. (Patient not taking: Reported on 8/27/2024), Disp: 30 tablet, Rfl: 5    Physical Exam:  Vital Signs:   Vitals:    08/27/24 1241   BP: 118/64   BP Location: Right arm   Patient Position: Sitting   Cuff Size: Adult   Pulse: 89   Resp: 17   SpO2: 94%   Weight: 120 kg (265 lb 6.4 oz)   Height: 175.3 cm (69\")       Physical Exam  Constitutional:       General: He is not in acute distress.     Appearance: He is obese. He is not diaphoretic.   HENT:      Head: Normocephalic and atraumatic.   Cardiovascular:      Rate and Rhythm: Normal rate and regular rhythm.      Heart sounds: No murmur heard.  Pulmonary:      Effort: Pulmonary effort is normal. No respiratory distress.      Breath sounds: Normal breath sounds. No stridor. No wheezing, rhonchi or rales.   Abdominal:      General: Bowel sounds are normal. There is no distension.      Palpations: Abdomen is soft.      Tenderness: There is no abdominal tenderness. There is no guarding or rebound.   Musculoskeletal:  "        General: No swelling. Normal range of motion.      Cervical back: Neck supple. No tenderness.   Skin:     General: Skin is warm and dry.   Neurological:      General: No focal deficit present.      Mental Status: He is alert and oriented to person, place, and time.   Psychiatric:         Mood and Affect: Mood normal.         Behavior: Behavior normal.         Results Review:   I reviewed the patient's new clinical results.        Assessment / Plan:     1.  Coronary artery disease  -- Status post 3V CABG in 7/23  -- Continues to do well postoperatively  -- Continue aspirin 81 mg daily  -- Continue high intensity statin  -- Follow-up in 1 year, sooner if required     2.  Hypertension  --BP remains adequately controlled     3.  Hyperlipidemia  --Lipid profile in 2/24 showed LDL 44, HDL 31, triglycerides 432  --Continue statin  --Agree with the addition of fibrate therapy     4.  Type 2 diabetes mellitus  --Hemoglobin A1c 8.4% in 1/24  --Management per PCP     5.  Obesity, BMI 39  --Weight loss advised      Follow Up:   Return in about 1 year (around 8/27/2025).      Thank you for allowing me to participate in the care of your patient. Please to not hesitate to contact me with additional questions or concerns.     GREG Thomas MD  Interventional Cardiology   08/27/2024  12:47 EDT

## 2024-10-07 ENCOUNTER — PRIOR AUTHORIZATION (OUTPATIENT)
Dept: ENDOCRINOLOGY | Facility: CLINIC | Age: 59
End: 2024-10-07
Payer: MEDICAID

## 2024-10-07 ENCOUNTER — TELEPHONE (OUTPATIENT)
Dept: UROLOGY | Facility: CLINIC | Age: 59
End: 2024-10-07

## 2024-10-07 NOTE — TELEPHONE ENCOUNTER
Caller: Myke Marcial     Relationship: SELF    Best call back number: [phone]     What form or medical record are you requesting: PROG NOTES    Who is requesting this form or medical record from you: [facility]    How would you like to receive the form or medical records (pick-up, mail, fax): FAX  If fax, what is the fax number: [phone]     Timeframe paperwork needed: ASAP    Additional notes: PT HAS A HEARING WITH THE DISABILITY OFFICE ON 10-7-24 AT 3PM. HE NEEDS HIS OFFICE/PROG NOTES FAXED TO THE HIS 'S OFFICE.  HE NEEDS THEM FOR 2023 TO PRESENT.    HE CAN BRING A SIGNED AUTHORIZATION IF NEEDED. PLEASE GIVE PT A CALL IF THERE ARE ANY QUESTIONS OR ISSUES.

## 2024-10-08 NOTE — TELEPHONE ENCOUNTER
Myke Marcial (Key: [id number])  PA Case ID #: [id number]  Rx #: [id number]  Need Help? Call us at [phone]  Outcome  Approved on October 7 by [place] [employer] 2017  The request has been approved. The authorization is effective from 10/07/2024 to 04/07/2025, as long as the member is enrolled in their current health plan. The request was approved as submitted. A written notification letter will follow with additional details.  Authorization Expiration Date: 4/6/2025  Drug  Mounjaro 10MG/0.5ML auto-injectors  ePA cloud logo  Form  [facility] [place] Medicaid ePA Form 2017 NCPDP

## 2024-10-17 ENCOUNTER — OFFICE VISIT (OUTPATIENT)
Dept: ENDOCRINOLOGY | Facility: CLINIC | Age: 59
End: 2024-10-17
Payer: MEDICAID

## 2024-10-17 VITALS
HEIGHT: 69 IN | BODY MASS INDEX: 40.58 KG/M2 | WEIGHT: 274 LBS | OXYGEN SATURATION: 98 % | SYSTOLIC BLOOD PRESSURE: 140 MMHG | DIASTOLIC BLOOD PRESSURE: 80 MMHG | HEART RATE: 82 BPM

## 2024-10-17 DIAGNOSIS — B37.49 YEAST DERMATITIS OF PENIS: ICD-10-CM

## 2024-10-17 DIAGNOSIS — E11.65 UNCONTROLLED TYPE 2 DIABETES MELLITUS WITH HYPERGLYCEMIA: Primary | ICD-10-CM

## 2024-10-17 LAB
EXPIRATION DATE: ABNORMAL
EXPIRATION DATE: ABNORMAL
GLUCOSE BLDC GLUCOMTR-MCNC: 316 MG/DL (ref 70–130)
HBA1C MFR BLD: 12.5 % (ref 4.5–5.7)
Lab: ABNORMAL
Lab: ABNORMAL

## 2024-10-17 RX ORDER — INSULIN ASPART 100 [IU]/ML
INJECTION, SUSPENSION SUBCUTANEOUS
Qty: 120 ML | Refills: 1 | Status: SHIPPED | OUTPATIENT
Start: 2024-10-17

## 2024-10-17 RX ORDER — METFORMIN HCL 500 MG
500 TABLET, EXTENDED RELEASE 24 HR ORAL 2 TIMES DAILY
Qty: 180 TABLET | Refills: 1 | Status: SHIPPED | OUTPATIENT
Start: 2024-10-17

## 2024-10-17 RX ORDER — FLUCONAZOLE 150 MG/1
150 TABLET ORAL
Qty: 3 TABLET | Refills: 0 | Status: SHIPPED | OUTPATIENT
Start: 2024-10-17 | End: 2024-10-24

## 2024-10-17 NOTE — PROGRESS NOTES
"Chief Complaint   Patient presents with    Diabetes     Type II          HPI   Myke Marcial is a 58 y.o. male had concerns including Diabetes (Type II).    He is checking blood sugars 4+ times per day with dexcom CGM. Data reviewed from 10/10/2024 through 10/17/2024 shows average glucose 389 with variability of 6.7%. In target range 0%, high 0%, very high 100%.   Pattern of: Significant hyperglycemia all day    Current prescribed medications for diabetes include metformin 500 mg twice daily, Farxiga 10 mg daily, Mounjaro 10 mg weekly, mixed insulin 32-34 units twice daily.  He isn't taking mounjaro due to supply issue but pharmacy now has and plans to resume ASAP.    He was off all diabetes meds for a month due to traveling to [place].  He did not wear CGM, diet was more liberal.  A1c is up to 12.5.    He has a genital yeast infection and fatigue.      The following portions of the patient's history were reviewed and updated as appropriate: allergies, current medications, past family history, past medical history, past social history, past surgical history, and problem list.      Review of Systems   Constitutional:  Positive for fatigue.   Endocrine:        See HPI   Genitourinary:         See HPI        Physical Exam  Vitals reviewed.   Constitutional:       Appearance: Normal appearance. He is obese.      Comments: Body mass index is 40.46 kg/m².   Cardiovascular:      Rate and Rhythm: Normal rate.   Pulmonary:      Effort: Pulmonary effort is normal.   Neurological:      General: No focal deficit present.      Mental Status: He is alert. Mental status is at baseline.   Psychiatric:         Mood and Affect: Mood normal.         Behavior: Behavior normal.        /80 (BP Location: Left arm, Patient Position: Sitting, Cuff Size: Adult)   Pulse 82   Ht 175.3 cm (69\")   Wt 124 kg (274 lb)   SpO2 98%   BMI 40.46 kg/m²      Labs and imaging    CMP:  Lab Results   Component Value Date    BUN 29 (H) " 05/01/2024    CREATININE 1.32 (H) 05/01/2024    EGFR 80.5 06/30/2023    BCR 22.0 05/01/2024     05/01/2024    K 4.2 05/01/2024    CO2 26.5 05/01/2024    CALCIUM 10.1 05/01/2024    ALBUMIN 4.4 05/01/2024    BILITOT 0.4 05/01/2024    ALKPHOS 84 05/01/2024    AST 16 05/01/2024    ALT 18 05/01/2024     Lipid Panel:  Lab Results   Component Value Date    CHOL 138 02/23/2024    TRIG 432 (H) 02/23/2024    HDL 31 (L) 02/23/2024    VLDL 63 (H) 02/23/2024    LDL 44 02/23/2024     HbA1c:  Lab Results   Component Value Date    HGBA1C 12.5 (A) 10/17/2024    HGBA1C 8.5 (A) 06/17/2024     Glucose:  Lab Results   Component Value Date    POCGLU 316 (A) 10/17/2024     Microalbumin:  Lab Results   Component Value Date    MALBCRERATIO 12 05/01/2024     TSH:  Lab Results   Component Value Date    TSH 3.880 07/03/2024       Assessment and plan  Diagnoses and all orders for this visit:    1. Uncontrolled type 2 diabetes mellitus with hyperglycemia (Primary)  Uncontrolled with significant hyperglycemia.  A1c is up to 12.5.  Due to noncompliance with medication regimen and glucose monitoring.  He stopped all medications and monitoring for about a month when traveling to [place].  In the last week he has resumed all medications but glucose levels are extremely high.  Complicated by gastroparesis, retinopathy, neuropathy, history of microalbuminuria.  Continue metformin 500 mg twice daily.  Hold Farxiga for 1 to 2 weeks until BGs improve and genital yeast infection resolves.   Increase mixed insulin to 40 units twice daily and titrate up by 5 units every few days until BGs are between 100-200   Resume Mounjaro 10 mg weekly.  Titrate up if tolerated.  Mounjaro has not exacerbated gastroparesis.  Labs are up-to-date, last metabolic panel May 2024, lipids February 2024, urine microalbumin May 2024.  Monitor yearly.  Monofilament up-to-date from January 2024.  Ophtho exam is up-to-date from February 2024.  -     POC Glucose, Blood  -      POC Glycosylated Hemoglobin (Hb A1C)  -     insulin aspart prot & aspart (NovoLOG 70/30 FlexPen ReliOn) (70-30) 100 UNIT/ML suspension pen-injector injection; 40 units twice daily before meals, titrate up as needed to  units  Dispense: 120 mL; Refill: 1  -     Tirzepatide (MOUNJARO) 10 MG/0.5ML solution pen-injector pen; Inject 0.5 mL under the skin into the appropriate area as directed 1 (One) Time Per Week.  Dispense: 2 mL; Refill: 3  -     metFORMIN ER (GLUCOPHAGE-XR) 500 MG 24 hr tablet; Take 1 tablet by mouth 2 (Two) Times a Day.  Dispense: 180 tablet; Refill: 1    2. Yeast dermatitis of penis  Due to uncontrolled hyperglycemia.  Hold Farxiga for now.  Prescription sent for Diflucan.  -     fluconazole (Diflucan) 150 MG tablet; Take 1 tablet by mouth Every 72 (Seventy-Two) Hours for 3 doses.  Dispense: 3 tablet; Refill: 0         Return for next scheduled follow up. The patient was instructed to contact the clinic with any interval questions or concerns.    Electronically signed by: Ondina Garcia DO   Endocrinologist    Please note that portions of this note were completed with a voice recognition program.

## 2024-10-17 NOTE — PATIENT INSTRUCTIONS
Hold farxiga for 1-2 weeks until glucose levels are in the 200s most of the time or better.     Increase mixed insulin to 40 units twice daily and increase by 5 units every 2-3 days as needed for glucose levels consistently > 200.

## 2024-10-28 ENCOUNTER — OFFICE VISIT (OUTPATIENT)
Dept: GASTROENTEROLOGY | Facility: CLINIC | Age: 59
End: 2024-10-28
Payer: MEDICAID

## 2024-10-28 VITALS
HEART RATE: 88 BPM | WEIGHT: 278 LBS | BODY MASS INDEX: 41.05 KG/M2 | DIASTOLIC BLOOD PRESSURE: 60 MMHG | SYSTOLIC BLOOD PRESSURE: 102 MMHG | OXYGEN SATURATION: 95 %

## 2024-10-28 DIAGNOSIS — D12.6 TUBULAR ADENOMA OF COLON: Primary | ICD-10-CM

## 2024-10-28 DIAGNOSIS — K64.9 RECTAL VARICES: ICD-10-CM

## 2024-10-28 PROCEDURE — 1160F RVW MEDS BY RX/DR IN RCRD: CPT | Performed by: PHYSICIAN ASSISTANT

## 2024-10-28 PROCEDURE — 3074F SYST BP LT 130 MM HG: CPT | Performed by: PHYSICIAN ASSISTANT

## 2024-10-28 PROCEDURE — 99213 OFFICE O/P EST LOW 20 MIN: CPT | Performed by: PHYSICIAN ASSISTANT

## 2024-10-28 PROCEDURE — 3078F DIAST BP <80 MM HG: CPT | Performed by: PHYSICIAN ASSISTANT

## 2024-10-28 PROCEDURE — 1159F MED LIST DOCD IN RCRD: CPT | Performed by: PHYSICIAN ASSISTANT

## 2024-10-28 RX ORDER — INSULIN ASPART 100 [IU]/ML
INJECTION, SOLUTION INTRAVENOUS; SUBCUTANEOUS
COMMUNITY
Start: 2024-10-19 | End: 2024-10-28

## 2024-10-28 NOTE — PROGRESS NOTES
Follow Up Note     Date: 10/28/2024   Patient Name: Myke Marcial  MRN: [id number]  : 1965     Primary Care Provider: Jaimie Nathan MD     Chief Complaint   Patient presents with    Results     Colonoscopy      History of present illness:   10/28/2024  Myke Marcial is a 59 y.o. male who is here today for follow up regarding Results (Colonoscopy).    Had colonoscopy since last visit and would like to discuss those results. Not sleeping well recently. No current GI complaints.    Interval History:  2022  Patient has been having issues with nausea and intermittent vomiting for the past over several months. He feels that food is sitting in the stomach prolonged period of time and has to through up with relief intermittently. No abdominal pain as such but feels burning sensation in the epigastrium. He burps excessively as well. He will have occasional heartburn depend on the diet. On prilosec as needed now.  Pt denies odynophagia or dysphagia.  He had a esophagram done on 2022 which revealed small hiatal hernia and esophageal dysmotility.  He has a poorly controlled diabetes mellitus on insulin.  His A1c improved from 11-8 recently. GB still there.      This patient deny any hematochezia or melena. He will have occasional lose stool and constipation. Normally will have 1-2 soft BM daily. Weight is stable. There is no history of anemia. No prior history of EGD.  He had a colonoscopy done in 2018 by Dr. Ahuja and was reported as having a 7 mm colon polyp in the transverse colon which was removed with hot biopsy forceps.  Biopsy revealed only benign colonic mucosa.  Reported as having a minimal mid ascending colon mucosal erythema and biopsies showed only lymphoid aggregate. No family history of colon cancer. Grandmother had Breast cancer. No history of any abdominal surgery. Denies alcohol abuse or cigarette smoking.     Subjective     Past Medical History:   Diagnosis Date     Anxiety and depression     Arthritis     LOWER BACK    Colon cancer screening 06/18/2018    Added automatically from request for surgery [id number]    Diabetes mellitus     Diabetic foot ulcer 11/29/2018    Ear drum perforation, right     Elevated cholesterol     GERD (gastroesophageal reflux disease)     Gynecomastia 2019    Hernia     Hyperlipidemia     Hypertension     Myocardial infarction     Refusal of blood product     Sleep apnea, obstructive     CPAP    Type 2 diabetes mellitus     Vitamin D deficiency disease 08/26/2016    Wears glasses     for reading only     Past Surgical History:   Procedure Laterality Date    CARDIAC CATHETERIZATION N/A 06/30/2023    Procedure: Coronary angiography;  Surgeon: Irwin Thomas MD;  Location: [facility] CATH INVASIVE LOCATION;  Service: Cardiovascular;  Laterality: N/A;    CATARACT EXTRACTION, BILATERAL      COLONOSCOPY N/A 07/10/2018    Procedure: COLONOSCOPY WITH POLYPECTOMIES;  Surgeon: Musa Berger MD;  Location: [facility] ENDOSCOPY;  Service: Gastroenterology    COLONOSCOPY N/A 08/16/2022    Procedure: COLONOSCOPY;  Surgeon: Brittany Goins MD;  Location: [facility] ENDOSCOPY;  Service: Gastroenterology;  Laterality: N/A;    COLONOSCOPY N/A 5/15/2024    Procedure: COLONOSCOPY  WITH POLYPECTOMY X 3;  Surgeon: Brittany Goins MD;  Location: [facility] ENDOSCOPY;  Service: Gastroenterology;  Laterality: N/A;    CORONARY ARTERY BYPASS GRAFT  07/2023    ENDOSCOPY N/A 03/17/2022    Procedure: ESOPHAGOGASTRODUODENOSCOPY with biopsies;  Surgeon: Brittany Goins MD;  Location: [facility] ENDOSCOPY;  Service: Gastroenterology;  Laterality: N/A;    MUSCLE REPAIR Right     biceps tendon     Family History   Problem Relation Age of Onset    Sleep apnea Mother     Hypertension Father     Hyperlipidemia Father     Prostate cancer Father     Cancer Father         Prostate   stage1    Cancer Maternal Grandmother     Cancer Paternal Grandmother     Colon cancer Neg Hx      Liver cancer Neg Hx     Rectal cancer Neg Hx     Stomach cancer Neg Hx      Social History     Socioeconomic History    Marital status:    Tobacco Use    Smoking status: Never     Passive exposure: Never    Smokeless tobacco: Never   Vaping Use    Vaping status: Never Used   Substance and Sexual Activity    Alcohol use: Not Currently    Drug use: Never    Sexual activity: Not Currently     Partners: Female     Birth control/protection: Condom     Current Outpatient Medications:     aspirin 81 MG EC tablet, Take 1 tablet by mouth Daily., Disp: , Rfl:     atorvastatin (LIPITOR) 80 MG tablet, Take 1 tablet by mouth Daily., Disp: 90 tablet, Rfl: 3    B-D UF III MINI PEN NEEDLES 31G X 5 MM misc, USE 1  ONCE DAILY, Disp: 100 each, Rfl: 0    buPROPion XL (WELLBUTRIN XL) 150 MG 24 hr tablet, Take 1 tablet by mouth., Disp: , Rfl:     chlorthalidone (HYGROTON) 25 MG tablet, Take 1 tablet by mouth once daily, Disp: 90 tablet, Rfl: 0    Continuous Blood Gluc  (Dexcom G7 ) device, Use 1 each Daily., Disp: 1 each, Rfl: 0    Continuous Blood Gluc Sensor (Dexcom G7 Sensor) misc, Use 1 each Every 10 (Ten) Days., Disp: 9 each, Rfl: 3    dapagliflozin Propanediol (Farxiga) 10 MG tablet, Take 10 mg by mouth Daily., Disp: 90 tablet, Rfl: 1    fenofibrate (TRICOR) 145 MG tablet, Take 1 tablet by mouth Daily., Disp: 90 tablet, Rfl: 3    insulin aspart prot & aspart (NovoLOG 70/30 FlexPen ReliOn) (70-30) 100 UNIT/ML suspension pen-injector injection, 40 units twice daily before meals, titrate up as needed to  units, Disp: 120 mL, Rfl: 1    metFORMIN ER (GLUCOPHAGE-XR) 500 MG 24 hr tablet, Take 1 tablet by mouth 2 (Two) Times a Day., Disp: 180 tablet, Rfl: 1    metoprolol tartrate (LOPRESSOR) 25 MG tablet, Take 0.5 tablets by mouth 2 (Two) Times a Day., Disp: 90 tablet, Rfl: 3    multivitamin with minerals tablet tablet, Take 1 tablet by mouth Daily., Disp: , Rfl:     omeprazole (priLOSEC) 20 MG capsule, Take 1  capsule by mouth Daily., Disp: 90 capsule, Rfl: 1    pramipexole (MIRAPEX) 0.25 MG tablet, TAKE 1 TABLET BY MOUTH THREE TIMES DAILY, Disp: 270 tablet, Rfl: 0    Tirzepatide (MOUNJARO) 10 MG/0.5ML solution pen-injector pen, Inject 0.5 mL under the skin into the appropriate area as directed 1 (One) Time Per Week., Disp: 2 mL, Rfl: 3    Vibegron (Gemtesa) 75 MG tablet, Take 1 tablet by mouth Daily., Disp: 30 tablet, Rfl: 11    vitamin D3 125 MCG (5000 UT) capsule capsule, Take 1 capsule by mouth Daily., Disp: , Rfl:     Allergies   Allergen Reactions    Ace Inhibitors Cough    Bee Venom Anaphylaxis    Penicillins Swelling     The following portions of the patient's history were reviewed and updated as appropriate: allergies, current medications, past family history, past medical history, past social history, past surgical history and problem list.    Objective     Physical Exam  Constitutional:       General: He is not in acute distress.     Appearance: Normal appearance. He is well-developed. He is obese. He is not diaphoretic.   HENT:      Head: Normocephalic and atraumatic.      Right Ear: External ear normal.      Left Ear: External ear normal.      Nose: Nose normal.   Eyes:      General: No scleral icterus.        Right eye: No discharge.         Left eye: No discharge.      Conjunctiva/sclera: Conjunctivae normal.   Neck:      Trachea: No tracheal deviation.   Pulmonary:      Effort: Pulmonary effort is normal. No respiratory distress.   Musculoskeletal:         General: Normal range of motion.      Cervical back: Normal range of motion.   Skin:     Coloration: Skin is not pale.      Findings: Erythema (facial) present. No rash.   Neurological:      Mental Status: He is alert and oriented to person, place, and time.      Coordination: Coordination normal.   Psychiatric:         Mood and Affect: Mood normal.         Behavior: Behavior normal.         Thought Content: Thought content normal.         Judgment:  Judgment normal.       Vitals:    10/28/24 1111   BP: 102/60   Pulse: 88   SpO2: 95%   Weight: 126 kg (278 lb)     Results Review:   I reviewed the patient's new clinical results.    Colonoscopy 5/15/2024  - Decreased sphincter tone found on digital rectal exam. - Diverticulosis in the sigmoid colon, in the descending colon and in the ascending colon. - One 3 to 4 mm polyp at the hepatic flexure, removed with a cold snare. Resected and retrieved. - One 6 to 8 mm polyp in the proximal descending colon, removed with a cold snare. Resected and retrieved. - One 4 mm polyp in the distal descending colon, removed with a cold snare. Resected and retrieved. - Rectal varices. - Non-bleeding external and internal hemorrhoids.  Pathology DIAGNOSIS:  A.     HEPATIC FLEXURE POLYP:  Tubular adenoma  Negative for carcinoma or high-grade dysplasia  B.     DESCENDING COLON POLYP, X2:  Hyperplastic polyps x2  Negative for dysplasia or carcinoma     Assessment / Plan      1. Tubular adenoma of colon  Colonoscopy 5/2024 had diverticulosis of colon, 3 small colon polyps. Pathology showed 1/3 was tubular adenomas, 2/3 were hyperplastic.     Repeat colonoscopy in 5 years for surveillance, due 5/2029  High fiber diet    2. Rectal varices  Had rectal varices noted on recent colonoscopy. No known prior history of liver disease. Nonalcoholic. No recent abdominal imaging. Plt normal. CMP with normal liver enzymes 5/2024. He may have underlying liver disease, needs further evaluation.     US abd to consider  Avoid alcohol  Work on weight loss  Should discuss with PCP    Follow Up:   Return if symptoms worsen or fail to improve. He prefers to call back for an appt if needed.     Yoko Kay PA-C  [facility] [place]  11/1/2024  14:31 EDT    Dictated Utilizing Dragon Dictation: Part of this note may be an electronic transcription/translation of spoken language to printed text using the Dragon Dictation System.

## 2024-11-04 ENCOUNTER — OFFICE VISIT (OUTPATIENT)
Dept: FAMILY MEDICINE CLINIC | Facility: CLINIC | Age: 59
End: 2024-11-04
Payer: MEDICAID

## 2024-11-04 VITALS
OXYGEN SATURATION: 97 % | WEIGHT: 273 LBS | HEIGHT: 69 IN | DIASTOLIC BLOOD PRESSURE: 80 MMHG | HEART RATE: 90 BPM | SYSTOLIC BLOOD PRESSURE: 118 MMHG | TEMPERATURE: 98 F | BODY MASS INDEX: 40.43 KG/M2

## 2024-11-04 DIAGNOSIS — E11.42 DIABETIC PERIPHERAL NEUROPATHY ASSOCIATED WITH TYPE 2 DIABETES MELLITUS: ICD-10-CM

## 2024-11-04 DIAGNOSIS — I25.10 CORONARY ARTERY DISEASE INVOLVING NATIVE CORONARY ARTERY OF NATIVE HEART WITHOUT ANGINA PECTORIS: ICD-10-CM

## 2024-11-04 DIAGNOSIS — E66.01 CLASS 3 SEVERE OBESITY DUE TO EXCESS CALORIES WITH SERIOUS COMORBIDITY AND BODY MASS INDEX (BMI) OF 40.0 TO 44.9 IN ADULT: ICD-10-CM

## 2024-11-04 DIAGNOSIS — Z28.21 INFLUENZA VACCINATION DECLINED BY PATIENT: ICD-10-CM

## 2024-11-04 DIAGNOSIS — R94.6 ABNORMAL THYROID FUNCTION TEST: ICD-10-CM

## 2024-11-04 DIAGNOSIS — E53.8 VITAMIN B 12 DEFICIENCY: ICD-10-CM

## 2024-11-04 DIAGNOSIS — E78.2 MIXED HYPERLIPIDEMIA: ICD-10-CM

## 2024-11-04 DIAGNOSIS — G47.33 OSA ON CPAP: ICD-10-CM

## 2024-11-04 DIAGNOSIS — E11.43 DIABETIC GASTROPARESIS ASSOCIATED WITH TYPE 2 DIABETES MELLITUS: ICD-10-CM

## 2024-11-04 DIAGNOSIS — E66.813 CLASS 3 SEVERE OBESITY DUE TO EXCESS CALORIES WITH SERIOUS COMORBIDITY AND BODY MASS INDEX (BMI) OF 40.0 TO 44.9 IN ADULT: ICD-10-CM

## 2024-11-04 DIAGNOSIS — E11.65 UNCONTROLLED TYPE 2 DIABETES MELLITUS WITH HYPERGLYCEMIA: ICD-10-CM

## 2024-11-04 DIAGNOSIS — I10 ESSENTIAL HYPERTENSION: Primary | ICD-10-CM

## 2024-11-04 DIAGNOSIS — K31.84 DIABETIC GASTROPARESIS ASSOCIATED WITH TYPE 2 DIABETES MELLITUS: ICD-10-CM

## 2024-11-04 DIAGNOSIS — K76.0 FATTY LIVER: ICD-10-CM

## 2024-11-04 DIAGNOSIS — E55.9 VITAMIN D DEFICIENCY DISEASE: ICD-10-CM

## 2024-11-04 DIAGNOSIS — Z51.81 MEDICATION MONITORING ENCOUNTER: ICD-10-CM

## 2024-11-04 DIAGNOSIS — E11.319 DIABETIC RETINOPATHY ASSOCIATED WITH TYPE 2 DIABETES MELLITUS, MACULAR EDEMA PRESENCE UNSPECIFIED, UNSPECIFIED LATERALITY, UNSPECIFIED RETINOPATHY SEVERITY: ICD-10-CM

## 2024-11-04 NOTE — PROGRESS NOTES
Subjective   Myke Marcial is a 59 y.o. male.     History of Present Illness  Here for routine f/u on several chronic med problems:    Uncontrolled IDDM with retinopathy, gastroparesis, DPN- followed by endo. Last A1c over 12. Admits he was not compliant with meds, diet, etc during prolonged visit to [place]. Has restarted meds, working on improving diet. On ASA, statin, farxiga, mixed insulin, metformin, Mounjaro. Not currently on Ace/ARB    CAD, HTN, HLP- on ASA, statin, BB, fibrate, thiazide diuretic    Chronic obesity- up 10 lbs from last visit. Struggles with regular activity, limiting caloric intake. Now taking Mounjaro as rx'd.    PRATEEK (and central?)- intermittently compliant with CPAP due to poor sleep patterns, often sleeps in chair, etc.    OAB- on Gemtesa, followed by urology. Denies dysuria, hematuria, straining to urinate.    Low B12 and vit D in past.    The following portions of the patient's history were reviewed and updated as appropriate: allergies, current medications, past family history, past medical history, past social history, past surgical history, and problem list.    Review of Systems   Constitutional:  Positive for fatigue. Negative for fever.   HENT:  Positive for congestion and postnasal drip. Negative for nosebleeds, rhinorrhea, sore throat, tinnitus and trouble swallowing.    Eyes:  Negative for visual disturbance.   Respiratory:  Positive for shortness of breath (with exertion). Negative for cough and wheezing.    Cardiovascular:  Positive for leg swelling (mild, stable). Negative for chest pain and palpitations.   Gastrointestinal:  Positive for nausea (mild, intermittent). Negative for abdominal pain, blood in stool, constipation and vomiting.        Heartburn   Endocrine: Positive for heat intolerance.   Genitourinary:  Negative for decreased urine volume, difficulty urinating, dysuria and hematuria.        ED   Musculoskeletal:  Positive for arthralgias and back pain (chronic,  stable).   Skin:  Negative for rash and wound.   Neurological:  Negative for dizziness, syncope, weakness and headaches.   Hematological:  Negative for adenopathy. Does not bruise/bleed easily.   Psychiatric/Behavioral:  Positive for dysphoric mood (mildly) and sleep disturbance. Negative for confusion and suicidal ideas. The patient is not nervous/anxious.    Pt's previous ROS reviewed and updated as indicated.       Objective   Vitals:    11/04/24 0837   BP: 118/80   Pulse: 90   Temp: 98 °F (36.7 °C)   SpO2: 97%     Body mass index is 40.3 kg/m².      11/04/24 0837   Weight: 124 kg (273 lb)       Physical Exam  Vitals and nursing note reviewed.   Constitutional:       General: He is not in acute distress.     Appearance: He is morbidly obese. He is not ill-appearing.   HENT:      Head: Atraumatic.      Mouth/Throat:      Mouth: Mucous membranes are moist.   Eyes:      General: No scleral icterus.     Conjunctiva/sclera: Conjunctivae normal.   Neck:      Thyroid: No thyroid mass.      Vascular: Normal carotid pulses. No carotid bruit.   Cardiovascular:      Rate and Rhythm: Normal rate and regular rhythm.      Pulses: Normal pulses.      Heart sounds: Normal heart sounds.   Pulmonary:      Effort: Pulmonary effort is normal.      Breath sounds: Normal breath sounds.   Musculoskeletal:      Right lower leg: No edema.      Left lower leg: No edema.   Lymphadenopathy:      Cervical: No cervical adenopathy.   Skin:     General: Skin is warm and dry.      Coloration: Skin is not jaundiced or pale.   Neurological:      Mental Status: He is alert and oriented to person, place, and time.      Gait: Gait is intact.   Psychiatric:         Behavior: Behavior normal. Behavior is cooperative.     Pt's previous physical exam reviewed and updated as indicated.    Lab Results   Component Value Date    HGBA1C 12.5 (A) 10/17/2024    HGBA1C 8.5 (A) 06/17/2024    HGBA1C 8.20 (H) 05/01/2024     Lab Results   Component Value Date     MICROALBUR 10.1 05/01/2024    CREATININE 1.41 (H) 11/04/2024         Assessment & Plan   Diagnoses and all orders for this visit:    1. Essential hypertension (Primary)  -     CBC & Differential  -     Comprehensive Metabolic Panel  -     TSH Rfx On Abnormal To Free T4    2. Mixed hyperlipidemia  -     Comprehensive Metabolic Panel  -     Lipid Panel  -     TSH Rfx On Abnormal To Free T4    3. Coronary artery disease involving native coronary artery of native heart without angina pectoris  -     CBC & Differential  -     Comprehensive Metabolic Panel    4. Class 3 severe obesity due to excess calories with serious comorbidity and body mass index (BMI) of 40.0 to 44.9 in adult    5. Diabetic retinopathy associated with type 2 diabetes mellitus, macular edema presence unspecified, unspecified laterality, unspecified retinopathy severity    6. Diabetic gastroparesis associated with type 2 diabetes mellitus    7. PRATEEK on CPAP    8. Diabetic peripheral neuropathy associated with type 2 diabetes mellitus    9. Uncontrolled type 2 diabetes mellitus with hyperglycemia  -     CBC & Differential  -     Comprehensive Metabolic Panel    10. Medication monitoring encounter  -     CBC & Differential  -     Comprehensive Metabolic Panel    11. Abnormal thyroid function test  -     TSH Rfx On Abnormal To Free T4    12. Vitamin D deficiency disease  -     Vitamin D,25-Hydroxy    13. Vitamin B 12 deficiency  -     Vitamin B12    14. Influenza vaccination declined by patient       Uncontrolled IDDM with multiple diabetic complications- once again reiterated importance of medication compliance, activity and dietary rec compliance, etc. F/u with endocrinology as scheduled. Patient encouraged to take meds as rx'd, follow diabetic appropriate diet, exercise daily, perform feet check daily, and have yearly diabetic eye exams.    CAD, HTN, HLP - appearing stable. Poor lifestyle mgnt. Pt advised to eat a heart healthy diet and get regular  aerobic exercise. Continue ASA, statin, thiazide, tricor, metoprolol, Farxiga. No micro/macroalbuminuria at this time (ace/arb in future as indicated).    Assess status of vit/min deficiencies and replace as indicated.    Chronic morbid obesity with PRATEEK, fatty liver- Encouraged CPAP compliance. Nutrition and activity goals reviewed including: mainly water to drink, limit white flour/processed sugar, higher lean protein, high fiber carbs, regular meals, working toward 150 mins cardio per week. Continue Mounjaro.    Routine f/u in 6 months, f/u sooner as needed/instructed.  Encouraged to keep regular f/u with endocrinology, cardiology.  I will contact patient regarding test results and provide instructions regarding any necessary changes in plan of care.  Patient was encouraged to keep me informed of any acute changes, lack of improvement, or any new concerning symptoms.  Pt is aware of reasons to seek emergent care.  Patient voiced understanding of all instructions and denied further questions.    Please note that portions of this note may have been completed with a voice recognition program.

## 2024-11-05 LAB
25(OH)D3+25(OH)D2 SERPL-MCNC: 28.9 NG/ML (ref 30–100)
ALBUMIN SERPL-MCNC: 4.2 G/DL (ref 3.5–5.2)
ALBUMIN/GLOB SERPL: 1.4 G/DL
ALP SERPL-CCNC: 80 U/L (ref 39–117)
ALT SERPL-CCNC: 34 U/L (ref 1–41)
AST SERPL-CCNC: 28 U/L (ref 1–40)
BASOPHILS # BLD AUTO: 0.05 10*3/MM3 (ref 0–0.2)
BASOPHILS NFR BLD AUTO: 0.8 % (ref 0–1.5)
BILIRUB SERPL-MCNC: 0.4 MG/DL (ref 0–1.2)
BUN SERPL-MCNC: 18 MG/DL (ref 6–20)
BUN/CREAT SERPL: 12.8 (ref 7–25)
CALCIUM SERPL-MCNC: 9.3 MG/DL (ref 8.6–10.5)
CHLORIDE SERPL-SCNC: 97 MMOL/L (ref 98–107)
CHOLEST SERPL-MCNC: 158 MG/DL (ref 0–200)
CO2 SERPL-SCNC: 27.4 MMOL/L (ref 22–29)
CREAT SERPL-MCNC: 1.41 MG/DL (ref 0.76–1.27)
EGFRCR SERPLBLD CKD-EPI 2021: 57.8 ML/MIN/1.73
EOSINOPHIL # BLD AUTO: 0.07 10*3/MM3 (ref 0–0.4)
EOSINOPHIL NFR BLD AUTO: 1.2 % (ref 0.3–6.2)
ERYTHROCYTE [DISTWIDTH] IN BLOOD BY AUTOMATED COUNT: 13.5 % (ref 12.3–15.4)
GLOBULIN SER CALC-MCNC: 3.1 GM/DL
GLUCOSE SERPL-MCNC: 257 MG/DL (ref 65–99)
HCT VFR BLD AUTO: 47.6 % (ref 37.5–51)
HDLC SERPL-MCNC: 29 MG/DL (ref 40–60)
HGB BLD-MCNC: 15.7 G/DL (ref 13–17.7)
IMM GRANULOCYTES # BLD AUTO: 0.01 10*3/MM3 (ref 0–0.05)
IMM GRANULOCYTES NFR BLD AUTO: 0.2 % (ref 0–0.5)
LDLC SERPL CALC-MCNC: 60 MG/DL (ref 0–100)
LYMPHOCYTES # BLD AUTO: 1.28 10*3/MM3 (ref 0.7–3.1)
LYMPHOCYTES NFR BLD AUTO: 21.4 % (ref 19.6–45.3)
MCH RBC QN AUTO: 29.3 PG (ref 26.6–33)
MCHC RBC AUTO-ENTMCNC: 33 G/DL (ref 31.5–35.7)
MCV RBC AUTO: 88.8 FL (ref 79–97)
MONOCYTES # BLD AUTO: 0.48 10*3/MM3 (ref 0.1–0.9)
MONOCYTES NFR BLD AUTO: 8 % (ref 5–12)
NEUTROPHILS # BLD AUTO: 4.09 10*3/MM3 (ref 1.7–7)
NEUTROPHILS NFR BLD AUTO: 68.4 % (ref 42.7–76)
NRBC BLD AUTO-RTO: 0 /100 WBC (ref 0–0.2)
PLATELET # BLD AUTO: 337 10*3/MM3 (ref 140–450)
POTASSIUM SERPL-SCNC: 4 MMOL/L (ref 3.5–5.2)
PROT SERPL-MCNC: 7.3 G/DL (ref 6–8.5)
RBC # BLD AUTO: 5.36 10*6/MM3 (ref 4.14–5.8)
SODIUM SERPL-SCNC: 138 MMOL/L (ref 136–145)
TRIGL SERPL-MCNC: 450 MG/DL (ref 0–150)
TSH SERPL DL<=0.005 MIU/L-ACNC: 1.84 UIU/ML (ref 0.27–4.2)
VIT B12 SERPL-MCNC: 530 PG/ML (ref 211–946)
VLDLC SERPL CALC-MCNC: 69 MG/DL (ref 5–40)
WBC # BLD AUTO: 5.98 10*3/MM3 (ref 3.4–10.8)

## 2025-02-26 ENCOUNTER — OFFICE VISIT (OUTPATIENT)
Dept: ENDOCRINOLOGY | Facility: CLINIC | Age: 60
End: 2025-02-26
Payer: MEDICAID

## 2025-02-26 ENCOUNTER — PRIOR AUTHORIZATION (OUTPATIENT)
Dept: ENDOCRINOLOGY | Facility: CLINIC | Age: 60
End: 2025-02-26

## 2025-02-26 VITALS
OXYGEN SATURATION: 96 % | HEIGHT: 69 IN | DIASTOLIC BLOOD PRESSURE: 80 MMHG | WEIGHT: 266 LBS | HEART RATE: 88 BPM | BODY MASS INDEX: 39.4 KG/M2 | SYSTOLIC BLOOD PRESSURE: 126 MMHG

## 2025-02-26 DIAGNOSIS — E11.65 UNCONTROLLED TYPE 2 DIABETES MELLITUS WITH HYPERGLYCEMIA: Primary | ICD-10-CM

## 2025-02-26 DIAGNOSIS — N18.31 STAGE 3A CHRONIC KIDNEY DISEASE: ICD-10-CM

## 2025-02-26 DIAGNOSIS — E78.2 MIXED HYPERLIPIDEMIA: ICD-10-CM

## 2025-02-26 LAB
ANION GAP SERPL CALCULATED.3IONS-SCNC: 15.2 MMOL/L (ref 5–15)
BUN SERPL-MCNC: 19 MG/DL (ref 6–20)
BUN/CREAT SERPL: 12.3 (ref 7–25)
CALCIUM SPEC-SCNC: 9.9 MG/DL (ref 8.6–10.5)
CHLORIDE SERPL-SCNC: 98 MMOL/L (ref 98–107)
CO2 SERPL-SCNC: 25.8 MMOL/L (ref 22–29)
CREAT SERPL-MCNC: 1.55 MG/DL (ref 0.76–1.27)
EGFRCR SERPLBLD CKD-EPI 2021: 51.6 ML/MIN/1.73
EXPIRATION DATE: ABNORMAL
EXPIRATION DATE: ABNORMAL
GLUCOSE BLDC GLUCOMTR-MCNC: 138 MG/DL (ref 70–130)
GLUCOSE SERPL-MCNC: 129 MG/DL (ref 65–99)
HBA1C MFR BLD: 10.7 % (ref 4.5–5.7)
Lab: ABNORMAL
Lab: ABNORMAL
POTASSIUM SERPL-SCNC: 4.1 MMOL/L (ref 3.5–5.2)
SODIUM SERPL-SCNC: 139 MMOL/L (ref 136–145)

## 2025-02-26 PROCEDURE — 80048 BASIC METABOLIC PNL TOTAL CA: CPT | Performed by: INTERNAL MEDICINE

## 2025-02-26 RX ORDER — ACYCLOVIR 800 MG/1
1 TABLET ORAL
Qty: 6 EACH | Refills: 3 | Status: SHIPPED | OUTPATIENT
Start: 2025-02-26

## 2025-02-26 RX ORDER — ATORVASTATIN CALCIUM 80 MG/1
80 TABLET, FILM COATED ORAL DAILY
Qty: 90 TABLET | Refills: 3 | Status: SHIPPED | OUTPATIENT
Start: 2025-02-26

## 2025-02-26 RX ORDER — DAPAGLIFLOZIN 10 MG/1
10 TABLET, FILM COATED ORAL DAILY
Qty: 90 TABLET | Refills: 1 | Status: SHIPPED | OUTPATIENT
Start: 2025-02-26 | End: 2025-02-27 | Stop reason: SDUPTHER

## 2025-02-26 RX ORDER — METFORMIN HYDROCHLORIDE 500 MG/1
500 TABLET, EXTENDED RELEASE ORAL 2 TIMES DAILY
Qty: 180 TABLET | Refills: 1 | Status: SHIPPED | OUTPATIENT
Start: 2025-02-26

## 2025-02-26 NOTE — PROGRESS NOTES
Chief Complaint   Patient presents with    Diabetes     Type II          HPI   Myke Marcial is a 59 y.o. male had concerns including Diabetes (Type II).    He is checking blood sugars 1-2 times per day.   Dexcom had two failed sensors so he isn't using.   Current prescribed medications for diabetes include metformin 500 mg twice daily, Farxiga 10 mg daily, Mounjaro 10 mg weekly, mixed insulin 40 units twice daily.    He has only been taking mounjaro recently. Is struggling with depression. Took insulin this morning - took 23 units of the mixed insulin - was the last left in the pen.   Resumed meds this morning.     Yesterday evening he took 50 units of insulin in the afternoon. BG was 341 before insulin.     A1c is 10.7 today.  Was 12.5 at his last visit.    Hasn't discussed with PCP recently.   Feels like the depressed mood is mild. Related to weather.     The following portions of the patient's history were reviewed and updated as appropriate: allergies, current medications, past family history, past medical history, past social history, past surgical history, and problem list.      Review of Systems   Constitutional: Negative.    Endocrine:        See HPI   Psychiatric/Behavioral:  Positive for depressed mood.         Physical Exam  Vitals reviewed.   Constitutional:       Appearance: Normal appearance. He is obese.      Comments: Body mass index is 39.26 kg/m².   Cardiovascular:      Rate and Rhythm: Normal rate.      Pulses:           Dorsalis pedis pulses are 1+ on the right side and 1+ on the left side.   Pulmonary:      Effort: Pulmonary effort is normal.   Musculoskeletal:      Right foot: Deformity (hammer toe) present.      Left foot: Deformity (hammer toe) present.   Feet:      Right foot:      Skin integrity: Callus present.      Toenail Condition: Right toenails are normal.      Left foot:      Skin integrity: Callus present.      Toenail Condition: Left toenails are normal.      Comments:  "Diabetic Foot Exam Performed and Monofilament Test Performed      Monofilament 0/5 bilaterally    Neurological:      General: No focal deficit present.      Mental Status: He is alert. Mental status is at baseline.   Psychiatric:         Mood and Affect: Mood normal.         Behavior: Behavior normal.      /80 (BP Location: Left arm, Patient Position: Sitting, Cuff Size: Adult)   Pulse 88   Ht 175.3 cm (69.02\")   Wt 121 kg (266 lb)   SpO2 96%   BMI 39.26 kg/m²      Labs and imaging    A1C:  Lab Results   Component Value Date    HGBA1C 10.7 (A) 02/26/2025    HGBA1C 12.5 (A) 10/17/2024      Glucose:  Lab Results   Component Value Date    POCGLU 138 (A) 02/26/2025      CMP:  Lab Results   Component Value Date    GLUCOSE 257 (H) 11/04/2024    BUN 18 11/04/2024    CREATININE 1.41 (H) 11/04/2024     11/04/2024    K 4.0 11/04/2024    CL 97 (L) 11/04/2024    CALCIUM 9.3 11/04/2024    PROTEINTOT 7.3 11/04/2024    ALBUMIN 4.2 11/04/2024    ALT 34 11/04/2024    AST 28 11/04/2024    ALKPHOS 80 11/04/2024    BILITOT 0.4 11/04/2024    GLOB 3.1 11/04/2024    AGRATIO 1.4 11/04/2024    BCR 12.8 11/04/2024    ANIONGAP 10.2 06/30/2023    EGFR 57.8 (L) 11/04/2024      Lipid Panel:  Lab Results   Component Value Date    CHOL 138 02/23/2024    CHLPL 158 11/04/2024    TRIG 450 (H) 11/04/2024    HDL 29 (L) 11/04/2024    LDL 60 11/04/2024      Urine Microalbumin:  Lab Results   Component Value Date    MALBCRERATIO 12 05/01/2024      TSH:  Lab Results   Component Value Date    TSH 1.840 11/04/2024        Assessment and plan  Diagnoses and all orders for this visit:    1. Uncontrolled type 2 diabetes mellitus with hyperglycemia (Primary)  Uncontrolled with A1c down to 10.7, still with significant hyperglycemia. Due to med noncompliance. Will resume meds.   Complicated by gastroparesis, retinopathy, neuropathy, history of microalbuminuria.  Resume metformin 500 mg twice daily.  Resume Farxiga 10 mg daily.   Continue mixed " insulin 50 units twice daily and titrate up by 5 units every few days until BGs are between 100-200.   Increase Mounjaro to 12.5 mg weekly. Mounjaro has not exacerbated gastroparesis.  Labs are up-to-date, last metabolic panel November 2024, lipids November 2024, urine microalbumin May 2024.  Monitor yearly. Ophtho exam is due/pending. Monofilament updated today.  -     POC Glucose, Blood  -     POC Glycosylated Hemoglobin (Hb A1C)  -     Continuous Glucose Sensor (FreeStyle Rad 3 Sensor) misc; Use 1 each Every 14 (Fourteen) Days.  Dispense: 6 each; Refill: 3    2. Mixed hyperlipidemia  Uncontrolled with significant hypertriglyceridemia. Due to uncontrolled DM.   On atorvastatin 80 mg daily and fenofibrate. Missing doses recently.   Will monitor. Lipids last checked 11/2024.      3. CKD 3  GFR declining. Likely due to uncontrolled DM. Recheck BMP today.      Return in about 3 months (around 5/26/2025) for next scheduled follow up. The patient was instructed to contact the clinic with any interval questions or concerns.    Electronically signed by: Ondina Garcia DO   Endocrinologist    Please note that portions of this note were completed with a voice recognition program.

## 2025-02-26 NOTE — PATIENT INSTRUCTIONS
Patients with diabetes should have a yearly eye exam. Please be sure to schedule this at least once yearly and have the eye exam report faxed to [phone].

## 2025-02-27 ENCOUNTER — PRIOR AUTHORIZATION (OUTPATIENT)
Dept: ENDOCRINOLOGY | Facility: CLINIC | Age: 60
End: 2025-02-27
Payer: MEDICAID

## 2025-02-27 DIAGNOSIS — E11.65 UNCONTROLLED TYPE 2 DIABETES MELLITUS WITH HYPERGLYCEMIA: ICD-10-CM

## 2025-02-27 RX ORDER — DAPAGLIFLOZIN 10 MG/1
10 TABLET, FILM COATED ORAL DAILY
Qty: 90 TABLET | Refills: 1 | Status: SHIPPED | OUTPATIENT
Start: 2025-02-27

## 2025-02-27 NOTE — TELEPHONE ENCOUNTER
Mounjaro denied by insurance.  Our guideline named GLP-1 RECEPTOR AGONISTS requires the following rule(s) be met for approval: The member has had at least a 3-month trial and failure [drug did not work], allergy, contraindication [harmful for] (including potential drug-drug interactions with other medications) or intolerance [side effect] to two preferred GLP-1 agonists [drug in the same group]: Byetta, Ozempic, Trulicity, Victoza

## 2025-02-27 NOTE — TELEPHONE ENCOUNTER
Myke Macrial (Key: [id number])  [place] Case ID #: [id number]  Rx #: [id number]  Need Help? Call us at [phone]  Outcome  Approved today by [place] [employer] MedImpact 2017  The request has been approved. The authorization is effective from 02/27/2025 to 02/27/2026, as long as the member is enrolled in their current health plan. A written notification letter will follow with additional details.  Effective Date: 2/26/2025  Authorization Expiration Date: 2/26/2026  Drug  FreeStyle Rad 3 Sensor  ePA cloud logo  Form  MedImpact [place] [employer] ePA Form 2017 NCPDP

## 2025-03-06 ENCOUNTER — OFFICE VISIT (OUTPATIENT)
Dept: UROLOGY | Facility: CLINIC | Age: 60
End: 2025-03-06
Payer: MEDICAID

## 2025-03-06 ENCOUNTER — OFFICE VISIT (OUTPATIENT)
Dept: PULMONOLOGY | Facility: CLINIC | Age: 60
End: 2025-03-06
Payer: MEDICAID

## 2025-03-06 VITALS
SYSTOLIC BLOOD PRESSURE: 128 MMHG | HEART RATE: 105 BPM | HEIGHT: 69 IN | OXYGEN SATURATION: 94 % | BODY MASS INDEX: 39.1 KG/M2 | DIASTOLIC BLOOD PRESSURE: 78 MMHG | WEIGHT: 264 LBS

## 2025-03-06 VITALS
RESPIRATION RATE: 18 BRPM | WEIGHT: 264 LBS | OXYGEN SATURATION: 94 % | BODY MASS INDEX: 39.1 KG/M2 | HEIGHT: 69 IN | HEART RATE: 105 BPM | SYSTOLIC BLOOD PRESSURE: 128 MMHG | DIASTOLIC BLOOD PRESSURE: 78 MMHG

## 2025-03-06 DIAGNOSIS — N39.41 URGE INCONTINENCE: Primary | ICD-10-CM

## 2025-03-06 DIAGNOSIS — E66.812 OBESITY, CLASS II, BMI 35-39.9, ISOLATED (SEE ACTUAL BMI): ICD-10-CM

## 2025-03-06 DIAGNOSIS — R39.9 LOWER URINARY TRACT SYMPTOMS (LUTS): ICD-10-CM

## 2025-03-06 DIAGNOSIS — G47.33 OBSTRUCTIVE SLEEP APNEA: Primary | ICD-10-CM

## 2025-03-06 DIAGNOSIS — N52.9 ERECTILE DYSFUNCTION, UNSPECIFIED ERECTILE DYSFUNCTION TYPE: ICD-10-CM

## 2025-03-06 DIAGNOSIS — J30.89 OTHER ALLERGIC RHINITIS: ICD-10-CM

## 2025-03-06 LAB
BILIRUB BLD-MCNC: NEGATIVE MG/DL
CLARITY, POC: CLEAR
COLOR UR: YELLOW
EXPIRATION DATE: ABNORMAL
GLUCOSE UR STRIP-MCNC: ABNORMAL MG/DL
KETONES UR QL: NEGATIVE
LEUKOCYTE EST, POC: NEGATIVE
Lab: ABNORMAL
NITRITE UR-MCNC: NEGATIVE MG/ML
PH UR: 5.5 [PH] (ref 5–8)
PROT UR STRIP-MCNC: ABNORMAL MG/DL
RBC # UR STRIP: NEGATIVE /UL
SP GR UR: 1.01 (ref 1–1.03)
UROBILINOGEN UR QL: ABNORMAL

## 2025-03-06 PROCEDURE — 3074F SYST BP LT 130 MM HG: CPT | Performed by: INTERNAL MEDICINE

## 2025-03-06 PROCEDURE — 3078F DIAST BP <80 MM HG: CPT | Performed by: INTERNAL MEDICINE

## 2025-03-06 PROCEDURE — 99214 OFFICE O/P EST MOD 30 MIN: CPT | Performed by: INTERNAL MEDICINE

## 2025-03-06 RX ORDER — AZELASTINE 1 MG/ML
1 SPRAY, METERED NASAL 2 TIMES DAILY PRN
Qty: 1 EACH | Refills: 5 | Status: SHIPPED | OUTPATIENT
Start: 2025-03-06

## 2025-03-06 RX ORDER — TAMSULOSIN HYDROCHLORIDE 0.4 MG/1
1 CAPSULE ORAL NIGHTLY
Qty: 30 CAPSULE | Refills: 5 | Status: SHIPPED | OUTPATIENT
Start: 2025-03-06

## 2025-03-06 NOTE — PROGRESS NOTES
"  Chief Complaint   Patient presents with    Sleeping Problem    Follow-up         Subjective   Myke Marcial is a 59 y.o. male.   Patient was evaluated today for follow up of Sleep apnea. Patient does report initial improvement but now feels that the PAP device is not relieving some of the symptoms.    Patient reports slight worsening in daytime sleepiness when compared to before. Patient is not aware of snoring through the device.     Patient is also having occasional leaks with the mask which makes it hard for him to use the mask on a consistent basis.     Patient says that the compliance with the use of the equipment is not good.     he underwent CABG at , in July 2023 and has noticed issues being able to sleep since then.     Patient also complains of runny nose and dribbling in the back of the throat for the past few weeks. This has been sometimes associated with seasonal variation.          The following portions of the patient's history were reviewed and updated as appropriate: allergies, current medications, past family history, past medical history, past social history, past surgical history, and problem list.    Review of Systems   HENT:  Negative for sinus pressure, sneezing and sore throat.    Respiratory:  Negative for cough, chest tightness, shortness of breath and wheezing.        Objective   Visit Vitals  /78   Pulse 105   Resp 18   Ht 175.3 cm (69\") Comment: pt reported   Wt 120 kg (264 lb)   SpO2 94%   BMI 38.99 kg/m²       BMI Readings from Last 8 Encounters:   03/06/25 38.99 kg/m²   02/26/25 39.26 kg/m²   11/04/24 40.30 kg/m²   10/28/24 41.05 kg/m²   10/17/24 40.46 kg/m²   08/27/24 39.19 kg/m²   08/22/24 38.97 kg/m²   06/17/24 38.99 kg/m²       Physical Exam  Vitals reviewed.   Constitutional:       Appearance: He is well-developed.   HENT:      Head: Atraumatic.      Mouth/Throat:      Comments: Oropharynx was crowded.  Cardiovascular:      Rate and Rhythm: Normal rate and " regular rhythm.      Heart sounds: Normal heart sounds. No murmur heard.     Comments: CABG scar noted.   Neurological:      Mental Status: He is alert and oriented to person, place, and time.         Assessment & Plan   Diagnoses and all orders for this visit:    1. Obstructive sleep apnea (Primary)  -     BIPAP / CPAP Adjustment  -     BIPAP / CPAP Adjustment    2. Obesity, Class II, BMI 35-39.9, isolated (see actual BMI)  -     BIPAP / CPAP Adjustment  -     BIPAP / CPAP Adjustment    3. Other allergic rhinitis    Other orders  -     azelastine (ASTELIN) 0.1 % nasal spray; Administer 1 spray into the nostril(s) as directed by provider 2 (Two) Times a Day As Needed for Rhinitis or Allergies. Use in each nostril as directed  Dispense: 1 each; Refill: 5           Return in about 19 weeks (around 7/17/2025) for SleepONMORGAN/Nataliia, ....Also 14-15 mths w/ Dr. Nazario.    DISCUSSION (if any):  Sleep study performed in Sept 2016  AHI was 93 / hour.   REM AHI was 120/hour.         Latest PAP device provided in early 2023  DME company: [employer]    Current PAP settings: 14  Current mask type: FFM    I told the patient that his symptoms are consistent with poorly controlled sleep apnea.    I have decided to empirically change him to AutoPAP at a pressure of 14-20.    If the symptoms do not improve, even after undertaking the above measures, the patient may have to undergo a full night titration study.    Patient was advised to continue using PAP for at least 4 hours every night.    Patient was advised to call this office with any issues.    Patient will be started on nasal spray for symptoms which are definitely consistent with allergic rhinitis.     If his symptoms do not improve, then we will consider ordering IgE/RAST panel.      Dictated utilizing Dragon dictation.    This document was electronically signed by Anai Nazario MD on 03/06/25 at 10:49 EST

## 2025-03-06 NOTE — PROGRESS NOTES
Office Visit     Patient: Myke Marcial 59 y.o. male   : 1965   MRN: [id number]     Patient or patient representative verbalized consent for the use of Ambient Listening during the visit with  TAE Swift for chart documentation. 3/6/2025  14:49 EST    Chief Complaint   Patient presents with    Follow-up     6 month check  Urge incontinence       Referring Provider: No ref. provider found    Primary Care Provider: Jaimie Nathan MD     History of Present Illness  The patient is a 59-year-old male presenting for evaluation of urge incontinence.    Urge Incontinence  - Last seen in August for urge incontinence, using 2-3 diapers daily and experiencing nocturia twice per night.  - Prescribed Gemtesa, taken daily, missed dose this morning due to an early appointment.  - Previously discontinued medication due to noncompliance attributed to depression.  - Current diaper usage is 2 per day but feels it is better.  - Improved ability to reach the restroom, occasional incontinence while driving.  - Continues chlorthalidone, reports poor glycemic control confirmed by endocrinologist.  A1C 10.  - Not taken Flomax, no dizziness.  - Not sexually active, goes to bed after midnight, wakes once during the night, occasional difficulty returning to sleep.  - Symptoms stabilized, Kegel exercises beneficial.  - No longer attending PFPT but continues exercises at home.  - Advised to contact the clinic if issues arise.    Supplemental information: Uses a CPAP machine.    MEDICATIONS  Current: Gemtesa, insulin, chlorthalidone, Farxiga       IPSS Questionnaire (AUA-7):  Incomplete emptying  Over the past month, how often have you had a sensation of not emptying your bladder completely after you finished urinating?: Less than 1 time in 5 ([id number])  Frequency  Over the past month, how often have you had to urinate again less than two hours after you finishied urinating ?: Less than 1 time in 5  (03/06/25 1452)  Intermittency  Over the past month, how often have you found you stopped and started again several time when you urinated ?: Not at all (03/06/25 1452)  Urgency  Over the last month, how often have you found it difficult  have you found it difficult to postpone urination ?: About half the time (03/06/25 1452)  Weak Stream  Over the past month, how often have you had a weak urinary stream ?: Less than 1 time in 5 (03/06/25 1452)  Straining  Over the past month, how often have you had to push or strain to begin urination ?: Less than 1 time in 5 (03/06/25 1452)  Nocturia  Over the past month, how many times did you most typically get up to urinate from the time you went to bed until the time you got up in the morning ?: 1 time (03/06/25 1452)  Quality of life due to urinary symptoms  If you were to spend the rest of your life with your urinary condition the way it is now, how would feel about that?: Mixed - about equally satisfied (03/06/25 1452)    Scores  Total IPSS Score: (!) 8 (03/06/25 1452)  Total Score = Symptomatic Level: Moderately symptomatic: 8-19 (03/06/25 1452)        Subjective   Review of System:   As noted in HPI.    Past Medical History:   Diagnosis Date    Anxiety and depression     Arthritis     LOWER BACK    Colon cancer screening 06/18/2018    Added automatically from request for surgery [id number]    Diabetes mellitus     Diabetic foot ulcer 11/29/2018    Ear drum perforation, right     Elevated cholesterol     GERD (gastroesophageal reflux disease)     Gynecomastia 2019    Hernia     Hyperlipidemia     Hypertension     Lower urinary tract symptoms (LUTS)     Myocardial infarction     Refusal of blood product     Sleep apnea, obstructive     CPAP    Type 2 diabetes mellitus     Urge incontinence     Vitamin D deficiency disease 08/26/2016    Wears glasses     for reading only     Past Surgical History:   Procedure Laterality Date    CARDIAC CATHETERIZATION N/A 06/30/2023     Procedure: Coronary angiography;  Surgeon: Irwin Thomas MD;  Location: [facility] CATH INVASIVE LOCATION;  Service: Cardiovascular;  Laterality: N/A;    CATARACT EXTRACTION, BILATERAL      COLONOSCOPY N/A 07/10/2018    Procedure: COLONOSCOPY WITH POLYPECTOMIES;  Surgeon: Musa Berger MD;  Location: [facility] ENDOSCOPY;  Service: Gastroenterology    COLONOSCOPY N/A 08/16/2022    Procedure: COLONOSCOPY;  Surgeon: Brittany Goins MD;  Location: [facility] ENDOSCOPY;  Service: Gastroenterology;  Laterality: N/A;    COLONOSCOPY N/A 5/15/2024    Procedure: COLONOSCOPY  WITH POLYPECTOMY X 3;  Surgeon: Brittany Goins MD;  Location: [facility] ENDOSCOPY;  Service: Gastroenterology;  Laterality: N/A;    CORONARY ARTERY BYPASS GRAFT  07/2023    ENDOSCOPY N/A 03/17/2022    Procedure: ESOPHAGOGASTRODUODENOSCOPY with biopsies;  Surgeon: Brittany Goins MD;  Location: [facility] ENDOSCOPY;  Service: Gastroenterology;  Laterality: N/A;    MUSCLE REPAIR Right     biceps tendon     Family History   Problem Relation Age of Onset    Sleep apnea Mother     Hypertension Father     Hyperlipidemia Father     Prostate cancer Father     Cancer Father         Prostate   stage1    Cancer Maternal Grandmother     Cancer Paternal Grandmother     Colon cancer Neg Hx     Liver cancer Neg Hx     Rectal cancer Neg Hx     Stomach cancer Neg Hx      Social History     Socioeconomic History    Marital status:    Tobacco Use    Smoking status: Never     Passive exposure: Never    Smokeless tobacco: Never   Vaping Use    Vaping status: Never Used   Substance and Sexual Activity    Alcohol use: Not Currently    Drug use: Never    Sexual activity: Not Currently     Partners: Female     Birth control/protection: Condom       Current Outpatient Medications:     aspirin 81 MG EC tablet, Take 1 tablet by mouth Daily., Disp: , Rfl:     atorvastatin (LIPITOR) 80 MG tablet, Take 1 tablet by mouth Daily., Disp: 90 tablet, Rfl: 3    azelastine  (ASTELIN) 0.1 % nasal spray, Administer 1 spray into the nostril(s) as directed by provider 2 (Two) Times a Day As Needed for Rhinitis or Allergies. Use in each nostril as directed, Disp: 1 each, Rfl: 5    B-D UF III MINI PEN NEEDLES 31G X 5 MM misc, USE 1  ONCE DAILY, Disp: 100 each, Rfl: 0    buPROPion XL (WELLBUTRIN XL) 150 MG 24 hr tablet, Take 1 tablet by mouth., Disp: , Rfl:     chlorthalidone (HYGROTON) 25 MG tablet, Take 1 tablet by mouth once daily, Disp: 90 tablet, Rfl: 0    Continuous Glucose Sensor (FreeStyle Rad 3 Sensor) misc, Use 1 each Every 14 (Fourteen) Days., Disp: 6 each, Rfl: 3    Farxiga 10 MG tablet, Take 10 mg by mouth Daily., Disp: 90 tablet, Rfl: 1    fenofibrate (TRICOR) 145 MG tablet, Take 1 tablet by mouth Daily., Disp: 90 tablet, Rfl: 3    insulin aspart prot & aspart (NovoLOG 70/30 FlexPen ReliOn) (70-30) 100 UNIT/ML suspension pen-injector injection, 40 units twice daily before meals, titrate up as needed to  units, Disp: 120 mL, Rfl: 1    metFORMIN ER (GLUCOPHAGE-XR) 500 MG 24 hr tablet, Take 1 tablet by mouth 2 (Two) Times a Day., Disp: 180 tablet, Rfl: 1    multivitamin with minerals tablet tablet, Take 1 tablet by mouth Daily., Disp: , Rfl:     omeprazole (priLOSEC) 20 MG capsule, Take 1 capsule by mouth Daily., Disp: 90 capsule, Rfl: 1    pramipexole (MIRAPEX) 0.25 MG tablet, TAKE 1 TABLET BY MOUTH THREE TIMES DAILY, Disp: 270 tablet, Rfl: 0    Tirzepatide 12.5 MG/0.5ML solution auto-injector, Inject 0.5 mL under the skin into the appropriate area as directed Every 7 (Seven) Days., Disp: 2 mL, Rfl: 1    Vibegron (Gemtesa) 75 MG tablet, Take 1 tablet by mouth Daily., Disp: 30 tablet, Rfl: 11    vitamin D3 125 MCG (5000 UT) capsule capsule, Take 1 capsule by mouth Daily., Disp: , Rfl:     metoprolol tartrate (LOPRESSOR) 25 MG tablet, Take 0.5 tablets by mouth 2 (Two) Times a Day., Disp: 90 tablet, Rfl: 3    tamsulosin (FLOMAX) 0.4 MG capsule 24 hr capsule, Take 1 capsule  "by mouth Every Night., Disp: 30 capsule, Rfl: 5    Allergies   Allergen Reactions    Ace Inhibitors Cough    Bee Venom Anaphylaxis    Penicillins Swelling     Objective   Visit Vitals  /78   Pulse 105   Ht 175.3 cm (69.02\")   Wt 120 kg (264 lb)   SpO2 94%   BMI 38.97 kg/m²      Body mass index is 38.97 kg/m².     Physical Exam  Vitals and nursing note reviewed.   Constitutional:       General: He is awake. He is not in acute distress.     Appearance: He is obese. He is not ill-appearing.   Pulmonary:      Effort: Pulmonary effort is normal.   Skin:     General: Skin is warm and dry.   Neurological:      Mental Status: He is alert and oriented to person, place, and time. Mental status is at baseline.   Psychiatric:         Mood and Affect: Mood normal.         Behavior: Behavior is cooperative.          Labs  Lab Results   Component Value Date    COLORU Yellow 03/06/2025    CLARITYU Clear 03/06/2025    SPECGRAV 1.015 03/06/2025    PHUR 5.5 03/06/2025    LEUKOCYTESUR Negative 03/06/2025    NITRITE Negative 03/06/2025    PROTEINPOCUA Trace (A) 03/06/2025    GLUCOSEUR >=1000 mg/dL (3+) (A) 03/06/2025    KETONESU Negative 03/06/2025    UROBILINOGEN 0.2 E.U./dL 03/06/2025    BILIRUBINUR Negative 03/06/2025    RBCUR Negative 03/06/2025      Lab Results   Component Value Date    RBCUA Comment 02/16/2022    BACTERIA Trace (A) 02/16/2022        Lab Results   Component Value Date    WBC 5.98 11/04/2024    HGB 15.7 11/04/2024    HCT 47.6 11/04/2024    MCV 88.8 11/04/2024     11/04/2024     Lab Results   Component Value Date    GLUCOSE 129 (H) 02/26/2025    CALCIUM 9.9 02/26/2025     02/26/2025    K 4.1 02/26/2025    CO2 25.8 02/26/2025    CL 98 02/26/2025    BUN 19 02/26/2025    BUN 18 11/04/2024    CREATININE 1.55 (H) 02/26/2025    CREATININE 1.41 (H) 11/04/2024    EGFR 51.6 (L) 02/26/2025    EGFR 57.8 (L) 11/04/2024    BCR 12.3 02/26/2025    ANIONGAP 15.2 (H) 02/26/2025    ALT 34 11/04/2024    AST 28 " "11/04/2024       Lab Results   Component Value Date    HGBA1C 10.7 (A) 02/26/2025        Lab Results   Component Value Date    PSA 0.567 05/01/2024    PSA 0.589 02/15/2023       No results found for: \"URICACIDSTN\", \"NYUL7ACKUIO\", \"[id number]\", \"LABMAGN\", \"CAPHOSSTONE\", \"HYDROXYAPATI\"  Lab Results   Component Value Date    OFRV68JP 28.9 (L) 11/04/2024    CAION 4.7 07/03/2023     Lab Results   Component Value Date    LABPH 5.5 02/16/2022       Lab Results   Component Value Date    PSA 0.567 05/01/2024       I have reviewed the above labs.    PVR  Post-void residual performed with ultrasound by staff and interpreted by me Elder 70 mL    Assessment / Plan      Diagnoses and all orders for this visit:    1. Urge incontinence (Primary)  -     POC Urinalysis Dipstick, Automated    2. Lower urinary tract symptoms (LUTS)  -     tamsulosin (FLOMAX) 0.4 MG capsule 24 hr capsule; Take 1 capsule by mouth Every Night.  Dispense: 30 capsule; Refill: 5    3. Erectile dysfunction, unspecified erectile dysfunction type         Assessment & Plan  1. Urge incontinence: Stable.   - Continue gemtesa    2. LUTS: PVR: 70 mL, IPSS score: 8 (mild symptoms). Urine analysis: no infection, slight proteinuria, and glucosuria likely due to medications.   - Informed of increased risk of urinary neurogenic bladder due to uncontrolled diabetes, potential need for catheterization, and obstructive prostate contribution.  - Informed of Flomax side effects: dizziness, retrograde ejaculation.  - Prescribed Flomax 0.4 mg, to be taken at night.  - Advised to continue Kegel exercises at home.  - Catheterization considered if PVR >150.  - Repeat workup and prostate surgery discussion if medication ineffective or condition deteriorates.  - Advised to seek immediate medical attention for urinary retention, discomfort, overflow incontinence, low back pain, abdominal fullness, or inability to urinate for >6 hours.    3. ED: did not take cialis  - Reports not a " concern at this time due to wife's health  - Do not recommend PDE5 due to cardiac history    Follow-up  - Follow up in 4-6 weeks for bladder rescan.  - Urine specimen required if experiencing UTI symptoms.  - Instructed to contact office if issues with Flomax.    PROCEDURE  Underwent triple bypass surgery 1.5 years ago.       Return for f/u with EWA Dumont.    Julia Chen, MSN, APRN, FNP-C  [facility]

## 2025-04-15 ENCOUNTER — OFFICE VISIT (OUTPATIENT)
Dept: UROLOGY | Facility: CLINIC | Age: 60
End: 2025-04-15
Payer: MEDICAID

## 2025-04-15 VITALS
HEIGHT: 69 IN | BODY MASS INDEX: 38.95 KG/M2 | WEIGHT: 263 LBS | HEART RATE: 93 BPM | SYSTOLIC BLOOD PRESSURE: 128 MMHG | DIASTOLIC BLOOD PRESSURE: 80 MMHG | OXYGEN SATURATION: 99 % | TEMPERATURE: 97.6 F

## 2025-04-15 DIAGNOSIS — N40.0 BENIGN PROSTATIC HYPERPLASIA, UNSPECIFIED WHETHER LOWER URINARY TRACT SYMPTOMS PRESENT: ICD-10-CM

## 2025-04-15 DIAGNOSIS — R39.9 LOWER URINARY TRACT SYMPTOMS (LUTS): ICD-10-CM

## 2025-04-15 DIAGNOSIS — N39.41 URINARY INCONTINENCE, URGE: Primary | ICD-10-CM

## 2025-04-15 RX ORDER — TAMSULOSIN HYDROCHLORIDE 0.4 MG/1
1 CAPSULE ORAL NIGHTLY
Qty: 90 CAPSULE | Refills: 2 | Status: SHIPPED | OUTPATIENT
Start: 2025-04-15

## 2025-04-15 NOTE — PROGRESS NOTES
Office Visit     Patient: Myke Marcial 59 y.o. male   : 1965   MRN: [id number]     Patient or patient representative verbalized consent for the use of Ambient Listening during the visit with  TAE Swift for chart documentation. 4/15/2025  09:28 EDT    Chief Complaint   Patient presents with    Follow-up    Urinary Urgency     Better than last time     Referring Provider: No ref. provider found    Primary Care Provider: Jaimie Nathan MD     History of Present Illness  The patient is a 59-year-old male who presents for follow-up.    Medication Tolerance  - No adverse effects from Gemtesa or Flomax  - Prefers 90-day supply of medications    Incontinence Management  - Continues to wear diapers and pads as precaution  - No incontinence    Urinary Health  - No UTI symptoms    Colon Issues  - Occasional colon issues necessitate diaper use      MEDICATIONS  Flomax, Gemtesa       IPSS Questionnaire (AUA-7):  Incomplete emptying  Over the past month, how often have you had a sensation of not emptying your bladder completely after you finished urinating?: Less than half the time (04/15/25 0913)  Frequency  Over the past month, how often have you had to urinate again less than two hours after you finishied urinating ?: Less than 1 time in 5 (04/15/25 0913)  Intermittency  Over the past month, how often have you found you stopped and started again several time when you urinated ?: Not at all (04/15/25 0913)  Urgency  Over the last month, how often have you found it difficult  have you found it difficult to postpone urination ?: About half the time (04/15/25 0913)  Weak Stream  Over the past month, how often have you had a weak urinary stream ?: Less than half the time (04/15/25 0913)  Straining  Over the past month, how often have you had to push or strain to begin urination ?: Less than 1 time in 5 (04/15/25 0913)  Nocturia  Over the past month, how many times did you most typically get  up to urinate from the time you went to bed until the time you got up in the morning ?: 1 time (04/15/25 0913)  Quality of life due to urinary symptoms  If you were to spend the rest of your life with your urinary condition the way it is now, how would feel about that?: Mixed - about equally satisfied (04/15/25 0913)    Scores  Total IPSS Score: (!) 10 (04/15/25 0913)  Total Score = Symptomatic Level: Moderately symptomatic: 8-19 (04/15/25 0913)        Subjective   Review of System:   As noted in HPI.    Past Medical History:   Diagnosis Date    Allergic     Anxiety and depression     Arthritis     LOWER BACK    Benign prostatic hyperplasia 04/15/2025    Colon cancer screening 06/18/2018    Added automatically from request for surgery [id number]    Coronary artery disease involving native coronary artery of native heart without angina pectoris 07/25/2023    Diabetes mellitus     Diabetic foot ulcer 11/29/2018    Diabetic retinopathy associated with type 2 diabetes mellitus 02/01/2021    Ear drum perforation, right     Elevated cholesterol     Erectile dysfunction     GERD (gastroesophageal reflux disease)     Gynecomastia 2019    Hernia     Hyperlipidemia     Hypertension     Lower urinary tract symptoms (LUTS)     Myocardial infarction     Refusal of blood product     Sleep apnea, obstructive     CPAP    Type 2 diabetes mellitus     Urge incontinence     Vitamin D deficiency disease 08/26/2016    Wears glasses     for reading only     Past Surgical History:   Procedure Laterality Date    CARDIAC CATHETERIZATION N/A 06/30/2023    Procedure: Coronary angiography;  Surgeon: Irwin Thomas MD;  Location: [facility] CATH INVASIVE LOCATION;  Service: Cardiovascular;  Laterality: N/A;    CARDIAC SURGERY      CATARACT EXTRACTION, BILATERAL      COLONOSCOPY N/A 07/10/2018    Procedure: COLONOSCOPY WITH POLYPECTOMIES;  Surgeon: Musa Berger MD;  Location: [facility] ENDOSCOPY;  Service: Gastroenterology    COLONOSCOPY N/A  08/16/2022    Procedure: COLONOSCOPY;  Surgeon: Brittany Goins MD;  Location: [facility] ENDOSCOPY;  Service: Gastroenterology;  Laterality: N/A;    COLONOSCOPY N/A 05/15/2024    Procedure: COLONOSCOPY  WITH POLYPECTOMY X 3;  Surgeon: Brittany Goins MD;  Location: [facility] ENDOSCOPY;  Service: Gastroenterology;  Laterality: N/A;    CORONARY ARTERY BYPASS GRAFT  07/2023    ENDOSCOPY N/A 03/17/2022    Procedure: ESOPHAGOGASTRODUODENOSCOPY with biopsies;  Surgeon: Brittany Goins MD;  Location: [facility] ENDOSCOPY;  Service: Gastroenterology;  Laterality: N/A;    EYE SURGERY      MUSCLE REPAIR Right     biceps tendon    UMBILICAL HERNIA REPAIR      UPPER GASTROINTESTINAL ENDOSCOPY       Family History   Problem Relation Age of Onset    Sleep apnea Mother     Vision loss Mother         Macular degeneration    Hypertension Father     Hyperlipidemia Father     Prostate cancer Father     Cancer Father         Prostate   stage1    Cancer Maternal Grandmother     Cancer Paternal Grandmother     Colon cancer Neg Hx     Liver cancer Neg Hx     Rectal cancer Neg Hx     Stomach cancer Neg Hx      Social History     Socioeconomic History    Marital status:    Tobacco Use    Smoking status: Never     Passive exposure: Never    Smokeless tobacco: Never   Vaping Use    Vaping status: Never Used   Substance and Sexual Activity    Alcohol use: Not Currently    Drug use: No    Sexual activity: Not Currently     Partners: Female     Birth control/protection: Condom       Current Outpatient Medications:     tamsulosin (FLOMAX) 0.4 MG capsule 24 hr capsule, Take 1 capsule by mouth Every Night., Disp: 90 capsule, Rfl: 2    aspirin 81 MG EC tablet, Take 1 tablet by mouth Daily., Disp: , Rfl:     atorvastatin (LIPITOR) 80 MG tablet, Take 1 tablet by mouth Daily., Disp: 90 tablet, Rfl: 3    azelastine (ASTELIN) 0.1 % nasal spray, Administer 1 spray into the nostril(s) as directed by provider 2 (Two) Times a Day As Needed  for Rhinitis or Allergies. Use in each nostril as directed, Disp: 1 each, Rfl: 5    B-D UF III MINI PEN NEEDLES 31G X 5 MM misc, USE 1  ONCE DAILY, Disp: 100 each, Rfl: 0    buPROPion XL (WELLBUTRIN XL) 150 MG 24 hr tablet, Take 1 tablet by mouth., Disp: , Rfl:     chlorthalidone (HYGROTON) 25 MG tablet, Take 1 tablet by mouth once daily, Disp: 90 tablet, Rfl: 0    Continuous Glucose Sensor (FreeStyle Rad 3 Sensor) misc, Use 1 each Every 14 (Fourteen) Days., Disp: 6 each, Rfl: 3    Farxiga 10 MG tablet, Take 10 mg by mouth Daily., Disp: 90 tablet, Rfl: 1    fenofibrate (TRICOR) 145 MG tablet, Take 1 tablet by mouth Daily., Disp: 90 tablet, Rfl: 3    insulin aspart prot & aspart (NovoLOG 70/30 FlexPen ReliOn) (70-30) 100 UNIT/ML suspension pen-injector injection, 40 units twice daily before meals, titrate up as needed to  units, Disp: 120 mL, Rfl: 1    metFORMIN ER (GLUCOPHAGE-XR) 500 MG 24 hr tablet, Take 1 tablet by mouth 2 (Two) Times a Day., Disp: 180 tablet, Rfl: 1    metoprolol tartrate (LOPRESSOR) 25 MG tablet, Take 0.5 tablets by mouth 2 (Two) Times a Day., Disp: 90 tablet, Rfl: 3    multivitamin with minerals tablet tablet, Take 1 tablet by mouth Daily., Disp: , Rfl:     omeprazole (priLOSEC) 20 MG capsule, Take 1 capsule by mouth Daily., Disp: 90 capsule, Rfl: 1    pramipexole (MIRAPEX) 0.25 MG tablet, TAKE 1 TABLET BY MOUTH THREE TIMES DAILY, Disp: 270 tablet, Rfl: 0    Tirzepatide 12.5 MG/0.5ML solution auto-injector, Inject 0.5 mL under the skin into the appropriate area as directed Every 7 (Seven) Days., Disp: 2 mL, Rfl: 1    Vibegron (Gemtesa) 75 MG tablet, Take 1 tablet by mouth Daily., Disp: 30 tablet, Rfl: 11    vitamin D3 125 MCG (5000 UT) capsule capsule, Take 1 capsule by mouth Daily., Disp: , Rfl:     Allergies   Allergen Reactions    Ace Inhibitors Cough    Bee Venom Anaphylaxis    Penicillins Swelling     Objective   Visit Vitals  /80   Pulse 93   Temp 97.6 °F (36.4 °C)  "(Temporal)   Ht 175.3 cm (69\")   Wt 119 kg (263 lb)   SpO2 99%   BMI 38.84 kg/m²      Body mass index is 38.84 kg/m².     Physical Exam  Vitals and nursing note reviewed.   Constitutional:       General: He is awake. He is not in acute distress.     Appearance: He is not ill-appearing.      Comments: Accompanied by wife   Pulmonary:      Effort: Pulmonary effort is normal.   Skin:     General: Skin is warm and dry.   Neurological:      Mental Status: He is alert and oriented to person, place, and time. Mental status is at baseline.   Psychiatric:         Mood and Affect: Mood normal.         Behavior: Behavior is cooperative.          Labs  Lab Results   Component Value Date    COLORU Yellow 03/06/2025    CLARITYU Clear 03/06/2025    SPECGRAV 1.015 03/06/2025    PHUR 5.5 03/06/2025    LEUKOCYTESUR Negative 03/06/2025    NITRITE Negative 03/06/2025    PROTEINPOCUA Trace (A) 03/06/2025    GLUCOSEUR >=1000 mg/dL (3+) (A) 03/06/2025    KETONESU Negative 03/06/2025    UROBILINOGEN 0.2 E.U./dL 03/06/2025    BILIRUBINUR Negative 03/06/2025    RBCUR Negative 03/06/2025      Lab Results   Component Value Date    RBCUA Comment 02/16/2022    BACTERIA Trace (A) 02/16/2022        Lab Results   Component Value Date    WBC 5.98 11/04/2024    HGB 15.7 11/04/2024    HCT 47.6 11/04/2024    MCV 88.8 11/04/2024     11/04/2024     Lab Results   Component Value Date    GLUCOSE 129 (H) 02/26/2025    CALCIUM 9.9 02/26/2025     02/26/2025    K 4.1 02/26/2025    CO2 25.8 02/26/2025    CL 98 02/26/2025    BUN 19 02/26/2025    BUN 18 11/04/2024    CREATININE 1.55 (H) 02/26/2025    CREATININE 1.41 (H) 11/04/2024    EGFR 51.6 (L) 02/26/2025    EGFR 57.8 (L) 11/04/2024    BCR 12.3 02/26/2025    ANIONGAP 15.2 (H) 02/26/2025    ALT 34 11/04/2024    AST 28 11/04/2024       Lab Results   Component Value Date    HGBA1C 10.7 (A) 02/26/2025        Lab Results   Component Value Date    PSA 0.567 05/01/2024    PSA 0.589 02/15/2023       No " "results found for: \"URICACIDSTN\", \"ASVF0VOLFHQ\", \"IFIP6BIOEA\", \"LABMAGN\", \"CAPHOSSTONE\", \"HYDROXYAPATI\"  Lab Results   Component Value Date    NEVM26EC 28.9 (L) 11/04/2024    CAION 4.7 07/03/2023     Lab Results   Component Value Date    LABPH 5.5 02/16/2022       Lab Results   Component Value Date    PSA 0.567 05/01/2024         I have reviewed the above labs.      PVR  Post-void residual performed with ultrasound by staff and interpreted by me - 0 mL    Assessment / Plan      Diagnoses and all orders for this visit:    1. Urinary incontinence, urge [N39.41] (Primary)    2. Lower urinary tract symptoms (LUTS)  -     tamsulosin (FLOMAX) 0.4 MG capsule 24 hr capsule; Take 1 capsule by mouth Every Night.  Dispense: 90 capsule; Refill: 2    3. Benign prostatic hyperplasia, unspecified whether lower urinary tract symptoms present  -     PSA Diagnostic; Future         Assessment & Plan  1. LUTS: Stable. IPSS 7, PVR 0. Flomax effective for bladder emptying as prior PVR ~70 mL, no side effects.  - Provide 90-day supply of Flomax via [facility] pharmacy  - Advised to report symptom exacerbation  - Discussed InterStim for bladder and bowel issues and given written education  - Order PSA test for May 2024, and then annually  - Provide urine sample next visit    Follow-up in 6 months       Return for f/u with Julia, UA, labs prior, PVR.    Julia Chen, MSN, APRN, FNP-C  [facility]    "

## 2025-05-05 ENCOUNTER — OFFICE VISIT (OUTPATIENT)
Dept: FAMILY MEDICINE CLINIC | Facility: CLINIC | Age: 60
End: 2025-05-05
Payer: MEDICAID

## 2025-05-05 VITALS
BODY MASS INDEX: 39.16 KG/M2 | WEIGHT: 264.4 LBS | HEIGHT: 69 IN | SYSTOLIC BLOOD PRESSURE: 132 MMHG | OXYGEN SATURATION: 98 % | DIASTOLIC BLOOD PRESSURE: 80 MMHG | HEART RATE: 78 BPM

## 2025-05-05 DIAGNOSIS — G47.33 OSA ON CPAP: ICD-10-CM

## 2025-05-05 DIAGNOSIS — E11.65 UNCONTROLLED TYPE 2 DIABETES MELLITUS WITH HYPERGLYCEMIA: ICD-10-CM

## 2025-05-05 DIAGNOSIS — E11.43 DIABETIC GASTROPARESIS ASSOCIATED WITH TYPE 2 DIABETES MELLITUS: ICD-10-CM

## 2025-05-05 DIAGNOSIS — Z23 NEED FOR PNEUMOCOCCAL 20-VALENT CONJUGATE VACCINATION: ICD-10-CM

## 2025-05-05 DIAGNOSIS — I25.10 CORONARY ARTERY DISEASE INVOLVING NATIVE CORONARY ARTERY OF NATIVE HEART WITHOUT ANGINA PECTORIS: ICD-10-CM

## 2025-05-05 DIAGNOSIS — K31.84 DIABETIC GASTROPARESIS ASSOCIATED WITH TYPE 2 DIABETES MELLITUS: ICD-10-CM

## 2025-05-05 DIAGNOSIS — Z00.00 WELL ADULT EXAM: Primary | ICD-10-CM

## 2025-05-05 DIAGNOSIS — E66.01 CLASS 2 SEVERE OBESITY DUE TO EXCESS CALORIES WITH SERIOUS COMORBIDITY AND BODY MASS INDEX (BMI) OF 39.0 TO 39.9 IN ADULT: ICD-10-CM

## 2025-05-05 DIAGNOSIS — I10 ESSENTIAL HYPERTENSION: ICD-10-CM

## 2025-05-05 DIAGNOSIS — E11.42 DIABETIC PERIPHERAL NEUROPATHY ASSOCIATED WITH TYPE 2 DIABETES MELLITUS: ICD-10-CM

## 2025-05-05 DIAGNOSIS — E11.319 DIABETIC RETINOPATHY ASSOCIATED WITH TYPE 2 DIABETES MELLITUS, MACULAR EDEMA PRESENCE UNSPECIFIED, UNSPECIFIED LATERALITY, UNSPECIFIED RETINOPATHY SEVERITY: ICD-10-CM

## 2025-05-05 DIAGNOSIS — E78.2 MIXED HYPERLIPIDEMIA: ICD-10-CM

## 2025-05-05 DIAGNOSIS — Z12.5 SCREENING FOR PROSTATE CANCER: ICD-10-CM

## 2025-05-05 DIAGNOSIS — E66.812 CLASS 2 SEVERE OBESITY DUE TO EXCESS CALORIES WITH SERIOUS COMORBIDITY AND BODY MASS INDEX (BMI) OF 39.0 TO 39.9 IN ADULT: ICD-10-CM

## 2025-05-05 DIAGNOSIS — K76.0 FATTY LIVER: ICD-10-CM

## 2025-05-05 RX ORDER — DOXYCYCLINE HYCLATE 100 MG
1 TABLET ORAL EVERY 12 HOURS SCHEDULED
COMMUNITY
Start: 2025-04-26

## 2025-05-05 NOTE — PROGRESS NOTES
Subjective   Myke Marcial is a 59 y.o. male.     History of Present Illness  Here today for annual physical.      Chronic med conditions include uncontrolled IDDM with retinopathy, gastroparesis, DPN, as well as HLP,  CAD, morbid obesity, PRATEEK on CPAP and depression. Diabetes managed per Dr. Corona. Also followed by sleep medicine. Reports using CPAP as rx'd.     Father with h/o prostate cancer. No family h/o premature CAD or colon CA. Has had consultation with urology.     Non smoker, no h/o smoking.     No EtOH or illicit substance use.     , heterosexual monogamous relationship. He denies h/o STD. Declines screening.      Previously self employed as contractor/construction. Currently working to obtain his disability as he has not been able to return to gainful employment since CABG.     Varied diet, high starch, high calorie, high salt.     Generally sedentary.     Firearms in home, safely stored.     UTD on vaccinations other than Shingrix, Prevnar 20.     Overdue for dental exam. Last March 2023?     Sees optometry and retinal specialist regularly. Has required injections in the past. Currently stable.      Pt's previous HPI reviewed and updated as indicated.     The following portions of the patient's history were reviewed and updated as appropriate: allergies, current medications, past family history, past medical history, past social history, past surgical history, and problem list.    Review of Systems   Constitutional:  Positive for fatigue. Negative for fever.   HENT:  Positive for congestion and postnasal drip. Negative for nosebleeds, rhinorrhea, sore throat, tinnitus and trouble swallowing.    Eyes:  Negative for visual disturbance.   Respiratory:  Positive for shortness of breath (with exertion). Negative for cough and wheezing.    Cardiovascular:  Positive for leg swelling (mild, stable). Negative for chest pain and palpitations.   Gastrointestinal:  Positive for nausea (mild,  intermittent). Negative for abdominal pain, blood in stool, constipation and vomiting.        Heartburn   Endocrine: Positive for heat intolerance.   Genitourinary:  Negative for decreased urine volume, difficulty urinating, dysuria and hematuria.        ED   Musculoskeletal:  Positive for arthralgias and back pain (chronic, stable).   Skin:  Negative for rash and wound.   Neurological:  Negative for dizziness, syncope, weakness and headaches.   Hematological:  Negative for adenopathy. Does not bruise/bleed easily.   Psychiatric/Behavioral:  Negative for confusion, dysphoric mood, sleep disturbance and suicidal ideas. The patient is not nervous/anxious.    Pt's previous ROS reviewed and updated as indicated.       Objective   Vitals:    05/05/25 1049   BP: 132/80   Pulse: 78   SpO2: 98%     Body mass index is 39.05 kg/m².      05/05/25  1049   Weight: 120 kg (264 lb 6.4 oz)       Physical Exam  Vitals and nursing note reviewed.   Constitutional:       General: He is not in acute distress.     Appearance: He is well-groomed. He is obese. He is not ill-appearing.   HENT:      Head: Atraumatic.      Right Ear: Tympanic membrane and ear canal normal.      Left Ear: Tympanic membrane and ear canal normal.      Nose: Nose normal.      Mouth/Throat:      Mouth: Mucous membranes are moist. No oral lesions.      Pharynx: Oropharynx is clear.   Eyes:      General: No scleral icterus.     Conjunctiva/sclera: Conjunctivae normal.   Neck:      Thyroid: No thyroid mass.      Vascular: Normal carotid pulses. No carotid bruit.   Cardiovascular:      Rate and Rhythm: Normal rate and regular rhythm.      Pulses: Normal pulses.      Heart sounds: Normal heart sounds.   Pulmonary:      Effort: Pulmonary effort is normal.      Breath sounds: Normal breath sounds.   Abdominal:      General: Abdomen is protuberant. Bowel sounds are normal. There is no distension.      Palpations: Abdomen is soft. There is no mass.      Tenderness: There  is no abdominal tenderness.      Comments: Exam limited by body habitus. Diastasis recti   Musculoskeletal:      Right lower leg: No edema.      Left lower leg: No edema.   Lymphadenopathy:      Cervical: No cervical adenopathy.   Skin:     General: Skin is warm and dry.      Coloration: Skin is not jaundiced or pale.   Neurological:      Mental Status: He is alert and oriented to person, place, and time.      Gait: Gait is intact.   Psychiatric:         Mood and Affect: Mood and affect normal.         Behavior: Behavior normal. Behavior is cooperative.         Cognition and Memory: Cognition normal.     Pt's previous physical exam reviewed and updated as indicated.    Lab Results   Component Value Date    HGBA1C 10.7 (A) 02/26/2025    HGBA1C 12.5 (A) 10/17/2024    HGBA1C 8.5 (A) 06/17/2024     Lab Results   Component Value Date    MICROALBUR 44.9 05/05/2025    CREATININE 1.24 05/05/2025         Assessment & Plan   Diagnoses and all orders for this visit:    1. Well adult exam (Primary)  -     Pneumococcal Conjugate Vaccine 20-Valent (PCV20)  -     PSA Screen  -     CBC & Differential  -     Comprehensive Metabolic Panel  -     TSH Rfx On Abnormal To Free T4  -     Lipid Panel  -     Vitamin B12    2. Need for pneumococcal 20-valent conjugate vaccination  -     Pneumococcal Conjugate Vaccine 20-Valent (PCV20)    3. Screening for prostate cancer  -     PSA Screen    4. Uncontrolled type 2 diabetes mellitus with hyperglycemia  -     Microalbumin / Creatinine Urine Ratio - Urine, Clean Catch    5. Essential hypertension  -     Microalbumin / Creatinine Urine Ratio - Urine, Clean Catch    6. Mixed hyperlipidemia    7. Coronary artery disease involving native coronary artery of native heart without angina pectoris    8. Class 2 severe obesity due to excess calories with serious comorbidity and body mass index (BMI) of 39.0 to 39.9 in adult    9. Fatty liver    10. PRATEEK on CPAP    11. Diabetic gastroparesis associated with  type 2 diabetes mellitus    12. Diabetic peripheral neuropathy associated with type 2 diabetes mellitus    13. Diabetic retinopathy associated with type 2 diabetes mellitus, macular edema presence unspecified, unspecified laterality, unspecified retinopathy severity       Age appropriate preventive care reviewed including cancer screenings, safety measures, mental health concerns, supplements, prevention of worsening CV disease and DM, etc. Handout provided.    F/u with Dr. Corona, Dr. Nazario as scheduled. Encouraged up-titration of Mounjaro as tolerated.    Stable chronic medical problems with poorly controlled DM. Continue ASA, statin, chlorthalidone, farxiga, tricor, mixed insulin, metformin, metoprolol, mounjaro    Encouraged continued cpap compliance.    Patient encouraged to take meds as rx'd, follow diabetic appropriate diet, exercise daily, perform feet check daily, and have yearly diabetic eye exams.    Routine f/u in 6 months, sooner as needed/instructed.  I will contact patient regarding test results and provide instructions regarding any necessary changes in plan of care.  Patient was encouraged to keep me informed of any acute changes, lack of improvement, or any new concerning symptoms.  Pt is aware of reasons to seek emergent care.  Patient voiced understanding of all instructions and denied further questions.    Please note that portions of this note may have been completed with a voice recognition program.

## 2025-05-06 DIAGNOSIS — E11.65 UNCONTROLLED TYPE 2 DIABETES MELLITUS WITH HYPERGLYCEMIA: ICD-10-CM

## 2025-05-06 LAB
ALBUMIN SERPL-MCNC: 4.3 G/DL (ref 3.5–5.2)
ALBUMIN/CREAT UR: 23 MG/G CREAT (ref 0–29)
ALBUMIN/GLOB SERPL: 1.4 G/DL
ALP SERPL-CCNC: 85 U/L (ref 39–117)
ALT SERPL-CCNC: 42 U/L (ref 1–41)
AST SERPL-CCNC: 52 U/L (ref 1–40)
BASOPHILS # BLD AUTO: 0.03 10*3/MM3 (ref 0–0.2)
BASOPHILS NFR BLD AUTO: 0.4 % (ref 0–1.5)
BILIRUB SERPL-MCNC: 0.7 MG/DL (ref 0–1.2)
BUN SERPL-MCNC: 25 MG/DL (ref 6–20)
BUN/CREAT SERPL: 20.2 (ref 7–25)
CALCIUM SERPL-MCNC: 9.7 MG/DL (ref 8.6–10.5)
CHLORIDE SERPL-SCNC: 100 MMOL/L (ref 98–107)
CHOLEST SERPL-MCNC: 164 MG/DL (ref 0–200)
CO2 SERPL-SCNC: 25.3 MMOL/L (ref 22–29)
CREAT SERPL-MCNC: 1.24 MG/DL (ref 0.76–1.27)
CREAT UR-MCNC: 194.2 MG/DL
EGFRCR SERPLBLD CKD-EPI 2021: 67.4 ML/MIN/1.73
EOSINOPHIL # BLD AUTO: 0.05 10*3/MM3 (ref 0–0.4)
EOSINOPHIL NFR BLD AUTO: 0.7 % (ref 0.3–6.2)
ERYTHROCYTE [DISTWIDTH] IN BLOOD BY AUTOMATED COUNT: 13.8 % (ref 12.3–15.4)
GLOBULIN SER CALC-MCNC: 3.1 GM/DL
GLUCOSE SERPL-MCNC: 172 MG/DL (ref 65–99)
HCT VFR BLD AUTO: 46.7 % (ref 37.5–51)
HDLC SERPL-MCNC: 37 MG/DL (ref 40–60)
HGB BLD-MCNC: 15.7 G/DL (ref 13–17.7)
IMM GRANULOCYTES # BLD AUTO: 0.06 10*3/MM3 (ref 0–0.05)
IMM GRANULOCYTES NFR BLD AUTO: 0.8 % (ref 0–0.5)
LDLC SERPL CALC-MCNC: 83 MG/DL (ref 0–100)
LYMPHOCYTES # BLD AUTO: 1.53 10*3/MM3 (ref 0.7–3.1)
LYMPHOCYTES NFR BLD AUTO: 20.8 % (ref 19.6–45.3)
MCH RBC QN AUTO: 29.3 PG (ref 26.6–33)
MCHC RBC AUTO-ENTMCNC: 33.6 G/DL (ref 31.5–35.7)
MCV RBC AUTO: 87.1 FL (ref 79–97)
MICROALBUMIN UR-MCNC: 44.9 UG/ML
MONOCYTES # BLD AUTO: 0.55 10*3/MM3 (ref 0.1–0.9)
MONOCYTES NFR BLD AUTO: 7.5 % (ref 5–12)
NEUTROPHILS # BLD AUTO: 5.13 10*3/MM3 (ref 1.7–7)
NEUTROPHILS NFR BLD AUTO: 69.8 % (ref 42.7–76)
NRBC BLD AUTO-RTO: 0 /100 WBC (ref 0–0.2)
PLATELET # BLD AUTO: 317 10*3/MM3 (ref 140–450)
POTASSIUM SERPL-SCNC: 3.8 MMOL/L (ref 3.5–5.2)
PROT SERPL-MCNC: 7.4 G/DL (ref 6–8.5)
PSA SERPL-MCNC: 0.7 NG/ML (ref 0–4)
RBC # BLD AUTO: 5.36 10*6/MM3 (ref 4.14–5.8)
SODIUM SERPL-SCNC: 139 MMOL/L (ref 136–145)
TRIGL SERPL-MCNC: 264 MG/DL (ref 0–150)
TSH SERPL DL<=0.005 MIU/L-ACNC: 1.79 UIU/ML (ref 0.27–4.2)
VIT B12 SERPL-MCNC: 427 PG/ML (ref 211–946)
VLDLC SERPL CALC-MCNC: 44 MG/DL (ref 5–40)
WBC # BLD AUTO: 7.35 10*3/MM3 (ref 3.4–10.8)

## 2025-05-06 RX ORDER — TIRZEPATIDE 12.5 MG/.5ML
INJECTION, SOLUTION SUBCUTANEOUS
Qty: 2 ML | Refills: 2 | Status: SHIPPED | OUTPATIENT
Start: 2025-05-06

## 2025-05-06 NOTE — TELEPHONE ENCOUNTER
Rx Refill Note  Requested Prescriptions     Pending Prescriptions Disp Refills    Mounjaro 12.5 MG/0.5ML solution auto-injector [Pharmacy Med Name: Mounjaro 12.5 MG/0.5ML Subcutaneous Solution Pen-injector] 4 mL 0     Sig: INJECT 12.5MG UNDER THE SKIN INTO THE APPROPRIATE AREA AS DIRECTED EVERY 7 DAYS          Last office visit with prescribing clinician: 2/26/2025     Next office visit with prescribing clinician: 6/16/2025         Zakiya Hull MA  05/06/25, 12:27 EDT

## 2025-05-19 DIAGNOSIS — E11.65 UNCONTROLLED TYPE 2 DIABETES MELLITUS WITH HYPERGLYCEMIA: Primary | ICD-10-CM

## 2025-05-19 RX ORDER — ACYCLOVIR 400 MG/1
1 TABLET ORAL
Qty: 9 EACH | Refills: 3 | Status: SHIPPED | OUTPATIENT
Start: 2025-05-19

## 2025-06-12 RX ORDER — FLUCONAZOLE 150 MG/1
TABLET ORAL
Qty: 2 TABLET | Refills: 0 | Status: SHIPPED | OUTPATIENT
Start: 2025-06-12

## 2025-06-16 ENCOUNTER — OFFICE VISIT (OUTPATIENT)
Dept: ENDOCRINOLOGY | Facility: CLINIC | Age: 60
End: 2025-06-16
Payer: MEDICAID

## 2025-06-16 VITALS
OXYGEN SATURATION: 96 % | BODY MASS INDEX: 39.99 KG/M2 | SYSTOLIC BLOOD PRESSURE: 120 MMHG | HEART RATE: 88 BPM | DIASTOLIC BLOOD PRESSURE: 78 MMHG | HEIGHT: 69 IN | WEIGHT: 270 LBS

## 2025-06-16 DIAGNOSIS — E11.65 UNCONTROLLED TYPE 2 DIABETES MELLITUS WITH HYPERGLYCEMIA: ICD-10-CM

## 2025-06-16 DIAGNOSIS — E11.65 UNCONTROLLED TYPE 2 DIABETES MELLITUS WITH HYPERGLYCEMIA: Primary | ICD-10-CM

## 2025-06-16 LAB
EXPIRATION DATE: ABNORMAL
EXPIRATION DATE: NORMAL
GLUCOSE BLDC GLUCOMTR-MCNC: 128 MG/DL (ref 70–130)
HBA1C MFR BLD: 9.6 % (ref 4.5–5.7)
Lab: ABNORMAL
Lab: NORMAL

## 2025-06-16 RX ORDER — DAPAGLIFLOZIN 10 MG/1
10 TABLET, FILM COATED ORAL DAILY
Qty: 90 TABLET | Refills: 1 | Status: SHIPPED | OUTPATIENT
Start: 2025-06-16

## 2025-06-16 RX ORDER — METFORMIN HYDROCHLORIDE 500 MG/1
500 TABLET, EXTENDED RELEASE ORAL 2 TIMES DAILY
Qty: 180 TABLET | Refills: 1 | Status: SHIPPED | OUTPATIENT
Start: 2025-06-16

## 2025-06-16 RX ORDER — ACYCLOVIR 400 MG/1
1 TABLET ORAL
Qty: 9 EACH | Refills: 3 | Status: SHIPPED | OUTPATIENT
Start: 2025-06-16

## 2025-06-16 NOTE — PROGRESS NOTES
"Chief Complaint   Patient presents with    Diabetes     Uncontrolled type 2 diabetes mellitus with hyperglycemia    Hypertension    Hyperlipidemia          HPI   yMke Marcial is a 59 y.o. male had concerns including Diabetes (Uncontrolled type 2 diabetes mellitus with hyperglycemia), Hypertension, and Hyperlipidemia.    He is checking blood sugars - none. Out of CGM. Not consistently doing fingersticks.     Current medications for diabetes include metformin 500 mg twice daily, Farxiga 10 mg daily, Mounjaro 12.5 mg weekly, mixed insulin 50 units twice daily.    Has missed morning doses of insulin on occasion. Not consistently.     The following portions of the patient's history were reviewed and updated as appropriate: allergies, current medications, past family history, past medical history, past social history, past surgical history, and problem list.      Review of Systems   Constitutional:  Positive for activity change.   Endocrine:        See HPI        Physical Exam  Vitals reviewed.   Constitutional:       Appearance: Normal appearance. He is obese.      Comments: Body mass index is 39.87 kg/m².   Cardiovascular:      Rate and Rhythm: Normal rate.   Pulmonary:      Effort: Pulmonary effort is normal.   Neurological:      General: No focal deficit present.      Mental Status: He is alert. Mental status is at baseline.   Psychiatric:         Mood and Affect: Mood normal.         Behavior: Behavior normal.        /78 (BP Location: Left arm, Patient Position: Sitting, Cuff Size: Adult)   Pulse 88   Ht 175.3 cm (69\")   Wt 122 kg (270 lb)   SpO2 96%   BMI 39.87 kg/m²      Labs and imaging    A1C:  Lab Results   Component Value Date    HGBA1C 9.6 (A) 06/16/2025    HGBA1C 10.7 (A) 02/26/2025    HGBA1C 12.5 (A) 10/17/2024    HGBA1C 8.5 (A) 06/17/2024    HGBA1C 8.20 (H) 05/01/2024    HGBA1C 8.4 (A) 01/18/2024    HGBA1C 8.80 (H) 10/25/2023    HGBA1C 7.90 (H) 06/30/2023    HGBA1C 9.4 04/20/2023    " HGBA1C 8.4 01/18/2023      Glucose:  Lab Results   Component Value Date    POCGLU 128 06/16/2025      CMP:  Lab Results   Component Value Date    GLUCOSE 172 (H) 05/05/2025    BUN 25 (H) 05/05/2025    CREATININE 1.24 05/05/2025    CREATININE 1.55 (H) 02/26/2025    CREATININE 1.41 (H) 11/04/2024     05/05/2025    K 3.8 05/05/2025     05/05/2025    CALCIUM 9.7 05/05/2025    PROTEINTOT 7.4 05/05/2025    ALBUMIN 4.3 05/05/2025    ALT 42 (H) 05/05/2025    AST 52 (H) 05/05/2025    ALKPHOS 85 05/05/2025    BILITOT 0.7 05/05/2025    GLOB 3.1 05/05/2025    AGRATIO 1.4 05/05/2025    BCR 20.2 05/05/2025    ANIONGAP 15.2 (H) 02/26/2025    EGFR 67.4 05/05/2025    EGFR 51.6 (L) 02/26/2025    EGFR 57.8 (L) 11/04/2024      Lipid Panel:  Lab Results   Component Value Date    CHOL 138 02/23/2024    CHLPL 164 05/05/2025    TRIG 264 (H) 05/05/2025    HDL 37 (L) 05/05/2025    LDL 83 05/05/2025      Urine Microalbumin:  Lab Results   Component Value Date    MALBCRERATIO 23 05/05/2025      TSH:  Lab Results   Component Value Date    TSH 1.790 05/05/2025        Assessment and plan  Diagnoses and all orders for this visit:    1. Uncontrolled type 2 diabetes mellitus with hyperglycemia (Primary)  Assessment & Plan:  Uncontrolled with hyperglycemia. A1c down to 9.6.  Complicated by gastroparesis, retinopathy, neuropathy, history of microalbuminuria, CKD 2  Continue metformin 500 mg twice daily.  Continue Farxiga 10 mg daily.   Continue mixed insulin 50 units twice daily and titrate up by 5 units every few days until BGs are between 100-200.   Increase Mounjaro to 15 mg weekly. Mounjaro has not exacerbated gastroparesis.  Resume Dexcom.   Labs are up-to-date 5/2025. Ophtho exam is UTD - seeing retina specialist. Pt will request the report.   Monofilament updated 2/2025.    Orders:  -     POC Glucose, Blood  -     POC Glycosylated Hemoglobin (Hb A1C)  -     Farxiga 10 MG tablet; Take 10 mg by mouth Daily.  Dispense: 90 tablet;  Refill: 1  -     metFORMIN ER (GLUCOPHAGE-XR) 500 MG 24 hr tablet; Take 1 tablet by mouth 2 (Two) Times a Day.  Dispense: 180 tablet; Refill: 1  -     Tirzepatide 15 MG/0.5ML solution auto-injector; Inject 15 mg under the skin into the appropriate area as directed 1 (One) Time Per Week.  Dispense: 6 mL; Refill: 1         Return in about 3 months (around 9/16/2025) for next scheduled follow up. The patient was instructed to contact the clinic with any interval questions or concerns.    Electronically signed by: Ondina Garcia DO   Endocrinologist    Please note that portions of this note were completed with a voice recognition program.

## 2025-06-16 NOTE — ASSESSMENT & PLAN NOTE
Uncontrolled with hyperglycemia. A1c down to 9.6.  Complicated by gastroparesis, retinopathy, neuropathy, history of microalbuminuria, CKD 2  Continue metformin 500 mg twice daily.  Continue Farxiga 10 mg daily.   Continue mixed insulin 50 units twice daily and titrate up by 5 units every few days until BGs are between 100-200.   Increase Mounjaro to 15 mg weekly. Mounjaro has not exacerbated gastroparesis.  Resume Dexcom.   Labs are up-to-date 5/2025. Ophtho exam is UTD - seeing retina specialist. Pt will request the report.   Monofilament updated 2/2025.

## 2025-06-17 ENCOUNTER — PRIOR AUTHORIZATION (OUTPATIENT)
Dept: ENDOCRINOLOGY | Facility: CLINIC | Age: 60
End: 2025-06-17
Payer: MEDICAID

## 2025-06-17 NOTE — TELEPHONE ENCOUNTER
Myke Marcial (Key: [id number])  PA Case ID #: [id number]  Rx #: [id number]  Need Help? Call us at [phone]  Outcome  Approved today by [place] [employer] Med[employer] 2017  The request has been approved. The authorization is effective for a maximum of 12 fills from 06/17/2025 to 06/17/2026, as long as the member is enrolled in their current health plan. A written notification letter will follow with additional details.  Effective Date: 6/17/2025  Authorization Expiration Date: 6/17/2026  Drug  Dexcom G7 Sensor  ePA cloud logo  Form  Med[employer] [place] Medicaid ePA Form 2017 NCPDP  Original Claim Info

## 2025-07-02 ENCOUNTER — HOSPITAL ENCOUNTER (OUTPATIENT)
Facility: HOSPITAL | Age: 60
Discharge: HOME | End: 2025-07-02
Admitting: FAMILY MEDICINE
Payer: MEDICAID

## 2025-07-02 DIAGNOSIS — R79.89 ABNORMAL LIVER FUNCTION TESTS: ICD-10-CM

## 2025-07-02 DIAGNOSIS — K76.0 FATTY LIVER: ICD-10-CM

## 2025-07-02 DIAGNOSIS — E66.01 CLASS 2 SEVERE OBESITY DUE TO EXCESS CALORIES WITH SERIOUS COMORBIDITY AND BODY MASS INDEX (BMI) OF 39.0 TO 39.9 IN ADULT: ICD-10-CM

## 2025-07-02 DIAGNOSIS — E66.812 CLASS 2 SEVERE OBESITY DUE TO EXCESS CALORIES WITH SERIOUS COMORBIDITY AND BODY MASS INDEX (BMI) OF 39.0 TO 39.9 IN ADULT: ICD-10-CM

## 2025-07-02 PROCEDURE — 76705 ECHO EXAM OF ABDOMEN: CPT

## 2025-07-12 ENCOUNTER — APPOINTMENT (OUTPATIENT)
Dept: GENERAL RADIOLOGY | Facility: HOSPITAL | Age: 60
DRG: 322 | End: 2025-07-12
Payer: MEDICAID

## 2025-07-12 ENCOUNTER — HOSPITAL ENCOUNTER (INPATIENT)
Facility: HOSPITAL | Age: 60
LOS: 1 days | Discharge: HOME | DRG: 322 | End: 2025-07-15
Attending: EMERGENCY MEDICINE | Admitting: INTERNAL MEDICINE
Payer: MEDICAID

## 2025-07-12 DIAGNOSIS — I20.89 STABLE ANGINA PECTORIS: Primary | ICD-10-CM

## 2025-07-12 DIAGNOSIS — R07.9 CHEST PAIN, UNSPECIFIED TYPE: ICD-10-CM

## 2025-07-12 LAB
ALBUMIN SERPL-MCNC: 4 G/DL (ref 3.5–5.2)
ALBUMIN/GLOB SERPL: 1.2 G/DL
ALP SERPL-CCNC: 87 U/L (ref 39–117)
ALT SERPL W P-5'-P-CCNC: 26 U/L (ref 1–41)
ANION GAP SERPL CALCULATED.3IONS-SCNC: 13.4 MMOL/L (ref 5–15)
APTT PPP: 26.7 SECONDS (ref 70–100)
AST SERPL-CCNC: 21 U/L (ref 1–40)
BASOPHILS # BLD AUTO: 0.05 10*3/MM3 (ref 0–0.2)
BASOPHILS NFR BLD AUTO: 0.6 % (ref 0–1.5)
BILIRUB SERPL-MCNC: 0.3 MG/DL (ref 0–1.2)
BUN SERPL-MCNC: 19 MG/DL (ref 6–20)
BUN/CREAT SERPL: 14.6 (ref 7–25)
CALCIUM SPEC-SCNC: 9.6 MG/DL (ref 8.6–10.5)
CHLORIDE SERPL-SCNC: 99 MMOL/L (ref 98–107)
CO2 SERPL-SCNC: 26.6 MMOL/L (ref 22–29)
CREAT SERPL-MCNC: 1.3 MG/DL (ref 0.76–1.27)
DEPRECATED RDW RBC AUTO: 44.6 FL (ref 37–54)
DEVICE COMMENT: ABNORMAL
EGFRCR SERPLBLD CKD-EPI 2021: 63.7 ML/MIN/1.73
EOSINOPHIL # BLD AUTO: 0.12 10*3/MM3 (ref 0–0.4)
EOSINOPHIL NFR BLD AUTO: 1.6 % (ref 0.3–6.2)
ERYTHROCYTE [DISTWIDTH] IN BLOOD BY AUTOMATED COUNT: 13.9 % (ref 12.3–15.4)
GEN 5 1HR TROPONIN T REFLEX: 34 NG/L
GLOBULIN UR ELPH-MCNC: 3.4 GM/DL
GLUCOSE BLDC GLUCOMTR-MCNC: 216 MG/DL (ref 70–130)
GLUCOSE BLDC GLUCOMTR-MCNC: 236 MG/DL (ref 70–130)
GLUCOSE BLDC GLUCOMTR-MCNC: 243 MG/DL (ref 70–130)
GLUCOSE SERPL-MCNC: 121 MG/DL (ref 65–99)
HCT VFR BLD AUTO: 49.9 % (ref 37.5–51)
HGB BLD-MCNC: 16.7 G/DL (ref 13–17.7)
HOLD SPECIMEN: NORMAL
HOLD SPECIMEN: NORMAL
IMM GRANULOCYTES # BLD AUTO: 0.03 10*3/MM3 (ref 0–0.05)
IMM GRANULOCYTES NFR BLD AUTO: 0.4 % (ref 0–0.5)
INR PPP: 0.93 (ref 0.9–1.1)
LYMPHOCYTES # BLD AUTO: 2.41 10*3/MM3 (ref 0.7–3.1)
LYMPHOCYTES NFR BLD AUTO: 31.2 % (ref 19.6–45.3)
MCH RBC QN AUTO: 29.5 PG (ref 26.6–33)
MCHC RBC AUTO-ENTMCNC: 33.5 G/DL (ref 31.5–35.7)
MCV RBC AUTO: 88.2 FL (ref 79–97)
MONOCYTES # BLD AUTO: 0.66 10*3/MM3 (ref 0.1–0.9)
MONOCYTES NFR BLD AUTO: 8.5 % (ref 5–12)
NEUTROPHILS NFR BLD AUTO: 4.46 10*3/MM3 (ref 1.7–7)
NEUTROPHILS NFR BLD AUTO: 57.7 % (ref 42.7–76)
NRBC BLD AUTO-RTO: 0 /100 WBC (ref 0–0.2)
PLATELET # BLD AUTO: 289 10*3/MM3 (ref 140–450)
PMV BLD AUTO: 9.5 FL (ref 6–12)
POTASSIUM SERPL-SCNC: 3.8 MMOL/L (ref 3.5–5.2)
PROT SERPL-MCNC: 7.4 G/DL (ref 6–8.5)
PROTHROMBIN TIME: 13.1 SECONDS (ref 12.3–15.1)
RBC # BLD AUTO: 5.66 10*6/MM3 (ref 4.14–5.8)
SODIUM SERPL-SCNC: 139 MMOL/L (ref 136–145)
TROPONIN T % DELTA: -8
TROPONIN T NUMERIC DELTA: -3 NG/L
TROPONIN T SERPL HS-MCNC: 37 NG/L
UFH PPP CHRO-ACNC: 0.1 IU/ML (ref 0.3–0.7)
UFH PPP CHRO-ACNC: 0.14 IU/ML (ref 0.3–0.7)
WBC NRBC COR # BLD AUTO: 7.73 10*3/MM3 (ref 3.4–10.8)
WHOLE BLOOD HOLD COAG: NORMAL
WHOLE BLOOD HOLD SPECIMEN: NORMAL

## 2025-07-12 PROCEDURE — 84484 ASSAY OF TROPONIN QUANT: CPT | Performed by: EMERGENCY MEDICINE

## 2025-07-12 PROCEDURE — 85520 HEPARIN ASSAY: CPT | Performed by: INTERNAL MEDICINE

## 2025-07-12 PROCEDURE — 93005 ELECTROCARDIOGRAM TRACING: CPT | Performed by: EMERGENCY MEDICINE

## 2025-07-12 PROCEDURE — 85610 PROTHROMBIN TIME: CPT | Performed by: INTERNAL MEDICINE

## 2025-07-12 PROCEDURE — 82948 REAGENT STRIP/BLOOD GLUCOSE: CPT | Performed by: INTERNAL MEDICINE

## 2025-07-12 PROCEDURE — G0378 HOSPITAL OBSERVATION PER HR: HCPCS

## 2025-07-12 PROCEDURE — 85520 HEPARIN ASSAY: CPT

## 2025-07-12 PROCEDURE — 85025 COMPLETE CBC W/AUTO DIFF WBC: CPT | Performed by: EMERGENCY MEDICINE

## 2025-07-12 PROCEDURE — 82948 REAGENT STRIP/BLOOD GLUCOSE: CPT

## 2025-07-12 PROCEDURE — 99223 1ST HOSP IP/OBS HIGH 75: CPT | Performed by: INTERNAL MEDICINE

## 2025-07-12 PROCEDURE — 25010000002 HEPARIN (PORCINE) PER 1000 UNITS: Performed by: INTERNAL MEDICINE

## 2025-07-12 PROCEDURE — 25010000002 ENOXAPARIN PER 10 MG: Performed by: INTERNAL MEDICINE

## 2025-07-12 PROCEDURE — 36415 COLL VENOUS BLD VENIPUNCTURE: CPT

## 2025-07-12 PROCEDURE — 80053 COMPREHEN METABOLIC PANEL: CPT | Performed by: EMERGENCY MEDICINE

## 2025-07-12 PROCEDURE — 63710000001 INSULIN LISPRO (HUMAN) PER 5 UNITS: Performed by: INTERNAL MEDICINE

## 2025-07-12 PROCEDURE — 85730 THROMBOPLASTIN TIME PARTIAL: CPT | Performed by: INTERNAL MEDICINE

## 2025-07-12 PROCEDURE — 25810000003 SODIUM CHLORIDE 0.9 % SOLUTION: Performed by: EMERGENCY MEDICINE

## 2025-07-12 PROCEDURE — 25010000002 ONDANSETRON PER 1 MG: Performed by: EMERGENCY MEDICINE

## 2025-07-12 PROCEDURE — 99285 EMERGENCY DEPT VISIT HI MDM: CPT | Performed by: EMERGENCY MEDICINE

## 2025-07-12 PROCEDURE — 71045 X-RAY EXAM CHEST 1 VIEW: CPT

## 2025-07-12 RX ORDER — HEPARIN SODIUM 10000 [USP'U]/100ML
15 INJECTION, SOLUTION INTRAVENOUS
Status: DISCONTINUED | OUTPATIENT
Start: 2025-07-12 | End: 2025-07-14

## 2025-07-12 RX ORDER — MULTIPLE VITAMINS W/ MINERALS TAB 9MG-400MCG
1 TAB ORAL DAILY
Status: DISCONTINUED | OUTPATIENT
Start: 2025-07-12 | End: 2025-07-15 | Stop reason: HOSPADM

## 2025-07-12 RX ORDER — METOPROLOL TARTRATE 25 MG/1
12.5 TABLET, FILM COATED ORAL 2 TIMES DAILY
Status: DISCONTINUED | OUTPATIENT
Start: 2025-07-12 | End: 2025-07-14

## 2025-07-12 RX ORDER — INSULIN LISPRO 100 [IU]/ML
2-7 INJECTION, SOLUTION INTRAVENOUS; SUBCUTANEOUS
Status: DISCONTINUED | OUTPATIENT
Start: 2025-07-12 | End: 2025-07-15 | Stop reason: HOSPADM

## 2025-07-12 RX ORDER — ATORVASTATIN CALCIUM 80 MG/1
80 TABLET, FILM COATED ORAL DAILY
Status: DISCONTINUED | OUTPATIENT
Start: 2025-07-12 | End: 2025-07-15 | Stop reason: HOSPADM

## 2025-07-12 RX ORDER — ONDANSETRON 2 MG/ML
4 INJECTION INTRAMUSCULAR; INTRAVENOUS EVERY 6 HOURS PRN
Status: DISCONTINUED | OUTPATIENT
Start: 2025-07-12 | End: 2025-07-15 | Stop reason: HOSPADM

## 2025-07-12 RX ORDER — PANTOPRAZOLE SODIUM 40 MG/1
40 TABLET, DELAYED RELEASE ORAL
Status: DISCONTINUED | OUTPATIENT
Start: 2025-07-12 | End: 2025-07-15 | Stop reason: HOSPADM

## 2025-07-12 RX ORDER — NICOTINE POLACRILEX 4 MG
15 LOZENGE BUCCAL
Status: DISCONTINUED | OUTPATIENT
Start: 2025-07-12 | End: 2025-07-15 | Stop reason: HOSPADM

## 2025-07-12 RX ORDER — TAMSULOSIN HYDROCHLORIDE 0.4 MG/1
0.4 CAPSULE ORAL NIGHTLY
Status: DISCONTINUED | OUTPATIENT
Start: 2025-07-12 | End: 2025-07-15 | Stop reason: HOSPADM

## 2025-07-12 RX ORDER — ASPIRIN 325 MG
325 TABLET ORAL ONCE
Status: COMPLETED | OUTPATIENT
Start: 2025-07-12 | End: 2025-07-12

## 2025-07-12 RX ORDER — ONDANSETRON 2 MG/ML
8 INJECTION INTRAMUSCULAR; INTRAVENOUS ONCE
Status: COMPLETED | OUTPATIENT
Start: 2025-07-12 | End: 2025-07-12

## 2025-07-12 RX ORDER — ACETAMINOPHEN 325 MG/1
650 TABLET ORAL EVERY 4 HOURS PRN
Status: DISCONTINUED | OUTPATIENT
Start: 2025-07-12 | End: 2025-07-15 | Stop reason: HOSPADM

## 2025-07-12 RX ORDER — BISACODYL 5 MG/1
5 TABLET, DELAYED RELEASE ORAL DAILY PRN
Status: DISCONTINUED | OUTPATIENT
Start: 2025-07-12 | End: 2025-07-15 | Stop reason: HOSPADM

## 2025-07-12 RX ORDER — FENTANYL CITRATE 50 UG/ML
25 INJECTION, SOLUTION INTRAMUSCULAR; INTRAVENOUS ONCE
Refills: 0 | Status: DISCONTINUED | OUTPATIENT
Start: 2025-07-12 | End: 2025-07-12

## 2025-07-12 RX ORDER — SODIUM CHLORIDE 9 MG/ML
40 INJECTION, SOLUTION INTRAVENOUS AS NEEDED
Status: DISCONTINUED | OUTPATIENT
Start: 2025-07-12 | End: 2025-07-15 | Stop reason: HOSPADM

## 2025-07-12 RX ORDER — POLYETHYLENE GLYCOL 3350 17 G/17G
17 POWDER, FOR SOLUTION ORAL DAILY PRN
Status: DISCONTINUED | OUTPATIENT
Start: 2025-07-12 | End: 2025-07-15 | Stop reason: HOSPADM

## 2025-07-12 RX ORDER — SODIUM CHLORIDE 0.9 % (FLUSH) 0.9 %
10 SYRINGE (ML) INJECTION EVERY 12 HOURS SCHEDULED
Status: DISCONTINUED | OUTPATIENT
Start: 2025-07-12 | End: 2025-07-15 | Stop reason: HOSPADM

## 2025-07-12 RX ORDER — ASPIRIN 81 MG/1
81 TABLET ORAL DAILY
Status: DISCONTINUED | OUTPATIENT
Start: 2025-07-13 | End: 2025-07-15 | Stop reason: HOSPADM

## 2025-07-12 RX ORDER — NALOXONE HCL 0.4 MG/ML
0.4 VIAL (ML) INJECTION
Status: DISCONTINUED | OUTPATIENT
Start: 2025-07-12 | End: 2025-07-15 | Stop reason: HOSPADM

## 2025-07-12 RX ORDER — HEPARIN SODIUM 1000 [USP'U]/ML
4000 INJECTION, SOLUTION INTRAVENOUS; SUBCUTANEOUS ONCE
Status: COMPLETED | OUTPATIENT
Start: 2025-07-12 | End: 2025-07-12

## 2025-07-12 RX ORDER — SODIUM CHLORIDE 0.9 % (FLUSH) 0.9 %
10 SYRINGE (ML) INJECTION AS NEEDED
Status: DISCONTINUED | OUTPATIENT
Start: 2025-07-12 | End: 2025-07-15 | Stop reason: HOSPADM

## 2025-07-12 RX ORDER — NITROGLYCERIN 0.4 MG/1
0.4 TABLET SUBLINGUAL
Status: DISCONTINUED | OUTPATIENT
Start: 2025-07-12 | End: 2025-07-14

## 2025-07-12 RX ORDER — MORPHINE SULFATE 2 MG/ML
2 INJECTION, SOLUTION INTRAMUSCULAR; INTRAVENOUS EVERY 4 HOURS PRN
Status: DISCONTINUED | OUTPATIENT
Start: 2025-07-12 | End: 2025-07-15 | Stop reason: HOSPADM

## 2025-07-12 RX ORDER — HEPARIN SODIUM 1000 [USP'U]/ML
4000 INJECTION, SOLUTION INTRAVENOUS; SUBCUTANEOUS AS NEEDED
Status: DISCONTINUED | OUTPATIENT
Start: 2025-07-12 | End: 2025-07-12

## 2025-07-12 RX ORDER — HEPARIN SODIUM 1000 [USP'U]/ML
2000 INJECTION, SOLUTION INTRAVENOUS; SUBCUTANEOUS AS NEEDED
Status: DISCONTINUED | OUTPATIENT
Start: 2025-07-12 | End: 2025-07-12

## 2025-07-12 RX ORDER — DEXTROSE MONOHYDRATE 25 G/50ML
25 INJECTION, SOLUTION INTRAVENOUS
Status: DISCONTINUED | OUTPATIENT
Start: 2025-07-12 | End: 2025-07-15 | Stop reason: HOSPADM

## 2025-07-12 RX ORDER — ENOXAPARIN SODIUM 100 MG/ML
40 INJECTION SUBCUTANEOUS EVERY 12 HOURS SCHEDULED
Status: DISCONTINUED | OUTPATIENT
Start: 2025-07-12 | End: 2025-07-12

## 2025-07-12 RX ORDER — FENOFIBRATE 145 MG/1
145 TABLET, FILM COATED ORAL DAILY
Status: DISCONTINUED | OUTPATIENT
Start: 2025-07-12 | End: 2025-07-15 | Stop reason: HOSPADM

## 2025-07-12 RX ORDER — ACETAMINOPHEN 160 MG/5ML
650 SOLUTION ORAL EVERY 4 HOURS PRN
Status: DISCONTINUED | OUTPATIENT
Start: 2025-07-12 | End: 2025-07-15 | Stop reason: HOSPADM

## 2025-07-12 RX ORDER — ALUMINA, MAGNESIA, AND SIMETHICONE 2400; 2400; 240 MG/30ML; MG/30ML; MG/30ML
30 SUSPENSION ORAL ONCE
Status: COMPLETED | OUTPATIENT
Start: 2025-07-12 | End: 2025-07-12

## 2025-07-12 RX ORDER — ACETAMINOPHEN 650 MG/1
650 SUPPOSITORY RECTAL EVERY 4 HOURS PRN
Status: DISCONTINUED | OUTPATIENT
Start: 2025-07-12 | End: 2025-07-15 | Stop reason: HOSPADM

## 2025-07-12 RX ORDER — CLOPIDOGREL BISULFATE 75 MG/1
75 TABLET ORAL DAILY
Status: DISCONTINUED | OUTPATIENT
Start: 2025-07-12 | End: 2025-07-15 | Stop reason: HOSPADM

## 2025-07-12 RX ORDER — BISACODYL 10 MG
10 SUPPOSITORY, RECTAL RECTAL DAILY PRN
Status: DISCONTINUED | OUTPATIENT
Start: 2025-07-12 | End: 2025-07-15 | Stop reason: HOSPADM

## 2025-07-12 RX ORDER — AMOXICILLIN 250 MG
2 CAPSULE ORAL 2 TIMES DAILY
Status: DISCONTINUED | OUTPATIENT
Start: 2025-07-12 | End: 2025-07-15 | Stop reason: HOSPADM

## 2025-07-12 RX ADMIN — INSULIN LISPRO 3 UNITS: 100 INJECTION, SOLUTION INTRAVENOUS; SUBCUTANEOUS at 20:55

## 2025-07-12 RX ADMIN — TAMSULOSIN HYDROCHLORIDE 0.4 MG: 0.4 CAPSULE ORAL at 20:55

## 2025-07-12 RX ADMIN — METOPROLOL TARTRATE 12.5 MG: 25 TABLET, FILM COATED ORAL at 11:10

## 2025-07-12 RX ADMIN — INSULIN LISPRO 3 UNITS: 100 INJECTION, SOLUTION INTRAVENOUS; SUBCUTANEOUS at 11:25

## 2025-07-12 RX ADMIN — ONDANSETRON 8 MG: 2 INJECTION, SOLUTION INTRAMUSCULAR; INTRAVENOUS at 05:48

## 2025-07-12 RX ADMIN — Medication 10 ML: at 11:28

## 2025-07-12 RX ADMIN — SODIUM CHLORIDE 500 ML: 9 INJECTION, SOLUTION INTRAVENOUS at 05:54

## 2025-07-12 RX ADMIN — ENOXAPARIN SODIUM 40 MG: 100 INJECTION SUBCUTANEOUS at 11:15

## 2025-07-12 RX ADMIN — Medication 1 TABLET: at 11:26

## 2025-07-12 RX ADMIN — FENOFIBRATE 145 MG: 145 TABLET ORAL at 17:03

## 2025-07-12 RX ADMIN — METOPROLOL TARTRATE 12.5 MG: 25 TABLET, FILM COATED ORAL at 20:55

## 2025-07-12 RX ADMIN — ATORVASTATIN CALCIUM 80 MG: 40 TABLET, FILM COATED ORAL at 11:10

## 2025-07-12 RX ADMIN — HEPARIN SODIUM 4000 UNITS: 1000 INJECTION, SOLUTION INTRAVENOUS; SUBCUTANEOUS at 17:03

## 2025-07-12 RX ADMIN — Medication 10 ML: at 20:55

## 2025-07-12 RX ADMIN — INSULIN LISPRO 3 UNITS: 100 INJECTION, SOLUTION INTRAVENOUS; SUBCUTANEOUS at 17:03

## 2025-07-12 RX ADMIN — ASPIRIN 325 MG: 325 TABLET ORAL at 04:51

## 2025-07-12 RX ADMIN — CLOPIDOGREL BISULFATE 75 MG: 75 TABLET, FILM COATED ORAL at 17:03

## 2025-07-12 RX ADMIN — PANTOPRAZOLE SODIUM 40 MG: 40 TABLET, DELAYED RELEASE ORAL at 11:10

## 2025-07-12 RX ADMIN — HEPARIN SODIUM 8 UNITS/KG/HR: 10000 INJECTION, SOLUTION INTRAVENOUS at 17:03

## 2025-07-12 RX ADMIN — ALUMINUM HYDROXIDE, MAGNESIUM HYDROXIDE, AND DIMETHICONE 30 ML: 400; 400; 40 SUSPENSION ORAL at 05:47

## 2025-07-12 RX ADMIN — SENNOSIDES AND DOCUSATE SODIUM 2 TABLET: 50; 8.6 TABLET ORAL at 11:10

## 2025-07-12 NOTE — H&P
[facility]   HISTORY AND PHYSICAL      Name:  Myke Marcial   Age:  59 y.o.  Sex:  male  :  1965  MRN:  [id number]   Visit Number:  [id number]  Admission Date:  2025  Date Of Service:  25  Primary Care Physician:  Jaimie Nathan MD    Chief Complaint:     Chest pain.    History Of Presenting Illness:      Myke Marcial is a 59-year-old male with history of coronary artery disease status post CABG (2023), follows up with Dr. Thomas, diabetes mellitus type 2 on insulin, obesity, hypertension, chronic kidney disease stage II was brought to the emergency room by family with symptoms of chest pain.  Patient states that he was in his usual state of health until last night.  He woke up to walk his dog and started feeling fatigued and exertional shortness of breath.  Subsequently came down and sat on the chair and started having retrosternal chest pain lasting several minutes associated with some sweating.  Denies any radiation of the pain.  He states that this felt like his previous chest pain where he had heart attack.  He was subsequently brought to the emergency room by his wife.    Since his CABG 2 years ago, he has not had any further episodes of chest pain but does have exertional shortness of breath that has been progressively worsening.  He does get significantly short of breath on walking to the mailbox.  He has not had any recent stress test or cardiac catheterizations.  He follows up with Dr. Thomas.  He does not smoke and has been compliant with his medications.    In the emergency room he was afebrile and hemodynamically stable saturating at 94% on room air but did have mild tachycardia 808.  Initial troponin 37 with repeat at 34.  CMP was unremarkable except for creatinine of 1.3 which seems to be baseline.  CBC was unremarkable.  Chest x-ray was unremarkable.  EKG did not show any new ST-T changes.  Due to the patient's risk factors and new  onset of chest pain, he was given full dose aspirin, Maalox, Zofran in the emergency room and was subsequently admitted to the medical floor with telemetry.    Review Of Systems:    All systems were reviewed and negative except as mentioned in history of presenting illness, assessment and plan.    Past Medical History: Patient's  has a past medical history of Allergic, Anxiety and depression, Arthritis, Benign prostatic hyperplasia (04/15/2025), Colon cancer screening (06/18/2018), Coronary artery disease involving native coronary artery of native heart without angina pectoris (07/25/2023), Diabetes mellitus, Diabetic foot ulcer (11/29/2018), Diabetic retinopathy associated with type 2 diabetes mellitus (02/01/2021), Ear drum perforation, right, Elevated cholesterol, Erectile dysfunction, GERD (gastroesophageal reflux disease), Gynecomastia (2019), Hernia, Hyperlipidemia, Hypertension, Lower urinary tract symptoms (LUTS), Myocardial infarction, Refusal of blood product, Sleep apnea, obstructive, Type 2 diabetes mellitus, Urge incontinence, Vitamin D deficiency disease (08/26/2016), and Wears glasses.    Past Surgical History: Patient's  has a past surgical history that includes Muscle Repair (Right); Colonoscopy (N/A, 07/10/2018); Cataract extraction, bilateral; Esophagogastroduodenoscopy (N/A, 03/17/2022); Colonoscopy (N/A, 08/16/2022); Cardiac catheterization (N/A, 06/30/2023); Coronary artery bypass graft (07/2023); Colonoscopy (N/A, 05/15/2024); Upper gastrointestinal endoscopy; Umbilical hernia repair; Eye surgery; and Cardiac surgery.    Social History: Patient's  reports that he has never smoked. He has never been exposed to tobacco smoke. He has never used smokeless tobacco. He reports that he does not currently use alcohol. He reports that he does not use drugs.    Family History:  Patient's family history includes Cancer in his father, maternal grandmother, and paternal grandmother; Hyperlipidemia in his  father; Hypertension in his father; Prostate cancer in his father; Sleep apnea in his mother; Vision loss in his mother.    Allergies:      Ace inhibitors, Bee venom, and Penicillins    Home Medications:    Prior to Admission Medications       Prescriptions Last Dose Informant Patient Reported? Taking?    aspirin 81 MG EC tablet   No No    Take 1 tablet by mouth Daily.    atorvastatin (LIPITOR) 80 MG tablet   No No    Take 1 tablet by mouth Daily.    azelastine (ASTELIN) 0.1 % nasal spray   No No    Administer 1 spray into the nostril(s) as directed by provider 2 (Two) Times a Day As Needed for Rhinitis or Allergies. Use in each nostril as directed    B-D UF III MINI PEN NEEDLES 31G X 5 MM misc   No No    USE 1  ONCE DAILY    buPROPion XL (WELLBUTRIN XL) 150 MG 24 hr tablet   Yes No    Take 1 tablet by mouth.    chlorthalidone (HYGROTON) 25 MG tablet   No No    Take 1 tablet by mouth once daily    Continuous Glucose Sensor (Dexcom G7 Sensor) misc   No No    Use 1 each Every 10 (Ten) Days.    Continuous Glucose Sensor (FreeStyle Rad 3 Sensor) misc   No No    Use 1 each Every 14 (Fourteen) Days.    doxycycline (VIBRAMYICN) 100 MG tablet   Yes No    Take 1 tablet by mouth Every 12 (Twelve) Hours.    Farxiga 10 MG tablet   No No    Take 10 mg by mouth Daily.    fenofibrate (TRICOR) 145 MG tablet   No No    Take 1 tablet by mouth Daily.    fluconazole (Diflucan) 150 MG tablet   No No    1 po now, repeat in 5 days    insulin aspart prot & aspart (NovoLOG 70/30 FlexPen ReliOn) (70-30) 100 UNIT/ML suspension pen-injector injection   No No    40 units twice daily before meals, titrate up as needed to  units    metFORMIN ER (GLUCOPHAGE-XR) 500 MG 24 hr tablet   No No    Take 1 tablet by mouth 2 (Two) Times a Day.    metoprolol tartrate (LOPRESSOR) 25 MG tablet   No No    Take 0.5 tablets by mouth 2 (Two) Times a Day.    multivitamin with minerals tablet tablet   Yes No    Take 1 tablet by mouth Daily.    omeprazole  "(priLOSEC) 20 MG capsule   No No    Take 1 capsule by mouth Daily.    pramipexole (MIRAPEX) 0.25 MG tablet   No No    TAKE 1 TABLET BY MOUTH THREE TIMES DAILY    tamsulosin (FLOMAX) 0.4 MG capsule 24 hr capsule   No No    Take 1 capsule by mouth Every Night.    Tirzepatide 15 MG/0.5ML solution auto-injector   No No    Inject 15 mg under the skin into the appropriate area as directed 1 (One) Time Per Week.    Vibegron (Gemtesa) 75 MG tablet   No No    Take 1 tablet by mouth Daily.    vitamin D3 125 MCG (5000 UT) capsule capsule   Yes No    Take 1 capsule by mouth Daily.     ED Medications:    Medications   sodium chloride 0.9 % flush 10 mL (has no administration in time range)   nitroglycerin (NITROSTAT) SL tablet 0.4 mg (has no administration in time range)   aspirin tablet 325 mg (325 mg Oral Given 7/12/25 0451)   sodium chloride 0.9 % bolus 500 mL (0 mL Intravenous Stopped 7/12/25 0656)   ondansetron (ZOFRAN) injection 8 mg (8 mg Intravenous Given 7/12/25 0548)   aluminum-magnesium hydroxide-simethicone (MAALOX MAX) 400-400-40 MG/5ML suspension 30 mL (30 mL Oral Given 7/12/25 0547)     Vital Signs:  Temp:  [98 °F (36.7 °C)] 98 °F (36.7 °C)  Heart Rate:  [] 98  Resp:  [22] 22  BP: (110-132)/(75-91) 127/91        07/12/25  0437   Weight: 124 kg (273 lb)     Body mass index is 40.32 kg/m².    Physical Exam:     Most recent vital Signs: /91   Pulse 98   Temp 98 °F (36.7 °C) (Oral)   Resp 22   Ht 175.3 cm (69\")   Wt 124 kg (273 lb)   SpO2 90%   BMI 40.32 kg/m²     Physical Exam  Constitutional:       General: He is not in acute distress.     Appearance: He is obese. He is not ill-appearing.   HENT:      Head: Normocephalic and atraumatic.      Right Ear: External ear normal.      Left Ear: External ear normal.      Nose: Nose normal.      Mouth/Throat:      Mouth: Mucous membranes are moist.   Eyes:      Extraocular Movements: Extraocular movements intact.      Conjunctiva/sclera: Conjunctivae " normal.   Cardiovascular:      Rate and Rhythm: Normal rate and regular rhythm.      Pulses: Normal pulses.      Heart sounds: Normal heart sounds. No murmur heard.     Comments: CABG scar noted.  Pulmonary:      Effort: Pulmonary effort is normal. No respiratory distress.      Breath sounds: Normal breath sounds. No wheezing.   Abdominal:      General: Bowel sounds are normal.      Palpations: Abdomen is soft.      Tenderness: There is no abdominal tenderness. There is no guarding or rebound.      Comments: Obese abdomen.   Musculoskeletal:         General: Normal range of motion.      Cervical back: Neck supple.      Right lower leg: No edema.      Left lower leg: No edema.   Skin:     General: Skin is warm.      Findings: No erythema or rash.   Neurological:      General: No focal deficit present.      Mental Status: He is alert and oriented to person, place, and time. Mental status is at baseline.   Psychiatric:         Mood and Affect: Mood normal.         Behavior: Behavior normal.       Laboratory data:    I have reviewed the labs done in the emergency room.    Results from last 7 days   Lab Units 07/12/25  0445   SODIUM mmol/L 139   POTASSIUM mmol/L 3.8   CHLORIDE mmol/L 99   CO2 mmol/L 26.6   BUN mg/dL 19.0   CREATININE mg/dL 1.30*   CALCIUM mg/dL 9.6   BILIRUBIN mg/dL 0.3   ALK PHOS U/L 87   ALT (SGPT) U/L 26   AST (SGOT) U/L 21   GLUCOSE mg/dL 121*     Results from last 7 days   Lab Units 07/12/25  0445   WBC 10*3/mm3 7.73   HEMOGLOBIN g/dL 16.7   HEMATOCRIT % 49.9   PLATELETS 10*3/mm3 289       Results from last 7 days   Lab Units 07/12/25  0603 07/12/25  0445   HSTROP T ng/L 34* 37*     EKG:      EKG done in the emergency room was reviewed by me.  It shows sinus tachycardia at 103.  Normal axis.  No significant ST-T changes were noted.    Radiology:    Portable chest x-ray done in the emergency room was reviewed by me.  It does not show any significant infiltrates or cardiomegaly.  Sternal wires noted.   Official report is currently pending.    Assessment:    Chest pain with borderline elevated troponins and plateau pattern, POA.  Coronary artery disease status post CABG (7/2023).  Diabetes mellitus type 2 with nephropathy.  Chronic kidney disease stage II.  Essential hypertension.  Obesity with a BMI of 40.  BPH.    Plan:    Chest pain/CAD.  - Patient does have significant risk factors and has had prior history of myocardial infarction and CABG.  - Fortunately, EKG does not show any acute ischemic changes and troponins are stable/downtrending.  - Last echocardiogram from July 2023 showed normal left ventricular systolic and diastolic functions.  - Continue home medications including aspirin, atorvastatin, metoprolol.  - I have discussed the patient's treatment plan with Dr. Mederos from cardiology and he will be consulted for further recommendations.    Diabetes mellitus type 2.  - Continue subcutaneous insulin protocol for coverage.  - Hemoglobin A1c 9.6 on 6/16/2025.    I have discussed the patient's condition and treatment plan with the patient's wife Meli who is at the bedside.    Risk Assessment: Moderate  DVT Prophylaxis: Enoxaparin  Code Status: Full  Diet: Cardiac diabetic      Prasad Choe MD  07/12/25  07:34 EDT    Dictated utilizing Dragon dictation.

## 2025-07-12 NOTE — PLAN OF CARE
Goal Outcome Evaluation:      Admit from ED           Problem: Adult Inpatient Plan of Care  Goal: Plan of Care Review  Outcome: Progressing  Goal: Patient-Specific Goal (Individualized)  Outcome: Progressing  Goal: Absence of Hospital-Acquired Illness or Injury  Outcome: Progressing  Intervention: Identify and Manage Fall Risk  Recent Flowsheet Documentation  Taken 7/12/2025 1400 by Ponce Addison RN  Safety Promotion/Fall Prevention:   nonskid shoes/slippers when out of bed   safety round/check completed  Taken 7/12/2025 1300 by Ponce Addison RN  Safety Promotion/Fall Prevention:   nonskid shoes/slippers when out of bed   safety round/check completed  Taken 7/12/2025 1140 by Ponce Addison RN  Safety Promotion/Fall Prevention:   activity supervised   lighting adjusted   nonskid shoes/slippers when out of bed   room organization consistent   safety round/check completed  Intervention: Prevent Skin Injury  Recent Flowsheet Documentation  Taken 7/12/2025 1300 by Ponce Addison RN  Body Position: weight shifting  Taken 7/12/2025 1140 by Ponce Addison RN  Body Position:   position changed independently   sitting up in bed   legs elevated   upper extremity elevated   weight shifting  Skin Protection:   skin sealant/moisture barrier applied   transparent dressing maintained   pulse oximeter probe site changed  Intervention: Prevent Infection  Recent Flowsheet Documentation  Taken 7/12/2025 1400 by Ponce Addison RN  Infection Prevention: rest/sleep promoted  Taken 7/12/2025 1300 by Ponce Addison RN  Infection Prevention: rest/sleep promoted  Taken 7/12/2025 1140 by Ponce Addison RN  Infection Prevention:   personal protective equipment utilized   rest/sleep promoted   single patient room provided   hand hygiene promoted  Goal: Optimal Comfort and Wellbeing  Outcome: Progressing  Goal: Readiness for Transition of Care  Outcome: Progressing  Intervention: Mutually Develop Transition Plan  Recent Flowsheet Documentation  Taken  7/12/2025 1140 by Ponce Addison RN  Equipment Currently Used at Home: none  Transportation Anticipated: family or friend will provide  Transportation Concerns: none  Patient/Family Anticipated Services at Transition: none  Patient/Family Anticipates Transition to: home with family     Problem: Comorbidity Management  Goal: Maintenance of Behavioral Health Symptom Control  Outcome: Progressing  Intervention: Maintain Behavioral Health Symptom Control  Recent Flowsheet Documentation  Taken 7/12/2025 1400 by Ponce Addison RN  Medication Review/Management: medications reviewed  Taken 7/12/2025 1300 by Ponce Addison RN  Medication Review/Management: medications reviewed  Taken 7/12/2025 1140 by Ponce Addison RN  Medication Review/Management:   medications reviewed   high-risk medications identified  Goal: Blood Glucose Level Within Target Range  Outcome: Progressing  Intervention: Monitor and Manage Glycemia  Recent Flowsheet Documentation  Taken 7/12/2025 1400 by Ponce Addison RN  Medication Review/Management: medications reviewed  Taken 7/12/2025 1300 by Ponce Addison RN  Medication Review/Management: medications reviewed  Taken 7/12/2025 1140 by Ponce Addison RN  Medication Review/Management:   medications reviewed   high-risk medications identified  Goal: Blood Pressure in Desired Range  Outcome: Progressing  Intervention: Maintain Blood Pressure Management  Recent Flowsheet Documentation  Taken 7/12/2025 1400 by Ponce Addison RN  Medication Review/Management: medications reviewed  Taken 7/12/2025 1300 by Ponce Addison RN  Medication Review/Management: medications reviewed  Taken 7/12/2025 1140 by Ponce Addison RN  Medication Review/Management:   medications reviewed   high-risk medications identified

## 2025-07-12 NOTE — H&P
Date of Hospital Visit: 25  Encounter Provider: Anai Menendez MD  Place of Service: [facility]  Patient Name: Myke Marcial  :1965  Referral Provider: No ref. provider found  Primary Care Provider: Jaimie Nathan MD    Chief complaint/Reason for Consultation: Non-ST segment elevation MI      History of Present Illness:       The patient presents to the hospital for chest pain.    He experienced a sensation similar to a heart attack upon waking up this morning. He felt dizzy when he stood up to let his dog out and then sat down due to an overall feeling of discomfort. He also reported mild pain and nausea, but did not vomit. His wife suggested a hospital visit as these symptoms were reminiscent of his heart attack 2 years ago. At that time, he had elevated enzymes and was diagnosed with 6 blockages, leading to a triple bypass surgery. Since then, he has been symptom-free until today. His pain subsided after medication, but he still feels some tightness, which he attributes to his surgery. He has not had any other surgeries, irregular heartbeat, stroke, cancer, or GERD. He is retired from construction work and enjoys woodworking and yard work at home. However, he can only engage in these activities for 10 to 15 minutes before needing a 20-minute rest. He reports a lack of stamina since his bypass surgery. He has gained weight, currently weighing 273 pounds, up from 257 pounds when he was working. He has never smoked or used drugs. He is a [employer] and has an advanced directive on file. He has a history of lower back arthritis and sleep apnea, for which he uses a machine.         Problem List:  CARDIAC  Coronary Artery Disease:   NSTEMI, 2 years ago: Multivessel disease  CABG, 2 years ago: X   NSTEMI, 2025:     Myocardium:   Echo, 2023: LVEF 60%     Valvular:   No valvular disease     Electrical:   NSR     Pericardium:   Normal     CARDIAC RISK  FACTORS  Hypertension  Diabetes  Dyslipidemia  Obstructive Sleep Apnea    NON-CARDIAC  GERD  Hernia  Allergies  BPH  Vitamin D deficiency  [employer]    SURGERIES  Bilateral cataract extraction  Hernia repair    Past Medical History:   Diagnosis Date    Allergic     Anxiety and depression     Arthritis     LOWER BACK    Benign prostatic hyperplasia 04/15/2025    Colon cancer screening 06/18/2018    Added automatically from request for surgery [id number]    Coronary artery disease involving native coronary artery of native heart without angina pectoris 07/25/2023    Diabetes mellitus     Diabetic foot ulcer 11/29/2018    Diabetic retinopathy associated with type 2 diabetes mellitus 02/01/2021    Ear drum perforation, right     Elevated cholesterol     Erectile dysfunction     GERD (gastroesophageal reflux disease)     Gynecomastia 2019    Hernia     Hyperlipidemia     Hypertension     Lower urinary tract symptoms (LUTS)     Myocardial infarction     Refusal of blood product     Sleep apnea, obstructive     CPAP    Type 2 diabetes mellitus     Urge incontinence     Vitamin D deficiency disease 08/26/2016    Wears glasses     for reading only       Past Surgical History:   Procedure Laterality Date    CARDIAC CATHETERIZATION N/A 06/30/2023    Procedure: Coronary angiography;  Surgeon: Irwin Thomas MD;  Location: [facility] CATH INVASIVE LOCATION;  Service: Cardiovascular;  Laterality: N/A;    CARDIAC SURGERY      CATARACT EXTRACTION, BILATERAL      COLONOSCOPY N/A 07/10/2018    Procedure: COLONOSCOPY WITH POLYPECTOMIES;  Surgeon: Musa Berger MD;  Location: [facility] ENDOSCOPY;  Service: Gastroenterology    COLONOSCOPY N/A 08/16/2022    Procedure: COLONOSCOPY;  Surgeon: Brittany Goins MD;  Location: [facility] ENDOSCOPY;  Service: Gastroenterology;  Laterality: N/A;    COLONOSCOPY N/A 05/15/2024    Procedure: COLONOSCOPY  WITH POLYPECTOMY X 3;  Surgeon: Brittany Goins MD;  Location: [facility] ENDOSCOPY;   Service: Gastroenterology;  Laterality: N/A;    CORONARY ARTERY BYPASS GRAFT  07/2023    ENDOSCOPY N/A 03/17/2022    Procedure: ESOPHAGOGASTRODUODENOSCOPY with biopsies;  Surgeon: Brittany Goins MD;  Location: [facility] ENDOSCOPY;  Service: Gastroenterology;  Laterality: N/A;    EYE SURGERY      MUSCLE REPAIR Right     biceps tendon    UMBILICAL HERNIA REPAIR      UPPER GASTROINTESTINAL ENDOSCOPY         Medications Prior to Admission   Medication Sig Dispense Refill Last Dose/Taking    aspirin 81 MG EC tablet Take 1 tablet by mouth Daily.       atorvastatin (LIPITOR) 80 MG tablet Take 1 tablet by mouth Daily. 90 tablet 3     azelastine (ASTELIN) 0.1 % nasal spray Administer 1 spray into the nostril(s) as directed by provider 2 (Two) Times a Day As Needed for Rhinitis or Allergies. Use in each nostril as directed 1 each 5     B-D UF III MINI PEN NEEDLES 31G X 5 MM misc USE 1  ONCE DAILY 100 each 0     buPROPion XL (WELLBUTRIN XL) 150 MG 24 hr tablet Take 1 tablet by mouth.       chlorthalidone (HYGROTON) 25 MG tablet Take 1 tablet by mouth once daily 90 tablet 0     Continuous Glucose Sensor (Dexcom G7 Sensor) misc Use 1 each Every 10 (Ten) Days. 9 each 3     Continuous Glucose Sensor (FreeStyle Rad 3 Sensor) misc Use 1 each Every 14 (Fourteen) Days. 6 each 3     doxycycline (VIBRAMYICN) 100 MG tablet Take 1 tablet by mouth Every 12 (Twelve) Hours.       Farxiga 10 MG tablet Take 10 mg by mouth Daily. 90 tablet 1     fenofibrate (TRICOR) 145 MG tablet Take 1 tablet by mouth Daily. 90 tablet 3     fluconazole (Diflucan) 150 MG tablet 1 po now, repeat in 5 days 2 tablet 0     insulin aspart prot & aspart (NovoLOG 70/30 FlexPen ReliOn) (70-30) 100 UNIT/ML suspension pen-injector injection 40 units twice daily before meals, titrate up as needed to  units 120 mL 1     metFORMIN ER (GLUCOPHAGE-XR) 500 MG 24 hr tablet Take 1 tablet by mouth 2 (Two) Times a Day. 180 tablet 1     metoprolol tartrate (LOPRESSOR)  25 MG tablet Take 0.5 tablets by mouth 2 (Two) Times a Day. 90 tablet 3     multivitamin with minerals tablet tablet Take 1 tablet by mouth Daily.       omeprazole (priLOSEC) 20 MG capsule Take 1 capsule by mouth Daily. 90 capsule 1     pramipexole (MIRAPEX) 0.25 MG tablet TAKE 1 TABLET BY MOUTH THREE TIMES DAILY 270 tablet 0     tamsulosin (FLOMAX) 0.4 MG capsule 24 hr capsule Take 1 capsule by mouth Every Night. 90 capsule 2     Tirzepatide 15 MG/0.5ML solution auto-injector Inject 15 mg under the skin into the appropriate area as directed 1 (One) Time Per Week. 6 mL 1     Vibegron (Gemtesa) 75 MG tablet Take 1 tablet by mouth Daily. 30 tablet 11     vitamin D3 125 MCG (5000 UT) capsule capsule Take 1 capsule by mouth Daily.          Social History     Socioeconomic History    Marital status:    Tobacco Use    Smoking status: Never     Passive exposure: Never    Smokeless tobacco: Never   Vaping Use    Vaping status: Never Used   Substance and Sexual Activity    Alcohol use: Not Currently    Drug use: No    Sexual activity: Not Currently     Partners: Female     Birth control/protection: Condom       Family History   Problem Relation Age of Onset    Sleep apnea Mother     Vision loss Mother         Macular degeneration    Hypertension Father     Hyperlipidemia Father     Prostate cancer Father     Cancer Father         Prostate   stage1    Cancer Maternal Grandmother     Cancer Paternal Grandmother     Colon cancer Neg Hx     Liver cancer Neg Hx     Rectal cancer Neg Hx     Stomach cancer Neg Hx        REVIEW OF SYSTEMS:   Review of Systems   Cardiovascular:  Positive for chest pain.             Objective:     Vitals:    07/12/25 1101 07/12/25 1119 07/12/25 1127 07/12/25 1140   BP:    137/96   BP Location:    Left arm   Patient Position:    Lying   Pulse: 96 94 95 96   Resp:    20   Temp:    97.5 °F (36.4 °C)   TempSrc:    Oral   SpO2: 92% 94% 95% 97%   Weight:    125 kg (276 lb 0.3 oz)   Height:      "      Body mass index is 40.76 kg/m².  Flowsheet Rows      Flowsheet Row First Filed Value   Admission Height 175.3 cm (69\") Documented at 07/12/2025 0437   Admission Weight 124 kg (273 lb) Documented at 07/12/2025 0437            Physical Exam     Constitutional:       General: Not in acute distress.     Appearance: Healthy appearance. Not in distress.     Neck:     JVP:Not elevated     Carotid artery: Normal    Pulmonary:      Effort: Pulmonary effort is normal.      Breath sounds: Normal breath sounds. No wheezing. No rhonchi. No rales.     Cardiovascular:      Normal rate. Regular rhythm. Normal S1. Normal S2.      Murmurs: There is no significant murmur.      No gallop. No click. No rub.     Abdominal:      General: Bowel sounds are normal.      Palpations: Abdomen is soft.      Tenderness: There is no abdominal tenderness.    Extremities:     Pulses:Normal radial and pedal pulses     Edema:no edema    Lab Review:             Results from last 7 days   Lab Units 07/12/25  0603 07/12/25 0445   HSTROP T ng/L 34* 37*     No results found for: \"PROBNP\"  Lipid Panel          11/4/2024    09:08 5/5/2025    11:41   Lipid Panel   Total Cholesterol 158  164    Triglycerides 450  264    HDL Cholesterol 29  37    VLDL Cholesterol 69  44    LDL Cholesterol  60  83                 Results from last 7 days   Lab Units 07/12/25  0445   SODIUM mmol/L 139   POTASSIUM mmol/L 3.8   CHLORIDE mmol/L 99   CO2 mmol/L 26.6   BUN mg/dL 19.0   CREATININE mg/dL 1.30*   GLUCOSE mg/dL 121*   CALCIUM mg/dL 9.6     Results from last 7 days   Lab Units 07/12/25  0603 07/12/25  0445   HSTROP T ng/L 34* 37*     Results from last 7 days   Lab Units 07/12/25  0445   WBC 10*3/mm3 7.73   HEMOGLOBIN g/dL 16.7   HEMATOCRIT % 49.9   PLATELETS 10*3/mm3 289                 @LABRCNT(probnp)@         EKG: Tachycardia    CXR: Normal         Assessment and Plan:        1.  Non-ST segment elevation MI: chest pain   - We will start patient on aspirin and " Plavix.  - Will go ahead and start him on heparin drip  - Perform echocardiogram to assess cardiac function  - Consider repeat heart catheterization based on echocardiogram results    2.  Hyperlipidemia: Chronic  - We will keep him on Lipitor 80 mg and check his lipid profile.  - His LDL should be less than 55    3.  Diabetes: Chronic  - We will follow his hemoglobin A1c  - He will be a good candidate for SGLT2's.  - He will be a good candidate for GLP-1's    4.  Sleep apnea: Chronic  - His wife will be bringing his CPAP machine.  - He has gained weight in the last few years      Patient or patient representative verbalized consent for the use of Ambient Listening during the visit for chart documentation.

## 2025-07-12 NOTE — Clinical Note
Hemostasis started on the left radial artery. R-Band was used in achieving hemostasis. Radial compression device applied to vessel. Hemostasis achieved successfully. Closure device additional comment: 14 mL air in TR Band, no bleeding or hematoma noted

## 2025-07-12 NOTE — Clinical Note
Called lab for add on ESR and CRP c/o Kiara. (break coverage for Patrica ASHER).    Rhythm change - 2nd degree HB type 2

## 2025-07-12 NOTE — PHARMACY RECOMMENDATION
"Pharmacy Consult - Enoxaparin Dosing  Pharmacy was consulted to dose enoxaparin for  Myke Marcial, a 59 y.o. male  175.3 cm (69\") 124 kg (273 lb)    Indication: VTE prophylaxis    Allergies  Ace inhibitors, Bee venom, and Penicillins    Relevant clinical data and objective history reviewed:   [Ht: 175.3 cm (69\"); Wt: 124 kg (273 lb)]  Body mass index is 40.32 kg/m².  Estimated Creatinine Clearance: 80.6 mL/min (A) (by C-G formula based on SCr of 1.3 mg/dL (H)).  Results from last 7 days   Lab Units 07/12/25  0445   HEMOGLOBIN g/dL 16.7   HEMATOCRIT % 49.9   PLATELETS 10*3/mm3 289   CREATININE mg/dL 1.30*       Asessment/Plan  Initiate enoxaparin 40 mg SQ every 12 hours  Pharmacy will monitor renal function and clinical status and adjust the dose and/or frequency as needed.    Thanks,   Abby Varghese, PharmD, BCPS  7/12/2025  10:51 EDT    "

## 2025-07-12 NOTE — Clinical Note
Prepped: groin and Left Wrist. Prepped with: ChloraPrep. The site was clipped. The patient was draped in a sterile fashion.

## 2025-07-12 NOTE — PHARMACY RECOMMENDATION
Pharmacy to Dose Heparin Infusion    Myke Marcial is a  59 y.o. male receiving heparin infusion.     Therapy for (VTE/Cardiac):   Cardiac  Patient Weight: 125 kg  Initial Bolus (Y/N):   Y  Any Bolus (Y/N):   Y        Signs or Symptoms of Bleeding:     Cardiac or Other (Not VTE)   Initial Bolus: 60 units/kg (Max 4,000 units)  Initial rate: 12 units/kg/hr (Max 1,000 units/hr)   Anti Xa Rebolus Infusion Hold time Change infusion Dose (Units/kg/hr) Next Anti Xa or aPTT Level Due   < 0.1 50 Units/kg  (4000 Units Max) None Increase by  3 Units/kg/hr 6 hours   0.1- 0.19 25 Units/kg  (2000 Units Max) None Increase by  2 Units/kg/hr 6 hours   0.2 - 0.29 0 None Increase by  1 Units/kg/hr 6 hours   0.3 - 0.5 0 None No Change 6 hours (after 2 consecutive levels in range check qAM)   0.51 - 0.6 0 None Decrease by  1 Units/kg/hr 6 hours   0.61 - 0.8 0 30 Minutes Decrease by  2 Units/kg/hr 6 hours   0.81 - 1 0 60 Minutes Decrease by  3 Units/kg/hr 6 hours   >1 0 Hold  After Anti Xa less than 0.5 decrease previous rate by  4 Units/kg/hr  Every 2 hours until Anti Xa  less than 0.5 then when infusion restarts in 6 hours       Recommend anti-Xa every 6 hours.     Results from last 7 days   Lab Units 07/12/25  0445   HEMOGLOBIN g/dL 16.7   HEMATOCRIT % 49.9   PLATELETS 10*3/mm3 289          Date   Time   Anti-Xa Current Rate (Unit/kg/hr) Bolus   (Units) Rate Change   (Unit/kg/hr) New Rate (Unit/kg/hr) Next   Anti-Xa Comments  Pump Check Daily   7/12/25 1640 TBD NEW 4000 +8 8 2300 D/W Ponce RN                Pharmacy will continue to follow anti-Xa results and monitor for signs and symptoms of bleeding or thrombosis.    Abby Varghese, PharmD  07/12/25 16:39 EDT

## 2025-07-12 NOTE — ED PROVIDER NOTES
EMERGENCY DEPARTMENT ENCOUNTER    Pt Name: Myke Marcial  MRN: [id number]  Pt :   1965  Room Number:    Date of encounter:  2025  PCP: Jaimie Nathan MD  ED Provider: Rm Bruce MD    Historian: Patient      HPI:  Chief Complaint: Chest pain        Context: Myke Marcial is a 59 y.o. male who presents to the ED c/o chest pain.  Patient has a past medical history significant for heart disease with prior CABG and diabetes.  Patient says that since last night he has not been feeling well.  He says he began having retrosternal chest discomfort associated with nausea.  Says he also felt somewhat dizzy.  Denies any headache or vision changes.  Says the pain is non-radiating.  Does not radiate through to his back.      PAST MEDICAL HISTORY  Past Medical History:   Diagnosis Date    Allergic     Anxiety and depression     Arthritis     LOWER BACK    Benign prostatic hyperplasia 04/15/2025    Colon cancer screening 2018    Added automatically from request for surgery [id number]    Coronary artery disease involving native coronary artery of native heart without angina pectoris 2023    Diabetes mellitus     Diabetic foot ulcer 2018    Diabetic retinopathy associated with type 2 diabetes mellitus 2021    Ear drum perforation, right     Elevated cholesterol     Erectile dysfunction     GERD (gastroesophageal reflux disease)     Gynecomastia 2019    Hernia     Hyperlipidemia     Hypertension     Lower urinary tract symptoms (LUTS)     Myocardial infarction     Refusal of blood product     Sleep apnea, obstructive     CPAP    Type 2 diabetes mellitus     Urge incontinence     Vitamin D deficiency disease 2016    Wears glasses     for reading only         PAST SURGICAL HISTORY  Past Surgical History:   Procedure Laterality Date    CARDIAC CATHETERIZATION N/A 2023    Procedure: Coronary angiography;  Surgeon: Irwin Thomas MD;  Location: [facility]  INVASIVE LOCATION;  Service: Cardiovascular;  Laterality: N/A;    CARDIAC SURGERY      CATARACT EXTRACTION, BILATERAL      COLONOSCOPY N/A 07/10/2018    Procedure: COLONOSCOPY WITH POLYPECTOMIES;  Surgeon: Musa Berger MD;  Location: [facility] ENDOSCOPY;  Service: Gastroenterology    COLONOSCOPY N/A 08/16/2022    Procedure: COLONOSCOPY;  Surgeon: Brittany Goins MD;  Location: [facility] ENDOSCOPY;  Service: Gastroenterology;  Laterality: N/A;    COLONOSCOPY N/A 05/15/2024    Procedure: COLONOSCOPY  WITH POLYPECTOMY X 3;  Surgeon: Brittany Goins MD;  Location: [facility] ENDOSCOPY;  Service: Gastroenterology;  Laterality: N/A;    CORONARY ARTERY BYPASS GRAFT  07/2023    ENDOSCOPY N/A 03/17/2022    Procedure: ESOPHAGOGASTRODUODENOSCOPY with biopsies;  Surgeon: Brittany Goins MD;  Location: [facility] ENDOSCOPY;  Service: Gastroenterology;  Laterality: N/A;    EYE SURGERY      MUSCLE REPAIR Right     biceps tendon    UMBILICAL HERNIA REPAIR      UPPER GASTROINTESTINAL ENDOSCOPY           FAMILY HISTORY  Family History   Problem Relation Age of Onset    Sleep apnea Mother     Vision loss Mother         Macular degeneration    Hypertension Father     Hyperlipidemia Father     Prostate cancer Father     Cancer Father         Prostate   stage1    Cancer Maternal Grandmother     Cancer Paternal Grandmother     Colon cancer Neg Hx     Liver cancer Neg Hx     Rectal cancer Neg Hx     Stomach cancer Neg Hx          SOCIAL HISTORY  Social History     Socioeconomic History    Marital status:    Tobacco Use    Smoking status: Never     Passive exposure: Never    Smokeless tobacco: Never   Vaping Use    Vaping status: Never Used   Substance and Sexual Activity    Alcohol use: Not Currently    Drug use: No    Sexual activity: Not Currently     Partners: Female     Birth control/protection: Condom         ALLERGIES  Ace inhibitors, Bee venom, and Penicillins        REVIEW OF SYSTEMS    All systems reviewed and  negative except for those discussed in HPI.       PHYSICAL EXAM    I have reviewed the triage vital signs and nursing notes.    ED Triage Vitals [07/12/25 0437]   Temp Heart Rate Resp BP SpO2   98 °F (36.7 °C) 108 22 124/86 94 %      Temp src Heart Rate Source Patient Position BP Location FiO2 (%)   Oral Monitor Sitting Left arm --         General: Moderate acute distress  Skin: normal color, warm and dry  Head: normocephalic, atraumatic  Eyes: Pupils equally round and reactive to light.  No nystagmus bilaterally.  Negative test of skew bilaterally.  Nose: normal nasal mucosa, no visible deformity.  Mouth: moist mucous membranes.  Neck: supple.  Chest: no retractions, no visible deformity  Cardiovascular: Regular rate and rhythm.  Lungs: clear to auscultation bilaterally.  Back: no contusions, wounds or abrasions.  Abdomen: soft, non-tender, non-distended. No rebound tenderness, no guarding.  No peritonitis.  Extremities: no cyanosis or edema. Palpable radial pulses bilaterally. Palpable dorsalis pedis pulses bilaterally.  Neuro:  alert and oriented x3, no focal neurological deficits.  5 out of 5 strength about the bilateral upper and lower extremities.  No nystagmus bilaterally.  No dysarthria, no aphasia.  No dysmetria bilaterally.  Normal heel-to-shin testing bilaterally.  Psych:  appropriate mood and behavior.        LAB RESULTS  Recent Results (from the past 24 hours)   Comprehensive Metabolic Panel    Collection Time: 07/12/25  4:45 AM    Specimen: Blood   Result Value Ref Range    Glucose 121 (H) 65 - 99 mg/dL    BUN 19.0 6.0 - 20.0 mg/dL    Creatinine 1.30 (H) 0.76 - 1.27 mg/dL    Sodium 139 136 - 145 mmol/L    Potassium 3.8 3.5 - 5.2 mmol/L    Chloride 99 98 - 107 mmol/L    CO2 26.6 22.0 - 29.0 mmol/L    Calcium 9.6 8.6 - 10.5 mg/dL    Total Protein 7.4 6.0 - 8.5 g/dL    Albumin 4.0 3.5 - 5.2 g/dL    ALT (SGPT) 26 1 - 41 U/L    AST (SGOT) 21 1 - 40 U/L    Alkaline Phosphatase 87 39 - 117 U/L    Total  Bilirubin 0.3 0.0 - 1.2 mg/dL    Globulin 3.4 gm/dL    A/G Ratio 1.2 g/dL    BUN/Creatinine Ratio 14.6 7.0 - 25.0    Anion Gap 13.4 5.0 - 15.0 mmol/L    eGFR 63.7 >60.0 mL/min/1.73   High Sensitivity Troponin T    Collection Time: 07/12/25  4:45 AM    Specimen: Blood   Result Value Ref Range    HS Troponin T 37 (H) <22 ng/L   Green Top (Gel)    Collection Time: 07/12/25  4:45 AM   Result Value Ref Range    Extra Tube Hold for add-ons.    Lavender Top    Collection Time: 07/12/25  4:45 AM   Result Value Ref Range    Extra Tube hold for add-on    Gold Top - SST    Collection Time: 07/12/25  4:45 AM   Result Value Ref Range    Extra Tube Hold for add-ons.    Light Blue Top    Collection Time: 07/12/25  4:45 AM   Result Value Ref Range    Extra Tube Hold for add-ons.    CBC Auto Differential    Collection Time: 07/12/25  4:45 AM    Specimen: Blood   Result Value Ref Range    WBC 7.73 3.40 - 10.80 10*3/mm3    RBC 5.66 4.14 - 5.80 10*6/mm3    Hemoglobin 16.7 13.0 - 17.7 g/dL    Hematocrit 49.9 37.5 - 51.0 %    MCV 88.2 79.0 - 97.0 fL    MCH 29.5 26.6 - 33.0 pg    MCHC 33.5 31.5 - 35.7 g/dL    RDW 13.9 12.3 - 15.4 %    RDW-SD 44.6 37.0 - 54.0 fl    MPV 9.5 6.0 - 12.0 fL    Platelets 289 140 - 450 10*3/mm3    Neutrophil % 57.7 42.7 - 76.0 %    Lymphocyte % 31.2 19.6 - 45.3 %    Monocyte % 8.5 5.0 - 12.0 %    Eosinophil % 1.6 0.3 - 6.2 %    Basophil % 0.6 0.0 - 1.5 %    Immature Grans % 0.4 0.0 - 0.5 %    Neutrophils, Absolute 4.46 1.70 - 7.00 10*3/mm3    Lymphocytes, Absolute 2.41 0.70 - 3.10 10*3/mm3    Monocytes, Absolute 0.66 0.10 - 0.90 10*3/mm3    Eosinophils, Absolute 0.12 0.00 - 0.40 10*3/mm3    Basophils, Absolute 0.05 0.00 - 0.20 10*3/mm3    Immature Grans, Absolute 0.03 0.00 - 0.05 10*3/mm3    nRBC 0.0 0.0 - 0.2 /100 WBC   High Sensitivity Troponin T 1Hr    Collection Time: 07/12/25  6:03 AM    Specimen: Blood   Result Value Ref Range    HS Troponin T 34 (H) <22 ng/L    Troponin T Numeric Delta -3 ng/L    Troponin  T % Delta -8 Abnormal if >/= 20%       If labs were ordered, I independently reviewed the results and considered them in treating the patient.  See medical decision making discussion section for my interpretation of lab results.        RADIOLOGY  No Radiology Exams Resulted Within Past 24 Hours    I ordered and independently reviewed the above noted radiographic studies.  See radiologist's dictation for official interpretation.    Per my independent reading:      Chest radiograph is obtained and is negative for acute cardiopulmonary findings based on my independent reading.            PROCEDURES    Procedures    ECG 12 Lead ED Triage Standing Order; Chest Pain   Final Result          MEDICATIONS GIVEN IN ER    Medications   sodium chloride 0.9 % flush 10 mL (has no administration in time range)   nitroglycerin (NITROSTAT) SL tablet 0.4 mg (has no administration in time range)   aspirin tablet 325 mg (325 mg Oral Given 7/12/25 1272)   sodium chloride 0.9 % bolus 500 mL (0 mL Intravenous Stopped 7/12/25 4935)   ondansetron (ZOFRAN) injection 8 mg (8 mg Intravenous Given 7/12/25 1612)   aluminum-magnesium hydroxide-simethicone (MAALOX MAX) 400-400-40 MG/5ML suspension 30 mL (30 mL Oral Given 7/12/25 7830)         MEDICAL DECISION MAKING, PROGRESS, and CONSULTS    All labs, if obtained, have been independently reviewed by me.  All radiology studies, if obtained, have been reviewed by me and the radiologist dictating the report.  All EKG's, if obtained, have been independently viewed and interpreted by me/my attending physician.      Discussion below represents my analysis of pertinent findings related to patient's condition, differential diagnosis, treatment plan and final disposition.          HEART Score: 8                Differential diagnosis:    Differential diagnosis for this patient includes acute coronary syndrome, pneumothorax, pulmonary embolism, pericarditis, myocarditis, cardiac tamponade, pericardial  effusion, aortic dissection, Boerhaave syndrome, musculoskeletal pain, reflux, other acute emergency.    Medical Decision Making Discussion:    Vitals reviewed and demonstrate tachycardia but otherwise are normal.    EKG obtained and based on my independent reading demonstrates sinus tachycardia.  No acute ischemic changes.    Labs reviewed and demonstrate an initial high-sensitivity troponin of 37.  Repeat troponin shows no significant delta rise.    Patient has been given aspirin.  He was also given GI cocktail.  Patient reports resolution of pain.    On repeat examination patient resting comfortably.  Denies having any further chest pain.  Since patient reports that the way he felt this morning is how he felt when he required CABG 2 years ago and has a heart score of 8 I think he is appropriate for hospitalization for further eval.  Case discussed with Dr. Choe who accepted the patient for hospitalization.    Additional sources:    - External (non-ED) record review: Primary care note from May 2025 documenting history of diabetes, gastroparesis, hyperlipidemia, heart disease, elevated BMI, obstructive sleep apnea.    - Chronic or social conditions impacting care: Diabetes, heart disease    Shared Decision Making:  After my consideration of clinical presentation and any laboratory/radiology studies obtained, I discussed the findings with the patient/patient representative who is in agreement with the treatment plan and the final disposition.   Risks and benefits of discharge and/or observation/admission were discussed.    Orders placed during this visit:  Orders Placed This Encounter   Procedures    XR Chest 1 View    [place] Draw    Comprehensive Metabolic Panel    High Sensitivity Troponin T    CBC Auto Differential    High Sensitivity Troponin T 1Hr    Diet: Cardiac, Diabetic; Healthy Heart (2-3 Na+); Consistent Carbohydrate; Fluid Consistency: Thin (IDDSI 0)    Undress & Gown    Continuous Pulse Oximetry     Oxygen Therapy- Nasal Cannula; Titrate 1-6 LPM Per SpO2; 90 - 95%    ECG 12 Lead ED Triage Standing Order; Chest Pain    ECG 12 Lead ED Triage Standing Order; Chest Pain    Insert Peripheral IV    Initiate Observation Status    CBC & Differential    Green Top (Gel)    Lavender Top    Gold Top - SST    Light Blue Top         AS OF 07:44 EDT VITALS:    BP - 127/91  HR - 98  TEMP - 98 °F (36.7 °C) (Oral)  O2 SATS - 90%                  DIAGNOSIS  Final diagnoses:   Chest pain, unspecified type         DISPOSITION  Admit      Please note that portions of this document were completed with voice recognition software.        Rm Bruce MD  07/12/25 0038

## 2025-07-12 NOTE — Clinical Note
First balloon inflation max pressure = 10 isra. First balloon inflation duration = 12 seconds. Second inflation of balloon - Max pressure = 14 isra. 2nd Inflation of balloon - Duration = 10 seconds.

## 2025-07-12 NOTE — PAYOR COMM NOTE
"To:  HUMANA MK  From: Karin Trevino RN  Phone: [phone]  Fax: [phone]  NPI: [id number]  TIN: [id number]  Member ID: [id number]   MRN: [id number]    OBSERVATION NOTIFICATION    Myke Justice (59 y.o. Male)       Date of Birth   1965    Social Security Number       Address   [address] [place] [id number]    Home Phone   [phone]    MRN   [id number]       [employer]    Marital Status                               Admission Date   2025    Admission Type   Emergency    Admitting Provider   Prasad Choe MD    Attending Provider   Prasad Choe MD    [facility]       Discharge Date       Discharge Disposition       Discharge Destination                                 Attending Provider: Prasad Choe MD    Allergies: Ace Inhibitors, Bee Venom, Penicillins    Isolation: None   Infection: None   Code Status: CPR    Ht: 175.3 cm (69\")   Wt: 125 kg (276 lb 0.3 oz)    Admission Cmt: None   Principal Problem: Chest pain [R07.9]                   Active Insurance as of 2025       Primary Coverage       Payor Plan Insurance Group Employer/Plan Group    [place] [id number]       Payor Plan Address Payor Plan Phone Number Payor Plan Fax Number Effective Dates    [employer] [address] [address] [phone]  2025 - None Entered    [facility] [id number]         Subscriber Name Subscriber Birth Date Member ID       MYKE JUSTICE 1965 [id number]                     Emergency Contacts        (Rel.) Home Phone Work Phone Mobile Phone    Meli Justice (Spouse) [phone] -- [phone]                 Emergency Department Notes        Rm Bruce MD at 25 0514           EMERGENCY DEPARTMENT ENCOUNTER    Pt Name: Myke Justice  MRN: [id number]  Pt :   1965  Room Number:    Date of encounter:  2025  PCP: " Jaimie Nathan MD  ED Provider: Rm Bruce MD    Historian: Patient      HPI:  Chief Complaint: Chest pain        Context: Myke Marcial is a 59 y.o. male who presents to the ED c/o chest pain.  Patient has a past medical history significant for heart disease with prior CABG and diabetes.  Patient says that since last night he has not been feeling well.  He says he began having retrosternal chest discomfort associated with nausea.  Says he also felt somewhat dizzy.  Denies any headache or vision changes.  Says the pain is non-radiating.  Does not radiate through to his back.      PAST MEDICAL HISTORY  Past Medical History:   Diagnosis Date    Allergic     Anxiety and depression     Arthritis     LOWER BACK    Benign prostatic hyperplasia 04/15/2025    Colon cancer screening 06/18/2018    Added automatically from request for surgery [id number]    Coronary artery disease involving native coronary artery of native heart without angina pectoris 07/25/2023    Diabetes mellitus     Diabetic foot ulcer 11/29/2018    Diabetic retinopathy associated with type 2 diabetes mellitus 02/01/2021    Ear drum perforation, right     Elevated cholesterol     Erectile dysfunction     GERD (gastroesophageal reflux disease)     Gynecomastia 2019    Hernia     Hyperlipidemia     Hypertension     Lower urinary tract symptoms (LUTS)     Myocardial infarction     Refusal of blood product     Sleep apnea, obstructive     CPAP    Type 2 diabetes mellitus     Urge incontinence     Vitamin D deficiency disease 08/26/2016    Wears glasses     for reading only         PAST SURGICAL HISTORY  Past Surgical History:   Procedure Laterality Date    CARDIAC CATHETERIZATION N/A 06/30/2023    Procedure: Coronary angiography;  Surgeon: Irwin Thomas MD;  Location: [facility] CATH INVASIVE LOCATION;  Service: Cardiovascular;  Laterality: N/A;    CARDIAC SURGERY      CATARACT EXTRACTION, BILATERAL      COLONOSCOPY N/A 07/10/2018    Procedure:  COLONOSCOPY WITH POLYPECTOMIES;  Surgeon: Musa Berger MD;  Location: [facility] ENDOSCOPY;  Service: Gastroenterology    COLONOSCOPY N/A 08/16/2022    Procedure: COLONOSCOPY;  Surgeon: Brittany Goins MD;  Location: [facility] ENDOSCOPY;  Service: Gastroenterology;  Laterality: N/A;    COLONOSCOPY N/A 05/15/2024    Procedure: COLONOSCOPY  WITH POLYPECTOMY X 3;  Surgeon: Brittany Goins MD;  Location: [facility] ENDOSCOPY;  Service: Gastroenterology;  Laterality: N/A;    CORONARY ARTERY BYPASS GRAFT  07/2023    ENDOSCOPY N/A 03/17/2022    Procedure: ESOPHAGOGASTRODUODENOSCOPY with biopsies;  Surgeon: Brittany Goins MD;  Location: [facility] ENDOSCOPY;  Service: Gastroenterology;  Laterality: N/A;    EYE SURGERY      MUSCLE REPAIR Right     biceps tendon    UMBILICAL HERNIA REPAIR      UPPER GASTROINTESTINAL ENDOSCOPY           FAMILY HISTORY  Family History   Problem Relation Age of Onset    Sleep apnea Mother     Vision loss Mother         Macular degeneration    Hypertension Father     Hyperlipidemia Father     Prostate cancer Father     Cancer Father         Prostate   stage1    Cancer Maternal Grandmother     Cancer Paternal Grandmother     Colon cancer Neg Hx     Liver cancer Neg Hx     Rectal cancer Neg Hx     Stomach cancer Neg Hx          SOCIAL HISTORY  Social History     Socioeconomic History    Marital status:    Tobacco Use    Smoking status: Never     Passive exposure: Never    Smokeless tobacco: Never   Vaping Use    Vaping status: Never Used   Substance and Sexual Activity    Alcohol use: Not Currently    Drug use: No    Sexual activity: Not Currently     Partners: Female     Birth control/protection: Condom         ALLERGIES  Ace inhibitors, Bee venom, and Penicillins        REVIEW OF SYSTEMS    All systems reviewed and negative except for those discussed in HPI.       PHYSICAL EXAM    I have reviewed the triage vital signs and nursing notes.    ED Triage Vitals [07/12/25 0437]   Temp  Heart Rate Resp BP SpO2   98 °F (36.7 °C) 108 22 124/86 94 %      Temp src Heart Rate Source Patient Position BP Location FiO2 (%)   Oral Monitor Sitting Left arm --         General: Moderate acute distress  Skin: normal color, warm and dry  Head: normocephalic, atraumatic  Eyes: Pupils equally round and reactive to light.  No nystagmus bilaterally.  Negative test of skew bilaterally.  Nose: normal nasal mucosa, no visible deformity.  Mouth: moist mucous membranes.  Neck: supple.  Chest: no retractions, no visible deformity  Cardiovascular: Regular rate and rhythm.  Lungs: clear to auscultation bilaterally.  Back: no contusions, wounds or abrasions.  Abdomen: soft, non-tender, non-distended. No rebound tenderness, no guarding.  No peritonitis.  Extremities: no cyanosis or edema. Palpable radial pulses bilaterally. Palpable dorsalis pedis pulses bilaterally.  Neuro:  alert and oriented x3, no focal neurological deficits.  5 out of 5 strength about the bilateral upper and lower extremities.  No nystagmus bilaterally.  No dysarthria, no aphasia.  No dysmetria bilaterally.  Normal heel-to-shin testing bilaterally.  Psych:  appropriate mood and behavior.        LAB RESULTS  Recent Results (from the past 24 hours)   Comprehensive Metabolic Panel    Collection Time: 07/12/25  4:45 AM    Specimen: Blood   Result Value Ref Range    Glucose 121 (H) 65 - 99 mg/dL    BUN 19.0 6.0 - 20.0 mg/dL    Creatinine 1.30 (H) 0.76 - 1.27 mg/dL    Sodium 139 136 - 145 mmol/L    Potassium 3.8 3.5 - 5.2 mmol/L    Chloride 99 98 - 107 mmol/L    CO2 26.6 22.0 - 29.0 mmol/L    Calcium 9.6 8.6 - 10.5 mg/dL    Total Protein 7.4 6.0 - 8.5 g/dL    Albumin 4.0 3.5 - 5.2 g/dL    ALT (SGPT) 26 1 - 41 U/L    AST (SGOT) 21 1 - 40 U/L    Alkaline Phosphatase 87 39 - 117 U/L    Total Bilirubin 0.3 0.0 - 1.2 mg/dL    Globulin 3.4 gm/dL    A/G Ratio 1.2 g/dL    BUN/Creatinine Ratio 14.6 7.0 - 25.0    Anion Gap 13.4 5.0 - 15.0 mmol/L    eGFR 63.7 >60.0  mL/min/1.73   High Sensitivity Troponin T    Collection Time: 07/12/25  4:45 AM    Specimen: Blood   Result Value Ref Range    HS Troponin T 37 (H) <22 ng/L   Green Top (Gel)    Collection Time: 07/12/25  4:45 AM   Result Value Ref Range    Extra Tube Hold for add-ons.    Lavender Top    Collection Time: 07/12/25  4:45 AM   Result Value Ref Range    Extra Tube hold for add-on    Gold Top - SST    Collection Time: 07/12/25  4:45 AM   Result Value Ref Range    Extra Tube Hold for add-ons.    Light Blue Top    Collection Time: 07/12/25  4:45 AM   Result Value Ref Range    Extra Tube Hold for add-ons.    CBC Auto Differential    Collection Time: 07/12/25  4:45 AM    Specimen: Blood   Result Value Ref Range    WBC 7.73 3.40 - 10.80 10*3/mm3    RBC 5.66 4.14 - 5.80 10*6/mm3    Hemoglobin 16.7 13.0 - 17.7 g/dL    Hematocrit 49.9 37.5 - 51.0 %    MCV 88.2 79.0 - 97.0 fL    MCH 29.5 26.6 - 33.0 pg    MCHC 33.5 31.5 - 35.7 g/dL    RDW 13.9 12.3 - 15.4 %    RDW-SD 44.6 37.0 - 54.0 fl    MPV 9.5 6.0 - 12.0 fL    Platelets 289 140 - 450 10*3/mm3    Neutrophil % 57.7 42.7 - 76.0 %    Lymphocyte % 31.2 19.6 - 45.3 %    Monocyte % 8.5 5.0 - 12.0 %    Eosinophil % 1.6 0.3 - 6.2 %    Basophil % 0.6 0.0 - 1.5 %    Immature Grans % 0.4 0.0 - 0.5 %    Neutrophils, Absolute 4.46 1.70 - 7.00 10*3/mm3    Lymphocytes, Absolute 2.41 0.70 - 3.10 10*3/mm3    Monocytes, Absolute 0.66 0.10 - 0.90 10*3/mm3    Eosinophils, Absolute 0.12 0.00 - 0.40 10*3/mm3    Basophils, Absolute 0.05 0.00 - 0.20 10*3/mm3    Immature Grans, Absolute 0.03 0.00 - 0.05 10*3/mm3    nRBC 0.0 0.0 - 0.2 /100 WBC   High Sensitivity Troponin T 1Hr    Collection Time: 07/12/25  6:03 AM    Specimen: Blood   Result Value Ref Range    HS Troponin T 34 (H) <22 ng/L    Troponin T Numeric Delta -3 ng/L    Troponin T % Delta -8 Abnormal if >/= 20%       If labs were ordered, I independently reviewed the results and considered them in treating the patient.  See medical decision  making discussion section for my interpretation of lab results.        RADIOLOGY  No Radiology Exams Resulted Within Past 24 Hours    I ordered and independently reviewed the above noted radiographic studies.  See radiologist's dictation for official interpretation.    Per my independent reading:      Chest radiograph is obtained and is negative for acute cardiopulmonary findings based on my independent reading.            PROCEDURES    Procedures    ECG 12 Lead ED Triage Standing Order; Chest Pain   Final Result          MEDICATIONS GIVEN IN ER    Medications   sodium chloride 0.9 % flush 10 mL (has no administration in time range)   nitroglycerin (NITROSTAT) SL tablet 0.4 mg (has no administration in time range)   aspirin tablet 325 mg (325 mg Oral Given 7/12/25 5791)   sodium chloride 0.9 % bolus 500 mL (0 mL Intravenous Stopped 7/12/25 0656)   ondansetron (ZOFRAN) injection 8 mg (8 mg Intravenous Given 7/12/25 2179)   aluminum-magnesium hydroxide-simethicone (MAALOX MAX) 400-400-40 MG/5ML suspension 30 mL (30 mL Oral Given 7/12/25 5478)         MEDICAL DECISION MAKING, PROGRESS, and CONSULTS    All labs, if obtained, have been independently reviewed by me.  All radiology studies, if obtained, have been reviewed by me and the radiologist dictating the report.  All EKG's, if obtained, have been independently viewed and interpreted by me/my attending physician.      Discussion below represents my analysis of pertinent findings related to patient's condition, differential diagnosis, treatment plan and final disposition.          HEART Score: 8                Differential diagnosis:    Differential diagnosis for this patient includes acute coronary syndrome, pneumothorax, pulmonary embolism, pericarditis, myocarditis, cardiac tamponade, pericardial effusion, aortic dissection, Boerhaave syndrome, musculoskeletal pain, reflux, other acute emergency.    Medical Decision Making Discussion:    Vitals reviewed and  demonstrate tachycardia but otherwise are normal.    EKG obtained and based on my independent reading demonstrates sinus tachycardia.  No acute ischemic changes.    Labs reviewed and demonstrate an initial high-sensitivity troponin of 37.  Repeat troponin shows no significant delta rise.    Patient has been given aspirin.  He was also given GI cocktail.  Patient reports resolution of pain.    On repeat examination patient resting comfortably.  Denies having any further chest pain.  Since patient reports that the way he felt this morning is how he felt when he required CABG 2 years ago and has a heart score of 8 I think he is appropriate for hospitalization for further eval.  Case discussed with Dr. Choe who accepted the patient for hospitalization.    Additional sources:    - External (non-ED) record review: Primary care note from May 2025 documenting history of diabetes, gastroparesis, hyperlipidemia, heart disease, elevated BMI, obstructive sleep apnea.    - Chronic or social conditions impacting care: Diabetes, heart disease    Shared Decision Making:  After my consideration of clinical presentation and any laboratory/radiology studies obtained, I discussed the findings with the patient/patient representative who is in agreement with the treatment plan and the final disposition.   Risks and benefits of discharge and/or observation/admission were discussed.    Orders placed during this visit:  Orders Placed This Encounter   Procedures    XR Chest 1 View    [place] Draw    Comprehensive Metabolic Panel    High Sensitivity Troponin T    CBC Auto Differential    High Sensitivity Troponin T 1Hr    Diet: Cardiac, Diabetic; Healthy Heart (2-3 Na+); Consistent Carbohydrate; Fluid Consistency: Thin (IDDSI 0)    Undress & Gown    Continuous Pulse Oximetry    Oxygen Therapy- Nasal Cannula; Titrate 1-6 LPM Per SpO2; 90 - 95%    ECG 12 Lead ED Triage Standing Order; Chest Pain    ECG 12 Lead ED Triage Standing Order; Chest  Pain    Insert Peripheral IV    Initiate Observation Status    CBC & Differential    Green Top (Gel)    Lavender Top    Gold Top - SST    Light Blue Top         AS OF 07:44 EDT VITALS:    BP - 127/91  HR - 98  TEMP - 98 °F (36.7 °C) (Oral)  O2 SATS - 90%                  DIAGNOSIS  Final diagnoses:   Chest pain, unspecified type         DISPOSITION  Admit      Please note that portions of this document were completed with voice recognition software.        Rm Bruce MD  07/12/25 0744      Electronically signed by Rm Bruce MD at 07/12/25 0795

## 2025-07-12 NOTE — Clinical Note
First balloon inflation max pressure = 10 isra. First balloon inflation duration = 15 seconds. Second inflation of balloon - Max pressure = 16 isra. 2nd Inflation of balloon - Duration = 12 seconds.

## 2025-07-13 ENCOUNTER — APPOINTMENT (OUTPATIENT)
Dept: CARDIOLOGY | Facility: HOSPITAL | Age: 60
DRG: 322 | End: 2025-07-13
Payer: MEDICAID

## 2025-07-13 LAB
ANION GAP SERPL CALCULATED.3IONS-SCNC: 15.2 MMOL/L (ref 5–15)
AORTIC DIMENSIONLESS INDEX: 0.56 (DI)
AV MEAN PRESS GRAD SYS DOP V1V2: 4 MMHG
AV VMAX SYS DOP: 127 CM/SEC
BASOPHILS # BLD AUTO: 0.02 10*3/MM3 (ref 0–0.2)
BASOPHILS NFR BLD AUTO: 0.2 % (ref 0–1.5)
BH CV ECHO MEAS - AO MAX PG: 6.5 MMHG
BH CV ECHO MEAS - AO ROOT DIAM: 3.4 CM
BH CV ECHO MEAS - AO V2 VTI: 25.2 CM
BH CV ECHO MEAS - AVA(I,D): 1.78 CM2
BH CV ECHO MEAS - EDV(CUBED): 82.3 ML
BH CV ECHO MEAS - EDV(MOD-SP2): 128 ML
BH CV ECHO MEAS - EDV(MOD-SP4): 147 ML
BH CV ECHO MEAS - EF(MOD-SP2): 65.2 %
BH CV ECHO MEAS - EF(MOD-SP4): 56.5 %
BH CV ECHO MEAS - ESV(CUBED): 21.3 ML
BH CV ECHO MEAS - ESV(MOD-SP2): 44.5 ML
BH CV ECHO MEAS - ESV(MOD-SP4): 63.9 ML
BH CV ECHO MEAS - FS: 36.3 %
BH CV ECHO MEAS - IVS/LVPW: 0.98 CM
BH CV ECHO MEAS - IVSD: 0.86 CM
BH CV ECHO MEAS - LA DIMENSION: 4.1 CM
BH CV ECHO MEAS - LAT PEAK E' VEL: 9.5 CM/SEC
BH CV ECHO MEAS - LV DIASTOLIC VOL/BSA (35-75): 62.1 CM2
BH CV ECHO MEAS - LV MASS(C)D: 120 GRAMS
BH CV ECHO MEAS - LV MAX PG: 1.87 MMHG
BH CV ECHO MEAS - LV MEAN PG: 1 MMHG
BH CV ECHO MEAS - LV SYSTOLIC VOL/BSA (12-30): 27 CM2
BH CV ECHO MEAS - LV V1 MAX: 68.3 CM/SEC
BH CV ECHO MEAS - LV V1 VTI: 14.1 CM
BH CV ECHO MEAS - LVIDD: 4.4 CM
BH CV ECHO MEAS - LVIDS: 2.8 CM
BH CV ECHO MEAS - LVOT AREA: 3.2 CM2
BH CV ECHO MEAS - LVOT DIAM: 2.01 CM
BH CV ECHO MEAS - LVPWD: 0.88 CM
BH CV ECHO MEAS - MED PEAK E' VEL: 9 CM/SEC
BH CV ECHO MEAS - MV A MAX VEL: 77.7 CM/SEC
BH CV ECHO MEAS - MV DEC SLOPE: 394 CM/SEC2
BH CV ECHO MEAS - MV DEC TIME: 0.17 SEC
BH CV ECHO MEAS - MV E MAX VEL: 67.6 CM/SEC
BH CV ECHO MEAS - MV E/A: 0.87
BH CV ECHO MEAS - MV MAX PG: 1.93 MMHG
BH CV ECHO MEAS - MV MEAN PG: 1 MMHG
BH CV ECHO MEAS - MV V2 VTI: 14.6 CM
BH CV ECHO MEAS - MVA(VTI): 3.1 CM2
BH CV ECHO MEAS - SV(LVOT): 44.7 ML
BH CV ECHO MEAS - SV(MOD-SP2): 83.5 ML
BH CV ECHO MEAS - SV(MOD-SP4): 83.1 ML
BH CV ECHO MEAS - SVI(LVOT): 18.9 ML/M2
BH CV ECHO MEAS - SVI(MOD-SP2): 35.3 ML/M2
BH CV ECHO MEAS - SVI(MOD-SP4): 35.1 ML/M2
BH CV ECHO MEAS - TAPSE (>1.6): 1.85 CM
BH CV ECHO MEASUREMENTS AVERAGE E/E' RATIO: 7.31
BH CV XLRA - RV BASE: 3.1 CM
BH CV XLRA - RV LENGTH: 9.9 CM
BH CV XLRA - RV MID: 4.3 CM
BH CV XLRA - TDI S': 11.4 CM/SEC
BUN SERPL-MCNC: 23 MG/DL (ref 6–20)
BUN/CREAT SERPL: 14.7 (ref 7–25)
CALCIUM SPEC-SCNC: 9.2 MG/DL (ref 8.6–10.5)
CHLORIDE SERPL-SCNC: 98 MMOL/L (ref 98–107)
CO2 SERPL-SCNC: 22.8 MMOL/L (ref 22–29)
CREAT SERPL-MCNC: 1.56 MG/DL (ref 0.76–1.27)
DEPRECATED RDW RBC AUTO: 46.5 FL (ref 37–54)
EGFRCR SERPLBLD CKD-EPI 2021: 51.2 ML/MIN/1.73
EOSINOPHIL # BLD AUTO: 0.05 10*3/MM3 (ref 0–0.4)
EOSINOPHIL NFR BLD AUTO: 0.5 % (ref 0.3–6.2)
ERYTHROCYTE [DISTWIDTH] IN BLOOD BY AUTOMATED COUNT: 14.2 % (ref 12.3–15.4)
GEN 5 1HR TROPONIN T REFLEX: 43 NG/L
GLUCOSE BLDC GLUCOMTR-MCNC: 200 MG/DL (ref 70–130)
GLUCOSE BLDC GLUCOMTR-MCNC: 221 MG/DL (ref 70–130)
GLUCOSE BLDC GLUCOMTR-MCNC: 228 MG/DL (ref 70–130)
GLUCOSE BLDC GLUCOMTR-MCNC: 253 MG/DL (ref 70–130)
GLUCOSE SERPL-MCNC: 228 MG/DL (ref 65–99)
HCT VFR BLD AUTO: 48.4 % (ref 37.5–51)
HGB BLD-MCNC: 16.1 G/DL (ref 13–17.7)
IMM GRANULOCYTES # BLD AUTO: 0.05 10*3/MM3 (ref 0–0.05)
IMM GRANULOCYTES NFR BLD AUTO: 0.5 % (ref 0–0.5)
LEFT ATRIUM VOLUME INDEX: 15.4 ML/M2
LV EF BIPLANE MOD: 60.6 %
LYMPHOCYTES # BLD AUTO: 1.75 10*3/MM3 (ref 0.7–3.1)
LYMPHOCYTES NFR BLD AUTO: 17.9 % (ref 19.6–45.3)
MCH RBC QN AUTO: 29.8 PG (ref 26.6–33)
MCHC RBC AUTO-ENTMCNC: 33.3 G/DL (ref 31.5–35.7)
MCV RBC AUTO: 89.5 FL (ref 79–97)
MONOCYTES # BLD AUTO: 0.52 10*3/MM3 (ref 0.1–0.9)
MONOCYTES NFR BLD AUTO: 5.3 % (ref 5–12)
NEUTROPHILS NFR BLD AUTO: 7.36 10*3/MM3 (ref 1.7–7)
NEUTROPHILS NFR BLD AUTO: 75.6 % (ref 42.7–76)
NRBC BLD AUTO-RTO: 0 /100 WBC (ref 0–0.2)
PLATELET # BLD AUTO: 271 10*3/MM3 (ref 140–450)
PMV BLD AUTO: 10.3 FL (ref 6–12)
POTASSIUM SERPL-SCNC: 4.2 MMOL/L (ref 3.5–5.2)
RBC # BLD AUTO: 5.41 10*6/MM3 (ref 4.14–5.8)
SODIUM SERPL-SCNC: 136 MMOL/L (ref 136–145)
TROPONIN T % DELTA: -2
TROPONIN T NUMERIC DELTA: -1 NG/L
TROPONIN T SERPL HS-MCNC: 44 NG/L
UFH PPP CHRO-ACNC: 0.12 IU/ML (ref 0.3–0.7)
UFH PPP CHRO-ACNC: 0.13 IU/ML (ref 0.3–0.7)
UFH PPP CHRO-ACNC: 0.21 IU/ML (ref 0.3–0.7)
WBC NRBC COR # BLD AUTO: 9.75 10*3/MM3 (ref 3.4–10.8)

## 2025-07-13 PROCEDURE — 85520 HEPARIN ASSAY: CPT | Performed by: INTERNAL MEDICINE

## 2025-07-13 PROCEDURE — 99232 SBSQ HOSP IP/OBS MODERATE 35: CPT | Performed by: INTERNAL MEDICINE

## 2025-07-13 PROCEDURE — G0378 HOSPITAL OBSERVATION PER HR: HCPCS

## 2025-07-13 PROCEDURE — 85520 HEPARIN ASSAY: CPT

## 2025-07-13 PROCEDURE — 85025 COMPLETE CBC W/AUTO DIFF WBC: CPT | Performed by: INTERNAL MEDICINE

## 2025-07-13 PROCEDURE — 63710000001 INSULIN GLARGINE PER 5 UNITS: Performed by: INTERNAL MEDICINE

## 2025-07-13 PROCEDURE — 80048 BASIC METABOLIC PNL TOTAL CA: CPT | Performed by: INTERNAL MEDICINE

## 2025-07-13 PROCEDURE — 82948 REAGENT STRIP/BLOOD GLUCOSE: CPT | Performed by: INTERNAL MEDICINE

## 2025-07-13 PROCEDURE — 63710000001 INSULIN LISPRO (HUMAN) PER 5 UNITS: Performed by: INTERNAL MEDICINE

## 2025-07-13 PROCEDURE — 25010000002 SULFUR HEXAFLUORIDE MICROSPH 60.7-25 MG RECONSTITUTED SUSPENSION: Performed by: INTERNAL MEDICINE

## 2025-07-13 PROCEDURE — 25010000002 HEPARIN (PORCINE) PER 1000 UNITS: Performed by: INTERNAL MEDICINE

## 2025-07-13 PROCEDURE — 93306 TTE W/DOPPLER COMPLETE: CPT

## 2025-07-13 PROCEDURE — 82948 REAGENT STRIP/BLOOD GLUCOSE: CPT

## 2025-07-13 PROCEDURE — 84484 ASSAY OF TROPONIN QUANT: CPT | Performed by: INTERNAL MEDICINE

## 2025-07-13 PROCEDURE — 93306 TTE W/DOPPLER COMPLETE: CPT | Performed by: INTERNAL MEDICINE

## 2025-07-13 RX ORDER — HEPARIN SODIUM 1000 [USP'U]/ML
2000 INJECTION, SOLUTION INTRAVENOUS; SUBCUTANEOUS ONCE
Status: COMPLETED | OUTPATIENT
Start: 2025-07-13 | End: 2025-07-13

## 2025-07-13 RX ORDER — HEPARIN SODIUM 1000 [USP'U]/ML
2000 INJECTION, SOLUTION INTRAVENOUS; SUBCUTANEOUS ONCE
Status: COMPLETED | OUTPATIENT
Start: 2025-07-14 | End: 2025-07-14

## 2025-07-13 RX ADMIN — FENOFIBRATE 145 MG: 145 TABLET ORAL at 08:18

## 2025-07-13 RX ADMIN — ATORVASTATIN CALCIUM 80 MG: 40 TABLET, FILM COATED ORAL at 08:18

## 2025-07-13 RX ADMIN — INSULIN LISPRO 4 UNITS: 100 INJECTION, SOLUTION INTRAVENOUS; SUBCUTANEOUS at 17:14

## 2025-07-13 RX ADMIN — HEPARIN SODIUM 2000 UNITS: 1000 INJECTION, SOLUTION INTRAVENOUS; SUBCUTANEOUS at 09:39

## 2025-07-13 RX ADMIN — INSULIN LISPRO 3 UNITS: 100 INJECTION, SOLUTION INTRAVENOUS; SUBCUTANEOUS at 12:22

## 2025-07-13 RX ADMIN — ASPIRIN 81 MG: 81 TABLET, COATED ORAL at 08:18

## 2025-07-13 RX ADMIN — INSULIN GLARGINE 25 UNITS: 100 INJECTION, SOLUTION SUBCUTANEOUS at 20:34

## 2025-07-13 RX ADMIN — INSULIN LISPRO 3 UNITS: 100 INJECTION, SOLUTION INTRAVENOUS; SUBCUTANEOUS at 08:18

## 2025-07-13 RX ADMIN — SULFUR HEXAFLUORIDE 4 ML: KIT at 09:52

## 2025-07-13 RX ADMIN — CLOPIDOGREL BISULFATE 75 MG: 75 TABLET, FILM COATED ORAL at 08:19

## 2025-07-13 RX ADMIN — METOPROLOL TARTRATE 12.5 MG: 25 TABLET, FILM COATED ORAL at 08:18

## 2025-07-13 RX ADMIN — INSULIN LISPRO 3 UNITS: 100 INJECTION, SOLUTION INTRAVENOUS; SUBCUTANEOUS at 20:34

## 2025-07-13 RX ADMIN — Medication 10 ML: at 08:19

## 2025-07-13 RX ADMIN — HEPARIN SODIUM 12 UNITS/KG/HR: 10000 INJECTION, SOLUTION INTRAVENOUS at 13:22

## 2025-07-13 RX ADMIN — METOPROLOL TARTRATE 12.5 MG: 25 TABLET, FILM COATED ORAL at 20:33

## 2025-07-13 RX ADMIN — SENNOSIDES AND DOCUSATE SODIUM 2 TABLET: 50; 8.6 TABLET ORAL at 20:34

## 2025-07-13 RX ADMIN — HEPARIN SODIUM 2000 UNITS: 1000 INJECTION, SOLUTION INTRAVENOUS; SUBCUTANEOUS at 00:24

## 2025-07-13 RX ADMIN — Medication 1 TABLET: at 08:19

## 2025-07-13 RX ADMIN — TAMSULOSIN HYDROCHLORIDE 0.4 MG: 0.4 CAPSULE ORAL at 20:34

## 2025-07-13 RX ADMIN — PANTOPRAZOLE SODIUM 40 MG: 40 TABLET, DELAYED RELEASE ORAL at 06:30

## 2025-07-13 RX ADMIN — SENNOSIDES AND DOCUSATE SODIUM 2 TABLET: 50; 8.6 TABLET ORAL at 08:18

## 2025-07-13 NOTE — PROGRESS NOTES
[facility]    Name:  Myke Marcial   Age:  59 y.o.  Sex:  male  :  1965  MRN:  [id number]   Visit Number:  [id number]  Admission Date:  2025  Date Of Service:  25  Primary Care Physician:  Jaimie Nathan MD     LOS: 0 days :    Chief Complaint:      Follow up of chest pain.    Subjective:  History of Present Illness  Reason for Admit: Chest pain and diabetes.    The patient experienced a brief episode of chest pain last night, which has since subsided. His sleep was interrupted due to medical interventions, but he managed to rest adequately once these were completed.    His blood sugar level was recorded at 223. He received his insulin dose last night, which is typically 50 units twice daily of 70/30 mixed insulin. Additionally, he administers Mounjaro once a week on Sundays.    MEDICATIONS  Current: 70/30 mixed insulin, Mounjaro     Review of Systems:     All systems were reviewed and negative except as mentioned in subjective, assessment and plan.    Vital Signs:    Temp:  [97.4 °F (36.3 °C)-98 °F (36.7 °C)] 98 °F (36.7 °C)  Heart Rate:  [84-94] 89  Resp:  [12-20] 15  BP: (118-136)/(76-86) 132/86    Intake and output:    I/O last 3 completed shifts:  In: 1366.7 [P.O.:720; I.V.:146.7; IV Piggyback:500]  Out: -   No intake/output data recorded.    Physical Examination:    General Appearance:  Alert and cooperative.    Head:  Atraumatic and normocephalic.   Eyes: Conjunctivae and sclerae normal, no icterus. No pallor.   Throat: No oral lesions, no thrush, oral mucosa moist.   Neck: Supple, trachea midline, no thyromegaly.   Lungs:   Breath sounds heard bilaterally equally.  No wheezing or crackles. No Pleural rub or bronchial breathing.   Heart:  Normal S1 and S2, no murmur, no gallop, no rub. No JVD. CABG scar noted.   Abdomen:   Normal bowel sounds, no masses, no organomegaly. Soft, nontender, obese, no rebound tenderness.    Extremities: Supple, no edema, no cyanosis, no clubbing.   Skin: No bleeding or rash.   Neurologic: Alert and oriented x 3. No facial asymmetry. Moves all four limbs. No tremors.      Laboratory results:    Results from last 7 days   Lab Units 07/13/25  0701 07/12/25  0445   SODIUM mmol/L 136 139   POTASSIUM mmol/L 4.2 3.8   CHLORIDE mmol/L 98 99   CO2 mmol/L 22.8 26.6   BUN mg/dL 23.0* 19.0   CREATININE mg/dL 1.56* 1.30*   CALCIUM mg/dL 9.2 9.6   BILIRUBIN mg/dL  --  0.3   ALK PHOS U/L  --  87   ALT (SGPT) U/L  --  26   AST (SGOT) U/L  --  21   GLUCOSE mg/dL 228* 121*     Results from last 7 days   Lab Units 07/13/25  0701 07/12/25  0445   WBC 10*3/mm3 9.75 7.73   HEMOGLOBIN g/dL 16.1 16.7   HEMATOCRIT % 48.4 49.9   PLATELETS 10*3/mm3 271 289     Results from last 7 days   Lab Units 07/12/25  1636   INR  0.93     Results from last 7 days   Lab Units 07/13/25  0838 07/13/25  0701 07/12/25  0603   HSTROP T ng/L 43* 44* 34*     I have reviewed the patient's laboratory results.  Results  Labs   - Blood sugar: 223   - Hemoglobin A1c: 06/2025, 9.6     Radiology results:    XR Chest 1 View  Result Date: 7/12/2025  CLINICAL INDICATION:  Chest Pain Triage Protocol  EXAMINATION TECHNIQUE: XR CHEST 1 VW-  COMPARISON: None.  FINDINGS: Prior median sternotomy. Heart is normal in size. Aortic tortuosity and ectasia. No dense consolidation. Few scattered calcified pulmonary granulomas. No pneumothorax or effusion. No acute osseous or soft tissue abnormality.      Impression: No acute findings.  Images personally reviewed, interpreted and dictated by JOSE Alcocer.     This report was signed and finalized on 7/12/2025 7:44 AM by JOSE Alcocer.      I have reviewed the patient's radiology reports.    Medication Review:     I have reviewed the patient's active and prn medications.   Assessment & Plan  The plan for chest pain:  - He reported a little chest pain since last night, but it did not last long.  -   Rosa M, the cardiologist, has initiated a heparin drip and plans to perform an echocardiogram to determine if a heart catheterization is necessary.  - Discussed with Dr. Mederos and he stated that the echocardiogram looked okay but he will let Dr. Thomas, patient's primary cardiologist to see the patient tomorrow and perform cardiac catheterization if needed.    The plan for diabetes mellitus:  - His blood sugar was 223.  - He is currently on 50 units of 70/30 mixed insulin twice a day and takes Mounjaro once a week.  - His hemoglobin A1c was 9.6 last month.  - Due to the unavailability of 70/30 insulin, he will be given Lantus 25 units tonight.     Discussed with nursing staff.    I have reviewed the copied text and it is accurate as of 07/13/25    DVT Prophylaxis: Heparin  Code Status: Full  Diet: Cardiac diabetic  Discharge Plan: Pending    Prasad Choe MD  07/13/25  12:42 EDT    Patient or patient representative verbalized consent for the use of Ambient Listening during the visit for chart documentation.

## 2025-07-13 NOTE — CASE MANAGEMENT/SOCIAL WORK
Discharge Planning Assessment  [facility]     Patient Name: Myke Marcial  MRN: [id number]  Today's Date: 7/13/2025    Admit Date: 7/12/2025    Plan: DCP-Home with wife. Denies any needs.   Discharge Needs Assessment       Row Name 07/13/25 1658       Living Environment    People in Home spouse    Name(s) of People in Home Meli/Wife    Current Living Arrangements home    Duration at Residence Over 8 years    Potentially Unsafe Housing Conditions unable to assess    In the past 12 months has the electric, gas, oil, or water company threatened to shut off services in your home? No    Primary Care Provided by self    Provides Primary Care For no one    Family Caregiver if Needed spouse    Family Caregiver Names Meli/Wife    Quality of Family Relationships supportive    Able to Return to Prior Arrangements yes       Resource/Environmental Concerns    Resource/Environmental Concerns none    Transportation Concerns none       Transportation Needs    In the past 12 months, has lack of transportation kept you from medical appointments or from getting medications? no    In the past 12 months, has lack of transportation kept you from meetings, work, or from getting things needed for daily living? No       Food Insecurity    Within the past 12 months, you worried that your food would run out before you got the money to buy more. Never true    Within the past 12 months, the food you bought just didn't last and you didn't have money to get more. Never true       Transition Planning    Patient/Family Anticipates Transition to home with family    Patient/Family Anticipated Services at Transition none    Transportation Anticipated family or friend will provide       Discharge Needs Assessment    Readmission Within the Last 30 Days no previous admission in last 30 days    Equipment Currently Used at Home rollator;bp cuff;scales;shower chair;walker, rolling;cane, straight;wheelchair;cpap  Dexcom    Concerns to be  "Addressed denies needs/concerns at this time    Do you want help finding or keeping work or a job? I do not need or want help    Do you want help with school or training? For example, starting or completing job training or getting a high school diploma, GED or equivalent No    Anticipated Changes Related to Illness none    Equipment Needed After Discharge none                   Discharge Plan       Row Name 25 1291       Plan    Plan DCP-Home with wife. Denies any needs.    Plan Comments CM spoke with pt and wife/Meli at bedside. Permission given to discuss DCP with both. Confirmed name,,address and insurance. Pt states she has a LW and No POA. CM requested papers to be brought in when possible. Verbalized understanding. PCP states [facility]. \"Last seen 5-6 months ago\". Pharmacy used [facility]. Agreed to meds to bed. DME listed above. States (I) with ADL's and (I) with mobility.Uses her AD as needed. Denies any finanical, food or transporation issues. DCP return home with wife. Denies any needs at this time.                  Continued Care and Services - Admitted Since 2025    No active coordination exists.          Demographic Summary       Row Name 25 3929       General Information    Admission Type observation    Arrived From emergency department    Required Notices Provided --    Referral Source admission list    Reason for Consult discharge planning    Preferred Language English       Contact Information    Permission Granted to Share Info With                    Functional Status       Row Name 25 5765       Functional Status    Usual Activity Tolerance good    Current Activity Tolerance moderate       Physical Activity    On average, how many days per week do you engage in moderate to strenuous exercise (like a brisk walk)? 5 days    On average, how many minutes do you engage in exercise at this level? 20 min    Number of minutes of exercise per week 100 "       Assessment of Health Literacy    How often do you have someone help you read hospital materials? Never    How often do you have problems learning about your medical condition because of difficulty understanding written information? Never    How often do you have a problem understanding what is told to you about your medical condition? Never    How confident are you filling out medical forms by yourself? Extremely    Health Literacy Excellent       Functional Status, IADL    Medications independent    Meal Preparation independent    Housekeeping independent    Laundry independent    Shopping independent    If for any reason you need help with day-to-day activities such as bathing, preparing meals, shopping, managing finances, etc., do you get the help you need? I don't need any help       Mental Status    General Appearance WDL WDL       Mental Status Summary    Recent Changes in Mental Status/Cognitive Functioning no changes       Employment/    Employment Status disabled    Current or Previous Occupation construction                   Psychosocial       Row Name 07/13/25 8631       Developmental Stage (Eriksson's Stages of Development)    Developmental Stage Stage 7 (35-65 years/Middle Adulthood) Generativity vs. Stagnation                   Abuse/Neglect    No documentation.                  Legal    No documentation.                  Substance Abuse    No documentation.                  Patient Forms    No documentation.                     Beronica Tran RN

## 2025-07-13 NOTE — PLAN OF CARE
Goal Outcome Evaluation:  Plan of Care Reviewed With: patient        Progress: improving  Outcome Evaluation: vs stable on ra. no significant evens, continuing to monitor.

## 2025-07-14 LAB
ACT BLD: 304 SECONDS (ref 82–152)
ANION GAP SERPL CALCULATED.3IONS-SCNC: 15.3 MMOL/L (ref 5–15)
BUN SERPL-MCNC: 25 MG/DL (ref 6–20)
BUN/CREAT SERPL: 17 (ref 7–25)
CALCIUM SPEC-SCNC: 8.6 MG/DL (ref 8.6–10.5)
CHLORIDE SERPL-SCNC: 98 MMOL/L (ref 98–107)
CO2 SERPL-SCNC: 20.7 MMOL/L (ref 22–29)
CREAT SERPL-MCNC: 1.47 MG/DL (ref 0.76–1.27)
EGFRCR SERPLBLD CKD-EPI 2021: 54.9 ML/MIN/1.73
GLUCOSE BLDC GLUCOMTR-MCNC: 207 MG/DL (ref 70–130)
GLUCOSE BLDC GLUCOMTR-MCNC: 211 MG/DL (ref 70–130)
GLUCOSE BLDC GLUCOMTR-MCNC: 237 MG/DL (ref 70–130)
GLUCOSE BLDC GLUCOMTR-MCNC: 265 MG/DL (ref 70–130)
GLUCOSE SERPL-MCNC: 223 MG/DL (ref 65–99)
POTASSIUM SERPL-SCNC: 4.6 MMOL/L (ref 3.5–5.2)
SODIUM SERPL-SCNC: 134 MMOL/L (ref 136–145)
UFH PPP CHRO-ACNC: 0.39 IU/ML (ref 0.3–0.7)

## 2025-07-14 PROCEDURE — 82948 REAGENT STRIP/BLOOD GLUCOSE: CPT | Performed by: INTERNAL MEDICINE

## 2025-07-14 PROCEDURE — 85347 COAGULATION TIME ACTIVATED: CPT

## 2025-07-14 PROCEDURE — 80048 BASIC METABOLIC PNL TOTAL CA: CPT | Performed by: INTERNAL MEDICINE

## 2025-07-14 PROCEDURE — 63710000001 INSULIN LISPRO (HUMAN) PER 5 UNITS: Performed by: INTERNAL MEDICINE

## 2025-07-14 PROCEDURE — C1887 CATHETER, GUIDING: HCPCS | Performed by: INTERNAL MEDICINE

## 2025-07-14 PROCEDURE — 93455 CORONARY ART/GRFT ANGIO S&I: CPT | Performed by: INTERNAL MEDICINE

## 2025-07-14 PROCEDURE — C1874 STENT, COATED/COV W/DEL SYS: HCPCS | Performed by: INTERNAL MEDICINE

## 2025-07-14 PROCEDURE — 82948 REAGENT STRIP/BLOOD GLUCOSE: CPT

## 2025-07-14 PROCEDURE — C9600 PERC DRUG-EL COR STENT SING: HCPCS | Performed by: INTERNAL MEDICINE

## 2025-07-14 PROCEDURE — C1769 GUIDE WIRE: HCPCS | Performed by: INTERNAL MEDICINE

## 2025-07-14 PROCEDURE — 25810000003 SODIUM CHLORIDE 0.9 % SOLUTION: Performed by: INTERNAL MEDICINE

## 2025-07-14 PROCEDURE — B2111ZZ FLUOROSCOPY OF MULTIPLE CORONARY ARTERIES USING LOW OSMOLAR CONTRAST: ICD-10-PCS | Performed by: INTERNAL MEDICINE

## 2025-07-14 PROCEDURE — 25010000002 HEPARIN (PORCINE) PER 1000 UNITS: Performed by: INTERNAL MEDICINE

## 2025-07-14 PROCEDURE — 99232 SBSQ HOSP IP/OBS MODERATE 35: CPT | Performed by: INTERNAL MEDICINE

## 2025-07-14 PROCEDURE — B2151ZZ FLUOROSCOPY OF LEFT HEART USING LOW OSMOLAR CONTRAST: ICD-10-PCS | Performed by: INTERNAL MEDICINE

## 2025-07-14 PROCEDURE — 99231 SBSQ HOSP IP/OBS SF/LOW 25: CPT | Performed by: INTERNAL MEDICINE

## 2025-07-14 PROCEDURE — 93005 ELECTROCARDIOGRAM TRACING: CPT | Performed by: INTERNAL MEDICINE

## 2025-07-14 PROCEDURE — C1894 INTRO/SHEATH, NON-LASER: HCPCS | Performed by: INTERNAL MEDICINE

## 2025-07-14 PROCEDURE — 25010000002 MIDAZOLAM PER 1MG: Performed by: INTERNAL MEDICINE

## 2025-07-14 PROCEDURE — B2131ZZ FLUOROSCOPY OF MULTIPLE CORONARY ARTERY BYPASS GRAFTS USING LOW OSMOLAR CONTRAST: ICD-10-PCS | Performed by: INTERNAL MEDICINE

## 2025-07-14 PROCEDURE — 25010000002 LIDOCAINE 1 % SOLUTION: Performed by: INTERNAL MEDICINE

## 2025-07-14 PROCEDURE — 4A023N7 MEASUREMENT OF CARDIAC SAMPLING AND PRESSURE, LEFT HEART, PERCUTANEOUS APPROACH: ICD-10-PCS | Performed by: INTERNAL MEDICINE

## 2025-07-14 PROCEDURE — 25010000002 FENTANYL CITRATE (PF) 50 MCG/ML SOLUTION: Performed by: INTERNAL MEDICINE

## 2025-07-14 PROCEDURE — 63710000001 INSULIN GLARGINE PER 5 UNITS: Performed by: INTERNAL MEDICINE

## 2025-07-14 PROCEDURE — 027135Z DILATION OF CORONARY ARTERY, TWO ARTERIES WITH TWO DRUG-ELUTING INTRALUMINAL DEVICES, PERCUTANEOUS APPROACH: ICD-10-PCS | Performed by: INTERNAL MEDICINE

## 2025-07-14 PROCEDURE — 92928 PRQ TCAT PLMT NTRAC ST 1 LES: CPT | Performed by: INTERNAL MEDICINE

## 2025-07-14 PROCEDURE — 25510000001 IOPAMIDOL PER 1 ML: Performed by: INTERNAL MEDICINE

## 2025-07-14 PROCEDURE — 85520 HEPARIN ASSAY: CPT | Performed by: INTERNAL MEDICINE

## 2025-07-14 DEVICE — XIENCE SKYPOINT™ EVEROLIMUS ELUTING CORONARY STENT SYSTEM 2.50 MM X 18 MM / RAPID-EXCHANGE
Type: IMPLANTABLE DEVICE | Site: CORONARY | Status: FUNCTIONAL
Brand: XIENCE SKYPOINT™

## 2025-07-14 DEVICE — XIENCE SKYPOINT™ EVEROLIMUS ELUTING CORONARY STENT SYSTEM 2.50 MM X 23 MM / RAPID-EXCHANGE
Type: IMPLANTABLE DEVICE | Site: CORONARY | Status: FUNCTIONAL
Brand: XIENCE SKYPOINT™

## 2025-07-14 RX ORDER — LIDOCAINE HYDROCHLORIDE 10 MG/ML
INJECTION, SOLUTION INFILTRATION; PERINEURAL
Status: DISCONTINUED | OUTPATIENT
Start: 2025-07-14 | End: 2025-07-14 | Stop reason: HOSPADM

## 2025-07-14 RX ORDER — ONDANSETRON 4 MG/1
4 TABLET, ORALLY DISINTEGRATING ORAL EVERY 6 HOURS PRN
Status: DISCONTINUED | OUTPATIENT
Start: 2025-07-14 | End: 2025-07-15 | Stop reason: HOSPADM

## 2025-07-14 RX ORDER — NALOXONE HCL 0.4 MG/ML
0.4 VIAL (ML) INJECTION
Status: DISCONTINUED | OUTPATIENT
Start: 2025-07-14 | End: 2025-07-15 | Stop reason: HOSPADM

## 2025-07-14 RX ORDER — CLOPIDOGREL BISULFATE 75 MG/1
TABLET ORAL
Status: DISCONTINUED | OUTPATIENT
Start: 2025-07-14 | End: 2025-07-14 | Stop reason: HOSPADM

## 2025-07-14 RX ORDER — MORPHINE SULFATE 2 MG/ML
4 INJECTION, SOLUTION INTRAMUSCULAR; INTRAVENOUS
Status: DISCONTINUED | OUTPATIENT
Start: 2025-07-14 | End: 2025-07-15 | Stop reason: HOSPADM

## 2025-07-14 RX ORDER — MIDAZOLAM HYDROCHLORIDE 2 MG/2ML
INJECTION, SOLUTION INTRAMUSCULAR; INTRAVENOUS
Status: DISCONTINUED | OUTPATIENT
Start: 2025-07-14 | End: 2025-07-14 | Stop reason: HOSPADM

## 2025-07-14 RX ORDER — DIPHENHYDRAMINE HYDROCHLORIDE 50 MG/ML
25 INJECTION, SOLUTION INTRAMUSCULAR; INTRAVENOUS EVERY 6 HOURS PRN
Status: DISCONTINUED | OUTPATIENT
Start: 2025-07-14 | End: 2025-07-15 | Stop reason: HOSPADM

## 2025-07-14 RX ORDER — ACETAMINOPHEN 325 MG/1
650 TABLET ORAL EVERY 4 HOURS PRN
Status: DISCONTINUED | OUTPATIENT
Start: 2025-07-14 | End: 2025-07-15 | Stop reason: HOSPADM

## 2025-07-14 RX ORDER — TEMAZEPAM 15 MG/1
15 CAPSULE ORAL NIGHTLY PRN
Status: DISCONTINUED | OUTPATIENT
Start: 2025-07-14 | End: 2025-07-15 | Stop reason: HOSPADM

## 2025-07-14 RX ORDER — FENTANYL CITRATE 50 UG/ML
INJECTION, SOLUTION INTRAMUSCULAR; INTRAVENOUS
Status: DISCONTINUED | OUTPATIENT
Start: 2025-07-14 | End: 2025-07-14 | Stop reason: HOSPADM

## 2025-07-14 RX ORDER — VERAPAMIL HYDROCHLORIDE 2.5 MG/ML
INJECTION INTRAVENOUS
Status: DISCONTINUED | OUTPATIENT
Start: 2025-07-14 | End: 2025-07-14 | Stop reason: HOSPADM

## 2025-07-14 RX ORDER — IOPAMIDOL 755 MG/ML
INJECTION, SOLUTION INTRAVASCULAR
Status: DISCONTINUED | OUTPATIENT
Start: 2025-07-14 | End: 2025-07-14 | Stop reason: HOSPADM

## 2025-07-14 RX ORDER — ALPRAZOLAM 0.25 MG
0.25 TABLET ORAL 3 TIMES DAILY PRN
Status: DISCONTINUED | OUTPATIENT
Start: 2025-07-14 | End: 2025-07-15 | Stop reason: HOSPADM

## 2025-07-14 RX ORDER — SODIUM CHLORIDE 9 MG/ML
100 INJECTION, SOLUTION INTRAVENOUS CONTINUOUS
Status: ACTIVE | OUTPATIENT
Start: 2025-07-14 | End: 2025-07-14

## 2025-07-14 RX ORDER — SODIUM CHLORIDE 9 MG/ML
INJECTION, SOLUTION INTRAVENOUS
Status: COMPLETED | OUTPATIENT
Start: 2025-07-14 | End: 2025-07-14

## 2025-07-14 RX ORDER — METOPROLOL SUCCINATE 50 MG/1
50 TABLET, EXTENDED RELEASE ORAL
Status: DISCONTINUED | OUTPATIENT
Start: 2025-07-14 | End: 2025-07-15 | Stop reason: HOSPADM

## 2025-07-14 RX ORDER — ONDANSETRON 2 MG/ML
4 INJECTION INTRAMUSCULAR; INTRAVENOUS EVERY 6 HOURS PRN
Status: DISCONTINUED | OUTPATIENT
Start: 2025-07-14 | End: 2025-07-15 | Stop reason: HOSPADM

## 2025-07-14 RX ORDER — HEPARIN SODIUM 1000 [USP'U]/ML
INJECTION, SOLUTION INTRAVENOUS; SUBCUTANEOUS
Status: DISCONTINUED | OUTPATIENT
Start: 2025-07-14 | End: 2025-07-14 | Stop reason: HOSPADM

## 2025-07-14 RX ORDER — HYDROCODONE BITARTRATE AND ACETAMINOPHEN 5; 325 MG/1; MG/1
1 TABLET ORAL EVERY 4 HOURS PRN
Status: DISCONTINUED | OUTPATIENT
Start: 2025-07-14 | End: 2025-07-15 | Stop reason: HOSPADM

## 2025-07-14 RX ORDER — NITROGLYCERIN 0.4 MG/1
0.4 TABLET SUBLINGUAL
Status: DISCONTINUED | OUTPATIENT
Start: 2025-07-14 | End: 2025-07-15 | Stop reason: HOSPADM

## 2025-07-14 RX ORDER — AZELASTINE 1 MG/ML
1 SPRAY, METERED NASAL 2 TIMES DAILY PRN
Status: DISCONTINUED | OUTPATIENT
Start: 2025-07-14 | End: 2025-07-15 | Stop reason: HOSPADM

## 2025-07-14 RX ADMIN — Medication 10 ML: at 20:45

## 2025-07-14 RX ADMIN — ASPIRIN 81 MG: 81 TABLET, COATED ORAL at 08:21

## 2025-07-14 RX ADMIN — SODIUM CHLORIDE 100 ML/HR: 9 INJECTION, SOLUTION INTRAVENOUS at 14:28

## 2025-07-14 RX ADMIN — METOPROLOL SUCCINATE 50 MG: 50 TABLET, EXTENDED RELEASE ORAL at 17:28

## 2025-07-14 RX ADMIN — TAMSULOSIN HYDROCHLORIDE 0.4 MG: 0.4 CAPSULE ORAL at 20:45

## 2025-07-14 RX ADMIN — FENOFIBRATE 145 MG: 145 TABLET ORAL at 08:20

## 2025-07-14 RX ADMIN — METOPROLOL TARTRATE 12.5 MG: 25 TABLET, FILM COATED ORAL at 08:21

## 2025-07-14 RX ADMIN — HEPARIN SODIUM 2000 UNITS: 1000 INJECTION, SOLUTION INTRAVENOUS; SUBCUTANEOUS at 00:24

## 2025-07-14 RX ADMIN — INSULIN GLARGINE 25 UNITS: 100 INJECTION, SOLUTION SUBCUTANEOUS at 20:45

## 2025-07-14 RX ADMIN — Medication 1 TABLET: at 08:20

## 2025-07-14 RX ADMIN — ATORVASTATIN CALCIUM 80 MG: 40 TABLET, FILM COATED ORAL at 08:21

## 2025-07-14 RX ADMIN — SENNOSIDES AND DOCUSATE SODIUM 2 TABLET: 50; 8.6 TABLET ORAL at 08:20

## 2025-07-14 RX ADMIN — CLOPIDOGREL BISULFATE 75 MG: 75 TABLET, FILM COATED ORAL at 08:21

## 2025-07-14 RX ADMIN — Medication 10 ML: at 08:21

## 2025-07-14 RX ADMIN — AZELASTINE HYDROCHLORIDE 1 SPRAY: 137 SPRAY, METERED NASAL at 22:49

## 2025-07-14 RX ADMIN — INSULIN LISPRO 3 UNITS: 100 INJECTION, SOLUTION INTRAVENOUS; SUBCUTANEOUS at 08:21

## 2025-07-14 RX ADMIN — SENNOSIDES AND DOCUSATE SODIUM 2 TABLET: 50; 8.6 TABLET ORAL at 20:45

## 2025-07-14 RX ADMIN — INSULIN LISPRO 3 UNITS: 100 INJECTION, SOLUTION INTRAVENOUS; SUBCUTANEOUS at 20:44

## 2025-07-14 RX ADMIN — INSULIN LISPRO 4 UNITS: 100 INJECTION, SOLUTION INTRAVENOUS; SUBCUTANEOUS at 17:33

## 2025-07-14 NOTE — PLAN OF CARE
Problem: Adult Inpatient Plan of Care  Goal: Plan of Care Review  Outcome: Progressing  Goal: Patient-Specific Goal (Individualized)  Outcome: Progressing  Goal: Absence of Hospital-Acquired Illness or Injury  Outcome: Progressing  Intervention: Identify and Manage Fall Risk  Recent Flowsheet Documentation  Taken 7/14/2025 1600 by Ivy Saul RN  Safety Promotion/Fall Prevention: safety round/check completed  Taken 7/14/2025 1400 by Ivy Saul RN  Safety Promotion/Fall Prevention: safety round/check completed  Taken 7/14/2025 1000 by Ivy Saul RN  Safety Promotion/Fall Prevention: safety round/check completed  Taken 7/14/2025 0820 by Ivy Saul RN  Safety Promotion/Fall Prevention: safety round/check completed  Intervention: Prevent Skin Injury  Recent Flowsheet Documentation  Taken 7/14/2025 1600 by Ivy Saul RN  Body Position: position changed independently  Taken 7/14/2025 1400 by Ivy Saul RN  Body Position: position changed independently  Taken 7/14/2025 1000 by Ivy Saul RN  Body Position: position changed independently  Taken 7/14/2025 0820 by Ivy Saul RN  Body Position: position changed independently  Intervention: Prevent and Manage VTE (Venous Thromboembolism) Risk  Recent Flowsheet Documentation  Taken 7/14/2025 0820 by Ivy Saul RN  VTE Prevention/Management: (see MAR) other (see comments)  Intervention: Prevent Infection  Recent Flowsheet Documentation  Taken 7/14/2025 1600 by Ivy Saul RN  Infection Prevention: single patient room provided  Taken 7/14/2025 1400 by Ivy Saul RN  Infection Prevention: single patient room provided  Taken 7/14/2025 1000 by Ivy Saul RN  Infection Prevention: single patient room provided  Taken 7/14/2025 0820 by Ivy Saul RN  Infection Prevention: single patient room provided  Goal: Optimal Comfort and Wellbeing  Outcome: Progressing  Intervention: Provide Person-Centered Care  Recent  Flowsheet Documentation  Taken 7/14/2025 0820 by Ivy Saul RN  Trust Relationship/Rapport:   care explained   choices provided  Goal: Readiness for Transition of Care  Outcome: Progressing     Problem: Comorbidity Management  Goal: Maintenance of Behavioral Health Symptom Control  Outcome: Progressing  Intervention: Maintain Behavioral Health Symptom Control  Recent Flowsheet Documentation  Taken 7/14/2025 1600 by Ivy Saul RN  Medication Review/Management: medications reviewed  Taken 7/14/2025 1400 by Ivy Saul RN  Medication Review/Management: medications reviewed  Taken 7/14/2025 1000 by Ivy Saul RN  Medication Review/Management: medications reviewed  Taken 7/14/2025 0820 by Ivy Saul RN  Medication Review/Management: medications reviewed  Goal: Blood Glucose Level Within Target Range  Outcome: Progressing  Intervention: Monitor and Manage Glycemia  Recent Flowsheet Documentation  Taken 7/14/2025 1600 by Ivy Saul RN  Medication Review/Management: medications reviewed  Taken 7/14/2025 1400 by Ivy Saul RN  Medication Review/Management: medications reviewed  Taken 7/14/2025 1000 by Ivy Saul RN  Medication Review/Management: medications reviewed  Taken 7/14/2025 0820 by Ivy Saul RN  Medication Review/Management: medications reviewed  Goal: Blood Pressure in Desired Range  Outcome: Progressing  Intervention: Maintain Blood Pressure Management  Recent Flowsheet Documentation  Taken 7/14/2025 1600 by Ivy Saul RN  Medication Review/Management: medications reviewed  Taken 7/14/2025 1400 by Ivy Saul RN  Medication Review/Management: medications reviewed  Taken 7/14/2025 1000 by Ivy Saul RN  Medication Review/Management: medications reviewed  Taken 7/14/2025 0820 by Ivy Saul RN  Medication Review/Management: medications reviewed     Problem: Fall Injury Risk  Goal: Absence of Fall and Fall-Related Injury  Outcome:  Progressing  Intervention: Identify and Manage Contributors  Recent Flowsheet Documentation  Taken 7/14/2025 1600 by Ivy Saul RN  Medication Review/Management: medications reviewed  Taken 7/14/2025 1400 by Ivy Saul RN  Medication Review/Management: medications reviewed  Taken 7/14/2025 1000 by Ivy Saul RN  Medication Review/Management: medications reviewed  Taken 7/14/2025 0820 by Ivy Saul RN  Medication Review/Management: medications reviewed  Intervention: Promote Injury-Free Environment  Recent Flowsheet Documentation  Taken 7/14/2025 1600 by Ivy Saul RN  Safety Promotion/Fall Prevention: safety round/check completed  Taken 7/14/2025 1400 by Ivy Saul RN  Safety Promotion/Fall Prevention: safety round/check completed  Taken 7/14/2025 1000 by Ivy Saul RN  Safety Promotion/Fall Prevention: safety round/check completed  Taken 7/14/2025 0820 by Ivy Saul RN  Safety Promotion/Fall Prevention: safety round/check completed     Problem: Cardiac Catheterization (Diagnostic/Interventional)  Goal: Absence of Bleeding  Outcome: Progressing  Goal: Stable Heart Rate and Rhythm  Outcome: Progressing  Goal: Absence of Embolism Signs and Symptoms  Outcome: Progressing  Intervention: Prevent or Manage Embolism  Recent Flowsheet Documentation  Taken 7/14/2025 0820 by Ivy Saul RN  VTE Prevention/Management: (see MAR) other (see comments)   Goal Outcome Evaluation:

## 2025-07-14 NOTE — PAYOR COMM NOTE
"TO:HUMANA  FROM:MICK DRISCOLL, RN PHONE [phone]  INPT NOTIFICATION AND CLINICALS   TAX ID [id number] NP [id number] DX CODE I21.4    Myke Justice (59 y.o. Male)       Date of Birth   1965    Social Security Number       Address   [address] [place], [place] [address]    Home Phone   [phone]    MRN   [id number]       Zoroastrianism   Druze    Marital Status                               Admission Date   2025    Admission Type   Emergency    Admitting Provider   Prasad Choe MD    Attending Provider   Prasad Choe MD    Department, Room/Bed   [facility] TELEMETRY 3, 306/1       Discharge Date       Discharge Disposition       Discharge Destination                                 Attending Provider: Prasad Choe MD    Allergies: Ace Inhibitors, Bee Venom, Penicillins    Isolation: None   Infection: None   Code Status: CPR    Ht: 175.3 cm (69.02\")   Wt: 125 kg (275 lb 9.2 oz)    Admission Cmt: None   Principal Problem: Chest pain [R07.9]                   Active Insurance as of 2025       Primary Coverage       Payor Plan Insurance Group Employer/Plan Group    [id number]       Payor Plan Address Payor Plan Phone Number Payor Plan Fax Number Effective Dates    [employer] [address] [address] [phone]  2025 - None Entered    [facility] [id number]         Subscriber Name Subscriber Birth Date Member ID       MYKE JUSTICE 1965 [id number]                     Emergency Contacts        (Rel.) Home Phone Work Phone Mobile Phone    Meli Justice (Spouse) [phone] -- [phone]                 History & Physical        Anai Menendez MD at 25 1411              Date of Hospital Visit: 25  Encounter Provider: Anai Menendez MD  Place of Service: [facility]  Patient Name: Myke Justice  :1965  Referral Provider: No ref. provider " found  Primary Care Provider: Jaimie Nathan MD    Chief complaint/Reason for Consultation: Non-ST segment elevation MI      History of Present Illness:       The patient presents to the hospital for chest pain.    He experienced a sensation similar to a heart attack upon waking up this morning. He felt dizzy when he stood up to let his dog out and then sat down due to an overall feeling of discomfort. He also reported mild pain and nausea, but did not vomit. His wife suggested a hospital visit as these symptoms were reminiscent of his heart attack 2 years ago. At that time, he had elevated enzymes and was diagnosed with 6 blockages, leading to a triple bypass surgery. Since then, he has been symptom-free until today. His pain subsided after medication, but he still feels some tightness, which he attributes to his surgery. He has not had any other surgeries, irregular heartbeat, stroke, cancer, or GERD. He is retired from construction work and enjoys woodworking and yard work at home. However, he can only engage in these activities for 10 to 15 minutes before needing a 20-minute rest. He reports a lack of stamina since his bypass surgery. He has gained weight, currently weighing 273 pounds, up from 257 pounds when he was working. He has never smoked or used drugs. He is a [employer] and has an advanced directive on file. He has a history of lower back arthritis and sleep apnea, for which he uses a machine.         Problem List:  CARDIAC  Coronary Artery Disease:   NSTEMI, 2 years ago: Multivessel disease  CABG, 2 years ago: X   NSTEMI, 7/12/2025:     Myocardium:   Echo, 7/1/2023: LVEF 60%     Valvular:   No valvular disease     Electrical:   NSR     Pericardium:   Normal     CARDIAC RISK FACTORS  Hypertension  Diabetes  Dyslipidemia  Obstructive Sleep Apnea    NON-CARDIAC  GERD  Hernia  Allergies  BPH  Vitamin D deficiency  Adventism    SURGERIES  Bilateral cataract extraction  Hernia repair    Past  Medical History:   Diagnosis Date    Allergic     Anxiety and depression     Arthritis     LOWER BACK    Benign prostatic hyperplasia 04/15/2025    Colon cancer screening 06/18/2018    Added automatically from request for surgery [id number]    Coronary artery disease involving native coronary artery of native heart without angina pectoris 07/25/2023    Diabetes mellitus     Diabetic foot ulcer 11/29/2018    Diabetic retinopathy associated with type 2 diabetes mellitus 02/01/2021    Ear drum perforation, right     Elevated cholesterol     Erectile dysfunction     GERD (gastroesophageal reflux disease)     Gynecomastia 2019    Hernia     Hyperlipidemia     Hypertension     Lower urinary tract symptoms (LUTS)     Myocardial infarction     Refusal of blood product     Sleep apnea, obstructive     CPAP    Type 2 diabetes mellitus     Urge incontinence     Vitamin D deficiency disease 08/26/2016    Wears glasses     for reading only       Past Surgical History:   Procedure Laterality Date    CARDIAC CATHETERIZATION N/A 06/30/2023    Procedure: Coronary angiography;  Surgeon: Irwin Thomas MD;  Location: [facility] CATH INVASIVE LOCATION;  Service: Cardiovascular;  Laterality: N/A;    CARDIAC SURGERY      CATARACT EXTRACTION, BILATERAL      COLONOSCOPY N/A 07/10/2018    Procedure: COLONOSCOPY WITH POLYPECTOMIES;  Surgeon: Musa Berger MD;  Location: [facility] ENDOSCOPY;  Service: Gastroenterology    COLONOSCOPY N/A 08/16/2022    Procedure: COLONOSCOPY;  Surgeon: Brittany Goins MD;  Location: [facility] ENDOSCOPY;  Service: Gastroenterology;  Laterality: N/A;    COLONOSCOPY N/A 05/15/2024    Procedure: COLONOSCOPY  WITH POLYPECTOMY X 3;  Surgeon: Brittany Goins MD;  Location: [facility] ENDOSCOPY;  Service: Gastroenterology;  Laterality: N/A;    CORONARY ARTERY BYPASS GRAFT  07/2023    ENDOSCOPY N/A 03/17/2022    Procedure: ESOPHAGOGASTRODUODENOSCOPY with biopsies;  Surgeon: Brittany Goins MD;  Location:   [facility];  Service: Gastroenterology;  Laterality: N/A;    EYE SURGERY      MUSCLE REPAIR Right     biceps tendon    UMBILICAL HERNIA REPAIR      UPPER GASTROINTESTINAL ENDOSCOPY         Medications Prior to Admission   Medication Sig Dispense Refill Last Dose/Taking    aspirin 81 MG EC tablet Take 1 tablet by mouth Daily.       atorvastatin (LIPITOR) 80 MG tablet Take 1 tablet by mouth Daily. 90 tablet 3     azelastine (ASTELIN) 0.1 % nasal spray Administer 1 spray into the nostril(s) as directed by provider 2 (Two) Times a Day As Needed for Rhinitis or Allergies. Use in each nostril as directed 1 each 5     B-D UF III MINI PEN NEEDLES 31G X 5 MM misc USE 1  ONCE DAILY 100 each 0     buPROPion XL (WELLBUTRIN XL) 150 MG 24 hr tablet Take 1 tablet by mouth.       chlorthalidone (HYGROTON) 25 MG tablet Take 1 tablet by mouth once daily 90 tablet 0     Continuous Glucose Sensor (Dexcom G7 Sensor) misc Use 1 each Every 10 (Ten) Days. 9 each 3     Continuous Glucose Sensor (FreeStyle Rad 3 Sensor) misc Use 1 each Every 14 (Fourteen) Days. 6 each 3     doxycycline (VIBRAMYICN) 100 MG tablet Take 1 tablet by mouth Every 12 (Twelve) Hours.       Farxiga 10 MG tablet Take 10 mg by mouth Daily. 90 tablet 1     fenofibrate (TRICOR) 145 MG tablet Take 1 tablet by mouth Daily. 90 tablet 3     fluconazole (Diflucan) 150 MG tablet 1 po now, repeat in 5 days 2 tablet 0     insulin aspart prot & aspart (NovoLOG 70/30 FlexPen ReliOn) (70-30) 100 UNIT/ML suspension pen-injector injection 40 units twice daily before meals, titrate up as needed to  units 120 mL 1     metFORMIN ER (GLUCOPHAGE-XR) 500 MG 24 hr tablet Take 1 tablet by mouth 2 (Two) Times a Day. 180 tablet 1     metoprolol tartrate (LOPRESSOR) 25 MG tablet Take 0.5 tablets by mouth 2 (Two) Times a Day. 90 tablet 3     multivitamin with minerals tablet tablet Take 1 tablet by mouth Daily.       omeprazole (priLOSEC) 20 MG capsule Take 1 capsule by mouth Daily.  "90 capsule 1     pramipexole (MIRAPEX) 0.25 MG tablet TAKE 1 TABLET BY MOUTH THREE TIMES DAILY 270 tablet 0     tamsulosin (FLOMAX) 0.4 MG capsule 24 hr capsule Take 1 capsule by mouth Every Night. 90 capsule 2     Tirzepatide 15 MG/0.5ML solution auto-injector Inject 15 mg under the skin into the appropriate area as directed 1 (One) Time Per Week. 6 mL 1     Vibegron (Gemtesa) 75 MG tablet Take 1 tablet by mouth Daily. 30 tablet 11     vitamin D3 125 MCG (5000 UT) capsule capsule Take 1 capsule by mouth Daily.          Social History     Socioeconomic History    Marital status:    Tobacco Use    Smoking status: Never     Passive exposure: Never    Smokeless tobacco: Never   Vaping Use    Vaping status: Never Used   Substance and Sexual Activity    Alcohol use: Not Currently    Drug use: No    Sexual activity: Not Currently     Partners: Female     Birth control/protection: Condom       Family History   Problem Relation Age of Onset    Sleep apnea Mother     Vision loss Mother         Macular degeneration    Hypertension Father     Hyperlipidemia Father     Prostate cancer Father     Cancer Father         Prostate   stage1    Cancer Maternal Grandmother     Cancer Paternal Grandmother     Colon cancer Neg Hx     Liver cancer Neg Hx     Rectal cancer Neg Hx     Stomach cancer Neg Hx        REVIEW OF SYSTEMS:   Review of Systems   Cardiovascular:  Positive for chest pain.             Objective:     Vitals:    07/12/25 1101 07/12/25 1119 07/12/25 1127 07/12/25 1140   BP:    137/96   BP Location:    Left arm   Patient Position:    Lying   Pulse: 96 94 95 96   Resp:    20   Temp:    97.5 °F (36.4 °C)   TempSrc:    Oral   SpO2: 92% 94% 95% 97%   Weight:    125 kg (276 lb 0.3 oz)   Height:           Body mass index is 40.76 kg/m².  Flowsheet Rows      Flowsheet Row First Filed Value   Admission Height 175.3 cm (69\") Documented at 07/12/2025 0437   Admission Weight 124 kg (273 lb) Documented at 07/12/2025 0437      " "      Physical Exam     Constitutional:       General: Not in acute distress.     Appearance: Healthy appearance. Not in distress.     Neck:     JVP:Not elevated     Carotid artery: Normal    Pulmonary:      Effort: Pulmonary effort is normal.      Breath sounds: Normal breath sounds. No wheezing. No rhonchi. No rales.     Cardiovascular:      Normal rate. Regular rhythm. Normal S1. Normal S2.      Murmurs: There is no significant murmur.      No gallop. No click. No rub.     Abdominal:      General: Bowel sounds are normal.      Palpations: Abdomen is soft.      Tenderness: There is no abdominal tenderness.    Extremities:     Pulses:Normal radial and pedal pulses     Edema:no edema    Lab Review:             Results from last 7 days   Lab Units 07/12/25  0603 07/12/25  0445   HSTROP T ng/L 34* 37*     No results found for: \"PROBNP\"  Lipid Panel          11/4/2024    09:08 5/5/2025    11:41   Lipid Panel   Total Cholesterol 158  164    Triglycerides 450  264    HDL Cholesterol 29  37    VLDL Cholesterol 69  44    LDL Cholesterol  60  83                 Results from last 7 days   Lab Units 07/12/25  0445   SODIUM mmol/L 139   POTASSIUM mmol/L 3.8   CHLORIDE mmol/L 99   CO2 mmol/L 26.6   BUN mg/dL 19.0   CREATININE mg/dL 1.30*   GLUCOSE mg/dL 121*   CALCIUM mg/dL 9.6     Results from last 7 days   Lab Units 07/12/25  0603 07/12/25  0445   HSTROP T ng/L 34* 37*     Results from last 7 days   Lab Units 07/12/25  0445   WBC 10*3/mm3 7.73   HEMOGLOBIN g/dL 16.7   HEMATOCRIT % 49.9   PLATELETS 10*3/mm3 289                 @LABRCNT(probnp)@         EKG: Tachycardia    CXR: Normal         Assessment and Plan:        1.  Non-ST segment elevation MI: chest pain   - We will start patient on aspirin and Plavix.  - Will go ahead and start him on heparin drip  - Perform echocardiogram to assess cardiac function  - Consider repeat heart catheterization based on echocardiogram results    2.  Hyperlipidemia: Chronic  - We will keep " him on Lipitor 80 mg and check his lipid profile.  - His LDL should be less than 55    3.  Diabetes: Chronic  - We will follow his hemoglobin A1c  - He will be a good candidate for SGLT2's.  - He will be a good candidate for GLP-1's    4.  Sleep apnea: Chronic  - His wife will be bringing his CPAP machine.  - He has gained weight in the last few years      Patient or patient representative verbalized consent for the use of Ambient Listening during the visit for chart documentation.          Contains text generated by [employer]  Electronically signed by Anai Menendez MD at 25 6370       Prasad Choe MD at 25 2175            [facility]   HISTORY AND PHYSICAL      Name:  Myke Marcial   Age:  59 y.o.  Sex:  male  :  1965  MRN:  [id number]   Visit Number:  [id number]  Admission Date:  2025  Date Of Service:  25  Primary Care Physician:  Jaimie Nathan MD    Chief Complaint:     Chest pain.    History Of Presenting Illness:      Myke Marcial is a 59-year-old male with history of coronary artery disease status post CABG (2023), follows up with Dr. Thomas, diabetes mellitus type 2 on insulin, obesity, hypertension, chronic kidney disease stage II was brought to the emergency room by family with symptoms of chest pain.  Patient states that he was in his usual state of health until last night.  He woke up to walk his dog and started feeling fatigued and exertional shortness of breath.  Subsequently came down and sat on the chair and started having retrosternal chest pain lasting several minutes associated with some sweating.  Denies any radiation of the pain.  He states that this felt like his previous chest pain where he had heart attack.  He was subsequently brought to the emergency room by his wife.    Since his CABG 2 years ago, he has not had any further episodes of chest pain but does have exertional shortness of breath that has  been progressively worsening.  He does get significantly short of breath on walking to the mailbox.  He has not had any recent stress test or cardiac catheterizations.  He follows up with Dr. Thomas.  He does not smoke and has been compliant with his medications.    In the emergency room he was afebrile and hemodynamically stable saturating at 94% on room air but did have mild tachycardia 808.  Initial troponin 37 with repeat at 34.  CMP was unremarkable except for creatinine of 1.3 which seems to be baseline.  CBC was unremarkable.  Chest x-ray was unremarkable.  EKG did not show any new ST-T changes.  Due to the patient's risk factors and new onset of chest pain, he was given full dose aspirin, Maalox, Zofran in the emergency room and was subsequently admitted to the medical floor with telemetry.    Review Of Systems:    All systems were reviewed and negative except as mentioned in history of presenting illness, assessment and plan.    Past Medical History: Patient's  has a past medical history of Allergic, Anxiety and depression, Arthritis, Benign prostatic hyperplasia (04/15/2025), Colon cancer screening (06/18/2018), Coronary artery disease involving native coronary artery of native heart without angina pectoris (07/25/2023), Diabetes mellitus, Diabetic foot ulcer (11/29/2018), Diabetic retinopathy associated with type 2 diabetes mellitus (02/01/2021), Ear drum perforation, right, Elevated cholesterol, Erectile dysfunction, GERD (gastroesophageal reflux disease), Gynecomastia (2019), Hernia, Hyperlipidemia, Hypertension, Lower urinary tract symptoms (LUTS), Myocardial infarction, Refusal of blood product, Sleep apnea, obstructive, Type 2 diabetes mellitus, Urge incontinence, Vitamin D deficiency disease (08/26/2016), and Wears glasses.    Past Surgical History: Patient's  has a past surgical history that includes Muscle Repair (Right); Colonoscopy (N/A, 07/10/2018); Cataract extraction, bilateral;  Esophagogastroduodenoscopy (N/A, 03/17/2022); Colonoscopy (N/A, 08/16/2022); Cardiac catheterization (N/A, 06/30/2023); Coronary artery bypass graft (07/2023); Colonoscopy (N/A, 05/15/2024); Upper gastrointestinal endoscopy; Umbilical hernia repair; Eye surgery; and Cardiac surgery.    Social History: Patient's  reports that he has never smoked. He has never been exposed to tobacco smoke. He has never used smokeless tobacco. He reports that he does not currently use alcohol. He reports that he does not use drugs.    Family History:  Patient's family history includes Cancer in his father, maternal grandmother, and paternal grandmother; Hyperlipidemia in his father; Hypertension in his father; Prostate cancer in his father; Sleep apnea in his mother; Vision loss in his mother.    Allergies:      Ace inhibitors, Bee venom, and Penicillins    Home Medications:    Prior to Admission Medications       Prescriptions Last Dose Informant Patient Reported? Taking?    aspirin 81 MG EC tablet   No No    Take 1 tablet by mouth Daily.    atorvastatin (LIPITOR) 80 MG tablet   No No    Take 1 tablet by mouth Daily.    azelastine (ASTELIN) 0.1 % nasal spray   No No    Administer 1 spray into the nostril(s) as directed by provider 2 (Two) Times a Day As Needed for Rhinitis or Allergies. Use in each nostril as directed    B-D UF III MINI PEN NEEDLES 31G X 5 MM misc   No No    USE 1  ONCE DAILY    buPROPion XL (WELLBUTRIN XL) 150 MG 24 hr tablet   Yes No    Take 1 tablet by mouth.    chlorthalidone (HYGROTON) 25 MG tablet   No No    Take 1 tablet by mouth once daily    Continuous Glucose Sensor (Dexcom G7 Sensor) misc   No No    Use 1 each Every 10 (Ten) Days.    Continuous Glucose Sensor (FreeStyle Rad 3 Sensor) misc   No No    Use 1 each Every 14 (Fourteen) Days.    doxycycline (VIBRAMYICN) 100 MG tablet   Yes No    Take 1 tablet by mouth Every 12 (Twelve) Hours.    Farxiga 10 MG tablet   No No    Take 10 mg by mouth Daily.     fenofibrate (TRICOR) 145 MG tablet   No No    Take 1 tablet by mouth Daily.    fluconazole (Diflucan) 150 MG tablet   No No    1 po now, repeat in 5 days    insulin aspart prot & aspart (NovoLOG 70/30 FlexPen ReliOn) (70-30) 100 UNIT/ML suspension pen-injector injection   No No    40 units twice daily before meals, titrate up as needed to  units    metFORMIN ER (GLUCOPHAGE-XR) 500 MG 24 hr tablet   No No    Take 1 tablet by mouth 2 (Two) Times a Day.    metoprolol tartrate (LOPRESSOR) 25 MG tablet   No No    Take 0.5 tablets by mouth 2 (Two) Times a Day.    multivitamin with minerals tablet tablet   Yes No    Take 1 tablet by mouth Daily.    omeprazole (priLOSEC) 20 MG capsule   No No    Take 1 capsule by mouth Daily.    pramipexole (MIRAPEX) 0.25 MG tablet   No No    TAKE 1 TABLET BY MOUTH THREE TIMES DAILY    tamsulosin (FLOMAX) 0.4 MG capsule 24 hr capsule   No No    Take 1 capsule by mouth Every Night.    Tirzepatide 15 MG/0.5ML solution auto-injector   No No    Inject 15 mg under the skin into the appropriate area as directed 1 (One) Time Per Week.    Vibegron (Gemtesa) 75 MG tablet   No No    Take 1 tablet by mouth Daily.    vitamin D3 125 MCG (5000 UT) capsule capsule   Yes No    Take 1 capsule by mouth Daily.     ED Medications:    Medications   sodium chloride 0.9 % flush 10 mL (has no administration in time range)   nitroglycerin (NITROSTAT) SL tablet 0.4 mg (has no administration in time range)   aspirin tablet 325 mg (325 mg Oral Given 7/12/25 4211)   sodium chloride 0.9 % bolus 500 mL (0 mL Intravenous Stopped 7/12/25 0656)   ondansetron (ZOFRAN) injection 8 mg (8 mg Intravenous Given 7/12/25 8789)   aluminum-magnesium hydroxide-simethicone (MAALOX MAX) 400-400-40 MG/5ML suspension 30 mL (30 mL Oral Given 7/12/25 6507)     Vital Signs:  Temp:  [98 °F (36.7 °C)] 98 °F (36.7 °C)  Heart Rate:  [] 98  Resp:  [22] 22  BP: (110-132)/(75-91) 127/91        07/12/25  4947   Weight: 124 kg (273 lb)  "    Body mass index is 40.32 kg/m².    Physical Exam:     Most recent vital Signs: /91   Pulse 98   Temp 98 °F (36.7 °C) (Oral)   Resp 22   Ht 175.3 cm (69\")   Wt 124 kg (273 lb)   SpO2 90%   BMI 40.32 kg/m²     Physical Exam  Constitutional:       General: He is not in acute distress.     Appearance: He is obese. He is not ill-appearing.   HENT:      Head: Normocephalic and atraumatic.      Right Ear: External ear normal.      Left Ear: External ear normal.      Nose: Nose normal.      Mouth/Throat:      Mouth: Mucous membranes are moist.   Eyes:      Extraocular Movements: Extraocular movements intact.      Conjunctiva/sclera: Conjunctivae normal.   Cardiovascular:      Rate and Rhythm: Normal rate and regular rhythm.      Pulses: Normal pulses.      Heart sounds: Normal heart sounds. No murmur heard.     Comments: CABG scar noted.  Pulmonary:      Effort: Pulmonary effort is normal. No respiratory distress.      Breath sounds: Normal breath sounds. No wheezing.   Abdominal:      General: Bowel sounds are normal.      Palpations: Abdomen is soft.      Tenderness: There is no abdominal tenderness. There is no guarding or rebound.      Comments: Obese abdomen.   Musculoskeletal:         General: Normal range of motion.      Cervical back: Neck supple.      Right lower leg: No edema.      Left lower leg: No edema.   Skin:     General: Skin is warm.      Findings: No erythema or rash.   Neurological:      General: No focal deficit present.      Mental Status: He is alert and oriented to person, place, and time. Mental status is at baseline.   Psychiatric:         Mood and Affect: Mood normal.         Behavior: Behavior normal.       Laboratory data:    I have reviewed the labs done in the emergency room.    Results from last 7 days   Lab Units 07/12/25  0445   SODIUM mmol/L 139   POTASSIUM mmol/L 3.8   CHLORIDE mmol/L 99   CO2 mmol/L 26.6   BUN mg/dL 19.0   CREATININE mg/dL 1.30*   CALCIUM mg/dL 9.6 "   BILIRUBIN mg/dL 0.3   ALK PHOS U/L 87   ALT (SGPT) U/L 26   AST (SGOT) U/L 21   GLUCOSE mg/dL 121*     Results from last 7 days   Lab Units 07/12/25  0445   WBC 10*3/mm3 7.73   HEMOGLOBIN g/dL 16.7   HEMATOCRIT % 49.9   PLATELETS 10*3/mm3 289       Results from last 7 days   Lab Units 07/12/25  0603 07/12/25  0445   HSTROP T ng/L 34* 37*     EKG:      EKG done in the emergency room was reviewed by me.  It shows sinus tachycardia at 103.  Normal axis.  No significant ST-T changes were noted.    Radiology:    Portable chest x-ray done in the emergency room was reviewed by me.  It does not show any significant infiltrates or cardiomegaly.  Sternal wires noted.  Official report is currently pending.    Assessment:    Chest pain with borderline elevated troponins and plateau pattern, POA.  Coronary artery disease status post CABG (7/2023).  Diabetes mellitus type 2 with nephropathy.  Chronic kidney disease stage II.  Essential hypertension.  Obesity with a BMI of 40.  BPH.    Plan:    Chest pain/CAD.  - Patient does have significant risk factors and has had prior history of myocardial infarction and CABG.  - Fortunately, EKG does not show any acute ischemic changes and troponins are stable/downtrending.  - Last echocardiogram from July 2023 showed normal left ventricular systolic and diastolic functions.  - Continue home medications including aspirin, atorvastatin, metoprolol.  - I have discussed the patient's treatment plan with Dr. Mederos from cardiology and he will be consulted for further recommendations.    Diabetes mellitus type 2.  - Continue subcutaneous insulin protocol for coverage.  - Hemoglobin A1c 9.6 on 6/16/2025.    I have discussed the patient's condition and treatment plan with the patient's wife Meli who is at the bedside.    Risk Assessment: Moderate  DVT Prophylaxis: Enoxaparin  Code Status: Full  Diet: Cardiac diabetic      Prasad Choe MD  07/12/25  07:34 EDT    Dictated utilizing Dragon  dictation.    Electronically signed by Prasad Choe MD at 25 1209          Emergency Department Notes        Rm Bruce MD at 25 0514           EMERGENCY DEPARTMENT ENCOUNTER    Pt Name: Myke Marcial  MRN: [id number]  Pt :   1965  Room Number:    Date of encounter:  2025  PCP: Jaimie Nathan MD  ED Provider: Rm Bruce MD    Historian: Patient      HPI:  Chief Complaint: Chest pain        Context: Myke Marcial is a 59 y.o. male who presents to the ED c/o chest pain.  Patient has a past medical history significant for heart disease with prior CABG and diabetes.  Patient says that since last night he has not been feeling well.  He says he began having retrosternal chest discomfort associated with nausea.  Says he also felt somewhat dizzy.  Denies any headache or vision changes.  Says the pain is non-radiating.  Does not radiate through to his back.      PAST MEDICAL HISTORY  Past Medical History:   Diagnosis Date    Allergic     Anxiety and depression     Arthritis     LOWER BACK    Benign prostatic hyperplasia 04/15/2025    Colon cancer screening 2018    Added automatically from request for surgery [id number]    Coronary artery disease involving native coronary artery of native heart without angina pectoris 2023    Diabetes mellitus     Diabetic foot ulcer 2018    Diabetic retinopathy associated with type 2 diabetes mellitus 2021    Ear drum perforation, right     Elevated cholesterol     Erectile dysfunction     GERD (gastroesophageal reflux disease)     Gynecomastia 2019    Hernia     Hyperlipidemia     Hypertension     Lower urinary tract symptoms (LUTS)     Myocardial infarction     Refusal of blood product     Sleep apnea, obstructive     CPAP    Type 2 diabetes mellitus     Urge incontinence     Vitamin D deficiency disease 2016    Wears glasses     for reading only         PAST SURGICAL HISTORY  Past Surgical History:    Procedure Laterality Date    CARDIAC CATHETERIZATION N/A 06/30/2023    Procedure: Coronary angiography;  Surgeon: Irwin Thomas MD;  Location: [facility] CATH INVASIVE LOCATION;  Service: Cardiovascular;  Laterality: N/A;    CARDIAC SURGERY      CATARACT EXTRACTION, BILATERAL      COLONOSCOPY N/A 07/10/2018    Procedure: COLONOSCOPY WITH POLYPECTOMIES;  Surgeon: Musa Berger MD;  Location: [facility] ENDOSCOPY;  Service: Gastroenterology    COLONOSCOPY N/A 08/16/2022    Procedure: COLONOSCOPY;  Surgeon: Brittany Goins MD;  Location: [facility] ENDOSCOPY;  Service: Gastroenterology;  Laterality: N/A;    COLONOSCOPY N/A 05/15/2024    Procedure: COLONOSCOPY  WITH POLYPECTOMY X 3;  Surgeon: Brittany Goins MD;  Location: [facility] ENDOSCOPY;  Service: Gastroenterology;  Laterality: N/A;    CORONARY ARTERY BYPASS GRAFT  07/2023    ENDOSCOPY N/A 03/17/2022    Procedure: ESOPHAGOGASTRODUODENOSCOPY with biopsies;  Surgeon: Brittany Goins MD;  Location: [facility] ENDOSCOPY;  Service: Gastroenterology;  Laterality: N/A;    EYE SURGERY      MUSCLE REPAIR Right     biceps tendon    UMBILICAL HERNIA REPAIR      UPPER GASTROINTESTINAL ENDOSCOPY           FAMILY HISTORY  Family History   Problem Relation Age of Onset    Sleep apnea Mother     Vision loss Mother         Macular degeneration    Hypertension Father     Hyperlipidemia Father     Prostate cancer Father     Cancer Father         Prostate   stage1    Cancer Maternal Grandmother     Cancer Paternal Grandmother     Colon cancer Neg Hx     Liver cancer Neg Hx     Rectal cancer Neg Hx     Stomach cancer Neg Hx          SOCIAL HISTORY  Social History     Socioeconomic History    Marital status:    Tobacco Use    Smoking status: Never     Passive exposure: Never    Smokeless tobacco: Never   Vaping Use    Vaping status: Never Used   Substance and Sexual Activity    Alcohol use: Not Currently    Drug use: No    Sexual activity: Not Currently     Partners:  Female     Birth control/protection: Condom         ALLERGIES  Ace inhibitors, Bee venom, and Penicillins        REVIEW OF SYSTEMS    All systems reviewed and negative except for those discussed in HPI.       PHYSICAL EXAM    I have reviewed the triage vital signs and nursing notes.    ED Triage Vitals [07/12/25 0437]   Temp Heart Rate Resp BP SpO2   98 °F (36.7 °C) 108 22 124/86 94 %      Temp src Heart Rate Source Patient Position BP Location FiO2 (%)   Oral Monitor Sitting Left arm --         General: Moderate acute distress  Skin: normal color, warm and dry  Head: normocephalic, atraumatic  Eyes: Pupils equally round and reactive to light.  No nystagmus bilaterally.  Negative test of skew bilaterally.  Nose: normal nasal mucosa, no visible deformity.  Mouth: moist mucous membranes.  Neck: supple.  Chest: no retractions, no visible deformity  Cardiovascular: Regular rate and rhythm.  Lungs: clear to auscultation bilaterally.  Back: no contusions, wounds or abrasions.  Abdomen: soft, non-tender, non-distended. No rebound tenderness, no guarding.  No peritonitis.  Extremities: no cyanosis or edema. Palpable radial pulses bilaterally. Palpable dorsalis pedis pulses bilaterally.  Neuro:  alert and oriented x3, no focal neurological deficits.  5 out of 5 strength about the bilateral upper and lower extremities.  No nystagmus bilaterally.  No dysarthria, no aphasia.  No dysmetria bilaterally.  Normal heel-to-shin testing bilaterally.  Psych:  appropriate mood and behavior.        LAB RESULTS  Recent Results (from the past 24 hours)   Comprehensive Metabolic Panel    Collection Time: 07/12/25  4:45 AM    Specimen: Blood   Result Value Ref Range    Glucose 121 (H) 65 - 99 mg/dL    BUN 19.0 6.0 - 20.0 mg/dL    Creatinine 1.30 (H) 0.76 - 1.27 mg/dL    Sodium 139 136 - 145 mmol/L    Potassium 3.8 3.5 - 5.2 mmol/L    Chloride 99 98 - 107 mmol/L    CO2 26.6 22.0 - 29.0 mmol/L    Calcium 9.6 8.6 - 10.5 mg/dL    Total Protein  7.4 6.0 - 8.5 g/dL    Albumin 4.0 3.5 - 5.2 g/dL    ALT (SGPT) 26 1 - 41 U/L    AST (SGOT) 21 1 - 40 U/L    Alkaline Phosphatase 87 39 - 117 U/L    Total Bilirubin 0.3 0.0 - 1.2 mg/dL    Globulin 3.4 gm/dL    A/G Ratio 1.2 g/dL    BUN/Creatinine Ratio 14.6 7.0 - 25.0    Anion Gap 13.4 5.0 - 15.0 mmol/L    eGFR 63.7 >60.0 mL/min/1.73   High Sensitivity Troponin T    Collection Time: 07/12/25  4:45 AM    Specimen: Blood   Result Value Ref Range    HS Troponin T 37 (H) <22 ng/L   Green Top (Gel)    Collection Time: 07/12/25  4:45 AM   Result Value Ref Range    Extra Tube Hold for add-ons.    Lavender Top    Collection Time: 07/12/25  4:45 AM   Result Value Ref Range    Extra Tube hold for add-on    Gold Top - SST    Collection Time: 07/12/25  4:45 AM   Result Value Ref Range    Extra Tube Hold for add-ons.    Light Blue Top    Collection Time: 07/12/25  4:45 AM   Result Value Ref Range    Extra Tube Hold for add-ons.    CBC Auto Differential    Collection Time: 07/12/25  4:45 AM    Specimen: Blood   Result Value Ref Range    WBC 7.73 3.40 - 10.80 10*3/mm3    RBC 5.66 4.14 - 5.80 10*6/mm3    Hemoglobin 16.7 13.0 - 17.7 g/dL    Hematocrit 49.9 37.5 - 51.0 %    MCV 88.2 79.0 - 97.0 fL    MCH 29.5 26.6 - 33.0 pg    MCHC 33.5 31.5 - 35.7 g/dL    RDW 13.9 12.3 - 15.4 %    RDW-SD 44.6 37.0 - 54.0 fl    MPV 9.5 6.0 - 12.0 fL    Platelets 289 140 - 450 10*3/mm3    Neutrophil % 57.7 42.7 - 76.0 %    Lymphocyte % 31.2 19.6 - 45.3 %    Monocyte % 8.5 5.0 - 12.0 %    Eosinophil % 1.6 0.3 - 6.2 %    Basophil % 0.6 0.0 - 1.5 %    Immature Grans % 0.4 0.0 - 0.5 %    Neutrophils, Absolute 4.46 1.70 - 7.00 10*3/mm3    Lymphocytes, Absolute 2.41 0.70 - 3.10 10*3/mm3    Monocytes, Absolute 0.66 0.10 - 0.90 10*3/mm3    Eosinophils, Absolute 0.12 0.00 - 0.40 10*3/mm3    Basophils, Absolute 0.05 0.00 - 0.20 10*3/mm3    Immature Grans, Absolute 0.03 0.00 - 0.05 10*3/mm3    nRBC 0.0 0.0 - 0.2 /100 WBC   High Sensitivity Troponin T 1Hr     Collection Time: 07/12/25  6:03 AM    Specimen: Blood   Result Value Ref Range    HS Troponin T 34 (H) <22 ng/L    Troponin T Numeric Delta -3 ng/L    Troponin T % Delta -8 Abnormal if >/= 20%       If labs were ordered, I independently reviewed the results and considered them in treating the patient.  See medical decision making discussion section for my interpretation of lab results.        RADIOLOGY  No Radiology Exams Resulted Within Past 24 Hours    I ordered and independently reviewed the above noted radiographic studies.  See radiologist's dictation for official interpretation.    Per my independent reading:      Chest radiograph is obtained and is negative for acute cardiopulmonary findings based on my independent reading.            PROCEDURES    Procedures    ECG 12 Lead ED Triage Standing Order; Chest Pain   Final Result          MEDICATIONS GIVEN IN ER    Medications   sodium chloride 0.9 % flush 10 mL (has no administration in time range)   nitroglycerin (NITROSTAT) SL tablet 0.4 mg (has no administration in time range)   aspirin tablet 325 mg (325 mg Oral Given 7/12/25 3181)   sodium chloride 0.9 % bolus 500 mL (0 mL Intravenous Stopped 7/12/25 0656)   ondansetron (ZOFRAN) injection 8 mg (8 mg Intravenous Given 7/12/25 0948)   aluminum-magnesium hydroxide-simethicone (MAALOX MAX) 400-400-40 MG/5ML suspension 30 mL (30 mL Oral Given 7/12/25 4421)         MEDICAL DECISION MAKING, PROGRESS, and CONSULTS    All labs, if obtained, have been independently reviewed by me.  All radiology studies, if obtained, have been reviewed by me and the radiologist dictating the report.  All EKG's, if obtained, have been independently viewed and interpreted by me/my attending physician.      Discussion below represents my analysis of pertinent findings related to patient's condition, differential diagnosis, treatment plan and final disposition.          HEART Score: 8                Differential diagnosis:    Differential  diagnosis for this patient includes acute coronary syndrome, pneumothorax, pulmonary embolism, pericarditis, myocarditis, cardiac tamponade, pericardial effusion, aortic dissection, Boerhaave syndrome, musculoskeletal pain, reflux, other acute emergency.    Medical Decision Making Discussion:    Vitals reviewed and demonstrate tachycardia but otherwise are normal.    EKG obtained and based on my independent reading demonstrates sinus tachycardia.  No acute ischemic changes.    Labs reviewed and demonstrate an initial high-sensitivity troponin of 37.  Repeat troponin shows no significant delta rise.    Patient has been given aspirin.  He was also given GI cocktail.  Patient reports resolution of pain.    On repeat examination patient resting comfortably.  Denies having any further chest pain.  Since patient reports that the way he felt this morning is how he felt when he required CABG 2 years ago and has a heart score of 8 I think he is appropriate for hospitalization for further eval.  Case discussed with Dr. Choe who accepted the patient for hospitalization.    Additional sources:    - External (non-ED) record review: Primary care note from May 2025 documenting history of diabetes, gastroparesis, hyperlipidemia, heart disease, elevated BMI, obstructive sleep apnea.    - Chronic or social conditions impacting care: Diabetes, heart disease    Shared Decision Making:  After my consideration of clinical presentation and any laboratory/radiology studies obtained, I discussed the findings with the patient/patient representative who is in agreement with the treatment plan and the final disposition.   Risks and benefits of discharge and/or observation/admission were discussed.    Orders placed during this visit:  Orders Placed This Encounter   Procedures    XR Chest 1 View    Cornish Draw    Comprehensive Metabolic Panel    High Sensitivity Troponin T    CBC Auto Differential    High Sensitivity Troponin T 1Hr    Diet:  Cardiac, Diabetic; Healthy Heart (2-3 Na+); Consistent Carbohydrate; Fluid Consistency: Thin (IDDSI 0)    Undress & Gown    Continuous Pulse Oximetry    Oxygen Therapy- Nasal Cannula; Titrate 1-6 LPM Per SpO2; 90 - 95%    ECG 12 Lead ED Triage Standing Order; Chest Pain    ECG 12 Lead ED Triage Standing Order; Chest Pain    Insert Peripheral IV    Initiate Observation Status    CBC & Differential    Green Top (Gel)    Lavender Top    Gold Top - SST    Light Blue Top         AS OF 07:44 EDT VITALS:    BP - 127/91  HR - 98  TEMP - 98 °F (36.7 °C) (Oral)  O2 SATS - 90%                  DIAGNOSIS  Final diagnoses:   Chest pain, unspecified type         DISPOSITION  Admit      Please note that portions of this document were completed with voice recognition software.        Rm Bruce MD  07/12/25 0744      Electronically signed by Rm Bruce MD at 07/12/25 0744       Vital Signs (last day)       Date/Time Temp Temp src Pulse Resp BP Patient Position SpO2    07/14/25 0714 97.6 (36.4) Oral -- 18 126/85 Lying --    07/14/25 0330 97.6 (36.4) Oral -- 16 129/81 Lying 96    07/13/25 2315 98.8 (37.1) Oral 81 18 131/79 Lying 95    07/13/25 1913 98.3 (36.8) Oral -- 16 116/75 Lying 95    07/13/25 1100 98 (36.7) Oral 89 15 132/86 Lying 94    07/13/25 0951 -- -- -- -- 118/79 -- --    07/13/25 0700 97.9 (36.6) Temporal 84 14 118/79 Lying 93    07/13/25 0203 98 (36.7) Oral 86 12 124/80 Lying --          Current Facility-Administered Medications   Medication Dose Route Frequency Provider Last Rate Last Admin    acetaminophen (TYLENOL) tablet 650 mg  650 mg Oral Q4H PRN Prasad Choe MD        Or    acetaminophen (TYLENOL) 160 MG/5ML oral solution 650 mg  650 mg Oral Q4H PRN Prasad Choe MD        Or    acetaminophen (TYLENOL) suppository 650 mg  650 mg Rectal Q4H PRN Prasad Choe MD        aspirin EC tablet 81 mg  81 mg Oral Daily Prasad Choe MD   81 mg at 07/14/25 0821    atorvastatin (LIPITOR) tablet 80 mg  80 mg Oral  Daily Prasad Choe MD   80 mg at 07/14/25 0821    sennosides-docusate (PERICOLACE) 8.6-50 MG per tablet 2 tablet  2 tablet Oral BID Prasad Choe MD   2 tablet at 07/14/25 0820    And    polyethylene glycol (MIRALAX) packet 17 g  17 g Oral Daily PRN Prasad Choe MD        And    bisacodyl (DULCOLAX) EC tablet 5 mg  5 mg Oral Daily PRN Prasad Choe MD        And    bisacodyl (DULCOLAX) suppository 10 mg  10 mg Rectal Daily PRN Prasad Choe MD        clopidogrel (PLAVIX) tablet 75 mg  75 mg Oral Daily Anai Menendez MD   75 mg at 07/14/25 0821    dextrose (D50W) (25 g/50 mL) IV injection 25 g  25 g Intravenous Q15 Min PRN Prasad Choe MD        dextrose (GLUTOSE) oral gel 15 g  15 g Oral Q15 Min PRN Prasad Choe MD        fenofibrate (TRICOR) tablet 145 mg  145 mg Oral Daily Anai Menendez MD   145 mg at 07/14/25 0820    glucagon (GLUCAGEN) injection 1 mg  1 mg Intramuscular Q15 Min PRN Prasad Choe MD        heparin 23448 units/250 ml (100 units/ml) in D5W  15 Units/kg/hr Intravenous Titrated Anai Menendez MD 18.75 mL/hr at 07/14/25 0025 15 Units/kg/hr at 07/14/25 0025    insulin glargine (LANTUS, SEMGLEE) injection 25 Units  25 Units Subcutaneous Nightly Prasad Choe MD   25 Units at 07/13/25 2034    Insulin Lispro (humaLOG) injection 2-7 Units  2-7 Units Subcutaneous 4x Daily AC & at Bedtime Prasad Choe MD   3 Units at 07/14/25 0821    metoprolol tartrate (LOPRESSOR) tablet 12.5 mg  12.5 mg Oral BID Prasad Choe MD   12.5 mg at 07/14/25 0821    Morphine sulfate (PF) injection 2 mg  2 mg Intravenous Q4H PRN Prasad Choe MD        And    naloxone (NARCAN) injection 0.4 mg  0.4 mg Intravenous Q5 Min PRN Prasad Choe MD        multivitamin with minerals 1 tablet  1 tablet Oral Daily Prasad Choe MD   1 tablet at 07/14/25 0820    nitroglycerin (NITROSTAT) SL tablet 0.4 mg  0.4 mg Sublingual Q5 Min PRN Rm Bruce MD        ondansetron (ZOFRAN) injection 4 mg  4 mg Intravenous Q6H PRN Daija  MD Prasad        pantoprazole (PROTONIX) EC tablet 40 mg  40 mg Oral Q AM Prasad Choe MD   40 mg at 07/13/25 0630    Pharmacy to Dose Heparin   Not Applicable Continuous PRN Anai Menendez MD        sodium chloride 0.9 % flush 10 mL  10 mL Intravenous PRN Rm Bruce MD        sodium chloride 0.9 % flush 10 mL  10 mL Intravenous Q12H Prasad Choe MD   10 mL at 07/14/25 0821    sodium chloride 0.9 % flush 10 mL  10 mL Intravenous PRN Prasad Choe MD        sodium chloride 0.9 % infusion 40 mL  40 mL Intravenous PRN Prasad Choe MD        tamsulosin (FLOMAX) 24 hr capsule 0.4 mg  0.4 mg Oral Nightly Prasad Choe MD   0.4 mg at 07/13/25 2034     Lab Results (last 24 hours)       Procedure Component Value Units Date/Time    Basic Metabolic Panel [[id number]]  (Abnormal) Collected: 07/14/25 0735    Specimen: Blood Updated: 07/14/25 0844     Glucose 223 mg/dL      BUN 25.0 mg/dL      Creatinine 1.47 mg/dL      Sodium 134 mmol/L      Potassium 4.6 mmol/L      Comment: Slight hemolysis detected by analyzer. Result may be falsely elevated.        Chloride 98 mmol/L      CO2 20.7 mmol/L      Calcium 8.6 mg/dL      BUN/Creatinine Ratio 17.0     Anion Gap 15.3 mmol/L      eGFR 54.9 mL/min/1.73     Narrative:      GFR Categories in Chronic Kidney Disease (CKD)              GFR Category          GFR (mL/min/1.73)    Interpretation  G1                    90 or greater        Normal or high (1)  G2                    60-89                Mild decrease (1)  G3a                   45-59                Mild to moderate decrease  G3b                   30-44                Moderate to severe decrease  G4                    15-29                Severe decrease  G5                    14 or less           Kidney failure    (1)In the absence of evidence of kidney disease, neither GFR category G1 or G2 fulfill the criteria for CKD.    eGFR calculation 2021 CKD-EPI creatinine equation, which does not include race as a factor     Heparin Anti-Xa [[id number]]  (Normal) Collected: 07/14/25 0733    Specimen: Blood Updated: 07/14/25 0821     Heparin Anti-Xa (UFH) 0.39 IU/ml     POC Glucose 4x Daily Before Meals & at Bedtime [[id number]]  (Abnormal) Collected: 07/14/25 0739    Specimen: Blood Updated: 07/14/25 0741     Glucose 237 mg/dL      Comment: Serial Number: [id number]:  [id number]       Heparin Anti-Xa [[id number]]  (Abnormal) Collected: 07/13/25 2314    Specimen: Blood Updated: 07/13/25 2345     Heparin Anti-Xa (UFH) 0.13 IU/ml     POC Glucose Once [[id number]]  (Abnormal) Collected: 07/13/25 1917    Specimen: Blood Updated: 07/13/25 1919     Glucose 221 mg/dL      Comment: Serial Number: [id number]:  [id number]       POC Glucose Once [[id number]]  (Abnormal) Collected: 07/13/25 1633    Specimen: Blood Updated: 07/13/25 1636     Glucose 253 mg/dL      Comment: Serial Number: [id number]:  [id number]       Heparin Anti-Xa [[id number]]  (Abnormal) Collected: 07/13/25 1544    Specimen: Blood Updated: 07/13/25 1615     Heparin Anti-Xa (UFH) 0.21 IU/ml     POC Glucose Once [[id number]]  (Abnormal) Collected: 07/13/25 1214    Specimen: Blood Updated: 07/13/25 1217     Glucose 200 mg/dL      Comment: Serial Number: [id number]:  [id number]             Imaging Results (Last 24 Hours)       ** No results found for the last 24 hours. **             Physician Progress Notes (last 48 hours)        Boyd Wong MD at 07/14/25 1002          [facility] Progress note     LOS: 0 days   Patient Care Team:  Jaimie Nathan MD as PCP - General (Family Medicine)  Musa Berger MD as Consulting Physician (General Surgery)  Ondina Garcia DO as Consulting Physician (Endocrinology)  Anai Nazario MD as Consulting Physician (Pulmonary Disease)  Jagjit Edgar MD as Consulting Physician (Urology)  Irwin Thomas MD as Consulting Physician (Cardiology)  Jaclyn Ramos MD as Surgeon (Thoracic  "Surgery)  Yoko Kay PA-C as Physician Assistant (Physician Assistant)    Chief Complaint: Chest pain    Subjective:    Interval History:     Patient Complaints: none  Patient Denies:   Chest pain, shortness of breath, orthopnea, peripheral edema, palpitations, cough, sputum production, hemoptysis, abdominal pain, nausea, vomiting, diarrhea, fevers chills or night sweats  History taken from: patient chart family    Review of Systems:   All systems were reviewed and negative       Objective:    Vital Sign Min/Max for last 24 hours  Temp  Min: 97.6 °F (36.4 °C)  Max: 98.8 °F (37.1 °C)   BP  Min: 116/75  Max: 132/86   Pulse  Min: 81  Max: 89   Resp  Min: 15  Max: 18   SpO2  Min: 94 %  Max: 96 %   No data recorded   No data recorded     Flowsheet Rows      Flowsheet Row First Filed Value   Admission Height 175.3 cm (69\") Documented at 07/12/2025 0437   Admission Weight 124 kg (273 lb) Documented at 07/12/2025 0437            Physical Exam:     General Appearance:  Alert, cooperative, in no acute distress   Head:  Normocephalic, without obvious abnormality, atraumatic   Eyes:  Lids and lashes normal, conjunctivae and sclerae normal, no icterus, no pallor, corneas clear, PERRLA   Ears:  Ears appear intact with no abnormalities noted   Throat:  No oral lesions, no thrush, oral mucosa moist   Neck:  No adenopathy, supple, trachea midline, no thyromegaly, no carotid bruit, no JVD   Back:  No kyphosis present, no scoliosis present, no skin lesions, erythema or scars, no tenderness to percussion or palpation, range of motion normal   Lungs:  Clear to auscultation, respirations regular, even and unlabored    Heart:  Regular rhythm and normal rate, normal S1 and S2, no murmur, no gallop, no rub, no click   Chest Wall:  No abnormalities observed   Abdomen:  Normal bowel sounds, no masses, no organomegaly, soft non-tender, non-distended, no guarding, no rebound tenderness   Rectal:  Deferred   Extremities:  Moves all " extremities well, no edema, no cyanosis, no redness   Pulses:  Pulses palpable and equal bilaterally   Skin:  No bleeding, bruising or rash   Lymph nodes:  No palpable adenopathy   Neurologic:  Cranial nerves 2 - 12 grossly intact, sensation intact, DTR present and equal bilaterally                                                                                Results Review:     I reviewed the patient's new clinical results.      Results from last 7 days   Lab Units 25  0735   SODIUM mmol/L 134*   POTASSIUM mmol/L 4.6   CHLORIDE mmol/L 98   CO2 mmol/L 20.7*   BUN mg/dL 25.0*   CREATININE mg/dL 1.47*   GLUCOSE mg/dL 223*   CALCIUM mg/dL 8.6     Results from last 7 days   Lab Units 25  0701 25  0445   WBC 10*3/mm3 9.75 7.73   HEMOGLOBIN g/dL 16.1 16.7   HEMATOCRIT % 48.4 49.9   PLATELETS 10*3/mm3 271 289             Physical Exam    Medication Review: yes    Assessment/Plan:      Chest pain    Essential hypertension    S/P CABG x 3    Diabetic peripheral neuropathy associated with type 2 diabetes mellitus      Consulting cardiologist over the weekend has recommended coronary angiography with interventional standby.  Mr. Solomon has been engaged in a patient level discussion explaining the rationale for invasive testing.  The procedure of coronary angiography with catheter-based coronary revascularization from an anticipated radial approach has been explained in detail, using layman's terminology, including risks, benefits and alternatives.  He expresses understanding and desire to proceed.    Further recommendations will be predicated on the results of his catheterization findings.        Boyd Wong MD  25  10:03 EDT          Electronically signed by Boyd Wong MD at 25 1005       Prasad Choe MD at 25 1242                [facility]    Name:  Myke Marcial   Age:  59 y.o.  Sex:  male  :  1965  MRN:   [id number]   Visit Number:  [id number]  Admission Date:  7/12/2025  Date Of Service:  07/13/25  Primary Care Physician:  Jaimie Nathan MD     LOS: 0 days :    Chief Complaint:      Follow up of chest pain.    Subjective:  History of Present Illness  Reason for Admit: Chest pain and diabetes.    The patient experienced a brief episode of chest pain last night, which has since subsided. His sleep was interrupted due to medical interventions, but he managed to rest adequately once these were completed.    His blood sugar level was recorded at 223. He received his insulin dose last night, which is typically 50 units twice daily of 70/30 mixed insulin. Additionally, he administers Mounjaro once a week on Sundays.    MEDICATIONS  Current: 70/30 mixed insulin, Mounjaro     Review of Systems:     All systems were reviewed and negative except as mentioned in subjective, assessment and plan.    Vital Signs:    Temp:  [97.4 °F (36.3 °C)-98 °F (36.7 °C)] 98 °F (36.7 °C)  Heart Rate:  [84-94] 89  Resp:  [12-20] 15  BP: (118-136)/(76-86) 132/86    Intake and output:    I/O last 3 completed shifts:  In: 1366.7 [P.O.:720; I.V.:146.7; IV Piggyback:500]  Out: -   No intake/output data recorded.    Physical Examination:    General Appearance:  Alert and cooperative.    Head:  Atraumatic and normocephalic.   Eyes: Conjunctivae and sclerae normal, no icterus. No pallor.   Throat: No oral lesions, no thrush, oral mucosa moist.   Neck: Supple, trachea midline, no thyromegaly.   Lungs:   Breath sounds heard bilaterally equally.  No wheezing or crackles. No Pleural rub or bronchial breathing.   Heart:  Normal S1 and S2, no murmur, no gallop, no rub. No JVD. CABG scar noted.   Abdomen:   Normal bowel sounds, no masses, no organomegaly. Soft, nontender, obese, no rebound tenderness.   Extremities: Supple, no edema, no cyanosis, no clubbing.   Skin: No bleeding or rash.   Neurologic: Alert and oriented x 3. No facial asymmetry. Moves all  four limbs. No tremors.      Laboratory results:    Results from last 7 days   Lab Units 07/13/25  0701 07/12/25  0445   SODIUM mmol/L 136 139   POTASSIUM mmol/L 4.2 3.8   CHLORIDE mmol/L 98 99   CO2 mmol/L 22.8 26.6   BUN mg/dL 23.0* 19.0   CREATININE mg/dL 1.56* 1.30*   CALCIUM mg/dL 9.2 9.6   BILIRUBIN mg/dL  --  0.3   ALK PHOS U/L  --  87   ALT (SGPT) U/L  --  26   AST (SGOT) U/L  --  21   GLUCOSE mg/dL 228* 121*     Results from last 7 days   Lab Units 07/13/25  0701 07/12/25  0445   WBC 10*3/mm3 9.75 7.73   HEMOGLOBIN g/dL 16.1 16.7   HEMATOCRIT % 48.4 49.9   PLATELETS 10*3/mm3 271 289     Results from last 7 days   Lab Units 07/12/25  1636   INR  0.93     Results from last 7 days   Lab Units 07/13/25  0838 07/13/25  0701 07/12/25  0603   HSTROP T ng/L 43* 44* 34*     I have reviewed the patient's laboratory results.  Results  Labs   - Blood sugar: 223   - Hemoglobin A1c: 06/2025, 9.6     Radiology results:    XR Chest 1 View  Result Date: 7/12/2025  CLINICAL INDICATION:  Chest Pain Triage Protocol  EXAMINATION TECHNIQUE: XR CHEST 1 VW-  COMPARISON: None.  FINDINGS: Prior median sternotomy. Heart is normal in size. Aortic tortuosity and ectasia. No dense consolidation. Few scattered calcified pulmonary granulomas. No pneumothorax or effusion. No acute osseous or soft tissue abnormality.      Impression: No acute findings.  Images personally reviewed, interpreted and dictated by JOSE Alcocer.     This report was signed and finalized on 7/12/2025 7:44 AM by JOSE Alcocer.      I have reviewed the patient's radiology reports.    Medication Review:     I have reviewed the patient's active and prn medications.   Assessment & Plan  The plan for chest pain:  - He reported a little chest pain since last night, but it did not last long.  - Dr. Mederos, the cardiologist, has initiated a heparin drip and plans to perform an echocardiogram to determine if a heart catheterization is necessary.  - Discussed  with Dr. Mederos and he stated that the echocardiogram looked okay but he will let Dr. Thomas, patient's primary cardiologist to see the patient tomorrow and perform cardiac catheterization if needed.    The plan for diabetes mellitus:  - His blood sugar was 223.  - He is currently on 50 units of 70/30 mixed insulin twice a day and takes Mounjaro once a week.  - His hemoglobin A1c was 9.6 last month.  - Due to the unavailability of 70/30 insulin, he will be given Lantus 25 units tonight.     Discussed with nursing staff.    I have reviewed the copied text and it is accurate as of 07/13/25    DVT Prophylaxis: Heparin  Code Status: Full  Diet: Cardiac diabetic  Discharge Plan: Pending    Prasad Choe MD  07/13/25  12:42 EDT    Patient or patient representative verbalized consent for the use of Ambient Listening during the visit for chart documentation.       Contains text generated by popAD  Electronically signed by Prasad Choe MD at 07/13/25 1245       Consult Notes (last 48 hours)  Notes from 07/12/25 1034 through 07/14/25 1034   No notes of this type exist for this encounter.

## 2025-07-14 NOTE — PLAN OF CARE
Goal Outcome Evaluation:  Plan of Care Reviewed With: patient        Progress: improving  Outcome Evaluation: VSS; no c/o chest pain overnight; plan for heart cath in am

## 2025-07-14 NOTE — CASE MANAGEMENT/SOCIAL WORK
Case Management/Social Work    Patient Name:  Myke Marcial  YOB: 1965  MRN: [id number]  Admit Date:  7/12/2025        09:05 EDT   Discharge Plan       Row Name 07/14/25 0905       Plan    Plan home/wife                        Electronically signed by:  Karen Lara RN  07/14/25 09:05 EDT

## 2025-07-14 NOTE — PROGRESS NOTES
[facility]    Name:  Myke Marcial   Age:  59 y.o.  Sex:  male  :  1965  MRN:  [id number]   Visit Number:  [id number]  Admission Date:  2025  Date Of Service:  25  Primary Care Physician:  Jaimie Nathan MD     LOS: 0 days :    Chief Complaint:      Chest pain.    Subjective:    Myke Marcial was seen and examined this morning.  He is currently on heparin drip but still has some dull chest pain.  Denies any shortness of breath.  He is currently n.p.o. and awaiting cardiac catheterization by Dr. Wong.    Hospital Course:    Myke Marcial is a 59-year-old male with history of coronary artery disease status post CABG (2023), follows up with Dr. Thomas, diabetes mellitus type 2 on insulin, obesity, hypertension, chronic kidney disease stage II was brought to the emergency room by family with symptoms of chest pain.  Patient states that he was in his usual state of health until last night.  He woke up to walk his dog and started feeling fatigued and exertional shortness of breath.  Subsequently came down and sat on the chair and started having retrosternal chest pain lasting several minutes associated with some sweating.  Denies any radiation of the pain.  He states that this felt like his previous chest pain where he had heart attack.  He was subsequently brought to the emergency room by his wife.     Since his CABG 2 years ago, he has not had any further episodes of chest pain but does have exertional shortness of breath that has been progressively worsening.  He does get significantly short of breath on walking to the mailbox.  He has not had any recent stress test or cardiac catheterizations.  He follows up with Dr. Thomas.  He does not smoke and has been compliant with his medications.     In the emergency room he was afebrile and hemodynamically stable saturating at 94% on room air but did have mild tachycardia  808.  Initial troponin 37 with repeat at 34.  CMP was unremarkable except for creatinine of 1.3 which seems to be baseline.  CBC was unremarkable.  Chest x-ray was unremarkable.  EKG did not show any new ST-T changes.  Due to the patient's risk factors and new onset of chest pain, he was given full dose aspirin, Maalox, Zofran in the emergency room and was subsequently admitted to the medical floor with telemetry.    Patient was seen by Dr. Mederos from cardiology who recommended heparin drip.  2D echocardiogram did not show any significant regional wall motion abnormalities but due to his high risk factors and clinical symptoms, he recommended cardiac catheterization.  Patient was seen by Dr. Wong on 7/14/2025 and did undergo left heart catheterization requiring 2 stents.  Patient was loaded with Plavix and continued on aspirin.    Review of Systems:     All systems were reviewed and negative except as mentioned in subjective, assessment and plan.    Vital Signs:    Temp:  [97.6 °F (36.4 °C)-98.8 °F (37.1 °C)] 98.5 °F (36.9 °C)  Heart Rate:  [78-81] 78  Resp:  [14-18] 14  BP: (116-146)/(75-89) 146/89    Intake and output:    I/O last 3 completed shifts:  In: 386.7 [P.O.:240; I.V.:146.7]  Out: -   No intake/output data recorded.    Physical Examination:    General Appearance:  Alert and cooperative.    Head:  Atraumatic and normocephalic.   Eyes: Conjunctivae and sclerae normal, no icterus. No pallor.   Throat: No oral lesions, no thrush, oral mucosa moist.   Neck: Supple, trachea midline, no thyromegaly.   Lungs:   Breath sounds heard bilaterally equally.  No wheezing or crackles. No Pleural rub or bronchial breathing.   Heart:  Normal S1 and S2, no murmur, no gallop, no rub. No JVD.  CABG scar noted.   Abdomen:   Normal bowel sounds, no masses, no organomegaly. Soft, nontender, obese, no rebound tenderness.   Extremities: Supple, no edema, no cyanosis, no clubbing.   Skin: No bleeding or rash.   Neurologic:  Alert and oriented x 3. No facial asymmetry. Moves all four limbs. No tremors.    Laboratory results:    Results from last 7 days   Lab Units 07/14/25  0735 07/13/25  0701 07/12/25  0445   SODIUM mmol/L 134* 136 139   POTASSIUM mmol/L 4.6 4.2 3.8   CHLORIDE mmol/L 98 98 99   CO2 mmol/L 20.7* 22.8 26.6   BUN mg/dL 25.0* 23.0* 19.0   CREATININE mg/dL 1.47* 1.56* 1.30*   CALCIUM mg/dL 8.6 9.2 9.6   BILIRUBIN mg/dL  --   --  0.3   ALK PHOS U/L  --   --  87   ALT (SGPT) U/L  --   --  26   AST (SGOT) U/L  --   --  21   GLUCOSE mg/dL 223* 228* 121*     Results from last 7 days   Lab Units 07/13/25  0701 07/12/25  0445   WBC 10*3/mm3 9.75 7.73   HEMOGLOBIN g/dL 16.1 16.7   HEMATOCRIT % 48.4 49.9   PLATELETS 10*3/mm3 271 289     Results from last 7 days   Lab Units 07/12/25  1636   INR  0.93     Results from last 7 days   Lab Units 07/13/25  0838 07/13/25  0701 07/12/25  0603   HSTROP T ng/L 43* 44* 34*     I have reviewed the patient's laboratory results.    Radiology results:    No radiology results from the last 24 hrs    Medication Review:     I have reviewed the patient's active and prn medications.     Problem List:      Chest pain    Essential hypertension    S/P CABG x 3    Diabetic peripheral neuropathy associated with type 2 diabetes mellitus    Assessment:    Unstable angina with borderline elevated troponins status post PCI on 7/14/2025, POA.  Coronary artery disease status post CABG (7/2023).  Diabetes mellitus type 2 with nephropathy.  Chronic kidney disease stage II.  Essential hypertension.  Obesity with a BMI of 40.  BPH.    Plan:    Chest pain/CAD.  - Patient does have significant risk factors and has had prior history of myocardial infarction and CABG.  - Fortunately, EKG does not show any acute ischemic changes and troponins are stable/downtrending.  However due to typical symptoms and high risk factors, cardiology recommended left heart catheterization.  - 2D echocardiogram done on 7/13/2025 shows left  ventricular ejection fraction of 60%.  - Continue home medications including aspirin, atorvastatin, metoprolol.  - Continue heparin drip.  - I have discussed the patient's treatment plan with Dr. Wong and he did take the patient down for cardiac catheterization and placed 2 stents.  - Continue dual antiplatelet agents per cardiology.     Diabetes mellitus type 2.  - Continue subcutaneous insulin protocol for coverage.  - Hemoglobin A1c 9.6 on 6/16/2025.    Chronic kidney disease stage II.  - Patient's creatinine level is fairly at baseline but due to heart catheterization and IV contrast use, we will repeat renal function panel in a.m.  - If there is worsening renal failure, we will consult nephrology.    I have discussed the patient's condition and treatment plan with his wife Meli who is at the bedside.  Discussed with nursing staff and multidisciplinary team.     I have reviewed the copied text and it is accurate as of 07/14/25    DVT Prophylaxis: SCDs-if not discharged tomorrow, may need initiation of Lovenox.  Code Status: Full  Diet: Cardiac diabetic  Discharge Plan: Hopefully, home tomorrow.    Prasad Choe MD  07/14/25  13:33 EDT    Dictated utilizing Dragon dictation.

## 2025-07-14 NOTE — PROGRESS NOTES
"[facility]-Cardiology Progress note     LOS: 0 days   Patient Care Team:  Jaimie Nathan MD as PCP - General (Family Medicine)  Musa Berger MD as Consulting Physician (General Surgery)  Ondina Garcia DO as Consulting Physician (Endocrinology)  Anai Nazario MD as Consulting Physician (Pulmonary Disease)  Jagjit Edgar MD as Consulting Physician (Urology)  Irwin Thomas MD as Consulting Physician (Cardiology)  Jaclyn Ramos MD as Surgeon (Thoracic Surgery)  Yoko Kay PA-C as Physician Assistant (Physician Assistant)    Chief Complaint: Chest pain    Subjective:    Interval History:     Patient Complaints: none  Patient Denies:   Chest pain, shortness of breath, orthopnea, peripheral edema, palpitations, cough, sputum production, hemoptysis, abdominal pain, nausea, vomiting, diarrhea, fevers chills or night sweats  History taken from: patient chart family    Review of Systems:   All systems were reviewed and negative       Objective:    Vital Sign Min/Max for last 24 hours  Temp  Min: 97.6 °F (36.4 °C)  Max: 98.8 °F (37.1 °C)   BP  Min: 116/75  Max: 132/86   Pulse  Min: 81  Max: 89   Resp  Min: 15  Max: 18   SpO2  Min: 94 %  Max: 96 %   No data recorded   No data recorded     Flowsheet Rows      Flowsheet Row First Filed Value   Admission Height 175.3 cm (69\") Documented at 07/12/2025 0437   Admission Weight 124 kg (273 lb) Documented at 07/12/2025 0437            Physical Exam:     General Appearance:  Alert, cooperative, in no acute distress   Head:  Normocephalic, without obvious abnormality, atraumatic   Eyes:  Lids and lashes normal, conjunctivae and sclerae normal, no icterus, no pallor, corneas clear, PERRLA   Ears:  Ears appear intact with no abnormalities noted   Throat:  No oral lesions, no thrush, oral mucosa moist   Neck:  No adenopathy, supple, trachea midline, no thyromegaly, no carotid bruit, no JVD   Back:  No kyphosis present, no scoliosis present, no skin " lesions, erythema or scars, no tenderness to percussion or palpation, range of motion normal   Lungs:  Clear to auscultation, respirations regular, even and unlabored    Heart:  Regular rhythm and normal rate, normal S1 and S2, no murmur, no gallop, no rub, no click   Chest Wall:  No abnormalities observed   Abdomen:  Normal bowel sounds, no masses, no organomegaly, soft non-tender, non-distended, no guarding, no rebound tenderness   Rectal:  Deferred   Extremities:  Moves all extremities well, no edema, no cyanosis, no redness   Pulses:  Pulses palpable and equal bilaterally   Skin:  No bleeding, bruising or rash   Lymph nodes:  No palpable adenopathy   Neurologic:  Cranial nerves 2 - 12 grossly intact, sensation intact, DTR present and equal bilaterally                                                                                Results Review:     I reviewed the patient's new clinical results.      Results from last 7 days   Lab Units 07/14/25  0735   SODIUM mmol/L 134*   POTASSIUM mmol/L 4.6   CHLORIDE mmol/L 98   CO2 mmol/L 20.7*   BUN mg/dL 25.0*   CREATININE mg/dL 1.47*   GLUCOSE mg/dL 223*   CALCIUM mg/dL 8.6     Results from last 7 days   Lab Units 07/13/25  0701 07/12/25  0445   WBC 10*3/mm3 9.75 7.73   HEMOGLOBIN g/dL 16.1 16.7   HEMATOCRIT % 48.4 49.9   PLATELETS 10*3/mm3 271 289             Physical Exam    Medication Review: yes    Assessment/Plan:      Chest pain    Essential hypertension    S/P CABG x 3    Diabetic peripheral neuropathy associated with type 2 diabetes mellitus      Consulting cardiologist over the weekend has recommended coronary angiography with interventional standby.  Mr. Marcial has been engaged in a patient level discussion explaining the rationale for invasive testing.  The procedure of coronary angiography with catheter-based coronary revascularization from an anticipated radial approach has been explained in detail, using layman's terminology, including risks, benefits and  alternatives.  He expresses understanding and desire to proceed.    Further recommendations will be predicated on the results of his catheterization findings.        Boyd Wong MD  07/14/25  10:03 EDT

## 2025-07-14 NOTE — PROGRESS NOTES
Pharmacy to Dose Heparin Infusion    Myke Marcial is a  59 y.o. male receiving heparin infusion.     Therapy for (VTE/Cardiac):   Cardiac  Patient Weight: 125 kg  Initial Bolus (Y/N):   Y  Any Bolus (Y/N):   Y        Signs or Symptoms of Bleeding:     Cardiac or Other (Not VTE)   Initial Bolus: 60 units/kg (Max 4,000 units)  Initial rate: 12 units/kg/hr (Max 1,000 units/hr)   Anti Xa Rebolus Infusion Hold time Change infusion Dose (Units/kg/hr) Next Anti Xa or aPTT Level Due   < 0.1 50 Units/kg  (4000 Units Max) None Increase by  3 Units/kg/hr 6 hours   0.1- 0.19 25 Units/kg  (2000 Units Max) None Increase by  2 Units/kg/hr 6 hours   0.2 - 0.29 0 None Increase by  1 Units/kg/hr 6 hours   0.3 - 0.5 0 None No Change 6 hours (after 2 consecutive levels in range check qAM)   0.51 - 0.6 0 None Decrease by  1 Units/kg/hr 6 hours   0.61 - 0.8 0 30 Minutes Decrease by  2 Units/kg/hr 6 hours   0.81 - 1 0 60 Minutes Decrease by  3 Units/kg/hr 6 hours   >1 0 Hold  After Anti Xa less than 0.5 decrease previous rate by  4 Units/kg/hr  Every 2 hours until Anti Xa  less than 0.5 then when infusion restarts in 6 hours       Recommend anti-Xa every 6 hours.     Results from last 7 days   Lab Units 07/13/25  0701 07/12/25  1636 07/12/25  0445   INR   --  0.93  --    HEMOGLOBIN g/dL 16.1  --  16.7   HEMATOCRIT % 48.4  --  49.9   PLATELETS 10*3/mm3 271  --  289          Date   Time   Anti-Xa Current Rate (Unit/kg/hr) Bolus   (Units) Rate Change   (Unit/kg/hr) New Rate (Unit/kg/hr) Next   Anti-Xa Comments  Pump Check Daily   7/12/25 1640 TBD NEW 4000 +8 8 2300 D/W BRET Serra   7/12/25 2314 0.10 8 2000 +2 10 0600 D/W BRET Abrams   7/13/25 0900 0.12 10 2000 +2 12 1530 D/W BRET Ruffin   7/13/25 1630 0.21 12 0 +1 13 2300 D/W BRET Haynes   7/13/25 2314 0.13 13 2000 +2 15 0630 D/W Ramakrishna Catherine RN                                                                                                                                                                                         Pharmacy will continue to follow anti-Xa results and monitor for signs and symptoms of bleeding or thrombosis.    Crissy Case [facility]  07/14/2025 0042

## 2025-07-14 NOTE — CONSULTS
Diabetes Education    Patient Name:  Myke Marcial  YOB: 1965  MRN: [id number]  Admit Date:  7/12/2025        DM education referral related to database referral.  Pt's last A1c on 06/16/25 was 9.6%.   Pt. Is currently in heart cath lab.  Will reattempt to see when available.      Electronically signed by:  Ro Jaramillo RN  07/14/25 13:12 EDT

## 2025-07-15 ENCOUNTER — READMISSION MANAGEMENT (OUTPATIENT)
Dept: CALL CENTER | Facility: HOSPITAL | Age: 60
End: 2025-07-15
Payer: MEDICAID

## 2025-07-15 VITALS
DIASTOLIC BLOOD PRESSURE: 82 MMHG | RESPIRATION RATE: 18 BRPM | SYSTOLIC BLOOD PRESSURE: 124 MMHG | TEMPERATURE: 97.8 F | BODY MASS INDEX: 40.82 KG/M2 | HEIGHT: 69 IN | HEART RATE: 75 BPM | WEIGHT: 275.57 LBS | OXYGEN SATURATION: 98 %

## 2025-07-15 LAB
ANION GAP SERPL CALCULATED.3IONS-SCNC: 11.9 MMOL/L (ref 5–15)
BUN SERPL-MCNC: 21 MG/DL (ref 6–20)
BUN/CREAT SERPL: 15.2 (ref 7–25)
CALCIUM SPEC-SCNC: 8.7 MG/DL (ref 8.6–10.5)
CHLORIDE SERPL-SCNC: 99 MMOL/L (ref 98–107)
CHOLEST SERPL-MCNC: 168 MG/DL (ref 0–200)
CO2 SERPL-SCNC: 24.1 MMOL/L (ref 22–29)
CREAT SERPL-MCNC: 1.38 MG/DL (ref 0.76–1.27)
DEPRECATED RDW RBC AUTO: 44.9 FL (ref 37–54)
EGFRCR SERPLBLD CKD-EPI 2021: 59.3 ML/MIN/1.73
ERYTHROCYTE [DISTWIDTH] IN BLOOD BY AUTOMATED COUNT: 13.9 % (ref 12.3–15.4)
GLUCOSE BLDC GLUCOMTR-MCNC: 235 MG/DL (ref 70–130)
GLUCOSE BLDC GLUCOMTR-MCNC: 249 MG/DL (ref 70–130)
GLUCOSE SERPL-MCNC: 210 MG/DL (ref 65–99)
HCT VFR BLD AUTO: 46.4 % (ref 37.5–51)
HDLC SERPL-MCNC: 30 MG/DL (ref 40–60)
HGB BLD-MCNC: 15.4 G/DL (ref 13–17.7)
LDLC SERPL CALC-MCNC: 60 MG/DL (ref 0–100)
LDLC/HDLC SERPL: 1.19 {RATIO}
MCH RBC QN AUTO: 29.3 PG (ref 26.6–33)
MCHC RBC AUTO-ENTMCNC: 33.2 G/DL (ref 31.5–35.7)
MCV RBC AUTO: 88.2 FL (ref 79–97)
PLATELET # BLD AUTO: 249 10*3/MM3 (ref 140–450)
PMV BLD AUTO: 10.2 FL (ref 6–12)
POTASSIUM SERPL-SCNC: 3.8 MMOL/L (ref 3.5–5.2)
RBC # BLD AUTO: 5.26 10*6/MM3 (ref 4.14–5.8)
SODIUM SERPL-SCNC: 135 MMOL/L (ref 136–145)
TRIGL SERPL-MCNC: 511 MG/DL (ref 0–150)
VLDLC SERPL-MCNC: 78 MG/DL (ref 5–40)
WBC NRBC COR # BLD AUTO: 6.06 10*3/MM3 (ref 3.4–10.8)

## 2025-07-15 PROCEDURE — 99231 SBSQ HOSP IP/OBS SF/LOW 25: CPT | Performed by: INTERNAL MEDICINE

## 2025-07-15 PROCEDURE — 93005 ELECTROCARDIOGRAM TRACING: CPT | Performed by: INTERNAL MEDICINE

## 2025-07-15 PROCEDURE — 80048 BASIC METABOLIC PNL TOTAL CA: CPT | Performed by: INTERNAL MEDICINE

## 2025-07-15 PROCEDURE — 82948 REAGENT STRIP/BLOOD GLUCOSE: CPT | Performed by: INTERNAL MEDICINE

## 2025-07-15 PROCEDURE — 82948 REAGENT STRIP/BLOOD GLUCOSE: CPT

## 2025-07-15 PROCEDURE — 80061 LIPID PANEL: CPT | Performed by: INTERNAL MEDICINE

## 2025-07-15 PROCEDURE — 85027 COMPLETE CBC AUTOMATED: CPT | Performed by: INTERNAL MEDICINE

## 2025-07-15 PROCEDURE — 99239 HOSP IP/OBS DSCHRG MGMT >30: CPT | Performed by: INTERNAL MEDICINE

## 2025-07-15 PROCEDURE — 63710000001 INSULIN LISPRO (HUMAN) PER 5 UNITS: Performed by: INTERNAL MEDICINE

## 2025-07-15 RX ORDER — PANTOPRAZOLE SODIUM 40 MG/1
40 TABLET, DELAYED RELEASE ORAL
Qty: 30 TABLET | Refills: 0 | Status: SHIPPED | OUTPATIENT
Start: 2025-07-16 | End: 2025-08-15

## 2025-07-15 RX ORDER — CLOPIDOGREL BISULFATE 75 MG/1
75 TABLET ORAL DAILY
Qty: 30 TABLET | Refills: 0 | Status: SHIPPED | OUTPATIENT
Start: 2025-07-16

## 2025-07-15 RX ORDER — METOPROLOL SUCCINATE 50 MG/1
50 TABLET, EXTENDED RELEASE ORAL
Qty: 30 TABLET | Refills: 0 | Status: SHIPPED | OUTPATIENT
Start: 2025-07-16 | End: 2025-08-15

## 2025-07-15 RX ADMIN — INSULIN LISPRO 3 UNITS: 100 INJECTION, SOLUTION INTRAVENOUS; SUBCUTANEOUS at 11:37

## 2025-07-15 RX ADMIN — PANTOPRAZOLE SODIUM 40 MG: 40 TABLET, DELAYED RELEASE ORAL at 05:27

## 2025-07-15 RX ADMIN — ASPIRIN 81 MG: 81 TABLET, COATED ORAL at 08:09

## 2025-07-15 RX ADMIN — Medication 10 ML: at 08:10

## 2025-07-15 RX ADMIN — Medication 1 TABLET: at 08:09

## 2025-07-15 RX ADMIN — SENNOSIDES AND DOCUSATE SODIUM 2 TABLET: 50; 8.6 TABLET ORAL at 08:09

## 2025-07-15 RX ADMIN — INSULIN LISPRO 3 UNITS: 100 INJECTION, SOLUTION INTRAVENOUS; SUBCUTANEOUS at 08:09

## 2025-07-15 RX ADMIN — METOPROLOL SUCCINATE 50 MG: 50 TABLET, EXTENDED RELEASE ORAL at 08:09

## 2025-07-15 RX ADMIN — ATORVASTATIN CALCIUM 80 MG: 40 TABLET, FILM COATED ORAL at 08:09

## 2025-07-15 RX ADMIN — CLOPIDOGREL BISULFATE 75 MG: 75 TABLET, FILM COATED ORAL at 08:09

## 2025-07-15 RX ADMIN — FENOFIBRATE 145 MG: 145 TABLET ORAL at 08:09

## 2025-07-15 NOTE — CASE MANAGEMENT/SOCIAL WORK
Case Management Discharge Note                Selected Continued Care - Admitted Since 7/12/2025       Destination    No services have been selected for the patient.                Durable Medical Equipment    No services have been selected for the patient.                Dialysis/Infusion    No services have been selected for the patient.                Home Medical Care    No services have been selected for the patient.                Therapy    No services have been selected for the patient.                Community Resources    No services have been selected for the patient.                [facility]    No services have been selected for the patient.                    Transportation Services  Transportation: Private Transportation  Private: Car    Final Discharge Disposition Code: 01 - home or self-care

## 2025-07-15 NOTE — CONSULTS
"Diabetes Education  Assessment/Teaching    Patient Name:  Myke Marcial  YOB: 1965  MRN: [id number]  Admit Date:  7/12/2025      Assessment Date:  7/15/2025  Flowsheet Row Most Recent Value   General Information     Height 175.3 cm (69.02\")   Height Method Stated   Weight 125 kg (275 lb 9.2 oz)   Weight Method Bed scale   Pregnancy Assessment    Diabetes History    What type of diabetes do you have? Type 2   Length of Diabetes Diagnosis 10 + years  [15 years]   Current DM knowledge good   Have you had diabetes education/teaching in the past? yes   When and where was your diabetes education? follows with [facility]   Do you test your blood sugar at home? yes   Frequency of checks CGM continuous   Meter type Dexcom G7   Who performs the test? self   Typical readings pt. states that he really does not pay that much attention to the numbers and time of day.  Pt. was provided with BG logs and discussed checking his glucose and writing down number prior to meals and 2 hours pos meals.  Target goals were listed at the top.   Have you had low blood sugar? (<70mg/dl) no   Have you had high blood sugar? (>140mg/dl) yes   How often do you have high blood sugar? frequently   Feeling down, depressed, or hopeless Not at all   Little interest or pleasure in doing things Not at all   Do you have any diabetes complications? heart disease, foot ulcers, circulation problems, nephropathy, retinopathy   Education Preferences    What areas of diabetes would you like to learn about? avoiding high blood sugar, avoiding low blood sugar, diet information, medications for diabetes, testing my blood sugar at home   Nutrition Information    Are you currently following a special meal plan? occasionally  [pt. has switched over to sugar free beverages.  pt. states that he does not like many fruits or veges and was raised up with meat and potatoes like his grandfather liked.]   What is the biggest " challenge you have with your diet? Poor choices   Do any of the following issues affect your eating habits? --  [pt. states that he really does not eat 3 meals a day.  pt. states that he eats smaller amounts of food throughout the day.]   Assessment Topics    Healthy Eating - Assessment Needs education  [pt. given inforamtion on the Healthy Plate Method.  Barrier is dislike of veges.]   Being Active - Assessment --  [pt. states that since long term he has become more sedentary]   Taking Medication - Assessment Needs education  [Pt. states that he does miss doses of his Novolog 70/30 .  Pt. is taking 50 units with reakfast and 50 units with Supper.  Pt. is taking Farxiga 10 mg daily.  Metforman 500 mg 2 times a day and Mounjaro 15 mg weekly]   Problem Solving - Assessment Competent   Reducing Risk - Assessment Needs education   Healthy Coping - Assessment Competent   Monitoring - Assessment Competent   DM Goals             Flowsheet Row Most Recent Value   DM Education Needs    Meter Has own   Meter Type --  [Dexcom G7]   Frequency of Testing --  [continuous]   Blood Glucose Target 150   Blood Glucose Target Range  fasting and 2 hours post meal less than 180   Medication Oral, Insulin, Other injectables, Actions, Side effects   Problem Solving Hypoglycemia, Hyperglycemia, Signs, Symptoms, Treatment   Reducing Risks A1C testing   Healthy Eating Reviewed meal plan, Basic meal plan provided   Healthy Coping Appropriate   Discharge Plan Home, Follow-up with endocrinolgoist   Motivation Engaged   Teaching Method Explanation, Discussion, Handouts   Patient Response Verbalized understanding              Other Comments:  DM education referral related to A1c>7%.  Pt. Admitted 0n 07/12/25 for chest pain and had 2 stents placed during heart cath.  Pt. Has had a history of CAD and CABG about 2 years ago. Pt. Has had a foot ulcer, MI, CKD2, PRATEEK, cataracts and retinopathy.  Pt. Also has risk factors of increased cholesterol  "and HTN.  Pt's A1c on 06/16/25 was 9.6%.  Pt. Follows with Ondina Ralph at [facility].  Pt. States that his Mounjaro was increased to 15 units at that time.  .  Pt. Was provided with written material on the Healthy Plate Method, BG log and the \"Diabetes Basics\" booklet.  Pt. Has no questions at this time and will be following with endocrinology in about 2 months.  Pt. Was provided with my contact information about any questions or concerns         Electronically signed by:  Ro Jaramillo RN  07/15/25 11:29 EDT  "

## 2025-07-15 NOTE — DISCHARGE SUMMARY
[facility]   DISCHARGE SUMMARY      Name:  Myke Marcial   Age:  59 y.o.  Sex:  male  :  1965  MRN:  [id number]   Visit Number:  [id number]    Admission Date:  2025  Date of Discharge:  7/15/2025  Primary Care Physician:  Jaimie Nathan MD    Important issues to note:    Start: Plavix, Toprol, Protonix  Stop: Omeprazole, Lopressor  Follow up: PCP and cardiology  Brief Summary: Presented with chest pain due to coronary disease.  Received drug-eluting stents by Dr. Wong with vast improvement of symptoms.  Stable for discharge.    Discharge Diagnoses:   Unstable angina with borderline elevated troponins status post PCI on 2025, POA.  Coronary artery disease status post CABG (2023).  Diabetes mellitus type 2 with nephropathy. Diabetes mellitus with hyperglycemia   Chronic kidney disease stage II.  Essential hypertension.  Obesity with a BMI of 40.  BPH.           Problem List:     Active Hospital Problems    Diagnosis  POA    **Chest pain [R07.9]  Yes    S/P CABG x 3 [Z95.1]  Not Applicable    Diabetic peripheral neuropathy associated with type 2 diabetes mellitus [E11.42]  Yes    Essential hypertension [I10]  Yes      Resolved Hospital Problems   No resolved problems to display.     Presenting Problem:    Chief Complaint   Patient presents with    Chest Pain      Consults:     Consulting Physician(s)         Provider   Role Specialty     Boyd Wong MD      Consulting Physician Cardiology          Procedures Performed:    Procedure(s):  Coronary angiography  Percutaneous Coronary Intervention  Saphenous Vein Graft    History of presenting illness:       Myke Marcial is a 59-year-old male with history of coronary artery disease status post CABG (2023), follows up with Dr. Thomas, diabetes mellitus type 2 on insulin, obesity, hypertension, chronic kidney disease stage II was brought to the emergency room by family with symptoms of chest  pain.  Patient states that he was in his usual state of health until last night.  He woke up to walk his dog and started feeling fatigued and exertional shortness of breath.  Subsequently came down and sat on the chair and started having retrosternal chest pain lasting several minutes associated with some sweating.  Denies any radiation of the pain.  He states that this felt like his previous chest pain where he had heart attack.  He was subsequently brought to the emergency room by his wife.     Since his CABG 2 years ago, he has not had any further episodes of chest pain but does have exertional shortness of breath that has been progressively worsening.  He does get significantly short of breath on walking to the mailbox.  He has not had any recent stress test or cardiac catheterizations.  He follows up with Dr. Thomas.  He does not smoke and has been compliant with his medications.     In the emergency room he was afebrile and hemodynamically stable saturating at 94% on room air but did have mild tachycardia 808.  Initial troponin 37 with repeat at 34.  CMP was unremarkable except for creatinine of 1.3 which seems to be baseline.  CBC was unremarkable.  Chest x-ray was unremarkable.  EKG did not show any new ST-T changes.  Due to the patient's risk factors and new onset of chest pain, he was given full dose aspirin, Maalox, Zofran in the emergency room and was subsequently admitted to the medical floor with telemetry.     Patient was seen by Dr. Meedros from cardiology who recommended heparin drip.  2D echocardiogram did not show any significant regional wall motion abnormalities but due to his high risk factors and clinical symptoms, he recommended cardiac catheterization.  Patient was seen by Dr. Wong on 7/14/2025 and did undergo left heart catheterization requiring 2 stents.  Patient was loaded with Plavix and continued on aspirin.  Edited by: David Nielsen DO at 7/15/2025 [address] [place] [address]  Course:    This patient's problems and plans were partially entered by my partner and updated as appropriate by me 07/15/25.    7/15/2025: Previous provider documentation reviewed.     Chest pain/CAD.  - C 7/14/2025 Dr. Wong PCI with JUAN to OM, RCA  - Continue dual antiplatelet, statin agents per cardiology.  Omeprazole switched to Protonix to decrease interaction risk with Plavix Lopressor switched to Toprol.  Follow-up with Dr. Thomas.     Diabetes mellitus type 2.  Diabetes mellitus with hyperglycemia   - Continue subcutaneous insulin protocol for coverage.  While inpatient.  Resume previous regimen at discharge.  - Hemoglobin A1c 9.6 on 6/16/2025.     Chronic kidney disease stage II.  - Creatinine monitored and stable          DVT Prophylaxis: SCDs-if not discharged tomorrow, may need initiation of Lovenox.  Code Status: Full  Diet: Cardiac diabetic  Discharge Plan: Home independently  Edited by: David Nielsen DO at 7/15/2025 0800      Vital Signs:    Temp:  [97.6 °F (36.4 °C)-98.9 °F (37.2 °C)] 97.8 °F (36.6 °C)  Heart Rate:  [75-85] 75  Resp:  [16-18] 18  BP: ()/(68-93) 124/82    Physical Exam:    Constitutional: No acute distress, awake, alert  HENT: NCAT, mucous membranes moist  Respiratory: Clear to auscultation bilaterally, respiratory effort normal   Cardiovascular: RRR, no murmurs, rubs, or gallops  Gastrointestinal: Positive bowel sounds, soft, nontender, nondistended  Musculoskeletal: No bilateral ankle edema  Psychiatric: Appropriate affect, cooperative  Neurologic: Oriented x 3, speech clear  Skin: No rashes  Edited by: David Nielsen DO at 7/15/2025 0800    Pertinent Lab Results:     Results from last 7 days   Lab Units 07/15/25  0717 07/14/25  0735 07/13/25  0701 07/12/25  0445   SODIUM mmol/L 135* 134* 136 139   POTASSIUM mmol/L 3.8 4.6 4.2 3.8   CHLORIDE mmol/L 99 98 98 99   CO2 mmol/L 24.1 20.7* 22.8 26.6   BUN mg/dL 21.0* 25.0* 23.0* 19.0   CREATININE mg/dL 1.38* 1.47*  1.56* 1.30*   CALCIUM mg/dL 8.7 8.6 9.2 9.6   BILIRUBIN mg/dL  --   --   --  0.3   ALK PHOS U/L  --   --   --  87   ALT (SGPT) U/L  --   --   --  26   AST (SGOT) U/L  --   --   --  21   GLUCOSE mg/dL 210* 223* 228* 121*     Results from last 7 days   Lab Units 07/15/25  0717 07/13/25  0701 07/12/25  0445   WBC 10*3/mm3 6.06 9.75 7.73   HEMOGLOBIN g/dL 15.4 16.1 16.7   HEMATOCRIT % 46.4 48.4 49.9   PLATELETS 10*3/mm3 249 271 289     Results from last 7 days   Lab Units 07/12/25  1636   INR  0.93     Results from last 7 days   Lab Units 07/13/25  0838 07/13/25  0701 07/12/25  0603   HSTROP T ng/L 43* 44* 34*                           Pertinent Radiology Results:    Imaging Results (All)       Procedure Component Value Units Date/Time    XR Chest 1 View [[id number]] Collected: 07/12/25 0744     Updated: 07/12/25 0746    Narrative:      CLINICAL INDICATION:    Chest Pain Triage Protocol     EXAMINATION TECHNIQUE:  XR CHEST 1 VW-     COMPARISON:  None.     FINDINGS:  Prior median sternotomy. Heart is normal in size. Aortic tortuosity and  ectasia. No dense consolidation. Few scattered calcified pulmonary  granulomas. No pneumothorax or effusion. No acute osseous or soft tissue  abnormality.       Impression:      No acute findings.     Images personally reviewed, interpreted and dictated by JOSE Alcocer.              This report was signed and finalized on 7/12/2025 7:44 AM by JOSE Alcocer.               Echo:    Results for orders placed during the hospital encounter of 07/12/25    Adult Transthoracic Echo Complete W/ Cont if Necessary Per Protocol 07/13/2025 12:27 PM    Interpretation Summary    Left ventricular systolic function is normal. Calculated left ventricular EF = 60.6% Left ventricular ejection fraction appears to be 56 - 60%.    The left atrial cavity is borderline dilated. Left atrial volume is normal.    Condition on Discharge:      Stable.    Code status during the hospital  stay:    Code Status and Medical Interventions: CPR (Attempt to Resuscitate); Full Support   Ordered at: 07/12/25 1034     Code Status (Patient has no pulse and is not breathing):    CPR (Attempt to Resuscitate)     Medical Interventions (Patient has pulse or is breathing):    Full Support     Discharge Disposition:    Home or Self Care    Discharge Medications:       Discharge Medications        New Medications        Instructions Start Date   clopidogrel 75 MG tablet  Commonly known as: PLAVIX   75 mg, Oral, Daily   Start Date: July 16, 2025     metoprolol succinate XL 50 MG 24 hr tablet  Commonly known as: TOPROL-XL   50 mg, Oral, Every 24 Hours Scheduled   Start Date: July 16, 2025     pantoprazole 40 MG EC tablet  Commonly known as: PROTONIX  Replaces: omeprazole 20 MG capsule   40 mg, Oral, Every Early Morning   Start Date: July 16, 2025            Continue These Medications        Instructions Start Date   aspirin 81 MG EC tablet   81 mg, Oral, Daily      atorvastatin 80 MG tablet  Commonly known as: LIPITOR   80 mg, Oral, Daily      azelastine 0.1 % nasal spray  Commonly known as: ASTELIN   1 spray, Nasal, 2 Times Daily PRN, Use in each nostril as directed      B-D UF III MINI PEN NEEDLES 31G X 5 MM misc  Generic drug: Insulin Pen Needle   USE 1  ONCE DAILY      Farxiga 10 MG tablet  Generic drug: dapagliflozin Propanediol   10 mg, Oral, Daily      FreeStyle Rad 3 Sensor misc   1 each, Not Applicable, Every 14 Days      Dexcom G7 Sensor misc   1 each, Not Applicable, Every 10 Days      Gemtesa 75 MG tablet  Generic drug: Vibegron   75 mg, Oral, Daily      metFORMIN  MG 24 hr tablet  Commonly known as: GLUCOPHAGE-XR   500 mg, Oral, 2 Times Daily      multivitamin with minerals tablet tablet   1 tablet, Daily      NovoLOG 70/30 FlexPen ReliOn (70-30) 100 UNIT/ML suspension pen-injector injection  Generic drug: insulin aspart prot & aspart   40 units twice daily before meals, titrate up as needed to MDD  120 units      pramipexole 0.25 MG tablet  Commonly known as: MIRAPEX   TAKE 1 TABLET BY MOUTH THREE TIMES DAILY      tamsulosin 0.4 MG capsule 24 hr capsule  Commonly known as: FLOMAX   0.4 mg, Oral, Nightly      Tirzepatide 15 MG/0.5ML solution auto-injector   15 mg, Subcutaneous, Weekly      vitamin D3 125 MCG (5000 UT) capsule capsule   5,000 Units, Daily             Stop These Medications      buPROPion  MG 24 hr tablet  Commonly known as: WELLBUTRIN XL     chlorthalidone 25 MG tablet  Commonly known as: HYGROTON     doxycycline 100 MG tablet  Commonly known as: VIBRAMYICN     fenofibrate 145 MG tablet  Commonly known as: TRICOR     fluconazole 150 MG tablet  Commonly known as: Diflucan     metoprolol tartrate 25 MG tablet  Commonly known as: LOPRESSOR     omeprazole 20 MG capsule  Commonly known as: priLOSEC  Replaced by: pantoprazole 40 MG EC tablet            Discharge Diet:     Diet Instructions       Advance Diet As Tolerated -Target Diet: Cardiac diabetic      Target Diet: Cardiac diabetic          Activity at Discharge:       Follow-up Appointments:    Additional Instructions for the Follow-ups that You Need to Schedule       Ambulatory Referral to Cardiac Rehab   As directed      Discharge Follow-up with PCP   As directed       Currently Documented PCP:    Jaimie Nathan MD    PCP Phone Number:    [phone]     Follow Up Details: 1 week        Discharge Follow-up with Specified Provider: Dr. Thomas as scheduled   As directed      To: Dr. Thomas as scheduled               Follow-up Information       Jaimie Nathan MD .    Specialties: Family Medicine, Emergency Medicine  Why: 1 week  Contact information:  [address] [place] [address] [address] [place] [address]  [phone]                           Future Appointments   Date Time Provider Department Center   7/31/2025  2:15 PM Nataliia Jolly APRN [facility]   8/13/2025 11:45 AM Irwin Thomas MD [facility] CD [facility]   9/22/2025  8:45 AM Radha  Ondina Espinosa,  [facility] END [facility]   10/15/2025  9:40 AM Julia Chen APRN [facility]   11/5/2025  9:45 AM Jaimie Nathan MD [facility] PC [facility]   1/7/2026  3:45 PM Brittany Goins MD [facility] GE [facility]   5/7/2026 10:15 AM Anai Nazario MD [facility]     Test Results Pending at Discharge:           David Nielsen DO  07/15/25  15:39 EDT    Time: I spent 45 minutes on this discharge activity which included: face-to-face encounter with the patient, reviewing the data in the system, coordination of the care with the nursing staff as well as consultants, documentation, and entering orders.     Dictated utilizing Dragon dictation.

## 2025-07-15 NOTE — PLAN OF CARE
Goal Outcome Evaluation:  Plan of Care Reviewed With: patient        Progress: improving  Outcome Evaluation: VSS; no bleeding noted at cath site; plan for dc to home

## 2025-07-15 NOTE — CASE MANAGEMENT/SOCIAL WORK
Case Management/Social Work    Patient Name:  Myke Marcial  YOB: 1965  MRN: [id number]  Admit Date:  7/12/2025        08:21 EDT   Discharge Plan       Row Name 07/15/25 0821       Plan    Plan home/wife                        Electronically signed by:  Karen Lara RN  07/15/25 08:21 EDT

## 2025-07-15 NOTE — OUTREACH NOTE
Prep Survey      Flowsheet Row Responses   [facility] facility patient discharged from? [place]   Is LACE score < 7 ? No   Eligibility [facility]   Date of Admission 07/12/25   Date of Discharge 07/15/25   Discharge Disposition Home or Self Care   Discharge diagnosis Chest pain-Coronary angiography   Does the patient have one of the following disease processes/diagnoses(primary or secondary)? Other   Does the patient have Home health ordered? No   Is there a DME ordered? No   Prep survey completed? Yes            NEGIN MALLOY - Registered Nurse

## 2025-07-15 NOTE — PROGRESS NOTES
"[facility]-Cardiology Progress note     LOS: 1 day   Patient Care Team:  Jaimie Nathan MD as PCP - General (Family Medicine)  Musa Berger MD as Consulting Physician (General Surgery)  Ondina Garcia DO as Consulting Physician (Endocrinology)  Anai Nazario MD as Consulting Physician (Pulmonary Disease)  Jagjit Edgar MD as Consulting Physician (Urology)  Irwin Thomas MD as Consulting Physician (Cardiology)  Jaclyn Ramos MD as Surgeon (Thoracic Surgery)  Yoko Kay PA-C as Physician Assistant (Physician Assistant)    Chief Complaint: Chest pain    Subjective:    Interval History:     Patient Complaints: none  Patient Denies:   Chest pain, shortness of breath, orthopnea, peripheral edema, palpitations, cough, sputum production, hemoptysis, abdominal pain, nausea, vomiting, diarrhea, fevers chills or night sweats  History taken from: patient family    Review of Systems:   All systems were reviewed and negative       Objective:    Vital Sign Min/Max for last 24 hours  Temp  Min: 97.6 °F (36.4 °C)  Max: 98.9 °F (37.2 °C)   BP  Min: 91/76  Max: 146/89   Pulse  Min: 74  Max: 85   Resp  Min: 14  Max: 18   SpO2  Min: 89 %  Max: 98 %   Flow (L/min) (Oxygen Therapy)  Min: 2  Max: 2   No data recorded     Flowsheet Rows      Flowsheet Row First Filed Value   Admission Height 175.3 cm (69\") Documented at 07/12/2025 0437   Admission Weight 124 kg (273 lb) Documented at 07/12/2025 0437            Physical Exam:     General Appearance:  Alert, cooperative, in no acute distress   Head:  Normocephalic, without obvious abnormality, atraumatic   Eyes:  Lids and lashes normal, conjunctivae and sclerae normal, no icterus, no pallor, corneas clear, PERRLA   Ears:  Ears appear intact with no abnormalities noted   Throat:  No oral lesions, no thrush, oral mucosa moist   Neck:  No adenopathy, supple, trachea midline, no thyromegaly, no carotid bruit, no JVD   Back:  No kyphosis present, no scoliosis " present, no skin lesions, erythema or scars, no tenderness to percussion or palpation, range of motion normal   Lungs:  Clear to auscultation, respirations regular, even and unlabored    Heart:  Regular rhythm and normal rate, normal S1 and S2, no murmur, no gallop, no rub, no click   Chest Wall:  No abnormalities observed   Abdomen:  Normal bowel sounds, no masses, no organomegaly, soft non-tender, non-distended, no guarding, no rebound tenderness   Rectal:  Deferred   Extremities:  Moves all extremities well, no edema, no cyanosis, no redness   Pulses:  Pulses palpable and equal bilaterally   Skin:  No bleeding, bruising or rash   Lymph nodes:  No palpable adenopathy   Neurologic:  Cranial nerves 2 - 12 grossly intact, sensation intact, DTR present and equal bilaterally                                                                                Results Review:     I reviewed the patient's new clinical results.      Results from last 7 days   Lab Units 07/15/25  0717   SODIUM mmol/L 135*   POTASSIUM mmol/L 3.8   CHLORIDE mmol/L 99   CO2 mmol/L 24.1   BUN mg/dL 21.0*   CREATININE mg/dL 1.38*   GLUCOSE mg/dL 210*   CALCIUM mg/dL 8.7     Results from last 7 days   Lab Units 07/15/25  0717 07/13/25  0701 07/12/25  0445   WBC 10*3/mm3 6.06 9.75 7.73   HEMOGLOBIN g/dL 15.4 16.1 16.7   HEMATOCRIT % 46.4 48.4 49.9   PLATELETS 10*3/mm3 249 271 289             Physical Exam    Medication Review: yes    Assessment/Plan:      Chest pain    Essential hypertension    S/P CABG x 3    Diabetic peripheral neuropathy associated with type 2 diabetes mellitus      Outpatient cardiology follow-up with his established cardiologist-Dr. Thomas within 1 month of discharge    Plan for disposition:Where: home and When:  today    Boyd Wong MD  07/15/25  11:31 EDT

## 2025-07-16 ENCOUNTER — TRANSITIONAL CARE MANAGEMENT TELEPHONE ENCOUNTER (OUTPATIENT)
Dept: CALL CENTER | Facility: HOSPITAL | Age: 60
End: 2025-07-16
Payer: MEDICAID

## 2025-07-16 NOTE — OUTREACH NOTE
Call Center TCM Note      Flowsheet Row Responses   [facility] patient discharged from? [facility]   Does the patient have one of the following disease processes/diagnoses(primary or secondary)? Other   TCM attempt successful? Yes  [vr- spouse]   Call start time 1445   Call end time 1449   Discharge diagnosis Chest pain-Coronary angiography   Meds reviewed with patient/caregiver? Yes   Is the patient having any side effects they believe may be caused by any medication additions or changes? No   Does the patient have all medications ordered at discharge? Yes   Is the patient taking all medications as directed (includes completed medication regime)? Yes   Comments HOSP DC FU appt 7/17/25 115 pm.   Does the patient have an appointment with their PCP within 7-14 days of discharge? Yes   Has home health visited the patient within 72 hours of discharge? N/A   Psychosocial issues? No   Did the patient receive a copy of their discharge instructions? Yes   Nursing interventions Reviewed instructions with patient   What is the patient's perception of their health status since discharge? Improving   Is the patient/caregiver able to teach back signs and symptoms related to disease process for when to call PCP? Yes   Is the patient/caregiver able to teach back signs and symptoms related to disease process for when to call 911? Yes   Is the patient/caregiver able to teach back the hierarchy of who to call/visit for symptoms/problems? PCP, Specialist, Home health nurse, Urgent Care, ED, 911 Yes   TCM call completed? Yes   Wrap up additional comments Pt doing ok just tired. Site without issues. reviewed restrictions. Discussed meds and which to start and stop.   Call end time 1449            FRENCH WARNER - Registered Nurse    7/16/2025, 14:50 EDT

## 2025-07-17 ENCOUNTER — OFFICE VISIT (OUTPATIENT)
Dept: FAMILY MEDICINE CLINIC | Facility: CLINIC | Age: 60
End: 2025-07-17
Payer: MEDICAID

## 2025-07-17 VITALS
HEIGHT: 69 IN | BODY MASS INDEX: 40.29 KG/M2 | RESPIRATION RATE: 18 BRPM | DIASTOLIC BLOOD PRESSURE: 84 MMHG | WEIGHT: 272 LBS | HEART RATE: 87 BPM | SYSTOLIC BLOOD PRESSURE: 110 MMHG | OXYGEN SATURATION: 97 %

## 2025-07-17 DIAGNOSIS — E11.22 TYPE 2 DIABETES MELLITUS WITH STAGE 2 CHRONIC KIDNEY DISEASE, WITH LONG-TERM CURRENT USE OF INSULIN: ICD-10-CM

## 2025-07-17 DIAGNOSIS — E66.01 SEVERE OBESITY (BMI >= 40): ICD-10-CM

## 2025-07-17 DIAGNOSIS — Z09 HOSPITAL DISCHARGE FOLLOW-UP: ICD-10-CM

## 2025-07-17 DIAGNOSIS — Z79.4 TYPE 2 DIABETES MELLITUS WITH STAGE 2 CHRONIC KIDNEY DISEASE, WITH LONG-TERM CURRENT USE OF INSULIN: ICD-10-CM

## 2025-07-17 DIAGNOSIS — I10 ESSENTIAL HYPERTENSION: ICD-10-CM

## 2025-07-17 DIAGNOSIS — N18.2 STAGE 2 CHRONIC KIDNEY DISEASE: ICD-10-CM

## 2025-07-17 DIAGNOSIS — I25.810 CORONARY ARTERY DISEASE INVOLVING CORONARY BYPASS GRAFT OF NATIVE HEART WITHOUT ANGINA PECTORIS: Primary | ICD-10-CM

## 2025-07-17 DIAGNOSIS — K21.9 GASTROESOPHAGEAL REFLUX DISEASE WITHOUT ESOPHAGITIS: ICD-10-CM

## 2025-07-17 DIAGNOSIS — N18.2 TYPE 2 DIABETES MELLITUS WITH STAGE 2 CHRONIC KIDNEY DISEASE, WITH LONG-TERM CURRENT USE OF INSULIN: ICD-10-CM

## 2025-07-17 DIAGNOSIS — E78.2 MIXED HYPERLIPIDEMIA: ICD-10-CM

## 2025-07-17 DIAGNOSIS — G47.33 OSA (OBSTRUCTIVE SLEEP APNEA): ICD-10-CM

## 2025-07-17 DIAGNOSIS — Z79.4 TYPE 2 DIABETES MELLITUS WITH HYPERGLYCEMIA, WITH LONG-TERM CURRENT USE OF INSULIN: ICD-10-CM

## 2025-07-17 DIAGNOSIS — E11.65 TYPE 2 DIABETES MELLITUS WITH HYPERGLYCEMIA, WITH LONG-TERM CURRENT USE OF INSULIN: ICD-10-CM

## 2025-07-17 LAB
QT INTERVAL: 408 MS
QT INTERVAL: 410 MS
QTC INTERVAL: 465 MS
QTC INTERVAL: 470 MS

## 2025-07-17 NOTE — PROGRESS NOTES
Hospital Discharge Follow Up     Date: 2025   Patient Name: Myke Marcial  : 1965   MRN: [id number]     Chief Complaint:    Chief Complaint   Patient presents with    Hospital Follow Up Visit     R d/c 07/15 CP / stable angio pectoris, patient reports no CP since discharge, does report continued fatigue, f/u with cardio        History of Present Illness: Myke Marcial is a 59 y.o. male who is here today for hospital discharge follow up.    Patient reports that he was recently hospitalized for unstable angina requiring stenting. Patient reports that he is feeling okay but does feel tired. Patient denies chest pain or shortness of breath. Patient reports that he did have CABG in  around the same time of year.     Patient reports that he is currently on NovoLog, metformin, Farxiga, and tirzepatide. Patient reports that he did have his Mounjaro increased last month to 15mg weekly though has not started taking the 15mg yet. Patient reports he was taking his older prescription to use this up. Patient reports that he is starting the 15mg this . Patient is managed by endocrinology, Dr. Garcia.     Patient reports that he is compliance with his CPAP for PRATEEK. Patient reports he does follow with his sleep doctor on the  of this month.    Patient reports that he has been placed on Protonix in place of omeprazole.    Subjective     I have reviewed the patients family history, social history, past medical history, past surgical history and have updated it as appropriate.     Medications:     Current Outpatient Medications:     aspirin 81 MG EC tablet, Take 1 tablet by mouth Daily., Disp: , Rfl:     atorvastatin (LIPITOR) 80 MG tablet, Take 1 tablet by mouth Daily., Disp: 90 tablet, Rfl: 3    azelastine (ASTELIN) 0.1 % nasal spray, Administer 1 spray into the nostril(s) as directed by provider 2 (Two) Times a Day As Needed for Rhinitis or Allergies. Use in each nostril as  directed, Disp: 1 each, Rfl: 5    B-D UF III MINI PEN NEEDLES 31G X 5 MM misc, USE 1  ONCE DAILY, Disp: 100 each, Rfl: 0    clopidogrel (PLAVIX) 75 MG tablet, Take 1 tablet by mouth Daily., Disp: 30 tablet, Rfl: 0    Continuous Glucose Sensor (Dexcom G7 Sensor) misc, Use 1 each Every 10 (Ten) Days., Disp: 9 each, Rfl: 3    Farxiga 10 MG tablet, Take 10 mg by mouth Daily., Disp: 90 tablet, Rfl: 1    insulin aspart prot & aspart (NovoLOG 70/30 FlexPen ReliOn) (70-30) 100 UNIT/ML suspension pen-injector injection, 40 units twice daily before meals, titrate up as needed to  units (Patient taking differently: 0.5 mL 2 (Two) Times a Day Before Meals.), Disp: 120 mL, Rfl: 1    metFORMIN ER (GLUCOPHAGE-XR) 500 MG 24 hr tablet, Take 1 tablet by mouth 2 (Two) Times a Day., Disp: 180 tablet, Rfl: 1    metoprolol succinate XL (TOPROL-XL) 50 MG 24 hr tablet, Take 1 tablet by mouth Daily for 30 days., Disp: 30 tablet, Rfl: 0    multivitamin with minerals tablet tablet, Take 1 tablet by mouth Daily., Disp: , Rfl:     pantoprazole (PROTONIX) 40 MG EC tablet, Take 1 tablet by mouth Every Morning for 30 days., Disp: 30 tablet, Rfl: 0    pramipexole (MIRAPEX) 0.25 MG tablet, TAKE 1 TABLET BY MOUTH THREE TIMES DAILY, Disp: 270 tablet, Rfl: 0    tamsulosin (FLOMAX) 0.4 MG capsule 24 hr capsule, Take 1 capsule by mouth Every Night., Disp: 90 capsule, Rfl: 2    Tirzepatide 15 MG/0.5ML solution auto-injector, Inject 15 mg under the skin into the appropriate area as directed 1 (One) Time Per Week., Disp: 6 mL, Rfl: 1    Vibegron (Gemtesa) 75 MG tablet, Take 1 tablet by mouth Daily., Disp: 30 tablet, Rfl: 11    vitamin D3 125 MCG (5000 UT) capsule capsule, Take 1 capsule by mouth Daily., Disp: , Rfl:     Continuous Glucose Sensor (FreeStyle Rad 3 Sensor) misc, Use 1 each Every 14 (Fourteen) Days., Disp: 6 each, Rfl: 3    Allergies:   Allergies   Allergen Reactions    Ace Inhibitors Cough    Bee Venom Anaphylaxis    Penicillins  "Swelling       Objective     Physical Exam:     Vital Signs:   Vitals:    07/17/25 1324   BP: 110/84   Pulse: 87   Resp: 18   SpO2: 97%   Weight: 123 kg (272 lb)   Height: 175.3 cm (69\")     Body mass index is 40.17 kg/m².          Physical Exam     General:  Well appearing adult male in no acute distress. Alert and oriented. Vitals reviewed and are within normal limits.  Head/ENT: Atraumatic.   Neck: Anatomy appears symmetrical.   Cardiac: Regular rate and rhythm to auscultation.   Pulmonary: Lungs are clear to auscultation bilaterally.  Extremities: There is no appreciated lower extremity edema bilaterally to palpation.  Integumentary/Skin: Skin appears unremarkable from observable skin surfaces.   Neurological: Normal gait and speech.  Behavioral/Psych: Patient behavior/demeanor appears consistent with reported age. Patient is pleasant with normal affect today.      Procedures    Assessment / Plan      1. Hospital discharge follow-up    2. Coronary artery disease involving coronary bypass graft of native heart without angina pectoris    3. Mixed hyperlipidemia    4. Type 2 diabetes mellitus with hyperglycemia, with long-term current use of insulin    5. Type 2 diabetes mellitus with stage 2 chronic kidney disease, with long-term current use of insulin    6. Stage 2 chronic kidney disease    7. Essential hypertension    8. PRATEEK (obstructive sleep apnea)    9. Severe obesity (BMI >= 40)    10. Gastroesophageal reflux disease without esophagitis     Assessment & Plan  1. Coronary artery disease with unstable angina/Hyperlipidemia  - CAD w/prior CABG and new unstable angina  - Recently hospitalized for unstable angina with a stent placed in the RCA  - Experiencing fatigue since the procedure but reports no chest pain or shortness of breath  - Next cardiology appointment scheduled for 08/18/2025; reassured that this follow-up date is appropriate  - Provided with strict return precautions to seek emergency care if " necessary; confirms understanding  - Currently on aspirin, Plavix, metoprolol, and atorvastatin    2. Type 2 diabetes/BMI >40  - Managed by endocrinology; Mounjaro dosage recently increased but not yet started higher dose  - Encouraged to start higher dosage of Mounjaro (12 or 15 mg) as A1c levels will not improve without adherence to prescribed medication  - Discussed possible nutritional referral to aid with coronary artery disease, type 2 diabetes, and obesity; patient declined    3. Chronic kidney disease/HTN  - Stage 2 chronic kidney disease  - Advised to comply with diabetic medication regimen prescribed to prevent progression  - Encouraged compliance with medications for dyslipidemia and hypertension to control factors that could worsen the condition  - Advised to avoid nephrotoxic agents such as NSAIDs    4. Obstructive sleep apnea/MI >40  - Encouraged compliance with CPAP therapy  - Discussed possible nutritional referral to aid with coronary artery disease, type 2 diabetes, and obesity; patient declined     5. Gastroesophageal reflux disease  - Recently switched from omeprazole to Protonix  - Will continue to monitor     Patient or patient representative verbalized consent for the use of Ambient Listening during the visit with  Bashir Zuniga MD for chart documentation. 7/20/2025  19:11 EDT     Follow Up:   No follow-ups on file.      Bashir Zuniga MD  [facility]  [address] [place] [address] [address] [place] [address]  Fax [phone]  Phone [phone]

## 2025-07-28 ENCOUNTER — READMISSION MANAGEMENT (OUTPATIENT)
Dept: CALL CENTER | Facility: HOSPITAL | Age: 60
End: 2025-07-28
Payer: MEDICAID

## 2025-07-28 NOTE — OUTREACH NOTE
Medical Week 2 Survey      Flowsheet Row Responses   [facility] patient discharged from? [facility]   Does the patient have one of the following disease processes/diagnoses(primary or secondary)? Other   Week 2 attempt successful? No   Unsuccessful attempts Attempt 1   Dallas Friedman Registered Nurse

## 2025-07-31 ENCOUNTER — OFFICE VISIT (OUTPATIENT)
Dept: PULMONOLOGY | Facility: CLINIC | Age: 60
End: 2025-07-31
Payer: MEDICAID

## 2025-07-31 VITALS
RESPIRATION RATE: 18 BRPM | DIASTOLIC BLOOD PRESSURE: 76 MMHG | HEART RATE: 86 BPM | WEIGHT: 272 LBS | BODY MASS INDEX: 40.29 KG/M2 | OXYGEN SATURATION: 96 % | SYSTOLIC BLOOD PRESSURE: 124 MMHG | HEIGHT: 69 IN

## 2025-07-31 DIAGNOSIS — E66.01 MORBID OBESITY WITH BMI OF 40.0-44.9, ADULT: ICD-10-CM

## 2025-07-31 DIAGNOSIS — G47.33 OBSTRUCTIVE SLEEP APNEA: Primary | ICD-10-CM

## 2025-07-31 PROCEDURE — 3074F SYST BP LT 130 MM HG: CPT | Performed by: NURSE PRACTITIONER

## 2025-07-31 PROCEDURE — 99214 OFFICE O/P EST MOD 30 MIN: CPT | Performed by: NURSE PRACTITIONER

## 2025-07-31 PROCEDURE — 3078F DIAST BP <80 MM HG: CPT | Performed by: NURSE PRACTITIONER

## 2025-07-31 NOTE — PROGRESS NOTES
"Chief Complaint   Patient presents with    Sleeping Problem    Follow-up         Subjective   Myke Marcial is a 59 y.o. male.   Patient comes back today for follow up of Obstructive Sleep apnea.    Patient says that he is compliant with his device and using it regularly.    Patient's symptoms of sleep disturbance and daytime sleepiness have been helped greatly with the use of PAP device, as prescribed.  His pressure was changed at the last visit to AutoPAP 14-20. He is tolerating the pressure.     He falls asleep in his chair or in garage and when he goes to bed he does not get a full night sleep with the machine.     He naps during the day.     He has 2 stents placed a couple of weeks ago.     He has no bedtime and does not allow enough sleep time.       The following portions of the patient's history were reviewed and updated as appropriate: allergies, current medications, past family history, past medical history, past social history, and past surgical history.      Review of Systems   HENT:  Negative for sinus pressure, sneezing and sore throat.    Respiratory:  Positive for chest tightness (some). Negative for cough, shortness of breath and wheezing.    Psychiatric/Behavioral:  Negative for sleep disturbance.        Objective   Visit Vitals  /76   Pulse 86   Resp 18   Ht 175.3 cm (69.02\")   Wt 123 kg (272 lb)   SpO2 96%   BMI 40.15 kg/m²       Physical Exam  Vitals reviewed.   Constitutional:       Appearance: He is well-developed.   HENT:      Head: Atraumatic.      Mouth/Throat:      Mouth: Mucous membranes are moist.      Comments: Crowded oropharynx.  Eyes:      Extraocular Movements: Extraocular movements intact.   Cardiovascular:      Rate and Rhythm: Normal rate and regular rhythm.   Pulmonary:      Effort: Pulmonary effort is normal. No respiratory distress.   Abdominal:      Comments: Obese abdomen.    Skin:     General: Skin is warm.   Neurological:      Mental Status: He is alert and " oriented to person, place, and time.           Assessment & Plan   Diagnoses and all orders for this visit:    1. Obstructive sleep apnea (Primary)    2. Morbid obesity with BMI of 40.0-44.9, adult           Return in about 6 months (around 1/31/2026) for Recheck, For Dr. Nazario.      DISCUSSION (if any):  Sleep study performed in Sept 2016  AHI was 93 / hour.   REM AHI was 120/hour.     Latest PAP device provided in early 2023  DME company: [employer]?     Current PAP settings: 14-20  Current mask type: FFM    Continue treatment with AutoPAP at a pressure of 14-20, with a full-face mask.    He is likely not compliant due poor sleep hygiene. He is napping during the day.  I have discussed that it will be important in the future for him to try to have a more fixed sleep schedule and to cut out napping during the day if possible.    He just had stents placed so this may help with fatigue.     I have asked staff to request compliance from DME.  I will need to look at his compliance to evaluate residual AHI.    If AHI is elevated, will likely need to have a full night titration study.    Humidification setup, hose and mask care discussed.    Weight loss advised.    Use every night for at least 4 hours stressed.     The patient requested to be seen sooner than May 2026 so I have scheduled a 6-month follow-up.    Dictated utilizing Dragon dictation.    This document was electronically signed by TAE Roger July 31, 2025  16:13 EDT

## 2025-08-11 ENCOUNTER — OFFICE VISIT (OUTPATIENT)
Dept: FAMILY MEDICINE CLINIC | Facility: CLINIC | Age: 60
End: 2025-08-11
Payer: MEDICAID

## 2025-08-11 VITALS
HEART RATE: 85 BPM | BODY MASS INDEX: 40.82 KG/M2 | SYSTOLIC BLOOD PRESSURE: 126 MMHG | DIASTOLIC BLOOD PRESSURE: 84 MMHG | WEIGHT: 275.6 LBS | OXYGEN SATURATION: 95 % | HEIGHT: 69 IN

## 2025-08-11 DIAGNOSIS — I25.10 CORONARY ARTERY DISEASE INVOLVING NATIVE CORONARY ARTERY OF NATIVE HEART WITHOUT ANGINA PECTORIS: ICD-10-CM

## 2025-08-11 DIAGNOSIS — E11.65 UNCONTROLLED TYPE 2 DIABETES MELLITUS WITH HYPERGLYCEMIA: ICD-10-CM

## 2025-08-11 DIAGNOSIS — G47.33 OSA ON CPAP: ICD-10-CM

## 2025-08-11 DIAGNOSIS — Z91.199 POOR COMPLIANCE WITH CPAP TREATMENT: ICD-10-CM

## 2025-08-11 DIAGNOSIS — F32.9 REACTIVE DEPRESSION: Primary | ICD-10-CM

## 2025-08-11 DIAGNOSIS — K42.9 UMBILICAL HERNIA WITHOUT OBSTRUCTION AND WITHOUT GANGRENE: ICD-10-CM

## 2025-08-11 PROCEDURE — 3046F HEMOGLOBIN A1C LEVEL >9.0%: CPT | Performed by: FAMILY MEDICINE

## 2025-08-11 PROCEDURE — 1125F AMNT PAIN NOTED PAIN PRSNT: CPT | Performed by: FAMILY MEDICINE

## 2025-08-11 PROCEDURE — 3079F DIAST BP 80-89 MM HG: CPT | Performed by: FAMILY MEDICINE

## 2025-08-11 PROCEDURE — 1159F MED LIST DOCD IN RCRD: CPT | Performed by: FAMILY MEDICINE

## 2025-08-11 PROCEDURE — 99214 OFFICE O/P EST MOD 30 MIN: CPT | Performed by: FAMILY MEDICINE

## 2025-08-11 PROCEDURE — 3074F SYST BP LT 130 MM HG: CPT | Performed by: FAMILY MEDICINE

## 2025-08-11 PROCEDURE — 1160F RVW MEDS BY RX/DR IN RCRD: CPT | Performed by: FAMILY MEDICINE

## 2025-08-12 RX ORDER — PRAMIPEXOLE DIHYDROCHLORIDE 0.25 MG/1
0.25 TABLET ORAL 3 TIMES DAILY
Qty: 270 TABLET | Refills: 0 | Status: SHIPPED | OUTPATIENT
Start: 2025-08-12

## 2025-08-13 ENCOUNTER — OFFICE VISIT (OUTPATIENT)
Dept: CARDIOLOGY | Facility: CLINIC | Age: 60
End: 2025-08-13
Payer: MEDICAID

## 2025-08-13 VITALS
WEIGHT: 272.8 LBS | HEART RATE: 94 BPM | SYSTOLIC BLOOD PRESSURE: 132 MMHG | DIASTOLIC BLOOD PRESSURE: 86 MMHG | RESPIRATION RATE: 20 BRPM | BODY MASS INDEX: 40.4 KG/M2 | HEIGHT: 69 IN | OXYGEN SATURATION: 97 %

## 2025-08-13 DIAGNOSIS — I10 ESSENTIAL HYPERTENSION: ICD-10-CM

## 2025-08-13 DIAGNOSIS — E11.65 UNCONTROLLED TYPE 2 DIABETES MELLITUS WITH HYPERGLYCEMIA: ICD-10-CM

## 2025-08-13 DIAGNOSIS — E66.01 MORBID OBESITY WITH BMI OF 40.0-44.9, ADULT: ICD-10-CM

## 2025-08-13 DIAGNOSIS — E78.2 MIXED HYPERLIPIDEMIA: ICD-10-CM

## 2025-08-13 DIAGNOSIS — I25.10 CORONARY ARTERY DISEASE INVOLVING NATIVE CORONARY ARTERY OF NATIVE HEART WITHOUT ANGINA PECTORIS: Primary | ICD-10-CM

## 2025-08-13 PROCEDURE — 99214 OFFICE O/P EST MOD 30 MIN: CPT | Performed by: INTERNAL MEDICINE

## 2025-08-13 PROCEDURE — 3079F DIAST BP 80-89 MM HG: CPT | Performed by: INTERNAL MEDICINE

## 2025-08-13 PROCEDURE — 3075F SYST BP GE 130 - 139MM HG: CPT | Performed by: INTERNAL MEDICINE

## 2025-08-13 RX ORDER — RANOLAZINE 500 MG/1
500 TABLET, EXTENDED RELEASE ORAL 2 TIMES DAILY
Qty: 180 TABLET | Refills: 3 | Status: SHIPPED | OUTPATIENT
Start: 2025-08-13

## 2025-08-20 ENCOUNTER — TREATMENT (OUTPATIENT)
Dept: CARDIAC REHAB | Facility: HOSPITAL | Age: 60
End: 2025-08-20
Payer: MEDICAID

## 2025-08-20 DIAGNOSIS — Z95.1 STATUS POST CORONARY ARTERY BYPASS GRAFT: Primary | ICD-10-CM

## 2025-08-20 PROCEDURE — 93798 PHYS/QHP OP CAR RHAB W/ECG: CPT

## 2025-08-20 RX ORDER — METOPROLOL SUCCINATE 50 MG/1
50 TABLET, EXTENDED RELEASE ORAL
Qty: 90 TABLET | Refills: 3 | Status: SHIPPED | OUTPATIENT
Start: 2025-08-20 | End: 2025-09-19

## 2025-08-20 RX ORDER — CLOPIDOGREL BISULFATE 75 MG/1
75 TABLET ORAL DAILY
Qty: 180 TABLET | Refills: 3 | Status: SHIPPED | OUTPATIENT
Start: 2025-08-20

## 2025-08-27 ENCOUNTER — TREATMENT (OUTPATIENT)
Dept: CARDIAC REHAB | Facility: HOSPITAL | Age: 60
End: 2025-08-27
Payer: MEDICAID

## 2025-08-27 DIAGNOSIS — Z95.1 STATUS POST CORONARY ARTERY BYPASS GRAFT: Primary | ICD-10-CM

## 2025-08-27 PROCEDURE — 93798 PHYS/QHP OP CAR RHAB W/ECG: CPT

## (undated) DEVICE — INFLATION DEVICE KIT: Brand: ENCORE™ 26 ADVANTAGE KIT

## (undated) DEVICE — Device

## (undated) DEVICE — GW INQWIRE FC PTFE STD J/1.5 .035 260

## (undated) DEVICE — HYBRID TUBING/CAP SET FOR OLYMPUS® SCOPES: Brand: ERBE

## (undated) DEVICE — ENDOSCOPY PORT CONNECTOR FOR OLYMPUS® SCOPES: Brand: ERBE

## (undated) DEVICE — FRCP BIOP COLD ENDOJAW ALLGTR W/NDL 2.8X2300MM BLU

## (undated) DEVICE — FRCP BX RADJAW4 NDL 2.8 240 STD OG

## (undated) DEVICE — TR BAND RADIAL ARTERY COMPRESSION DEVICE: Brand: TR BAND

## (undated) DEVICE — GUIDELINER CATHETERS ARE INTENDED TO BE USED IN CONJUNCTION WITH GUIDE CATHETERS TO ACCESS DISCRETE REGIONS OF THE CORONARY AND/OR PERIPHERAL VASCULATURE, AND TO FACILITATE PLACEMENT OF INTERVENTIONAL DEVICES.: Brand: GUIDELINER® V3 CATHETER

## (undated) DEVICE — CATH F6 ST JL 4 100CM: Brand: SUPERTORQUE

## (undated) DEVICE — CONMED SCOPE SAVER BITE BLOCK, 20X27 MM: Brand: SCOPE SAVER

## (undated) DEVICE — SUCTION CANISTER, 1500CC, RIGID: Brand: DEROYAL

## (undated) DEVICE — HYBRID CO2 TUBING/CAP SET FOR OLYMPUS® SCOPES & CO2 SOURCE: Brand: ERBE

## (undated) DEVICE — VLV SXN AIR/H2O ORCAPOD3 1P/U STRL

## (undated) DEVICE — SINGLE-USE POLYPECTOMY SNARE: Brand: CAPTIVATOR II

## (undated) DEVICE — GLIDESHEATH SLENDER STAINLESS STEEL KIT: Brand: GLIDESHEATH SLENDER

## (undated) DEVICE — GLV SURG SENSICARE W/ALOE PF LF 8.5 STRL

## (undated) DEVICE — QUICK CATCH IN-LINE SUCTION POLYP TRAP IS USED FOR SUCTION RETRIEVAL OF ENDOSCOPICALLY REMOVED POLYPS.

## (undated) DEVICE — RUNTHROUGH NS EXTRA FLOPPY PTCA GUIDEWIRE: Brand: RUNTHROUGH

## (undated) DEVICE — GUIDE CATHETER: Brand: MACH1™

## (undated) DEVICE — LUBE JELLY PK/2.75GM STRL BX/144

## (undated) DEVICE — CATH DIAG IMPULSE IMT 6F 100CM

## (undated) DEVICE — ENDOGATOR AUXILIARY WATER JET CONNECTOR: Brand: ENDOGATOR

## (undated) DEVICE — DGW .035 FC J3MM 260CM TEF: Brand: EMERALD

## (undated) DEVICE — JELLY,LUBE,STERILE,FLIP TOP,TUBE,2-OZ: Brand: MEDLINE

## (undated) DEVICE — CATH F6 ST JR 4 100CM: Brand: SUPERTORQUE